# Patient Record
Sex: MALE | Race: BLACK OR AFRICAN AMERICAN | NOT HISPANIC OR LATINO | Employment: FULL TIME | URBAN - METROPOLITAN AREA
[De-identification: names, ages, dates, MRNs, and addresses within clinical notes are randomized per-mention and may not be internally consistent; named-entity substitution may affect disease eponyms.]

---

## 2017-04-19 ENCOUNTER — ALLSCRIPTS OFFICE VISIT (OUTPATIENT)
Dept: OTHER | Facility: OTHER | Age: 52
End: 2017-04-19

## 2018-01-14 VITALS
OXYGEN SATURATION: 98 % | DIASTOLIC BLOOD PRESSURE: 78 MMHG | TEMPERATURE: 98 F | HEART RATE: 97 BPM | WEIGHT: 228 LBS | SYSTOLIC BLOOD PRESSURE: 138 MMHG | HEIGHT: 65 IN | BODY MASS INDEX: 37.99 KG/M2 | RESPIRATION RATE: 18 BRPM

## 2019-05-14 ENCOUNTER — OFFICE VISIT (OUTPATIENT)
Dept: GASTROENTEROLOGY | Facility: CLINIC | Age: 54
End: 2019-05-14
Payer: COMMERCIAL

## 2019-05-14 VITALS
SYSTOLIC BLOOD PRESSURE: 150 MMHG | BODY MASS INDEX: 42.25 KG/M2 | HEIGHT: 65 IN | DIASTOLIC BLOOD PRESSURE: 105 MMHG | HEART RATE: 90 BPM | TEMPERATURE: 97.1 F | WEIGHT: 253.6 LBS

## 2019-05-14 DIAGNOSIS — Z12.11 COLON CANCER SCREENING: Primary | ICD-10-CM

## 2019-05-14 DIAGNOSIS — K21.9 GASTROESOPHAGEAL REFLUX DISEASE, ESOPHAGITIS PRESENCE NOT SPECIFIED: ICD-10-CM

## 2019-05-14 DIAGNOSIS — Z98.84 GASTRIC BYPASS STATUS FOR OBESITY: ICD-10-CM

## 2019-05-14 PROCEDURE — 99244 OFF/OP CNSLTJ NEW/EST MOD 40: CPT | Performed by: INTERNAL MEDICINE

## 2019-05-14 RX ORDER — AMLODIPINE BESYLATE 5 MG/1
1 TABLET ORAL DAILY
COMMUNITY
End: 2020-06-05

## 2019-05-14 RX ORDER — RANITIDINE 150 MG/1
150 TABLET ORAL DAILY
COMMUNITY
End: 2020-10-01 | Stop reason: ALTCHOICE

## 2019-05-14 RX ORDER — ALLOPURINOL 300 MG/1
1 TABLET ORAL AS NEEDED
COMMUNITY
End: 2020-12-01 | Stop reason: SDUPTHER

## 2019-05-14 RX ORDER — LANOLIN ALCOHOL/MO/W.PET/CERES
CREAM (GRAM) TOPICAL
COMMUNITY
End: 2020-12-01 | Stop reason: SDUPTHER

## 2019-05-14 RX ORDER — PANTOPRAZOLE SODIUM 40 MG/1
1 TABLET, DELAYED RELEASE ORAL 2 TIMES DAILY
COMMUNITY
Start: 2017-04-19 | End: 2020-10-01 | Stop reason: ALTCHOICE

## 2019-05-14 RX ORDER — SUCRALFATE ORAL 1 G/10ML
SUSPENSION ORAL 4 TIMES DAILY
COMMUNITY
End: 2020-12-01 | Stop reason: SDUPTHER

## 2019-05-24 ENCOUNTER — HOSPITAL ENCOUNTER (OUTPATIENT)
Dept: GASTROENTEROLOGY | Facility: HOSPITAL | Age: 54
Discharge: HOME | End: 2019-05-24
Attending: INTERNAL MEDICINE
Payer: COMMERCIAL

## 2019-05-24 VITALS
SYSTOLIC BLOOD PRESSURE: 143 MMHG | HEART RATE: 90 BPM | RESPIRATION RATE: 18 BRPM | OXYGEN SATURATION: 96 % | DIASTOLIC BLOOD PRESSURE: 90 MMHG

## 2019-05-24 DIAGNOSIS — K21.9 GASTROESOPHAGEAL REFLUX DISEASE, ESOPHAGITIS PRESENCE NOT SPECIFIED: ICD-10-CM

## 2019-05-24 PROCEDURE — 91020 GASTRIC MOTILITY STUDIES: CPT

## 2019-06-24 PROCEDURE — 91010 ESOPHAGUS MOTILITY STUDY: CPT | Performed by: INTERNAL MEDICINE

## 2019-07-01 ENCOUNTER — ANESTHESIA EVENT (OUTPATIENT)
Dept: GASTROENTEROLOGY | Facility: AMBULARY SURGERY CENTER | Age: 54
End: 2019-07-01

## 2019-07-14 NOTE — ANESTHESIA PREPROCEDURE EVALUATION
Review of Systems/Medical History          Cardiovascular  Hypertension ,    Pulmonary  Smoker cigar smoker  ,   Comment: Last night     GI/Hepatic    GERD , Bariatric surgery (2014),             Endo/Other    Obesity  morbid obesity   GYN       Hematology   Musculoskeletal    Comment: Gout      Neurology   Psychology           Physical Exam    Airway    Mallampati score: I  TM Distance: >3 FB  Neck ROM: full     Dental   No notable dental hx     Cardiovascular      Pulmonary      Other Findings        Anesthesia Plan  ASA Score- 3     Anesthesia Type- IV sedation with anesthesia with ASA Monitors  Additional Monitors:   Airway Plan:         Plan Factors-Patient not instructed to abstain from smoking on day of procedure  Patient did not smoke on day of surgery  Induction- intravenous  Postoperative Plan-     Informed Consent- Anesthetic plan and risks discussed with patient  I personally reviewed this patient with the CRNA  Discussed and agreed on the Anesthesia Plan with the CRNA  Galina Delgadillo

## 2019-07-15 ENCOUNTER — ANESTHESIA (OUTPATIENT)
Dept: GASTROENTEROLOGY | Facility: AMBULARY SURGERY CENTER | Age: 54
End: 2019-07-15

## 2019-07-15 ENCOUNTER — HOSPITAL ENCOUNTER (OUTPATIENT)
Dept: GASTROENTEROLOGY | Facility: AMBULARY SURGERY CENTER | Age: 54
Setting detail: OUTPATIENT SURGERY
Discharge: HOME | End: 2019-07-15
Attending: INTERNAL MEDICINE | Admitting: INTERNAL MEDICINE
Payer: COMMERCIAL

## 2019-07-15 VITALS
HEART RATE: 78 BPM | TEMPERATURE: 97 F | DIASTOLIC BLOOD PRESSURE: 93 MMHG | RESPIRATION RATE: 18 BRPM | WEIGHT: 250 LBS | HEIGHT: 65 IN | BODY MASS INDEX: 41.65 KG/M2 | OXYGEN SATURATION: 97 % | SYSTOLIC BLOOD PRESSURE: 146 MMHG

## 2019-07-15 DIAGNOSIS — Z12.11 COLON CANCER SCREENING: ICD-10-CM

## 2019-07-15 DIAGNOSIS — K21.9 GASTROESOPHAGEAL REFLUX DISEASE, ESOPHAGITIS PRESENCE NOT SPECIFIED: ICD-10-CM

## 2019-07-15 PROCEDURE — 43235 EGD DIAGNOSTIC BRUSH WASH: CPT | Performed by: INTERNAL MEDICINE

## 2019-07-15 RX ORDER — TRAMADOL HYDROCHLORIDE 50 MG/1
50 TABLET ORAL EVERY 6 HOURS PRN
COMMUNITY
End: 2021-07-18 | Stop reason: HOSPADM

## 2019-07-15 RX ORDER — SODIUM CHLORIDE, SODIUM LACTATE, POTASSIUM CHLORIDE, CALCIUM CHLORIDE 600; 310; 30; 20 MG/100ML; MG/100ML; MG/100ML; MG/100ML
125 INJECTION, SOLUTION INTRAVENOUS CONTINUOUS
Status: DISCONTINUED | OUTPATIENT
Start: 2019-07-15 | End: 2019-07-19 | Stop reason: HOSPADM

## 2019-07-15 RX ORDER — LIDOCAINE HYDROCHLORIDE 10 MG/ML
INJECTION, SOLUTION INFILTRATION; PERINEURAL AS NEEDED
Status: DISCONTINUED | OUTPATIENT
Start: 2019-07-15 | End: 2019-07-15 | Stop reason: SURG

## 2019-07-15 RX ORDER — PROPOFOL 10 MG/ML
INJECTION, EMULSION INTRAVENOUS AS NEEDED
Status: DISCONTINUED | OUTPATIENT
Start: 2019-07-15 | End: 2019-07-15 | Stop reason: SURG

## 2019-07-15 RX ADMIN — LIDOCAINE HYDROCHLORIDE ANHYDROUS 30 MG: 10 INJECTION, SOLUTION INFILTRATION at 11:59

## 2019-07-15 RX ADMIN — PROPOFOL 50 MG: 10 INJECTION, EMULSION INTRAVENOUS at 12:02

## 2019-07-15 RX ADMIN — SODIUM CHLORIDE, SODIUM LACTATE, POTASSIUM CHLORIDE, AND CALCIUM CHLORIDE: .6; .31; .03; .02 INJECTION, SOLUTION INTRAVENOUS at 11:33

## 2019-07-15 RX ADMIN — PROPOFOL 50 MG: 10 INJECTION, EMULSION INTRAVENOUS at 12:00

## 2019-07-15 RX ADMIN — PROPOFOL 50 MG: 10 INJECTION, EMULSION INTRAVENOUS at 11:59

## 2019-07-15 RX ADMIN — PROPOFOL 50 MG: 10 INJECTION, EMULSION INTRAVENOUS at 12:05

## 2019-07-15 NOTE — H&P
History and Physical -  [facility]  Sruthi Pereira 47 y o  male MRN: [id number]        HPI: Sruthi Pereira is a 47y o  year old male who presents for EGD for long standing GERD  REVIEW OF SYSTEMS: Per the HPI, and otherwise unremarkable  Historical Information   History reviewed  No pertinent past medical history  Past Surgical History:   Procedure Laterality Date    STOMACH SURGERY  2015    gastric sleeve     Social History   Social History     Substance and Sexual Activity   Alcohol Use Never    Frequency: Never     Social History     Substance and Sexual Activity   Drug Use Not on file     Social History     Tobacco Use   Smoking Status Light Tobacco Smoker   Smokeless Tobacco Never Used   Tobacco Comment    cigar ocasional     History reviewed  No pertinent family history  Meds/Allergies       (Not in a hospital admission)    No Known Allergies    Objective     There were no vitals taken for this visit  PHYSICAL EXAM    Gen: NAD  CV: RRR  CHEST: Clear  ABD: soft, NT/ND  EXT: no edema      ASSESSMENT/PLAN:  This is a 47y o  year old male here for EGD, and he is stable and optimized for his procedure

## 2019-07-15 NOTE — ANESTHESIA POSTPROCEDURE EVALUATION
Post-Op Assessment Note    CV Status:  Stable  Pain Score: 0    Pain management: adequate     Mental Status:  Alert and awake   Hydration Status:  Euvolemic   PONV Controlled:  Controlled   Airway Patency:  Patent   Post Op Vitals Reviewed: Yes      Staff: CRNA           BP   144/96   Temp     Pulse     Resp   18   SpO2   96 RA

## 2019-07-17 ENCOUNTER — TELEPHONE (OUTPATIENT)
Dept: GASTROENTEROLOGY | Facility: AMBULARY SURGERY CENTER | Age: 54
End: 2019-07-17

## 2019-07-17 NOTE — TELEPHONE ENCOUNTER
It's under procedures  Normal w/ large hiatal hernia  Agree needs to see bariatrics for possible repair    Sunshine Meyer

## 2019-12-03 ENCOUNTER — TELEPHONE (OUTPATIENT)
Dept: GASTROENTEROLOGY | Facility: AMBULARY SURGERY CENTER | Age: 54
End: 2019-12-03

## 2019-12-03 NOTE — TELEPHONE ENCOUNTER
Patients GI provider:  Dr Dhruv Clarke    Number to return call: [phone]    Reason for call: Pt spouse called requesting to schedule the hiatal repair procedure as discussed without going to bariatric  Please assist thank you    Scheduled procedure/appointment date if applicable: Apt/procedure   n/a

## 2019-12-05 NOTE — TELEPHONE ENCOUNTER
Left voicemail for patient or wife to call office  He has to go see a surgeon to get the hernia removed

## 2019-12-09 NOTE — TELEPHONE ENCOUNTER
Spoke to Howie and she is in the process of getting records over to Dr Vernon Fountain to schedule an appt

## 2019-12-10 DIAGNOSIS — Z98.84 BARIATRIC SURGERY STATUS: Primary | ICD-10-CM

## 2019-12-16 ENCOUNTER — HOSPITAL ENCOUNTER (INPATIENT)
Facility: HOSPITAL | Age: 54
LOS: 2 days | Discharge: HOME | DRG: 176 | End: 2019-12-18
Attending: EMERGENCY MEDICINE | Admitting: INTERNAL MEDICINE
Payer: COMMERCIAL

## 2019-12-16 ENCOUNTER — APPOINTMENT (EMERGENCY)
Dept: CT IMAGING | Facility: HOSPITAL | Age: 54
DRG: 176 | End: 2019-12-16
Payer: COMMERCIAL

## 2019-12-16 DIAGNOSIS — I26.99 PULMONARY EMBOLISM (HCC): ICD-10-CM

## 2019-12-16 DIAGNOSIS — F17.290 CIGAR SMOKER: ICD-10-CM

## 2019-12-16 DIAGNOSIS — I26.99 PE (PULMONARY THROMBOEMBOLISM) (HCC): Primary | ICD-10-CM

## 2019-12-16 LAB
ALBUMIN SERPL BCP-MCNC: 3.4 G/DL (ref 3.5–5)
ALP SERPL-CCNC: 87 U/L (ref 46–116)
ALT SERPL W P-5'-P-CCNC: 35 U/L (ref 12–78)
ANION GAP SERPL CALCULATED.3IONS-SCNC: 9 MMOL/L (ref 4–13)
APTT PPP: 31 SECONDS (ref 23–37)
AST SERPL W P-5'-P-CCNC: 19 U/L (ref 5–45)
BASOPHILS # BLD AUTO: 0.02 THOUSANDS/ΜL (ref 0–0.1)
BASOPHILS NFR BLD AUTO: 0 % (ref 0–1)
BILIRUB SERPL-MCNC: 0.24 MG/DL (ref 0.2–1)
BILIRUB UR QL STRIP: NEGATIVE
BUN SERPL-MCNC: 11 MG/DL (ref 5–25)
CALCIUM SERPL-MCNC: 8.9 MG/DL (ref 8.3–10.1)
CHLORIDE SERPL-SCNC: 104 MMOL/L (ref 100–108)
CLARITY UR: CLEAR
CO2 SERPL-SCNC: 26 MMOL/L (ref 21–32)
COLOR UR: YELLOW
CREAT SERPL-MCNC: 1.08 MG/DL (ref 0.6–1.3)
EOSINOPHIL # BLD AUTO: 0.12 THOUSAND/ΜL (ref 0–0.61)
EOSINOPHIL NFR BLD AUTO: 2 % (ref 0–6)
ERYTHROCYTE [DISTWIDTH] IN BLOOD BY AUTOMATED COUNT: 13.6 % (ref 11.6–15.1)
GFR SERPL CREATININE-BSD FRML MDRD: 90 ML/MIN/1.73SQ M
GLUCOSE SERPL-MCNC: 130 MG/DL (ref 65–140)
GLUCOSE UR STRIP-MCNC: NEGATIVE MG/DL
HCT VFR BLD AUTO: 41.7 % (ref 36.5–49.3)
HGB BLD-MCNC: 13.5 G/DL (ref 12–17)
HGB UR QL STRIP.AUTO: NEGATIVE
IMM GRANULOCYTES # BLD AUTO: 0.01 THOUSAND/UL (ref 0–0.2)
IMM GRANULOCYTES NFR BLD AUTO: 0 % (ref 0–2)
INR PPP: 0.96 (ref 0.84–1.19)
KETONES UR STRIP-MCNC: NEGATIVE MG/DL
LEUKOCYTE ESTERASE UR QL STRIP: NEGATIVE
LIPASE SERPL-CCNC: 115 U/L (ref 73–393)
LYMPHOCYTES # BLD AUTO: 0.88 THOUSANDS/ΜL (ref 0.6–4.47)
LYMPHOCYTES NFR BLD AUTO: 12 % (ref 14–44)
MCH RBC QN AUTO: 28.2 PG (ref 26.8–34.3)
MCHC RBC AUTO-ENTMCNC: 32.4 G/DL (ref 31.4–37.4)
MCV RBC AUTO: 87 FL (ref 82–98)
MONOCYTES # BLD AUTO: 0.57 THOUSAND/ΜL (ref 0.17–1.22)
MONOCYTES NFR BLD AUTO: 8 % (ref 4–12)
NEUTROPHILS # BLD AUTO: 5.7 THOUSANDS/ΜL (ref 1.85–7.62)
NEUTS SEG NFR BLD AUTO: 78 % (ref 43–75)
NITRITE UR QL STRIP: NEGATIVE
NRBC BLD AUTO-RTO: 0 /100 WBCS
PH UR STRIP.AUTO: 7 [PH]
PLATELET # BLD AUTO: 209 THOUSANDS/UL (ref 149–390)
PMV BLD AUTO: 10.4 FL (ref 8.9–12.7)
POTASSIUM SERPL-SCNC: 3.7 MMOL/L (ref 3.5–5.3)
PROT SERPL-MCNC: 7.3 G/DL (ref 6.4–8.2)
PROT UR STRIP-MCNC: NEGATIVE MG/DL
PROTHROMBIN TIME: 12.2 SECONDS (ref 11.6–14.5)
RBC # BLD AUTO: 4.79 MILLION/UL (ref 3.88–5.62)
SODIUM SERPL-SCNC: 139 MMOL/L (ref 136–145)
SP GR UR STRIP.AUTO: 1.02 (ref 1–1.03)
TROPONIN I SERPL-MCNC: 0.05 NG/ML
UROBILINOGEN UR QL STRIP.AUTO: 1 E.U./DL
WBC # BLD AUTO: 7.3 THOUSAND/UL (ref 4.31–10.16)

## 2019-12-16 PROCEDURE — 85730 THROMBOPLASTIN TIME PARTIAL: CPT | Performed by: EMERGENCY MEDICINE

## 2019-12-16 PROCEDURE — 93005 ELECTROCARDIOGRAM TRACING: CPT

## 2019-12-16 PROCEDURE — 96374 THER/PROPH/DIAG INJ IV PUSH: CPT

## 2019-12-16 PROCEDURE — 84484 ASSAY OF TROPONIN QUANT: CPT | Performed by: EMERGENCY MEDICINE

## 2019-12-16 PROCEDURE — 81003 URINALYSIS AUTO W/O SCOPE: CPT | Performed by: EMERGENCY MEDICINE

## 2019-12-16 PROCEDURE — 96361 HYDRATE IV INFUSION ADD-ON: CPT

## 2019-12-16 PROCEDURE — 71275 CT ANGIOGRAPHY CHEST: CPT

## 2019-12-16 PROCEDURE — 80053 COMPREHEN METABOLIC PANEL: CPT | Performed by: EMERGENCY MEDICINE

## 2019-12-16 PROCEDURE — 74177 CT ABD & PELVIS W/CONTRAST: CPT

## 2019-12-16 PROCEDURE — 99285 EMERGENCY DEPT VISIT HI MDM: CPT

## 2019-12-16 PROCEDURE — 99285 EMERGENCY DEPT VISIT HI MDM: CPT | Performed by: EMERGENCY MEDICINE

## 2019-12-16 PROCEDURE — 85025 COMPLETE CBC W/AUTO DIFF WBC: CPT | Performed by: EMERGENCY MEDICINE

## 2019-12-16 PROCEDURE — 36415 COLL VENOUS BLD VENIPUNCTURE: CPT | Performed by: EMERGENCY MEDICINE

## 2019-12-16 PROCEDURE — 83690 ASSAY OF LIPASE: CPT | Performed by: EMERGENCY MEDICINE

## 2019-12-16 PROCEDURE — 85610 PROTHROMBIN TIME: CPT | Performed by: EMERGENCY MEDICINE

## 2019-12-16 RX ORDER — HEPARIN SODIUM 1000 [USP'U]/ML
4600 INJECTION, SOLUTION INTRAVENOUS; SUBCUTANEOUS AS NEEDED
Status: DISCONTINUED | OUTPATIENT
Start: 2019-12-16 | End: 2019-12-18

## 2019-12-16 RX ORDER — HEPARIN SODIUM 1000 [USP'U]/ML
9200 INJECTION, SOLUTION INTRAVENOUS; SUBCUTANEOUS AS NEEDED
Status: DISCONTINUED | OUTPATIENT
Start: 2019-12-16 | End: 2019-12-18

## 2019-12-16 RX ORDER — KETOROLAC TROMETHAMINE 30 MG/ML
15 INJECTION, SOLUTION INTRAMUSCULAR; INTRAVENOUS ONCE
Status: COMPLETED | OUTPATIENT
Start: 2019-12-16 | End: 2019-12-16

## 2019-12-16 RX ORDER — HEPARIN SODIUM 10000 [USP'U]/100ML
3-30 INJECTION, SOLUTION INTRAVENOUS
Status: DISCONTINUED | OUTPATIENT
Start: 2019-12-16 | End: 2019-12-18

## 2019-12-16 RX ORDER — HEPARIN SODIUM 1000 [USP'U]/ML
9200 INJECTION, SOLUTION INTRAVENOUS; SUBCUTANEOUS ONCE
Status: COMPLETED | OUTPATIENT
Start: 2019-12-16 | End: 2019-12-16

## 2019-12-16 RX ADMIN — KETOROLAC TROMETHAMINE 15 MG: 30 INJECTION, SOLUTION INTRAMUSCULAR at 19:46

## 2019-12-16 RX ADMIN — IOHEXOL 100 ML: 350 INJECTION, SOLUTION INTRAVENOUS at 20:58

## 2019-12-16 RX ADMIN — HEPARIN SODIUM 18 UNITS/KG/HR: 10000 INJECTION, SOLUTION INTRAVENOUS at 22:31

## 2019-12-16 RX ADMIN — SODIUM CHLORIDE 1000 ML: 0.9 INJECTION, SOLUTION INTRAVENOUS at 19:42

## 2019-12-16 RX ADMIN — HEPARIN SODIUM 9200 UNITS: 1000 INJECTION INTRAVENOUS; SUBCUTANEOUS at 22:30

## 2019-12-16 NOTE — LETTER
[facility]  FLOOR MED SURG UNIT  [address] [place] [address]  Dept: [phone]    December 19, 2019     Patient: Kiara Moran   YOB: 1965   Date of Visit: 12/16/2019       To Whom it May Concern:    Allison President was under my professional care  He was seen in the hospital from 12/16/2019   to 12/19/19  He may return to work after 12/26/19 with the following limitations; No heavy lifting or exertional work  If you have any questions or concerns, please don't hesitate to call           Sincerely,          John Najera MD

## 2019-12-17 ENCOUNTER — APPOINTMENT (INPATIENT)
Dept: ULTRASOUND IMAGING | Facility: HOSPITAL | Age: 54
DRG: 176 | End: 2019-12-17
Payer: COMMERCIAL

## 2019-12-17 PROBLEM — Z90.3 HISTORY OF SLEEVE GASTRECTOMY: Status: ACTIVE | Noted: 2019-12-17

## 2019-12-17 PROBLEM — I10 HTN (HYPERTENSION): Status: ACTIVE | Noted: 2019-12-17

## 2019-12-17 PROBLEM — D86.9 SARCOIDOSIS: Status: ACTIVE | Noted: 2019-12-17

## 2019-12-17 PROBLEM — R91.1 INCIDENTAL LUNG NODULE, > 3MM AND < 8MM: Status: ACTIVE | Noted: 2019-12-17

## 2019-12-17 PROBLEM — N28.9 RENAL LESION: Status: ACTIVE | Noted: 2019-12-17

## 2019-12-17 PROBLEM — F17.290 CIGAR SMOKER: Status: ACTIVE | Noted: 2019-12-17

## 2019-12-17 PROBLEM — K21.9 GERD (GASTROESOPHAGEAL REFLUX DISEASE): Status: ACTIVE | Noted: 2019-12-17

## 2019-12-17 PROBLEM — I26.99 PULMONARY EMBOLISM (HCC): Status: ACTIVE | Noted: 2019-12-17

## 2019-12-17 LAB
ANION GAP SERPL CALCULATED.3IONS-SCNC: 10 MMOL/L (ref 4–13)
APTT PPP: 146 SECONDS (ref 23–37)
APTT PPP: 67 SECONDS (ref 23–37)
APTT PPP: 92 SECONDS (ref 23–37)
ATRIAL RATE: 107 BPM
BASOPHILS # BLD AUTO: 0.03 THOUSANDS/ΜL (ref 0–0.1)
BASOPHILS NFR BLD AUTO: 0 % (ref 0–1)
BUN SERPL-MCNC: 9 MG/DL (ref 5–25)
CALCIUM SERPL-MCNC: 8.4 MG/DL (ref 8.3–10.1)
CHLORIDE SERPL-SCNC: 103 MMOL/L (ref 100–108)
CO2 SERPL-SCNC: 26 MMOL/L (ref 21–32)
CREAT SERPL-MCNC: 0.83 MG/DL (ref 0.6–1.3)
EOSINOPHIL # BLD AUTO: 0.17 THOUSAND/ΜL (ref 0–0.61)
EOSINOPHIL NFR BLD AUTO: 3 % (ref 0–6)
ERYTHROCYTE [DISTWIDTH] IN BLOOD BY AUTOMATED COUNT: 13.9 % (ref 11.6–15.1)
GFR SERPL CREATININE-BSD FRML MDRD: 116 ML/MIN/1.73SQ M
GLUCOSE SERPL-MCNC: 90 MG/DL (ref 65–140)
HCT VFR BLD AUTO: 42.3 % (ref 36.5–49.3)
HGB BLD-MCNC: 13.3 G/DL (ref 12–17)
IMM GRANULOCYTES # BLD AUTO: 0.03 THOUSAND/UL (ref 0–0.2)
IMM GRANULOCYTES NFR BLD AUTO: 0 % (ref 0–2)
LYMPHOCYTES # BLD AUTO: 0.94 THOUSANDS/ΜL (ref 0.6–4.47)
LYMPHOCYTES NFR BLD AUTO: 14 % (ref 14–44)
MCH RBC QN AUTO: 27.8 PG (ref 26.8–34.3)
MCHC RBC AUTO-ENTMCNC: 31.4 G/DL (ref 31.4–37.4)
MCV RBC AUTO: 89 FL (ref 82–98)
MONOCYTES # BLD AUTO: 0.69 THOUSAND/ΜL (ref 0.17–1.22)
MONOCYTES NFR BLD AUTO: 10 % (ref 4–12)
NEUTROPHILS # BLD AUTO: 4.82 THOUSANDS/ΜL (ref 1.85–7.62)
NEUTS SEG NFR BLD AUTO: 73 % (ref 43–75)
NRBC BLD AUTO-RTO: 0 /100 WBCS
P AXIS: 58 DEGREES
PLATELET # BLD AUTO: 197 THOUSANDS/UL (ref 149–390)
PMV BLD AUTO: 11.4 FL (ref 8.9–12.7)
POTASSIUM SERPL-SCNC: 3.5 MMOL/L (ref 3.5–5.3)
PR INTERVAL: 134 MS
QRS AXIS: 5 DEGREES
QRSD INTERVAL: 84 MS
QT INTERVAL: 316 MS
QTC INTERVAL: 414 MS
RBC # BLD AUTO: 4.78 MILLION/UL (ref 3.88–5.62)
SODIUM SERPL-SCNC: 139 MMOL/L (ref 136–145)
T WAVE AXIS: 38 DEGREES
TROPONIN I SERPL-MCNC: 0.04 NG/ML
TROPONIN I SERPL-MCNC: 0.04 NG/ML
VENTRICULAR RATE: 103 BPM
WBC # BLD AUTO: 6.68 THOUSAND/UL (ref 4.31–10.16)

## 2019-12-17 PROCEDURE — 93010 ELECTROCARDIOGRAM REPORT: CPT | Performed by: INTERNAL MEDICINE

## 2019-12-17 PROCEDURE — 84484 ASSAY OF TROPONIN QUANT: CPT | Performed by: NURSE PRACTITIONER

## 2019-12-17 PROCEDURE — 85730 THROMBOPLASTIN TIME PARTIAL: CPT | Performed by: NURSE PRACTITIONER

## 2019-12-17 PROCEDURE — 85730 THROMBOPLASTIN TIME PARTIAL: CPT | Performed by: INTERNAL MEDICINE

## 2019-12-17 PROCEDURE — 85025 COMPLETE CBC W/AUTO DIFF WBC: CPT | Performed by: NURSE PRACTITIONER

## 2019-12-17 PROCEDURE — 99223 1ST HOSP IP/OBS HIGH 75: CPT | Performed by: INTERNAL MEDICINE

## 2019-12-17 PROCEDURE — 80048 BASIC METABOLIC PNL TOTAL CA: CPT | Performed by: NURSE PRACTITIONER

## 2019-12-17 PROCEDURE — 93970 EXTREMITY STUDY: CPT | Performed by: SURGERY

## 2019-12-17 PROCEDURE — 93970 EXTREMITY STUDY: CPT

## 2019-12-17 RX ORDER — TRAMADOL HYDROCHLORIDE 50 MG/1
50 TABLET ORAL EVERY 6 HOURS PRN
Status: DISCONTINUED | OUTPATIENT
Start: 2019-12-17 | End: 2019-12-17

## 2019-12-17 RX ORDER — SUCRALFATE ORAL 1 G/10ML
1000 SUSPENSION ORAL
Status: DISCONTINUED | OUTPATIENT
Start: 2019-12-17 | End: 2019-12-18 | Stop reason: HOSPADM

## 2019-12-17 RX ORDER — FAMOTIDINE 20 MG/1
20 TABLET, FILM COATED ORAL DAILY
Status: DISCONTINUED | OUTPATIENT
Start: 2019-12-17 | End: 2019-12-18 | Stop reason: HOSPADM

## 2019-12-17 RX ORDER — PANTOPRAZOLE SODIUM 40 MG/1
40 TABLET, DELAYED RELEASE ORAL
Status: DISCONTINUED | OUTPATIENT
Start: 2019-12-17 | End: 2019-12-18 | Stop reason: HOSPADM

## 2019-12-17 RX ORDER — ACETAMINOPHEN 325 MG/1
975 TABLET ORAL EVERY 8 HOURS SCHEDULED
Status: DISCONTINUED | OUTPATIENT
Start: 2019-12-17 | End: 2019-12-18 | Stop reason: HOSPADM

## 2019-12-17 RX ORDER — AMLODIPINE BESYLATE 5 MG/1
5 TABLET ORAL DAILY
Status: DISCONTINUED | OUTPATIENT
Start: 2019-12-17 | End: 2019-12-18 | Stop reason: HOSPADM

## 2019-12-17 RX ORDER — ACETAMINOPHEN 325 MG/1
650 TABLET ORAL EVERY 6 HOURS PRN
Status: DISCONTINUED | OUTPATIENT
Start: 2019-12-17 | End: 2019-12-17

## 2019-12-17 RX ORDER — OXYCODONE HYDROCHLORIDE 5 MG/1
5 TABLET ORAL EVERY 4 HOURS PRN
Status: DISCONTINUED | OUTPATIENT
Start: 2019-12-17 | End: 2019-12-18 | Stop reason: HOSPADM

## 2019-12-17 RX ORDER — OXYCODONE HYDROCHLORIDE 10 MG/1
10 TABLET ORAL EVERY 4 HOURS PRN
Status: DISCONTINUED | OUTPATIENT
Start: 2019-12-17 | End: 2019-12-18 | Stop reason: HOSPADM

## 2019-12-17 RX ADMIN — OXYCODONE HYDROCHLORIDE 10 MG: 10 TABLET ORAL at 16:35

## 2019-12-17 RX ADMIN — TRAMADOL HYDROCHLORIDE 50 MG: 50 TABLET ORAL at 14:15

## 2019-12-17 RX ADMIN — FAMOTIDINE 20 MG: 20 TABLET, FILM COATED ORAL at 08:21

## 2019-12-17 RX ADMIN — PANTOPRAZOLE SODIUM 40 MG: 40 TABLET, DELAYED RELEASE ORAL at 16:01

## 2019-12-17 RX ADMIN — PANTOPRAZOLE SODIUM 40 MG: 40 TABLET, DELAYED RELEASE ORAL at 06:33

## 2019-12-17 RX ADMIN — SUCRALFATE 1000 MG: 1 SUSPENSION ORAL at 01:03

## 2019-12-17 RX ADMIN — SUCRALFATE 1000 MG: 1 SUSPENSION ORAL at 08:21

## 2019-12-17 RX ADMIN — TRAMADOL HYDROCHLORIDE 50 MG: 50 TABLET ORAL at 04:28

## 2019-12-17 RX ADMIN — Medication 1 TABLET: at 08:21

## 2019-12-17 RX ADMIN — ACETAMINOPHEN 975 MG: 325 TABLET, FILM COATED ORAL at 21:54

## 2019-12-17 RX ADMIN — ACETAMINOPHEN 650 MG: 325 TABLET, FILM COATED ORAL at 08:21

## 2019-12-17 RX ADMIN — SUCRALFATE 1000 MG: 1 SUSPENSION ORAL at 21:53

## 2019-12-17 RX ADMIN — AMLODIPINE BESYLATE 5 MG: 5 TABLET ORAL at 08:21

## 2019-12-17 RX ADMIN — SUCRALFATE 1000 MG: 1 SUSPENSION ORAL at 16:03

## 2019-12-17 NOTE — ASSESSMENT & PLAN NOTE
· CT PE study displayed an incidental finding of a 4 mm right lower lobe nodule  Recommend repeat imaging in 12 months  Follow up outpatient  · Patient is a current cigar smoker

## 2019-12-17 NOTE — PLAN OF CARE
Problem: PAIN - ADULT  Goal: Verbalizes/displays adequate comfort level or baseline comfort level  Description  Interventions:  - Encourage patient to monitor pain and request assistance  - Assess pain using appropriate pain scale  - Administer analgesics based on type and severity of pain and evaluate response  - Implement non-pharmacological measures as appropriate and evaluate response  - Consider cultural and social influences on pain and pain management  - Notify physician/advanced practitioner if interventions unsuccessful or patient reports new pain  Outcome: Progressing     Problem: INFECTION - ADULT  Goal: Absence or prevention of progression during hospitalization  Description  INTERVENTIONS:  - Assess and monitor for signs and symptoms of infection  - Monitor lab/diagnostic results  - Monitor all insertion sites, i e  indwelling lines, tubes, and drains  - Monitor endotracheal if appropriate and nasal secretions for changes in amount and color  - [place] appropriate cooling/warming therapies per order  - Administer medications as ordered  - Instruct and encourage patient and family to use good hand hygiene technique  - Identify and instruct in appropriate isolation precautions for identified infection/condition  Outcome: Progressing  Goal: Absence of fever/infection during neutropenic period  Description  INTERVENTIONS:  - Monitor WBC    Outcome: Progressing     Problem: SAFETY ADULT  Goal: Patient will remain free of falls  Description  INTERVENTIONS:  - Assess patient frequently for physical needs  -  Identify cognitive and physical deficits and behaviors that affect risk of falls    -  [place] fall precautions as indicated by assessment   - Educate patient/family on patient safety including physical limitations  - Instruct patient to call for assistance with activity based on assessment  - Modify environment to reduce risk of injury  - Consider OT/PT consult to assist with strengthening/mobility  Outcome: Progressing  Goal: Maintain or return to baseline ADL function  Description  INTERVENTIONS:  -  Assess patient's ability to carry out ADLs; assess patient's baseline for ADL function and identify physical deficits which impact ability to perform ADLs (bathing, care of mouth/teeth, toileting, grooming, dressing, etc )  - Assess/evaluate cause of self-care deficits   - Assess range of motion  - Assess patient's mobility; develop plan if impaired  - Assess patient's need for assistive devices and provide as appropriate  - Encourage maximum independence but intervene and supervise when necessary  - Involve family in performance of ADLs  - Assess for home care needs following discharge   - Consider OT consult to assist with ADL evaluation and planning for discharge  - Provide patient education as appropriate  Outcome: Progressing  Goal: Maintain or return mobility status to optimal level  Description  INTERVENTIONS:  - Assess patient's baseline mobility status (ambulation, transfers, stairs, etc )    - Identify cognitive and physical deficits and behaviors that affect mobility  - Identify mobility aids required to assist with transfers and/or ambulation (gait belt, sit-to-stand, lift, walker, cane, etc )  - [place] fall precautions as indicated by assessment  - Record patient progress and toleration of activity level on Mobility SBAR; progress patient to next Phase/Stage  - Instruct patient to call for assistance with activity based on assessment  - Consider rehabilitation consult to assist with strengthening/weightbearing, etc   Outcome: Progressing     Problem: DISCHARGE PLANNING  Goal: Discharge to home or other facility with appropriate resources  Description  INTERVENTIONS:  - Identify barriers to discharge w/patient and caregiver  - Arrange for needed discharge resources and transportation as appropriate  - Identify discharge learning needs (meds, wound care, etc )  - Arrange for interpretive services to assist at discharge as needed  - Refer to Case Management Department for coordinating discharge planning if the patient needs post-hospital services based on physician/advanced practitioner order or complex needs related to functional status, cognitive ability, or social support system  Outcome: Progressing     Problem: Knowledge Deficit  Goal: Patient/family/caregiver demonstrates understanding of disease process, treatment plan, medications, and discharge instructions  Description  Complete learning assessment and assess knowledge base  Interventions:  - Provide teaching at level of understanding  - Provide teaching via preferred learning methods  Outcome: Progressing     Problem: Potential for Falls  Goal: Patient will remain free of falls  Description  INTERVENTIONS:  - Assess patient frequently for physical needs  -  Identify cognitive and physical deficits and behaviors that affect risk of falls    -  [place] fall precautions as indicated by assessment   - Educate patient/family on patient safety including physical limitations  - Instruct patient to call for assistance with activity based on assessment  - Modify environment to reduce risk of injury  - Consider OT/PT consult to assist with strengthening/mobility  Outcome: Progressing

## 2019-12-17 NOTE — ASSESSMENT & PLAN NOTE
· CT PE study displayed an incidental find of a 3 3 cm right renal cystic lesion, does not meet CT criteria for simple cyst on this exam, recommend elective ultrasound or MRI abdomen with contrast for further evaluation  · Follow up outpatient

## 2019-12-17 NOTE — ASSESSMENT & PLAN NOTE
· Patient presents with right flank/pleural pain that started on Sunday while watching football  · He reports that while at work on Monday, he experienced dyspnea with exertion and worsening right flank/pleural pain  · Patient works as a  for the [employer]  · Patient denies any recent travel or recent surgical procedures  · Patient reports that he smokes cigars occasionally  · Tachycardic on admission, heart rate in the 110s, however, this has resolved  · CT PE study:  Right lower lobe segmental and subsegmental pulmonary emboli are noted pulmonary arterial tree  3 3 cm right renal cystic lesion  4 mm nodule in right lower lobe possibly a fissural nodule  · Obtain Duplex of bilateral lower extremities, patient complained of intermittent right thigh pain x7 days  · Not requiring supplemental oxygen at this time  Currently on room air at 96%  · Continue IV heparin drip  Plan to transition to oral anticoagulant  · Pain management: Tramadol and Tylenol PRN  · Continue to monitor

## 2019-12-17 NOTE — H&P
[address] [facility]    H&P- Remediosnuriagume Guy 1965, 49 y o  male MRN: [id number]    Unit/Bed#: S -01 Encounter: [id number]    Primary Care Provider: Damian Hagan MD   Date and time admitted to hospital: 12/16/2019  7:14 PM      * Pulmonary embolism [facility])  Assessment & Plan  · Patient presents with right flank/pleural pain that started on Sunday while watching football  · He reports that while at work on Monday, he experienced dyspnea with exertion and worsening right flank/pleural pain  · Patient works as a  for the [employer]  · Patient denies any recent travel or recent surgical procedures  · Patient reports that he smokes cigars occasionally  · Tachycardic on admission, heart rate in the 110s, however, this has resolved  · CT PE study:  Right lower lobe segmental and subsegmental pulmonary emboli are noted pulmonary arterial tree  3 3 cm right renal cystic lesion  4 mm nodule in right lower lobe possibly a fissural nodule  · Obtain Duplex of bilateral lower extremities, patient complained of intermittent right thigh pain x7 days  · Not requiring supplemental oxygen at this time  Currently on room air at 96%  · Continue IV heparin drip  Plan to transition to oral anticoagulant  · Pain management: Tramadol and Tylenol PRN  · Continue to monitor  GERD (gastroesophageal reflux disease)  Assessment & Plan  · Continue Pepcid, Protonix, and Carafate  History of sleeve gastrectomy  Assessment & Plan  · Follows outpatient with Dr Tamiko Lynch of Gastroenterology  · Continue outpatient follow up  Sarcoidosis  Assessment & Plan  · Patient reports he was diagnosed in 2004  · He denies any active treatment at this time  Incidental lung nodule, > 3mm and < 8mm  Assessment & Plan  · CT PE study displayed an incidental finding of a 4 mm right lower lobe nodule  Recommend repeat imaging in 12 months  Follow up outpatient  · Patient is a current cigar smoker      Renal lesion  Assessment & Plan  · CT PE study displayed an incidental find of a 3 3 cm right renal cystic lesion, does not meet CT criteria for simple cyst on this exam, recommend elective ultrasound or MRI abdomen with contrast for further evaluation  · Follow up outpatient  HTN (hypertension)  Assessment & Plan  · Blood pressures of 140s/80-150s/90s  · Continue Norvasc  · Continue to monitor  Cigar smoker  Assessment & Plan  · Encouraged smoking cessation  VTE Prophylaxis: Heparin Drip  / sequential compression device   Code Status: Full  POLST: POLST form is not discussed and not completed at this time  Discussion with family: Wife at bedside    Anticipated Length of Stay:  Patient will be admitted on an Inpatient basis with an anticipated length of stay of  > 2 midnights  Justification for Hospital Stay: IV heparin drip bridge to PO anticoagulant, CM consult  Total Time for Visit, including Counseling / Coordination of Care: 1 hour  Greater than 50% of this total time spent on direct patient counseling and coordination of care  Chief Complaint:   Right flank pain and shortness of breath    History of Present Illness:    Samara Sloan is a 49 y o  male with a past medical history of HTN, GERD, sarcoidosis, gastric sleeve, who presents with right flank pain and shortness of breath that started 2 days ago  Patient reports that on Sunday he began to experience right flank pain while watching football  He reports that while at work on Monday he began to have shortness of breath and increased right flank pain  He reports that he works as a  for the [place] [employer]  While working, he was unable to close the truck door  He presented to the ER for further evaluation  Additionally, he reports right thigh pain intermittently that began about one week ago  He currently denies right thigh pain at this time  He denies chest pain, chest tightness, cough, palpitations, nausea, vomiting, fever or chills   He reports that he smoke cigars weekly  He denies alcohol use  He denies any recent travel or any recent surgical procedures  Review of Systems:    Review of Systems   Constitutional: Negative for activity change, appetite change, chills, diaphoresis, fatigue and fever  Respiratory: Negative for cough, chest tightness and shortness of breath  Cardiovascular: Negative for chest pain, palpitations and leg swelling  Gastrointestinal: Negative for abdominal pain, constipation, diarrhea, nausea and vomiting  Genitourinary: Negative for dysuria, frequency and hematuria  Musculoskeletal: Positive for back pain  Negative for arthralgias  Neurological: Negative for dizziness, syncope, weakness, light-headedness and headaches  All other systems reviewed and are negative  Past Medical and Surgical History:     Past Medical History:   Diagnosis Date    GERD (gastroesophageal reflux disease)     Hypertension        Past Surgical History:   Procedure Laterality Date    FOOT SURGERY      HAND SURGERY      STOMACH SURGERY  2015    gastric sleeve       Meds/Allergies:    Prior to Admission medications    Medication Sig Start Date End Date Taking?  Authorizing Provider   allopurinol (ZYLOPRIM) 300 mg tablet Take 1 tablet by mouth as needed    Yes Historical Provider, MD   amLODIPine (NORVASC) 5 mg tablet Take 1 tablet by mouth daily   Yes Historical Provider, MD   calcium citrate-vitamin D (CITRACAL+D) 315-200 MG-UNIT per tablet Take by mouth   Yes Historical Provider, MD   Multiple Vitamins-Minerals (MULTIVITAMIN ADULTS PO) Take 1 tablet by mouth daily   Yes Historical Provider, MD   pantoprazole (PROTONIX) 40 mg tablet Take 1 tablet by mouth 2 (two) times a day  4/19/17  Yes Historical Provider, MD   ranitidine (ZANTAC) 150 mg tablet Take 150 mg by mouth daily    Yes Historical Provider, MD   sucralfate (CARAFATE) 1 g/10 mL suspension Take by mouth 4 (four) times a day   Yes Historical Provider, MD   traMADol (ULTRAM) 50 mg tablet Take 50 mg by mouth every 6 (six) hours as needed for moderate pain    Historical Provider, MD     I have reviewed home medications with patient personally  Allergies: No Known Allergies    Social History:     Marital Status: /Civil Union   Occupation:   Patient Pre-hospital Living Situation: Private residence with wife  Patient Pre-hospital Level of Mobility: Independent  Patient Pre-hospital Diet Restrictions: None  Substance Use History:   Social History     Substance and Sexual Activity   Alcohol Use Never    Frequency: Never     Social History     Tobacco Use   Smoking Status Light Tobacco Smoker   Smokeless Tobacco Never Used   Tobacco Comment    cigar ocasional     Social History     Substance and Sexual Activity   Drug Use Never       Family History:    non-contributory    Physical Exam:     Vitals:   Blood Pressure: 158/96 (12/16/19 2304)  Pulse: 91 (12/16/19 2304)  Temperature: 97 8 °F (36 6 °C) (12/16/19 2304)  Temp Source: Oral (12/16/19 2304)  Respirations: 18 (12/16/19 2304)  Height: 5' 5" (165 1 cm) (12/16/19 1919)  Weight - Scale: 115 kg (253 lb 8 5 oz) (12/16/19 2128)  SpO2: 96 % (12/16/19 2304)    Physical Exam   Constitutional: He is oriented to person, place, and time  He appears well-developed and well-nourished  HENT:   Head: Normocephalic and atraumatic  Nose: Nose normal    Mouth/Throat: Oropharynx is clear and moist    Eyes: Pupils are equal, round, and reactive to light  Conjunctivae are normal  Right eye exhibits no discharge  Left eye exhibits no discharge  Neck: Normal range of motion  Neck supple  No tracheal deviation present  Cardiovascular: Normal rate, regular rhythm, normal heart sounds and intact distal pulses  Exam reveals no gallop and no friction rub  No murmur heard  Pulmonary/Chest: Effort normal and breath sounds normal  No respiratory distress  He has no wheezes  He has no rales  He exhibits no tenderness  Abdominal: Soft   Bowel sounds are normal  There is no tenderness  Musculoskeletal: Normal range of motion  He exhibits no edema  Neurological: He is alert and oriented to person, place, and time  Skin: Skin is warm and dry  Capillary refill takes less than 2 seconds  No rash noted  Psychiatric: He has a normal mood and affect  His behavior is normal  Judgment and thought content normal    Nursing note and vitals reviewed  Additional Data:     Lab Results: I have personally reviewed pertinent reports  Results from last 7 days   Lab Units 12/16/19  1941   WBC Thousand/uL 7 30   HEMOGLOBIN g/dL 13 5   HEMATOCRIT % 41 7   PLATELETS Thousands/uL 209   NEUTROS PCT % 78*   LYMPHS PCT % 12*   MONOS PCT % 8   EOS PCT % 2     Results from last 7 days   Lab Units 12/16/19  1941   SODIUM mmol/L 139   POTASSIUM mmol/L 3 7   CHLORIDE mmol/L 104   CO2 mmol/L 26   BUN mg/dL 11   CREATININE mg/dL 1 08   ANION GAP mmol/L 9   CALCIUM mg/dL 8 9   ALBUMIN g/dL 3 4*   TOTAL BILIRUBIN mg/dL 0 24   ALK PHOS U/L 87   ALT U/L 35   AST U/L 19   GLUCOSE RANDOM mg/dL 130     Results from last 7 days   Lab Units 12/16/19  1941   INR  0 96                   Imaging: I have personally reviewed pertinent reports  CT pe study w abdomen pelvis w contrast   Final Result by Miguel Hicks MD (12/16 5673)      3 3 cm right renal cystic lesion renal cystic lesion which does not meet CT criteria for simple cyst on this examination; recommend elective ultrasound or MRI abdomen with contrast, renal protocol, for further evaluation  4 mm nodule in the right lower lobe possibly a fissural nodule  Based on current Fleischner Society 2017 Guidelines on incidental pulmonary nodule, no routine follow-up is needed if the patient is considered low risk for lung cancer  If the patient is    considered high risk for lung cancer, 12 month follow-up non-contrast chest CT is recommended         I personally discussed this study with Mary Jo Motley on 12/16/2019 at 9:47 PM  Workstation performed: [id number]         VAS lower limb venous duplex study, complete bilateral    (Results Pending)       EKG, Pathology, and Other Studies Reviewed on Admission:   · EKG: Sinus tachycardia with occasional PVCs, heart rate 103    Allscripts / Epic Records Reviewed: Yes     ** Please Note: This note has been constructed using a voice recognition system   **

## 2019-12-17 NOTE — PLAN OF CARE
Problem: PAIN - ADULT  Goal: Verbalizes/displays adequate comfort level or baseline comfort level  Description  Interventions:  - Encourage patient to monitor pain and request assistance  - Assess pain using appropriate pain scale  - Administer analgesics based on type and severity of pain and evaluate response  - Implement non-pharmacological measures as appropriate and evaluate response  - Consider cultural and social influences on pain and pain management  - Notify physician/advanced practitioner if interventions unsuccessful or patient reports new pain  Outcome: Progressing     Problem: INFECTION - ADULT  Goal: Absence or prevention of progression during hospitalization  Description  INTERVENTIONS:  - Assess and monitor for signs and symptoms of infection  - Monitor lab/diagnostic results  - Monitor all insertion sites, i e  indwelling lines, tubes, and drains  - Monitor endotracheal if appropriate and nasal secretions for changes in amount and color  - [place] appropriate cooling/warming therapies per order  - Administer medications as ordered  - Instruct and encourage patient and family to use good hand hygiene technique  - Identify and instruct in appropriate isolation precautions for identified infection/condition  Outcome: Progressing  Goal: Absence of fever/infection during neutropenic period  Description  INTERVENTIONS:  - Monitor WBC    Outcome: Progressing     Problem: SAFETY ADULT  Goal: Patient will remain free of falls  Description  INTERVENTIONS:  - Assess patient frequently for physical needs  -  Identify cognitive and physical deficits and behaviors that affect risk of falls    -  [place] fall precautions as indicated by assessment   - Educate patient/family on patient safety including physical limitations  - Instruct patient to call for assistance with activity based on assessment  - Modify environment to reduce risk of injury  - Consider OT/PT consult to assist with strengthening/mobility  Outcome: Progressing  Goal: Maintain or return to baseline ADL function  Description  INTERVENTIONS:  -  Assess patient's ability to carry out ADLs; assess patient's baseline for ADL function and identify physical deficits which impact ability to perform ADLs (bathing, care of mouth/teeth, toileting, grooming, dressing, etc )  - Assess/evaluate cause of self-care deficits   - Assess range of motion  - Assess patient's mobility; develop plan if impaired  - Assess patient's need for assistive devices and provide as appropriate  - Encourage maximum independence but intervene and supervise when necessary  - Involve family in performance of ADLs  - Assess for home care needs following discharge   - Consider OT consult to assist with ADL evaluation and planning for discharge  - Provide patient education as appropriate  Outcome: Progressing  Goal: Maintain or return mobility status to optimal level  Description  INTERVENTIONS:  - Assess patient's baseline mobility status (ambulation, transfers, stairs, etc )    - Identify cognitive and physical deficits and behaviors that affect mobility  - Identify mobility aids required to assist with transfers and/or ambulation (gait belt, sit-to-stand, lift, walker, cane, etc )  - [place] fall precautions as indicated by assessment  - Record patient progress and toleration of activity level on Mobility SBAR; progress patient to next Phase/Stage  - Instruct patient to call for assistance with activity based on assessment  - Consider rehabilitation consult to assist with strengthening/weightbearing, etc   Outcome: Progressing     Problem: DISCHARGE PLANNING  Goal: Discharge to home or other facility with appropriate resources  Description  INTERVENTIONS:  - Identify barriers to discharge w/patient and caregiver  - Arrange for needed discharge resources and transportation as appropriate  - Identify discharge learning needs (meds, wound care, etc )  - Arrange for interpretive services to assist at discharge as needed  - Refer to Case Management Department for coordinating discharge planning if the patient needs post-hospital services based on physician/advanced practitioner order or complex needs related to functional status, cognitive ability, or social support system  Outcome: Progressing     Problem: Knowledge Deficit  Goal: Patient/family/caregiver demonstrates understanding of disease process, treatment plan, medications, and discharge instructions  Description  Complete learning assessment and assess knowledge base    Interventions:  - Provide teaching at level of understanding  - Provide teaching via preferred learning methods  Outcome: Progressing

## 2019-12-17 NOTE — UTILIZATION REVIEW
Initial Clinical Review    Admission: Date/Time/Statement: Inpatient Admission Orders (From admission, onward)     Ordered        12/16/19 2156  Inpatient Admission  Once                   Orders Placed This Encounter   Procedures    Inpatient Admission     Standing Status:   Standing     Number of Occurrences:   1     Order Specific Question:   Admitting Physician     Answer:   Elvis River [[id number]]     Order Specific Question:   Level of Care     Answer:   Med Surg [[id number]]     Order Specific Question:   Estimated length of stay     Answer:   More than 2 Midnights     Order Specific Question:   Certification     Answer:   I certify that inpatient services are medically necessary for this patient for a duration of greater than two midnights  See H&P and MD Progress Notes for additional information about the patient's course of treatment  ED Arrival Information     Expected Arrival Acuity Means of Arrival Escorted By Service Admission Type    - 12/16/2019 19:08 Urgent Walk-In Self Hospitalist Urgent    Arrival Complaint    SOB        Chief Complaint   Patient presents with    Shortness of Breath     Pt reports feeling SOB d/t R flank pain     Flank Pain     R flank pain starting last night, denies any difficulty urinating, started last night  States the pain is so bad that it hurts to take a deep breath or sit down      Assessment/Plan:   50 yo to ER from home,  Admitted [facility]pt [facility] at [facility] level of care for treatment of  PE  H/o sarcoidosis  SOB began 2 days ago  On 12/14 felt onset of rt flank pain while watching football  Today pain severe, came to ER  CT chest revealed PE   RA sat 96%    Will initiate  continuous heparin gtt, provide pain management and r/o dvt LE      ED Triage Vitals   Temperature Pulse Respirations Blood Pressure SpO2   12/16/19 1920 12/16/19 1919 12/16/19 1919 12/16/19 1920 12/16/19 1919   98 6 °F (37 °C) (!) 111 (!) 25 (!) 179/106 96 %      Temp Source Heart Rate Source Patient Position - Orthostatic VS BP Location FiO2 (%)   12/16/19 1920 12/16/19 1919 12/16/19 1919 12/16/19 1919 --   Oral Monitor Sitting Right arm       Pain Score       12/16/19 1919       Worst Possible Pain        Wt Readings from Last 1 Encounters:   12/16/19 115 kg (253 lb 8 5 oz)     Additional Vital Signs:     12/16/19 2045    96        95 %   12/16/19 2044    98  18  140/88    96 %   12/16/19 2030    100        95 %   12/16/19 2015    100        96 %   12/16/19 2000    98        97 %   12/16/19 1945    100        97 %         12/17/19 0700  98 6 °F (37 °C)  95  18  142/91  111  98 %         Pertinent Labs/Diagnostic Test Results:   12/16  CT PE study:  Right lower lobe segmental and subsegmental pulmonary emboli are noted pulmonary arterial tree  3 3 cm right renal cystic lesion  4 mm nodule in right lower lobe possibly a fissural nodule      12/16    Duplex of bilateral lower extremities, NEG for thrombosis of phlebitis     12/16  EKG -  Sinus tach    Results from last 7 days   Lab Units 12/17/19  0502 12/16/19  1941   WBC Thousand/uL 6 68 7 30   HEMOGLOBIN g/dL 13 3 13 5   HEMATOCRIT % 42 3 41 7   PLATELETS Thousands/uL 197 209   NEUTROS ABS Thousands/µL 4 82 5 70         Results from last 7 days   Lab Units 12/17/19  0502 12/16/19 1941   SODIUM mmol/L 139 139   POTASSIUM mmol/L 3 5 3 7   CHLORIDE mmol/L 103 104   CO2 mmol/L 26 26   ANION GAP mmol/L 10 9   BUN mg/dL 9 11   CREATININE mg/dL 0 83 1 08   EGFR ml/min/1 73sq m 116 90   CALCIUM mg/dL 8 4 8 9     Results from last 7 days   Lab Units 12/16/19  1941   AST U/L 19   ALT U/L 35   ALK PHOS U/L 87   TOTAL PROTEIN g/dL 7 3   ALBUMIN g/dL 3 4*   TOTAL BILIRUBIN mg/dL 0 24         Results from last 7 days   Lab Units 12/17/19  0502 12/16/19  1941   GLUCOSE RANDOM mg/dL 90 130     Results from last 7 days   Lab Units 12/17/19  0502 12/17/19  0047 12/16/19 1941   TROPONIN I ng/mL 0 04 0 04 0 05*         Results from last 7 days   Lab Units 12/17/19  0502 12/16/19 1941   PROTIME seconds  --  12 2   INR   --  0 96   PTT seconds 146* 31     Results from last 7 days   Lab Units 12/16/19 1941   LIPASE u/L 115     Results from last 7 days   Lab Units 12/16/19 1945   CLARITY UA  Clear   COLOR UA  Yellow   SPEC GRAV UA  1 020   PH UA  7 0   GLUCOSE UA mg/dl Negative   KETONES UA mg/dl Negative   BLOOD UA  Negative   PROTEIN UA mg/dl Negative   NITRITE UA  Negative   BILIRUBIN UA  Negative   UROBILINOGEN UA E U /dl 1 0   LEUKOCYTES UA  Negative     ED Treatment:   Medication Administration from 12/16/2019 1908 to 12/16/2019 2257       Date/Time Order Dose Route Action     12/16/2019 1946 ketorolac (TORADOL) injection 15 mg 15 mg Intravenous Given     12/16/2019 1942 sodium chloride 0 9 % bolus 1,000 mL 1,000 mL Intravenous New Bag     12/16/2019 2230 heparin (porcine) injection 9,200 Units 9,200 Units Intravenous Given     12/16/2019 2231 heparin (porcine) 25,000 units in 250 mL infusion (premix) 18 Units/kg/hr Intravenous New Bag        Past Medical History:   Diagnosis Date    GERD (gastroesophageal reflux disease)     Hypertension      Admitting Diagnosis: Shortness of breath [R06 02]  PE (pulmonary thromboembolism) (HCC) [I26 99]  Flank pain [R10 9]  Age/Sex: 49 y o  male  Admission Orders:  Scd's;  LE duplex study ;   Telemetry;  Continuous heparin gtt;    Scheduled Medications:    Medications:  amLODIPine 5 mg Oral Daily   famotidine 20 mg Oral Daily   multivitamin-minerals 1 tablet Oral Daily   nicotine 1 patch Transdermal Daily   pantoprazole 40 mg Oral BID AC   sucralfate 1,000 mg Oral 4x Daily (AC & HS)     Continuous IV Infusions:    heparin (porcine) 3-30 Units/kg/hr (Order-Specific) Intravenous Titrated     PRN Meds:    acetaminophen 650 mg Oral Q6H PRN   heparin (porcine) 4,600 Units Intravenous PRN   heparin (porcine) 9,200 Units Intravenous PRN   traMADol 50 mg Oral Q6H PRN       Network Utilization Review Department  [email or web address]  ATTENTION: Please call with any questions or concerns to [phone] and carefully listen to the prompts so that you are directed to the right person  All voicemails are confidential   Michelle Doll all requests for admission clinical reviews, approved or denied determinations and any other requests to dedicated fax number below belonging to the campus where the patient is receiving treatment   List of dedicated fax numbers for the Facilities:  [address] DENIALS (Administrative/Medical Necessity) [phone]   [address] (Maternity/NICU/Pediatrics) [phone]   Benito Courser [phone]   [facility] [phone]   Guilherme Grit [phone]   [address] [facility]  [phone]   [address] [place] [address] [address] [place] [address] [phone]   [facility]  [phone]   [address] [facility], [facility]   [address] [facility] [place] [address] [phone]

## 2019-12-18 ENCOUNTER — APPOINTMENT (INPATIENT)
Dept: NON INVASIVE DIAGNOSTICS | Facility: HOSPITAL | Age: 54
DRG: 176 | End: 2019-12-18
Payer: COMMERCIAL

## 2019-12-18 VITALS
WEIGHT: 253.53 LBS | DIASTOLIC BLOOD PRESSURE: 92 MMHG | SYSTOLIC BLOOD PRESSURE: 155 MMHG | HEART RATE: 86 BPM | TEMPERATURE: 98.3 F | RESPIRATION RATE: 18 BRPM | HEIGHT: 65 IN | BODY MASS INDEX: 42.24 KG/M2 | OXYGEN SATURATION: 95 %

## 2019-12-18 LAB
APTT PPP: 62 SECONDS (ref 23–37)
ERYTHROCYTE [DISTWIDTH] IN BLOOD BY AUTOMATED COUNT: 13.6 % (ref 11.6–15.1)
HCT VFR BLD AUTO: 40.7 % (ref 36.5–49.3)
HGB BLD-MCNC: 13 G/DL (ref 12–17)
MCH RBC QN AUTO: 28 PG (ref 26.8–34.3)
MCHC RBC AUTO-ENTMCNC: 31.9 G/DL (ref 31.4–37.4)
MCV RBC AUTO: 88 FL (ref 82–98)
PLATELET # BLD AUTO: 212 THOUSANDS/UL (ref 149–390)
PMV BLD AUTO: 11.2 FL (ref 8.9–12.7)
RBC # BLD AUTO: 4.64 MILLION/UL (ref 3.88–5.62)
WBC # BLD AUTO: 7.56 THOUSAND/UL (ref 4.31–10.16)

## 2019-12-18 PROCEDURE — 85027 COMPLETE CBC AUTOMATED: CPT | Performed by: INTERNAL MEDICINE

## 2019-12-18 PROCEDURE — 93306 TTE W/DOPPLER COMPLETE: CPT | Performed by: INTERNAL MEDICINE

## 2019-12-18 PROCEDURE — 99239 HOSP IP/OBS DSCHRG MGMT >30: CPT | Performed by: INTERNAL MEDICINE

## 2019-12-18 PROCEDURE — 85730 THROMBOPLASTIN TIME PARTIAL: CPT | Performed by: INTERNAL MEDICINE

## 2019-12-18 PROCEDURE — 93306 TTE W/DOPPLER COMPLETE: CPT

## 2019-12-18 RX ORDER — OXYCODONE HYDROCHLORIDE 5 MG/1
5 TABLET ORAL EVERY 4 HOURS PRN
Qty: 20 TABLET | Refills: 0 | Status: SHIPPED | OUTPATIENT
Start: 2019-12-18 | End: 2019-12-23

## 2019-12-18 RX ADMIN — PANTOPRAZOLE SODIUM 40 MG: 40 TABLET, DELAYED RELEASE ORAL at 16:22

## 2019-12-18 RX ADMIN — AMLODIPINE BESYLATE 5 MG: 5 TABLET ORAL at 08:25

## 2019-12-18 RX ADMIN — SUCRALFATE 1000 MG: 1 SUSPENSION ORAL at 11:16

## 2019-12-18 RX ADMIN — ACETAMINOPHEN 975 MG: 325 TABLET, FILM COATED ORAL at 06:58

## 2019-12-18 RX ADMIN — PANTOPRAZOLE SODIUM 40 MG: 40 TABLET, DELAYED RELEASE ORAL at 07:00

## 2019-12-18 RX ADMIN — SUCRALFATE 1000 MG: 1 SUSPENSION ORAL at 16:22

## 2019-12-18 RX ADMIN — OXYCODONE HYDROCHLORIDE 5 MG: 5 TABLET ORAL at 08:28

## 2019-12-18 RX ADMIN — RIVAROXABAN 15 MG: 15 TABLET, FILM COATED ORAL at 16:22

## 2019-12-18 RX ADMIN — HEPARIN SODIUM 13 UNITS/KG/HR: 10000 INJECTION, SOLUTION INTRAVENOUS at 04:08

## 2019-12-18 RX ADMIN — FAMOTIDINE 20 MG: 20 TABLET, FILM COATED ORAL at 08:25

## 2019-12-18 RX ADMIN — PERFLUTREN 0.8 ML/MIN: 6.52 INJECTION, SUSPENSION INTRAVENOUS at 11:05

## 2019-12-18 RX ADMIN — Medication 1 TABLET: at 08:25

## 2019-12-18 RX ADMIN — ACETAMINOPHEN 975 MG: 325 TABLET, FILM COATED ORAL at 15:25

## 2019-12-18 RX ADMIN — SUCRALFATE 1000 MG: 1 SUSPENSION ORAL at 07:00

## 2019-12-18 NOTE — INCIDENTAL FINDINGS
The following findings require follow up:  Radiographic finding   Findin mm nodule in the right lower lobe possibly a fissural nodule     Follow up required:  CT chest   Follow up should be done within 12 month(s)    Please notify the following clinician to assist with the follow up:   Dr Ce Tomlinson

## 2019-12-18 NOTE — SOCIAL WORK
Pt is in need of Xarelto to go home with  Cm received call from [facility] stating medication was escribed and the co pay cost is $55 per month  Cm met with pt to discuss cost and [facility] because [facility] will be [facility]  Cm contacted pt's PCP that was on record and they stated they have not seen him since 2014 and he would have to be seen in the office before they would prescribe him medications  Cm met with pt to explain this and offered to arrange an appointment however he stated he no longer sees that doctor and he was able to provide another name and number  Pt's PCP is now Lizzette Russell who is out of [place], [place] (P: [phone];  F: [phone])  Pt spoke with doctor on the phone and she is requesting medical release to be signed, script for Xarelto, CT scan and DCI faxed to her upon DC  CM completed this and updated pt's PCP in chart  No further needs from cm at this time

## 2019-12-18 NOTE — DISCHARGE SUMMARY
Discharge Summary - [facility] Internal Medicine    Patient Information: Bakari Hooper 49 y o  male MRN: [id number]  Unit/Bed#: S -01 Encounter: [id number]    Discharging Physician / Practitioner: Corbin Stout MD  PCP: TESFAYE Garcia  Admission Date: 12/16/2019  Discharge Date: 12/18/19    Reason for Admission:  Acute pulmonary embolism    Discharge Diagnoses:     Principal Problem:    Pulmonary embolism ([address] )  Active Problems:    GERD (gastroesophageal reflux disease)    History of sleeve gastrectomy    Sarcoidosis    Incidental lung nodule, > 3mm and < 8mm    Renal lesion    Cigar smoker    HTN (hypertension)      Consultations During Hospital Stay:  · None    Procedures Performed:   · None    Significant findings:  · CTA chest -Right lower lobe segmental and subsegmental pulmonary emboli are noted  3 3 cm right renal cystic lesion renal cystic lesion which does not meet CT criteria for simple cyst on this examination; recommend elective ultrasound or MRI abdomen with contrast, renal protocol, for further evaluation  4 mm nodule in the right lower lobe possibly a fissural nodule  Based on current Fleischner Society 2017 Guidelines on incidental pulmonary nodule, no routine follow-up is needed if the patient is considered low risk for lung cancer  If the patient is considered high risk for lung cancer, 12 month follow-up non-contrast chest CT is recommended    Hospital Course:   Bakari Hooper is a 49 y o  male patient who originally presented to the hospital on 12/16/2019 due to right flank and right lower chest pain  CTA of the chest showed Right lower lobe segmental and subsegmental pulmonary emboli are noted  He was started on heparin infusion and pain control  He remained hemodynamically stable through his hospital course  Echocardiogram was done which showed ejection fraction 86-53%, grade 1 diastolic dysfunction, no hemodynamically significant valvular disease    He was transitioned to Xarelto on discharge and is instructed to follow up with his primary care physician for continued management  Patient should be on oral anticoagulation for at least 3-6 months for presumed provoked PE  Condition at Discharge: stable     Discharge Day Visit / Exam:     Subjective:  No new complaints or acute overnight events  Still with right-sided chest and flank pain although he states this is improving  Vitals: Blood Pressure: 141/90 (12/18/19 0700)  Pulse: 82 (12/18/19 0700)  Temperature: 98 6 °F (37 °C) (12/18/19 0700)  Temp Source: Oral (12/18/19 0700)  Respirations: 18 (12/18/19 0700)  Height: 5' 5" (165 1 cm) (12/16/19 1919)  Weight - Scale: 115 kg (253 lb 8 5 oz) (12/16/19 2128)  SpO2: 95 % (12/18/19 0700)    General Appearance:    Alert, cooperative, no distress, appropriately responsive    Head:    Normocephalic, without obvious abnormality, atraumatic, mucous membranes moist    Eyes:    Conjunctiva/corneas clear, EOM's intact   Neck:   Supple   Lungs:     Clear to auscultation bilaterally, respirations unlabored, no crackles or wheeze     Heart:    Regular rate and rhythm, S1 and S2    Abdomen:     Soft, morbidly obese, non-tender   Extremities:   Extremities normal, atraumatic, no cyanosis or edema   Neurologic:  nonfocal      Discharge instructions/Information to patient and family:   See after visit summary for information provided to patient and family  Provisions for Follow-Up Care:  See after visit summary for information related to follow-up care and any pertinent home health orders  Disposition: Home    Updated patient's spouse on phone  All questions answered and concerns addressed    Discharge Statement:  I spent >30 minutes discharging the patient  This time was spent on the day of discharge  I had direct contact with the patient on the day of discharge   Greater than 50% of the total time was spent examining patient, answering all patient questions, arranging and discussing plan of care with patient as well as directly providing post-discharge instructions  Additional time then spent on discharge activities  Discharge Medications:  See after visit summary for reconciled discharge medications provided to patient and family  ** Please Note: Dragon 360 Dictation voice to text software may have been used in the creation of this document   **

## 2019-12-18 NOTE — PLAN OF CARE
Problem: PAIN - ADULT  Goal: Verbalizes/displays adequate comfort level or baseline comfort level  Description  Interventions:  - Encourage patient to monitor pain and request assistance  - Assess pain using appropriate pain scale  - Administer analgesics based on type and severity of pain and evaluate response  - Implement non-pharmacological measures as appropriate and evaluate response  - Consider cultural and social influences on pain and pain management  - Notify physician/advanced practitioner if interventions unsuccessful or patient reports new pain  Outcome: Progressing     Problem: INFECTION - ADULT  Goal: Absence or prevention of progression during hospitalization  Description  INTERVENTIONS:  - Assess and monitor for signs and symptoms of infection  - Monitor lab/diagnostic results  - Monitor all insertion sites, i e  indwelling lines, tubes, and drains  - Monitor endotracheal if appropriate and nasal secretions for changes in amount and color  - [place] appropriate cooling/warming therapies per order  - Administer medications as ordered  - Instruct and encourage patient and family to use good hand hygiene technique  - Identify and instruct in appropriate isolation precautions for identified infection/condition  Outcome: Progressing  Goal: Absence of fever/infection during neutropenic period  Description  INTERVENTIONS:  - Monitor WBC    Outcome: Progressing     Problem: SAFETY ADULT  Goal: Patient will remain free of falls  Description  INTERVENTIONS:  - Assess patient frequently for physical needs  -  Identify cognitive and physical deficits and behaviors that affect risk of falls    -  [place] fall precautions as indicated by assessment   - Educate patient/family on patient safety including physical limitations  - Instruct patient to call for assistance with activity based on assessment  - Modify environment to reduce risk of injury  - Consider OT/PT consult to assist with strengthening/mobility  Outcome: Progressing  Goal: Maintain or return to baseline ADL function  Description  INTERVENTIONS:  -  Assess patient's ability to carry out ADLs; assess patient's baseline for ADL function and identify physical deficits which impact ability to perform ADLs (bathing, care of mouth/teeth, toileting, grooming, dressing, etc )  - Assess/evaluate cause of self-care deficits   - Assess range of motion  - Assess patient's mobility; develop plan if impaired  - Assess patient's need for assistive devices and provide as appropriate  - Encourage maximum independence but intervene and supervise when necessary  - Involve family in performance of ADLs  - Assess for home care needs following discharge   - Consider OT consult to assist with ADL evaluation and planning for discharge  - Provide patient education as appropriate  Outcome: Progressing  Goal: Maintain or return mobility status to optimal level  Description  INTERVENTIONS:  - Assess patient's baseline mobility status (ambulation, transfers, stairs, etc )    - Identify cognitive and physical deficits and behaviors that affect mobility  - Identify mobility aids required to assist with transfers and/or ambulation (gait belt, sit-to-stand, lift, walker, cane, etc )  - [place] fall precautions as indicated by assessment  - Record patient progress and toleration of activity level on Mobility SBAR; progress patient to next Phase/Stage  - Instruct patient to call for assistance with activity based on assessment  - Consider rehabilitation consult to assist with strengthening/weightbearing, etc   Outcome: Progressing     Problem: DISCHARGE PLANNING  Goal: Discharge to home or other facility with appropriate resources  Description  INTERVENTIONS:  - Identify barriers to discharge w/patient and caregiver  - Arrange for needed discharge resources and transportation as appropriate  - Identify discharge learning needs (meds, wound care, etc )  - Arrange for interpretive services to assist at discharge as needed  - Refer to Case Management Department for coordinating discharge planning if the patient needs post-hospital services based on physician/advanced practitioner order or complex needs related to functional status, cognitive ability, or social support system  Outcome: Progressing     Problem: Knowledge Deficit  Goal: Patient/family/caregiver demonstrates understanding of disease process, treatment plan, medications, and discharge instructions  Description  Complete learning assessment and assess knowledge base  Interventions:  - Provide teaching at level of understanding  - Provide teaching via preferred learning methods  Outcome: Progressing     Problem: Potential for Falls  Goal: Patient will remain free of falls  Description  INTERVENTIONS:  - Assess patient frequently for physical needs  -  Identify cognitive and physical deficits and behaviors that affect risk of falls    -  [place] fall precautions as indicated by assessment   - Educate patient/family on patient safety including physical limitations  - Instruct patient to call for assistance with activity based on assessment  - Modify environment to reduce risk of injury  - Consider OT/PT consult to assist with strengthening/mobility  Outcome: Progressing

## 2019-12-18 NOTE — DISCHARGE INSTRUCTIONS
Pulmonary Embolism   WHAT YOU NEED TO KNOW:   A pulmonary embolism (PE) is the sudden blockage of a blood vessel in the lungs by an embolus  An embolus is a small piece of blood clot, fat, air, or tumor cells  The embolus cuts off the blood supply to your lungs  A pulmonary embolism can become life-threatening  DISCHARGE INSTRUCTIONS:   Call 911 for any of the following:   · You feel lightheaded, short of breath, and have chest pain  · You cough up blood  · You have a seizure  · You have slurred speech, increased sleepiness, or problems seeing, talking, or thinking  · You have weakness or cannot move your arm or leg on one side of your body  Seek care immediately if:   · You feel faint  · You have a severe headache  · Your heart is beating faster than normal   Contact your healthcare provider if:   · The skin on any part of your legs or hips turns purple  · Your gums or nose bleed  · You see blood in your urine or bowel movements  · Your bowel movements are black or darker than normal     · You have questions or concerns about your condition or care  Medicines:   · Blood thinners  help treat the PE and prevent new clots from forming  Examples of blood thinners include heparin, rivaroxaban, apixiban, and warfarin  The following are general safety guidelines to follow while you are taking a blood thinner:     ¨ Watch for bleeding and bruising  Watch for bleeding from your gums or nose  Watch for blood in your urine and bowel movements  Use a soft washcloth on your skin, and a soft toothbrush to brush your teeth  This can keep your skin and gums from bleeding  If you shave, use an electric shaver  Do not play contact sports  ¨ Tell your dentist and other healthcare providers that you take a blood thinner  Wear a bracelet or necklace that says you take this medicine  ¨ Do not start or stop any medicines unless your healthcare provider tells you to   Many medicines cannot be used with blood thinners  ¨ Tell your healthcare provider right away if you forget to take the blood thinner , or if you take too much  ¨ Warfarin  is a blood thinner that you may need to take  The following are additional things you should be aware of if you take warfarin:    § Foods and medicines can affect the amount of warfarin in your blood  Do not make major changes to your diet  Warfarin works best when you eat about the same amount of vitamin K every day  Vitamin K is found in green leafy vegetables and certain other foods  Ask for more information about what to eat or not to eat  § You will need to see your healthcare provider for follow-up visits  You will need regular blood tests to decide how much warfarin you need  · Take your medicine as directed  Contact your healthcare provider if you think your medicine is not helping or if you have side effects  Tell him or her if you are allergic to any medicine  Keep a list of the medicines, vitamins, and herbs you take  Include the amounts, and when and why you take them  Bring the list or the pill bottles to follow-up visits  Carry your medicine list with you in case of an emergency  Prevent another PE:   · Wear pressure stockings  The stockings are tight and put pressure on your legs  This improves blood flow and helps prevent clots  Wear the stockings during the day  Do not wear them when you sleep  · Exercise regularly  Ask about the best exercise plan for you  When you travel by car or work at a desk, take breaks to stand up and move around as much as possible  Rotate your feet in circles often if you sit for a long period of time  · Maintain a healthy weight  Ask your healthcare provider how much you should weigh  Ask him to help you create a weight loss plan if you are overweight  · Do not smoke  Nicotine and other chemicals in cigarettes and cigars can damage blood vessels and increase your risk for another PE   Ask your healthcare provider for information if you currently smoke and need help to quit  E-cigarettes or smokeless tobacco still contain nicotine  Talk to your healthcare provider before you use these products  Follow up with your healthcare provider as directed:  Write down your questions so you remember to ask them during your visits  © 2017 [employer] Information is for End User's use only and may not be sold, redistributed or otherwise used for commercial purposes  All illustrations and images included in CareNotes® are the copyrighted property of [employer] , [employer]  or Yordy Vogel  The above information is an  only  It is not intended as medical advice for individual conditions or treatments  Talk to your doctor, nurse or pharmacist before following any medical regimen to see if it is safe and effective for you

## 2019-12-24 ENCOUNTER — HOSPITAL ENCOUNTER (OUTPATIENT)
Dept: RADIOLOGY | Facility: HOSPITAL | Age: 54
Discharge: HOME | End: 2019-12-24
Attending: SURGERY
Payer: COMMERCIAL

## 2019-12-24 DIAGNOSIS — Z98.84 BARIATRIC SURGERY STATUS: ICD-10-CM

## 2019-12-24 PROCEDURE — 74240 X-RAY XM UPR GI TRC 1CNTRST: CPT

## 2020-01-15 ENCOUNTER — OFFICE VISIT (OUTPATIENT)
Dept: BARIATRICS | Facility: CLINIC | Age: 55
End: 2020-01-15
Payer: COMMERCIAL

## 2020-01-15 VITALS
HEART RATE: 93 BPM | TEMPERATURE: 98.5 F | WEIGHT: 257.5 LBS | DIASTOLIC BLOOD PRESSURE: 90 MMHG | RESPIRATION RATE: 18 BRPM | SYSTOLIC BLOOD PRESSURE: 140 MMHG | BODY MASS INDEX: 42.9 KG/M2 | HEIGHT: 65 IN

## 2020-01-15 DIAGNOSIS — K44.9 HIATAL HERNIA: ICD-10-CM

## 2020-01-15 DIAGNOSIS — I26.99 PULMONARY EMBOLISM, UNSPECIFIED CHRONICITY, UNSPECIFIED PULMONARY EMBOLISM TYPE, UNSPECIFIED WHETHER ACUTE COR PULMONALE PRESENT (HCC): ICD-10-CM

## 2020-01-15 DIAGNOSIS — Z90.3 HISTORY OF SLEEVE GASTRECTOMY: ICD-10-CM

## 2020-01-15 DIAGNOSIS — K21.9 GASTROESOPHAGEAL REFLUX DISEASE, ESOPHAGITIS PRESENCE NOT SPECIFIED: Primary | ICD-10-CM

## 2020-01-15 PROCEDURE — 99203 OFFICE O/P NEW LOW 30 MIN: CPT | Performed by: SURGERY

## 2020-01-15 NOTE — PROGRESS NOTES
OFFICE VISIT - BARIATRIC SURGERY  Maldonado Hernandez 49 y o  male MRN: [id number]  Unit/Bed#:  Encounter: [id number]      HPI:  Samuel Perez is a 49 y o  male status post sleeve gastrectomy in 2014 at an outside hospital   He is in the office today referred by GI to discuss a large hiatal hernia with sleeve migration into the chest     Subjective     The patient is status post laparoscopic sleeve gastrectomy in December of 2014 at [facility] by Dr Gopal Grover  At the time the surgery the patient was 250 lb and he was able to drop as low as 215  Today he weighs 257 5 lb with a BMI of 43 5  The patient reports that approximately year and a half ago he started to have nausea with intermittent regurgitation and severe reflux  He has frequent spells of bringing of food in choking as well as halitosis and tooth decay due to reflux  He consult to GI which performed a thorough workup and start him on pantoprazole 40 mg b i d , ranitidine 150 mg q day and Carafate 1 g p o  Q 6 hours  This regimen has mildly control his symptoms, but he still experiences reflux and has to sleep propped up  Of note, the patient recently was diagnosed with a PE and started on Xarelto therapy  He reports he has approximately 2 months left therapy before the take him off anticoagulation  He developed the PE secondary to a DVT after a long distance drive  Review of Systems   Constitutional: Negative  HENT: Negative  Eyes: Negative  Respiratory: Negative  Cardiovascular: Negative  Gastrointestinal: Positive for nausea and vomiting  Endocrine: Negative  Genitourinary: Negative  Musculoskeletal: Negative  Skin: Negative  Allergic/Immunologic: Negative  Neurological: Negative  Hematological: Negative  Psychiatric/Behavioral: Negative  All other systems reviewed and are negative        Historical Information   Past Medical History:   Diagnosis Date    GERD (gastroesophageal reflux disease)     Heartburn     Hypertension     Pulmonary embolism (HCC)      Past Surgical History:   Procedure Laterality Date    BARIATRIC SURGERY      FOOT SURGERY      HAND SURGERY      STOMACH SURGERY  2015    gastric sleeve     Social History   Social History     Substance and Sexual Activity   Alcohol Use Never    Frequency: Never     Social History     Substance and Sexual Activity   Drug Use Never     Social History     Tobacco Use   Smoking Status Former Smoker   Smokeless Tobacco Never Used   Tobacco Comment    cigar ocasional       Objective       Current Vitals:        Invasive Devices     None                 Physical Exam   Constitutional: He is oriented to person, place, and time  He appears well-developed and well-nourished  HENT:   Head: Normocephalic and atraumatic  Nose: Nose normal    Mouth/Throat: Oropharynx is clear and moist    Eyes: Conjunctivae and EOM are normal    Neck: Normal range of motion  No tracheal deviation present  Cardiovascular: Normal rate and regular rhythm  Pulmonary/Chest: Effort normal and breath sounds normal  No respiratory distress  Abdominal: Soft  Bowel sounds are normal  He exhibits no distension  There is no rebound and no guarding  No hernia  The abdomen is obese  All laparoscopic incisions have completely healed  There are no palpable underlying incisional hernias  Musculoskeletal: Normal range of motion  He exhibits no edema  Neurological: He is alert and oriented to person, place, and time  Skin: Skin is warm and dry  Psychiatric: He has a normal mood and affect  His behavior is normal  Judgment and thought content normal    Vitals reviewed  Pathology, and Other Studies: I have personally reviewed pertinent reports     and I have personally reviewed pertinent films in PACS      Assessment/PLAN:    Deven Beauchamp is a 49 y o  male  status post sleeve gastrectomy in 2014 at an outside hospital   He is in the office today referred by GI severe refractory heartburn secondary to a large hiatal hernia with sleep migration into the chest     The patient's reflux workup was reviewed with him, and the results are as follows:    UGI (12/24/19)    IMPRESSION:     1  Multiple episodes of large volume spontaneous gastroesophageal reflux noted throughout the exam  2  Small to moderate-sized type III hiatal hernia  3  No evidence of ulceration, fistula or obstruction    EGD (7/15/19)    FINDINGS:  · Large sliding hiatal hernia (type I hiatal hernia) - diaphragmatic impression 7 cm from the incisors  · The stomach appeared normal  On direct and retroflexed views  · The duodenum appeared normal  The first and second parts were visualized  No biopsies were obtained  Manometry (5/14/19)    Indication- GERD     Esophageal  manometry     Esophageal motility- 2/10 swallows demonstrated weak or absent contractile while 8/10 swallows had normal contractility with mean DCI of 963 mmHg  s cm     LES- median IRP is 6 mmHg     Impedance- abnormal impedance likely due to large hiatal hernia     Rapid swallow index was less than 0 8     [place] classification- normal esophageal motility with large hiatal hernia      ------------------------------------------------------------------------------    At this point the patient is workup is complete  The patient is in need of repair of this large hiatal hernia and has several options for this  A repair of a paraesophageal hernia with or without [place] [facility] device insertion was discussed, as well as the option of converting to a bypass  Given that the patient's manometry results are normal he has every option open to him  Given that his BMI is still above 40, he appears to be more inclined to a conversion to Keke-en-Y gastric bypass but he will make him a final decision in the future  The patient will be enrolled in our revisional pathway for now    He is to continue his Xarelto therapy and any surgical intervention will be done after he is off blood thinners  Will follow up in 3 months      Alexa Bland MD  Bariatric Surgery Fellow  1/15/2020  2:46 PM

## 2020-02-18 ENCOUNTER — CLINICAL SUPPORT (OUTPATIENT)
Dept: BARIATRICS | Facility: CLINIC | Age: 55
End: 2020-02-18

## 2020-02-18 VITALS — BODY MASS INDEX: 43.33 KG/M2 | WEIGHT: 256.4 LBS

## 2020-02-18 DIAGNOSIS — E66.01 MORBID (SEVERE) OBESITY DUE TO EXCESS CALORIES (HCC): ICD-10-CM

## 2020-02-18 DIAGNOSIS — K21.9 GERD (GASTROESOPHAGEAL REFLUX DISEASE): Primary | ICD-10-CM

## 2020-02-18 DIAGNOSIS — K21.9 GASTROESOPHAGEAL REFLUX DISEASE, ESOPHAGITIS PRESENCE NOT SPECIFIED: Primary | ICD-10-CM

## 2020-02-18 DIAGNOSIS — I10 ESSENTIAL HYPERTENSION: ICD-10-CM

## 2020-02-18 DIAGNOSIS — Z90.3 HISTORY OF SLEEVE GASTRECTOMY: ICD-10-CM

## 2020-02-18 DIAGNOSIS — K91.2 POSTSURGICAL MALABSORPTION: ICD-10-CM

## 2020-02-18 PROCEDURE — RECHECK: Performed by: DIETITIAN, REGISTERED

## 2020-02-18 NOTE — PROGRESS NOTES
Bariatric Nutrition Assessment Note    Type of surgery    Laparoscopic sleeve gastrectomy in December of 2014 at [facility] by Dr Sameer Jimenez  5 years  post-op  Surgeon: Dr Negra López  49 y o   male     Wt with BMI of 25: 148 2lbs  Pre-Op Excess Wt: 108 2lbs from current weight  Wt 116 kg (256 lb 6 4 oz)   BMI 43 33 kg/m²     [place]  [facility] Equation:  2312 kcal/day for weight maintenance at sedentary activity level  1812 kcal/day for 1 lb/wk wt loss  Weight History   Onset of Obesity: Adult: early 19's  Family history of obesity: No  Wt Loss Attempts: Commercial Programs ([employer], [employer], etc )  Counseling with  MD  Exercise  FAD Diets (Cabbage soup, Grapefruit, Cleanse, etc )  Fasting  High Protein/Low CHO diets (Atkins, Union, etc )  Meal Replacements (Jason Reels Fast, etc )  Nutrition Counseling with RD  Self Created Diets (Portion Control, Healthy Food Choices, etc )  Patient has tried the above for 6 months or more with insufficient weight loss or weight regain, which is why patient has requested to be evaluated for weight loss surgery today  Maximum Wt Lost:  Highest weight was 350lbs per pt report  At the time of surgery the patient was 250 lb and he reached a low weight of 215lbs after surgery      Review of History and Medications   Past Medical History:   Diagnosis Date    GERD (gastroesophageal reflux disease)     Heartburn     Hypertension     Pulmonary embolism (HCC)      Past Surgical History:   Procedure Laterality Date    BARIATRIC SURGERY      FOOT SURGERY      HAND SURGERY      STOMACH SURGERY  2015    gastric sleeve     Social History     Socioeconomic History    Marital status: /Civil Union     Spouse name: Not on file    Number of children: Not on file    Years of education: Not on file    Highest education level: Not on file   Occupational History    Not on file   Social Needs  Financial resource strain: Not on file   Wilfred-Hina insecurity:     Worry: Not on file     Inability: Not on file    Transportation needs:     Medical: Not on file     Non-medical: Not on file   Tobacco Use    Smoking status: Former Smoker    Smokeless tobacco: Never Used    Tobacco comment: cigar ocasional   Substance and Sexual Activity    Alcohol use: Never     Frequency: Never    Drug use: Never    Sexual activity: Not on file   Lifestyle    Physical activity:     Days per week: Not on file     Minutes per session: Not on file    Stress: Not on file   Relationships    Social connections:     Talks on phone: Not on file     Gets together: Not on file     Attends Spiritism service: Not on file     Active member of club or organization: Not on file     Attends meetings of clubs or organizations: Not on file     Relationship status: Not on file    Intimate partner violence:     Fear of current or ex partner: Not on file     Emotionally abused: Not on file     Physically abused: Not on file     Forced sexual activity: Not on file   Other Topics Concern    Not on file   Social History Narrative    Not on file       Current Outpatient Medications:     allopurinol (ZYLOPRIM) 300 mg tablet, Take 1 tablet by mouth as needed , Disp: , Rfl:     amLODIPine (NORVASC) 5 mg tablet, Take 1 tablet by mouth daily, Disp: , Rfl:     calcium citrate-vitamin D (CITRACAL+D) 315-200 MG-UNIT per tablet, Take by mouth, Disp: , Rfl:     Multiple Vitamins-Minerals (MULTIVITAMIN ADULTS PO), Take 1 tablet by mouth daily, Disp: , Rfl:     nicotine (NICODERM CQ) 7 mg/24hr TD 24 hr patch, Place 1 patch on the skin daily (Patient not taking: Reported on 1/15/2020), Disp: 28 patch, Rfl: 0    pantoprazole (PROTONIX) 40 mg tablet, Take 1 tablet by mouth 2 (two) times a day , Disp: , Rfl:     ranitidine (ZANTAC) 150 mg tablet, Take 150 mg by mouth daily , Disp: , Rfl:     rivaroxaban (XARELTO STARTER PACK) 15 & 20 MG starter pack, Take 1 Package by mouth see administration instructions, Disp: 1 Package, Rfl: 0    sucralfate (CARAFATE) 1 g/10 mL suspension, Take by mouth 4 (four) times a day, Disp: , Rfl:     traMADol (ULTRAM) 50 mg tablet, Take 50 mg by mouth every 6 (six) hours as needed for moderate pain, Disp: , Rfl:   Food Intake and Lifestyle Assessment   Food Intake Assessment completed via usual diet recall  Breakfast: coffee 32-48oz with creamer, bagel with cream cheese  Lunch: soup: cream of chicken OR broccoli cheddar OR potato soup  Dinner: beef short ribs, macaroni and cheese  Waits about 3 hrs to lay down after dinner due to reflux/regurgitation  Beverage intake: coffee, water, sugar free beverages and occasional regular soda  Protein supplement: Premier Protein  Estimated protein intake per day: 60-80g  Estimated fluid intake per day: two 16oz bottles water, 2-3 16oz coffees  Meals eaten away from home: 4 lunches per week  Typical meal pattern: 3 meals per day and 0 snacks per day  Eating Behaviors: Consumption of high calorie/ high fat foods, Consumption of high calorie beverages and appropriate portion sizes, does not currently follow 30/60 rule, occasionally eats fast food or drinks regular soda, occasionally skips breakfast, does not eat for several hours before bed due to GERD/regurgitation  Food allergies or intolerances: No Known Allergies  Cultural or Evangelical considerations: none    Pt is currently taking a Centrum multivitamin once daily  Pt has not taken calcium for a while  Physical Assessment  Physical Activity  Types of exercise: Walking  Pt is trying to get up more during work due to previous blood clots  Has recumbent bike at home  Current physical limitations: PE in December 2019- hospitalized for 4 days  Previously broke both ankles  OA in B/L Knees  Has free gym at work he plans on using  Psychosocial Assessment   Support systems: spouse  Socioeconomic factors: none noted      Nutrition Diagnosis  Diagnosis: Overweight / Obesity (NC-3 3), Excessive energy intake (NI-1 5), Undesirable food choices (NB-1 7) and Altered GI function (NC-1 4)  Related to: Limited adherence to nutrition-related recommendations, Physical inactivity, Excessive energy intake and Altered GI function  As Evidenced by: BMI >25, Expected anthropometric outcomes are not achieved, Excessive energy intake, Unintentional weight gain and reports of gerd/reflux     Nutrition Prescription: Recommend the following diet  Regular    Interventions and Teaching   Discussed pre-op and post-op nutrition guidelines  Patient educated and handouts provided  Surgical changes to stomach / GI  Capacity of post-surgery stomach  Diet progression  Adequate hydration  Sugar and fat restriction to decrease "dumping syndrome"  Fat restriction to decrease steatorrhea  Expected weight loss  Weight loss plateaus/ possibility of weight regain  Exercise  Suggestions for pre-op diet  Nutrition considerations after surgery  Protein supplements  Meal planning and preparation  Appropriate carbohydrate, protein, and fat intake, and food/fluid choices to maximize safe weight loss, nutrient intake, and tolerance   Dietary and lifestyle changes  Possible problems with poor eating habits  Intuitive eating  Techniques for self monitoring and keeping daily food journal  Potential for food intolerance after surgery, and ways to deal with them including: lactose intolerance, nausea, reflux, vomiting, diarrhea, food intolerance, appetite changes, gas  Vitamin / Mineral supplementation:  Reviewed Bariatric-formula Multivitamin with minerals and Calcium and instructed pt to begin taking  Also ordered annual vitamin/mineral labs for pt      Patient provided with gym coupon for [employer] Assessment: Yes    Education provided to: patient    Barriers to learning: No barriers identified  Readiness to change: action    Prior research on procedure: discussed with provider and previous wt  loss surgery    Comprehension: needs reinforcement and verbalizes understanding     Expected Compliance: good  Recommendations  Pt is an appropriate candidate for surgery  Yes    Evaluation / Monitoring  Dietitian to Monitor: Eating pattern as discussed Tolerance of nutrition prescription Body weight Lab values Physical activity    Goals  Eliminate sugar sweetened beverages, Food journal, Exercise 30 minutes 5 times per week, Complete lession plans 1-6, Eat 3 meals per day and begin following 30/60 rule and taking vitamins as discussed      Time Spent:   1 Hour

## 2020-02-18 NOTE — PROGRESS NOTES
Bariatric Behavioral Health Evaluation    Presenting Problem patient here to improve health, to treat symptoms and possible revision  Is the patient seeking Bariatric Surgery Eval? Yes  If yes how long have you researched this surgery option  Realizes Post- Op Requirements? Yes     Pre-morbid level of function and history of present illness: patient has medical issues  Psychiatric/Psychological Treatment Diagnosis: patient denies Axis I diagnosis and treatment  Outpatient Counselor No     Psychiatrist No     Have you had Inpatient Treatment? No    Family Constellation (include relationship with each and Psych/Med HX)    Mother  obesity and tobacco use, Father  tobacco use and Siblings  tobacco use    Additional comments/stressors related to family/relationships/peer support     Physical/Psychological Assessment:     Appearance: appropriate  Sociability: friendly  Affect: appropriate  Mood: calm  Thought Process: coherent  Speech: normal  Content: no impairment  Orientation: person  Yes , place  Yes , time  Yes , normal attention span  Yes , normal memory  Yes   and normal judgement  Yes   Insight: emotional  fair    Risk Assessment:         Recommendations: Recommended for surgery  yes    Observation:     Interviews this interview only  Based on the previous information, the client presents the following risk of harm to self or others: low     Note Patient denies Axis I diagnosis and treatment  Patient educated regarding the impact of nicotine and alcohol on the post surgery bariatric patient  Patient has a positive family history of obesity and tobacco use  Patient meets criteria for surgery at this program and is referred to the surgeon

## 2020-04-07 ENCOUNTER — TELEMEDICINE (OUTPATIENT)
Dept: SLEEP CENTER | Facility: CLINIC | Age: 55
End: 2020-04-07
Payer: COMMERCIAL

## 2020-04-07 DIAGNOSIS — K21.9 GERD (GASTROESOPHAGEAL REFLUX DISEASE): ICD-10-CM

## 2020-04-07 PROCEDURE — 99241 PR OFFICE CONSULTATION NEW/ESTAB PATIENT 15 MIN: CPT | Performed by: INTERNAL MEDICINE

## 2020-05-12 ENCOUNTER — TELEPHONE (OUTPATIENT)
Dept: BARIATRICS | Facility: CLINIC | Age: 55
End: 2020-05-12

## 2020-05-15 ENCOUNTER — OFFICE VISIT (OUTPATIENT)
Dept: BARIATRICS | Facility: CLINIC | Age: 55
End: 2020-05-15

## 2020-05-15 VITALS — BODY MASS INDEX: 42.25 KG/M2 | WEIGHT: 250 LBS

## 2020-05-15 DIAGNOSIS — Z90.3 HISTORY OF SLEEVE GASTRECTOMY: Primary | ICD-10-CM

## 2020-05-15 PROCEDURE — RECHECK: Performed by: DIETITIAN, REGISTERED

## 2020-05-22 ENCOUNTER — TELEPHONE (OUTPATIENT)
Dept: CARDIOLOGY CLINIC | Facility: CLINIC | Age: 55
End: 2020-05-22

## 2020-05-29 ENCOUNTER — OFFICE VISIT (OUTPATIENT)
Dept: CARDIOLOGY CLINIC | Facility: CLINIC | Age: 55
End: 2020-05-29
Payer: COMMERCIAL

## 2020-05-29 VITALS
HEIGHT: 65 IN | SYSTOLIC BLOOD PRESSURE: 122 MMHG | DIASTOLIC BLOOD PRESSURE: 80 MMHG | HEART RATE: 96 BPM | OXYGEN SATURATION: 97 % | BODY MASS INDEX: 43.55 KG/M2 | WEIGHT: 261.4 LBS

## 2020-05-29 DIAGNOSIS — K21.9 GERD (GASTROESOPHAGEAL REFLUX DISEASE): ICD-10-CM

## 2020-05-29 DIAGNOSIS — I10 ESSENTIAL HYPERTENSION: ICD-10-CM

## 2020-05-29 DIAGNOSIS — Z01.810 PREOPERATIVE CARDIOVASCULAR EXAMINATION: Primary | ICD-10-CM

## 2020-05-29 DIAGNOSIS — R94.30 LEFT VENTRICULAR EJECTION FRACTION OF 40-49%: ICD-10-CM

## 2020-05-29 PROCEDURE — 99244 OFF/OP CNSLTJ NEW/EST MOD 40: CPT | Performed by: INTERNAL MEDICINE

## 2020-05-29 PROCEDURE — 93000 ELECTROCARDIOGRAM COMPLETE: CPT | Performed by: INTERNAL MEDICINE

## 2020-05-29 RX ORDER — RIVAROXABAN 20 MG/1
TABLET, FILM COATED ORAL
COMMUNITY
Start: 2020-05-11 | End: 2020-09-24 | Stop reason: HOSPADM

## 2020-06-05 ENCOUNTER — TELEPHONE (OUTPATIENT)
Dept: CARDIOLOGY CLINIC | Facility: CLINIC | Age: 55
End: 2020-06-05

## 2020-06-05 ENCOUNTER — HOSPITAL ENCOUNTER (OUTPATIENT)
Dept: NON INVASIVE DIAGNOSTICS | Facility: CLINIC | Age: 55
Discharge: HOME | End: 2020-06-05
Payer: COMMERCIAL

## 2020-06-05 DIAGNOSIS — D86.9 SARCOIDOSIS: ICD-10-CM

## 2020-06-05 DIAGNOSIS — I10 ESSENTIAL HYPERTENSION: ICD-10-CM

## 2020-06-05 DIAGNOSIS — I10 ESSENTIAL HYPERTENSION: Primary | ICD-10-CM

## 2020-06-05 DIAGNOSIS — R94.30 LEFT VENTRICULAR EJECTION FRACTION OF 40-49%: ICD-10-CM

## 2020-06-05 DIAGNOSIS — I50.22 CHRONIC SYSTOLIC CONGESTIVE HEART FAILURE (HCC): ICD-10-CM

## 2020-06-05 DIAGNOSIS — Z01.810 PREOPERATIVE CARDIOVASCULAR EXAMINATION: ICD-10-CM

## 2020-06-05 PROCEDURE — 93321 DOPPLER ECHO F-UP/LMTD STD: CPT | Performed by: INTERNAL MEDICINE

## 2020-06-05 PROCEDURE — 93325 DOPPLER ECHO COLOR FLOW MAPG: CPT | Performed by: INTERNAL MEDICINE

## 2020-06-05 PROCEDURE — 93308 TTE F-UP OR LMTD: CPT

## 2020-06-05 PROCEDURE — 93308 TTE F-UP OR LMTD: CPT | Performed by: INTERNAL MEDICINE

## 2020-06-05 RX ORDER — CARVEDILOL 6.25 MG/1
6.25 TABLET ORAL 2 TIMES DAILY WITH MEALS
Qty: 90 TABLET | Refills: 3 | Status: SHIPPED | OUTPATIENT
Start: 2020-06-05 | End: 2020-07-17 | Stop reason: SDUPTHER

## 2020-06-05 RX ORDER — LOSARTAN POTASSIUM 25 MG/1
25 TABLET ORAL DAILY
Qty: 90 TABLET | Refills: 3 | Status: SHIPPED | OUTPATIENT
Start: 2020-06-05 | End: 2020-07-17 | Stop reason: SDUPTHER

## 2020-06-08 ENCOUNTER — TELEPHONE (OUTPATIENT)
Dept: CARDIOLOGY CLINIC | Facility: CLINIC | Age: 55
End: 2020-06-08

## 2020-06-17 ENCOUNTER — OFFICE VISIT (OUTPATIENT)
Dept: BARIATRICS | Facility: CLINIC | Age: 55
End: 2020-06-17

## 2020-06-17 DIAGNOSIS — Z90.3 HISTORY OF SLEEVE GASTRECTOMY: Primary | ICD-10-CM

## 2020-06-17 PROCEDURE — RECHECK

## 2020-06-22 ENCOUNTER — DOCUMENTATION (OUTPATIENT)
Dept: BARIATRICS | Facility: CLINIC | Age: 55
End: 2020-06-22

## 2020-06-30 ENCOUNTER — TRANSCRIBE ORDERS (OUTPATIENT)
Dept: LAB | Facility: CLINIC | Age: 55
End: 2020-06-30

## 2020-06-30 ENCOUNTER — LAB (OUTPATIENT)
Dept: LAB | Facility: CLINIC | Age: 55
End: 2020-06-30
Payer: COMMERCIAL

## 2020-06-30 DIAGNOSIS — K91.2 POSTSURGICAL MALABSORPTION: ICD-10-CM

## 2020-06-30 DIAGNOSIS — Z90.3 HISTORY OF SLEEVE GASTRECTOMY: ICD-10-CM

## 2020-06-30 DIAGNOSIS — K21.9 GASTROESOPHAGEAL REFLUX DISEASE, ESOPHAGITIS PRESENCE NOT SPECIFIED: ICD-10-CM

## 2020-06-30 DIAGNOSIS — E66.01 MORBID (SEVERE) OBESITY DUE TO EXCESS CALORIES (HCC): ICD-10-CM

## 2020-06-30 DIAGNOSIS — I10 ESSENTIAL HYPERTENSION: ICD-10-CM

## 2020-06-30 LAB
25(OH)D3 SERPL-MCNC: 22 NG/ML (ref 30–100)
ALBUMIN SERPL BCP-MCNC: 3.1 G/DL (ref 3.5–5)
ALP SERPL-CCNC: 70 U/L (ref 46–116)
ALT SERPL W P-5'-P-CCNC: 21 U/L (ref 12–78)
ANION GAP SERPL CALCULATED.3IONS-SCNC: 7 MMOL/L (ref 4–13)
AST SERPL W P-5'-P-CCNC: 12 U/L (ref 5–45)
BILIRUB SERPL-MCNC: 0.47 MG/DL (ref 0.2–1)
BUN SERPL-MCNC: 12 MG/DL (ref 5–25)
CALCIUM SERPL-MCNC: 8 MG/DL (ref 8.3–10.1)
CHLORIDE SERPL-SCNC: 101 MMOL/L (ref 100–108)
CHOLEST SERPL-MCNC: 163 MG/DL (ref 50–200)
CO2 SERPL-SCNC: 25 MMOL/L (ref 21–32)
CREAT SERPL-MCNC: 1 MG/DL (ref 0.6–1.3)
ERYTHROCYTE [DISTWIDTH] IN BLOOD BY AUTOMATED COUNT: 14.4 % (ref 11.6–15.1)
FERRITIN SERPL-MCNC: 36 NG/ML (ref 8–388)
FOLATE SERPL-MCNC: 8.5 NG/ML (ref 3.1–17.5)
GFR SERPL CREATININE-BSD FRML MDRD: 99 ML/MIN/1.73SQ M
GLUCOSE P FAST SERPL-MCNC: 105 MG/DL (ref 65–99)
HCT VFR BLD AUTO: 39.6 % (ref 36.5–49.3)
HDLC SERPL-MCNC: 60 MG/DL
HGB BLD-MCNC: 12.4 G/DL (ref 12–17)
IRON SATN MFR SERPL: 15 %
IRON SERPL-MCNC: 45 UG/DL (ref 65–175)
LDLC SERPL CALC-MCNC: 88 MG/DL (ref 0–100)
MCH RBC QN AUTO: 27.2 PG (ref 26.8–34.3)
MCHC RBC AUTO-ENTMCNC: 31.3 G/DL (ref 31.4–37.4)
MCV RBC AUTO: 87 FL (ref 82–98)
NONHDLC SERPL-MCNC: 103 MG/DL
PLATELET # BLD AUTO: 164 THOUSANDS/UL (ref 149–390)
PMV BLD AUTO: 11.9 FL (ref 8.9–12.7)
POTASSIUM SERPL-SCNC: 3.6 MMOL/L (ref 3.5–5.3)
PROT SERPL-MCNC: 7 G/DL (ref 6.4–8.2)
PTH-INTACT SERPL-MCNC: 96.5 PG/ML (ref 18.4–80.1)
RBC # BLD AUTO: 4.56 MILLION/UL (ref 3.88–5.62)
SODIUM SERPL-SCNC: 133 MMOL/L (ref 136–145)
TIBC SERPL-MCNC: 308 UG/DL (ref 250–450)
TRIGL SERPL-MCNC: 76 MG/DL
TSH SERPL DL<=0.05 MIU/L-ACNC: 1.02 UIU/ML (ref 0.36–3.74)
VIT B12 SERPL-MCNC: 266 PG/ML (ref 100–900)
WBC # BLD AUTO: 5.13 THOUSAND/UL (ref 4.31–10.16)

## 2020-06-30 PROCEDURE — 36415 COLL VENOUS BLD VENIPUNCTURE: CPT

## 2020-06-30 PROCEDURE — 80061 LIPID PANEL: CPT

## 2020-06-30 PROCEDURE — 82746 ASSAY OF FOLIC ACID SERUM: CPT

## 2020-06-30 PROCEDURE — 84443 ASSAY THYROID STIM HORMONE: CPT

## 2020-06-30 PROCEDURE — 82525 ASSAY OF COPPER: CPT

## 2020-06-30 PROCEDURE — 80053 COMPREHEN METABOLIC PANEL: CPT

## 2020-06-30 PROCEDURE — 82306 VITAMIN D 25 HYDROXY: CPT

## 2020-06-30 PROCEDURE — 83970 ASSAY OF PARATHORMONE: CPT

## 2020-06-30 PROCEDURE — 83540 ASSAY OF IRON: CPT

## 2020-06-30 PROCEDURE — 82607 VITAMIN B-12: CPT

## 2020-06-30 PROCEDURE — 83550 IRON BINDING TEST: CPT

## 2020-06-30 PROCEDURE — 84425 ASSAY OF VITAMIN B-1: CPT

## 2020-06-30 PROCEDURE — 85027 COMPLETE CBC AUTOMATED: CPT

## 2020-06-30 PROCEDURE — 84630 ASSAY OF ZINC: CPT

## 2020-06-30 PROCEDURE — 82728 ASSAY OF FERRITIN: CPT

## 2020-06-30 PROCEDURE — 84590 ASSAY OF VITAMIN A: CPT

## 2020-07-02 LAB
COPPER SERPL-MCNC: 97 UG/DL (ref 72–166)
ZINC SERPL-MCNC: 69 UG/DL (ref 56–134)

## 2020-07-06 LAB — VIT A SERPL-MCNC: 57 UG/DL (ref 20.1–62)

## 2020-07-07 ENCOUNTER — HOSPITAL ENCOUNTER (OUTPATIENT)
Dept: RADIOLOGY | Facility: HOSPITAL | Age: 55
Discharge: HOME | End: 2020-07-07
Attending: INTERNAL MEDICINE
Payer: COMMERCIAL

## 2020-07-07 VITALS
OXYGEN SATURATION: 100 % | RESPIRATION RATE: 16 BRPM | DIASTOLIC BLOOD PRESSURE: 79 MMHG | TEMPERATURE: 97.3 F | HEART RATE: 83 BPM | SYSTOLIC BLOOD PRESSURE: 126 MMHG

## 2020-07-07 DIAGNOSIS — D86.9 SARCOIDOSIS: ICD-10-CM

## 2020-07-07 DIAGNOSIS — I50.22 CHRONIC SYSTOLIC CONGESTIVE HEART FAILURE (HCC): ICD-10-CM

## 2020-07-07 LAB
ATRIAL RATE: 78 BPM
ATRIAL RATE: 83 BPM
P AXIS: 52 DEGREES
P AXIS: 61 DEGREES
PR INTERVAL: 150 MS
PR INTERVAL: 154 MS
QRS AXIS: 109 DEGREES
QRS AXIS: 75 DEGREES
QRSD INTERVAL: 86 MS
QRSD INTERVAL: 88 MS
QT INTERVAL: 380 MS
QT INTERVAL: 396 MS
QTC INTERVAL: 446 MS
QTC INTERVAL: 451 MS
T WAVE AXIS: -6 DEGREES
T WAVE AXIS: 17 DEGREES
VENTRICULAR RATE: 78 BPM
VENTRICULAR RATE: 83 BPM

## 2020-07-07 PROCEDURE — 93005 ELECTROCARDIOGRAM TRACING: CPT

## 2020-07-07 PROCEDURE — 93010 ELECTROCARDIOGRAM REPORT: CPT | Performed by: INTERNAL MEDICINE

## 2020-07-07 PROCEDURE — 75563 CARD MRI W/STRESS IMG & DYE: CPT

## 2020-07-07 PROCEDURE — A9585 GADOBUTROL INJECTION: HCPCS | Performed by: INTERNAL MEDICINE

## 2020-07-07 RX ADMIN — REGADENOSON 0.4 MG: 0.08 INJECTION, SOLUTION INTRAVENOUS at 10:01

## 2020-07-07 RX ADMIN — GADOBUTROL 28 ML: 604.72 INJECTION INTRAVENOUS at 10:07

## 2020-07-07 NOTE — NURSING NOTE
Post MRI pt alert and orientated x3  Post vitals and ECG performed  Pt given coffee and a snack by MRI technologist   Pt has no complaints and awaiting discharge from MRI patiently

## 2020-07-07 NOTE — NURSING NOTE
Pt alert and orientated x3  Pt identified x2  Vital signs and ECG performed prior to MRI  IV's inserted in bilateral antecubitals  Pt has no complaints and awaiting MRI procedure patiently

## 2020-07-08 DIAGNOSIS — E53.8 LOW VITAMIN B12 LEVEL: ICD-10-CM

## 2020-07-08 DIAGNOSIS — E83.51 LOW CALCIUM LEVELS: ICD-10-CM

## 2020-07-08 DIAGNOSIS — E21.1 HYPERPARATHYROIDISM , SECONDARY, NON-RENAL (HCC): ICD-10-CM

## 2020-07-08 DIAGNOSIS — Z90.3 HISTORY OF SLEEVE GASTRECTOMY: Primary | ICD-10-CM

## 2020-07-08 DIAGNOSIS — R79.89 LOW VITAMIN D LEVEL: ICD-10-CM

## 2020-07-08 LAB — VIT B1 BLD-SCNC: 108.4 NMOL/L (ref 66.5–200)

## 2020-07-15 ENCOUNTER — TELEPHONE (OUTPATIENT)
Dept: CARDIOLOGY CLINIC | Facility: CLINIC | Age: 55
End: 2020-07-15

## 2020-07-15 NOTE — TELEPHONE ENCOUNTER
I had sent a message for this patient to be scheduled to see me Friday at 3pm for a pull over  Can you make sure this is arranged?

## 2020-07-15 NOTE — TELEPHONE ENCOUNTER
Michelle from Bariatric Weight Management called to inquire on the status of CC for patient's upcoming bariatric revision with Dr Arron Powell  Please advise

## 2020-07-17 ENCOUNTER — OFFICE VISIT (OUTPATIENT)
Dept: CARDIOLOGY CLINIC | Facility: CLINIC | Age: 55
End: 2020-07-17
Payer: COMMERCIAL

## 2020-07-17 VITALS
BODY MASS INDEX: 42.95 KG/M2 | OXYGEN SATURATION: 97 % | HEIGHT: 65 IN | DIASTOLIC BLOOD PRESSURE: 80 MMHG | SYSTOLIC BLOOD PRESSURE: 126 MMHG | HEART RATE: 74 BPM | WEIGHT: 257.8 LBS

## 2020-07-17 DIAGNOSIS — I10 ESSENTIAL HYPERTENSION: ICD-10-CM

## 2020-07-17 DIAGNOSIS — I50.22 CHRONIC SYSTOLIC CONGESTIVE HEART FAILURE (HCC): ICD-10-CM

## 2020-07-17 PROCEDURE — 99214 OFFICE O/P EST MOD 30 MIN: CPT | Performed by: INTERNAL MEDICINE

## 2020-07-17 PROCEDURE — 3079F DIAST BP 80-89 MM HG: CPT | Performed by: INTERNAL MEDICINE

## 2020-07-17 PROCEDURE — 3074F SYST BP LT 130 MM HG: CPT | Performed by: INTERNAL MEDICINE

## 2020-07-17 PROCEDURE — 3008F BODY MASS INDEX DOCD: CPT | Performed by: INTERNAL MEDICINE

## 2020-07-17 RX ORDER — LOSARTAN POTASSIUM 50 MG/1
50 TABLET ORAL DAILY
Qty: 90 TABLET | Refills: 3 | Status: SHIPPED | OUTPATIENT
Start: 2020-07-17 | End: 2020-12-01 | Stop reason: SDUPTHER

## 2020-07-17 RX ORDER — CARVEDILOL 12.5 MG/1
12.5 TABLET ORAL 2 TIMES DAILY WITH MEALS
Qty: 180 TABLET | Refills: 3 | Status: SHIPPED | OUTPATIENT
Start: 2020-07-17 | End: 2020-12-01 | Stop reason: SDUPTHER

## 2020-07-17 NOTE — PROGRESS NOTES
Cardiology Consultation     Autumn Layton  [id number]  1965  [address] [facility]  [address] [id number]      1  Essential hypertension  carvedilol (COREG) 12 5 mg tablet    losartan (COZAAR) 50 mg tablet   2  Chronic systolic congestive heart failure (HCC)  carvedilol (COREG) 12 5 mg tablet    losartan (COZAAR) 50 mg tablet    Echo limited with contrast if indicated       Discussion/Summary:    Dilated nonischemic cardiomyopathy with biventricular dysfunction, EF is 31% by MRI  Suspect viral/idiopathic CMP, no other identifiable cause  There is a h/o sarcoidosis, but no suggestion of cardiac involvement  With regards to the heart failure, he is currently well compensated  I discussed the diagnosis at length with the patient and his wife, who is a  and seems to have a pretty good understanding of heart failure due to her work as well  We reviewed sodium, fluid restriction, optimal medical therapy for CHF  Device indication for AICD, and briefly discussed the topic of advanced therapies for CHF should these ever become necessary in the future  I will titrate up his beta blocker today, increase to 12 5mg twice a day  He can check BP at home  If > 100 SBP in 1 week and no symptoms, can increase losartan to 50mg  I have him new rx for both which he gets through the [place] system where he works  He is not requiring any diuretics at this time  I will repeat a limited echo in 3 months after he is on increased doses of therapy and if EF < 35%, he understands referral for AICD would be warranted  With regards to his preoperative evaluation, we reviewed the risks and benefits  I think he is low-moderate risk currently, given the CHF  However he is very well compensated  I will titrate up his therapy regardless, but I think he is safe to undergo surgery   We need to watch his volume status carefully, and he should continue his treatment through surgery  While BP can be lower after surgery, I would be cautious and ideally not stop his beta blocker and ARB entirely unless there is symptomatic hypotension  May need to decrease the doses temporarily  He is symptomatic with reflux from the hernia, and weight loss will be helpful in increasing aerobic activity and will be beneficial for his heart failure as well, so he can proceed with surgery as planned -- the date is yet TBD  Previous History:  52-year-old gentleman who has a history of gastric sleeve  I met him in May of 2020 in preoperative evaluation for bariatric surgery revision and hiatal hernia repair  He had a Pe in 12/2019, and at that time an echo showed a mildly decreased LV function  He was asymptomatic, but given his prior decrease in LV function, I repeated a limited echo which somewhat surprisingly showed an ef of 35%  There is a history of pulmonary sarcoidosis without any significant flares in the past other than when he was first diagnosed  He is hypertensive  I did a stress CMR to look for ischemic CMP, other infiltrative disease, and also to reevaluate EF  This showed biventricular dysfunction, EF 31%  No infiltration, and no evidence of ischemia  He returns today to review the results and discuss the timing of his surgery  He remains asymptomatic from a heart failure standpoint, NYHA Class I  He has been on Coreg 6 25mg twice a day and losartan 25mg without any side effects  He does miss the evening dose of Coreg occasionally  Has not required any diuretics  Is on Xarelto for DVT  No h/o afib      Patient Active Problem List   Diagnosis    Pulmonary embolism (HCC)    GERD (gastroesophageal reflux disease)    History of sleeve gastrectomy    Sarcoidosis    Incidental lung nodule, > 3mm and < 8mm    Renal lesion    Cigar smoker    Essential hypertension    Hyperparathyroidism , secondary, non-renal ([facility] )    Low vitamin D level    Low vitamin B12 level    Low calcium levels     Past Medical History:   Diagnosis Date    GERD (gastroesophageal reflux disease)     Heartburn     Hypertension     Pulmonary embolism (HCC)      Social History     Tobacco Use    Smoking status: Former Smoker    Smokeless tobacco: Never Used    Tobacco comment: cigar ocasional   Substance Use Topics    Alcohol use: Never     Frequency: Never    Drug use: Never      Family History   Problem Relation Age of Onset    Hypertension Father      Past Surgical History:   Procedure Laterality Date    BARIATRIC SURGERY      FOOT SURGERY      HAND SURGERY      STOMACH SURGERY  2015    gastric sleeve       Current Outpatient Medications:     allopurinol (ZYLOPRIM) 300 mg tablet, Take 1 tablet by mouth as needed , Disp: , Rfl:     calcium citrate-vitamin D (CITRACAL+D) 315-200 MG-UNIT per tablet, Take by mouth, Disp: , Rfl:     carvedilol (COREG) 12 5 mg tablet, Take 1 tablet (12 5 mg total) by mouth 2 (two) times a day with meals, Disp: 180 tablet, Rfl: 3    losartan (COZAAR) 50 mg tablet, Take 1 tablet (50 mg total) by mouth daily, Disp: 90 tablet, Rfl: 3    Multiple Vitamins-Minerals (MULTIVITAMIN ADULTS PO), Take 1 tablet by mouth daily, Disp: , Rfl:     pantoprazole (PROTONIX) 40 mg tablet, Take 1 tablet by mouth 2 (two) times a day , Disp: , Rfl:     ranitidine (ZANTAC) 150 mg tablet, Take 150 mg by mouth daily , Disp: , Rfl:     sucralfate (CARAFATE) 1 g/10 mL suspension, Take by mouth 4 (four) times a day, Disp: , Rfl:     traMADol (ULTRAM) 50 mg tablet, Take 50 mg by mouth every 6 (six) hours as needed for moderate pain, Disp: , Rfl:     XARELTO 20 MG tablet, , Disp: , Rfl:   No Known Allergies    Vitals:    07/17/20 1503   BP: 126/80   BP Location: Left arm   Patient Position: Sitting   Cuff Size: Adult   Pulse: 74   SpO2: 97%   Weight: 117 kg (257 lb 12 8 oz)   Height: 5' 4 5" (1 638 m)     Vitals:    07/17/20 1503   Weight: 117 kg (257 lb 12 8 oz)      Height: 5' 4 5" (163 8 cm)   Body mass index is 43 57 kg/m²  Physical Exam:  GEN: Janet Stands appears well, alert and oriented x 3, pleasant and cooperative   HEENT: pupils equal, round, and reactive to light; extraocular muscles intact  NECK: supple, no carotid bruits   HEART: regular rhythm, normal S1 and S2, no murmurs, clicks, gallops or rubs   LUNGS: clear to auscultation bilaterally; no wheezes, rales, or rhonchi   ABDOMEN: Obese, normal bowel sounds, soft, no tenderness, no distention  EXTREMITIES: peripheral pulses normal; no clubbing, cyanosis, or edema  NEURO: no focal findings   SKIN: normal without suspicious lesions on exposed skin    ROS:  Positive for GERD  Except as noted in HPI, is otherwise reviewed in detail and a 12 point review of systems is negative  Labs:  Lab Results   Component Value Date     12/26/2015    K 3 6 06/30/2020     06/30/2020    CREATININE 1 00 06/30/2020    BUN 12 06/30/2020    CO2 25 06/30/2020    ALT 21 06/30/2020    AST 12 06/30/2020    INR 0 96 12/16/2019    GLUF 105 (H) 06/30/2020    HGBA1C 5 6 06/06/2015    WBC 5 13 06/30/2020    HGB 12 4 06/30/2020    HCT 39 6 06/30/2020     06/30/2020     Testing:  MRI 7/7/20: IMPRESSION:  Impression:  1  Mildly enlarged left ventricle size moderately reduced left ventricle systolic function  Ejection fraction measures 31%  This may be slightly underestimated secondary to some arrhythmia artifact  Nonspecific delayed myocardial enhancement at   inferior RV insertion site  No evidence of perfusion defect in resting or stress state  Findings are consistent with idiopathic cardiomyopathy  2  Normal right ventricle end-diastolic volume with moderately reduced right ventricle systolic function  Ejection fraction measures 20%  3  No valvular abnormality  4   Mild left atrial enlargement      Echo 6/5/2020:  LEFT VENTRICLE:  Size was at the upper limits of normal   Systolic function was moderately reduced  Ejection fraction was estimated to be 35 %  There was moderate global hypokinesis with severe hypokinesis of the entire septum and the basal to mid inferior wall  Wall thickness was normal      RIGHT VENTRICLE:  The size was normal   Systolic function was normal      MITRAL VALVE:  There was mild regurgitation      TRICUSPID VALVE:  There was trace regurgitation      COMPARISONS:  Comparison was made with the previous study of 18-Dec-2019  Ejection fraction has decreased  Echo 12/2019:  LEFT VENTRICLE:  Systolic function was at the lower limits of normal  Ejection fraction was estimated to be 45 % - 50%  Although no diagnostic regional wall motion abnormality was identified, this possibility cannot be completely excluded on the basis of this study  Wall thickness was increased  Hypertrophy was noted  Doppler parameters were consistent with abnormal left ventricular relaxation (grade 1 diastolic dysfunction)      AORTIC VALVE:  There was trace regurgitation

## 2020-08-12 ENCOUNTER — TELEPHONE (OUTPATIENT)
Dept: SLEEP CENTER | Facility: CLINIC | Age: 55
End: 2020-08-12

## 2020-08-12 NOTE — TELEPHONE ENCOUNTER
Michelle from weight management is wondering what the treatment recommendations are for this patient  He needs to be scheduled for a bariatric revision surgery and your office notes have no information as to what the treatment options are for him      [address]

## 2020-09-03 ENCOUNTER — OFFICE VISIT (OUTPATIENT)
Dept: BARIATRICS | Facility: CLINIC | Age: 55
End: 2020-09-03

## 2020-09-03 ENCOUNTER — OFFICE VISIT (OUTPATIENT)
Dept: BARIATRICS | Facility: CLINIC | Age: 55
End: 2020-09-03
Payer: COMMERCIAL

## 2020-09-03 VITALS
HEIGHT: 65 IN | DIASTOLIC BLOOD PRESSURE: 80 MMHG | SYSTOLIC BLOOD PRESSURE: 134 MMHG | HEART RATE: 87 BPM | TEMPERATURE: 98.6 F | BODY MASS INDEX: 43.32 KG/M2 | WEIGHT: 260 LBS

## 2020-09-03 DIAGNOSIS — E66.01 MORBID (SEVERE) OBESITY DUE TO EXCESS CALORIES (HCC): Primary | ICD-10-CM

## 2020-09-03 PROCEDURE — 99214 OFFICE O/P EST MOD 30 MIN: CPT | Performed by: SURGERY

## 2020-09-03 PROCEDURE — 1036F TOBACCO NON-USER: CPT | Performed by: SURGERY

## 2020-09-03 PROCEDURE — RECHECK: Performed by: DIETITIAN, REGISTERED

## 2020-09-03 RX ORDER — ACETAMINOPHEN 325 MG/1
975 TABLET ORAL ONCE
Status: CANCELLED | OUTPATIENT
Start: 2020-09-22 | End: 2020-09-03

## 2020-09-03 RX ORDER — GABAPENTIN 300 MG/1
600 CAPSULE ORAL ONCE
Status: CANCELLED | OUTPATIENT
Start: 2020-09-22 | End: 2020-09-03

## 2020-09-03 RX ORDER — CEFAZOLIN SODIUM 2 G/50ML
2000 SOLUTION INTRAVENOUS ONCE
Status: CANCELLED | OUTPATIENT
Start: 2020-09-22 | End: 2020-09-03

## 2020-09-03 RX ORDER — CAPSAICIN 0.025 %
CREAM (GRAM) TOPICAL AS NEEDED
COMMUNITY
Start: 2020-09-02 | End: 2021-07-18 | Stop reason: HOSPADM

## 2020-09-03 RX ORDER — SCOLOPAMINE TRANSDERMAL SYSTEM 1 MG/1
1 PATCH, EXTENDED RELEASE TRANSDERMAL ONCE
Status: CANCELLED | OUTPATIENT
Start: 2020-09-22 | End: 2020-09-03

## 2020-09-03 RX ORDER — CYCLOBENZAPRINE HCL 10 MG
TABLET ORAL
COMMUNITY
Start: 2020-09-02 | End: 2020-12-01 | Stop reason: ALTCHOICE

## 2020-09-03 RX ORDER — NAPROXEN 500 MG/1
TABLET ORAL
COMMUNITY
Start: 2020-09-02 | End: 2020-12-01 | Stop reason: ALTCHOICE

## 2020-09-03 RX ORDER — HEPARIN SODIUM 5000 [USP'U]/ML
5000 INJECTION, SOLUTION INTRAVENOUS; SUBCUTANEOUS
Status: CANCELLED | OUTPATIENT
Start: 2020-09-23 | End: 2020-09-24

## 2020-09-03 RX ORDER — CELECOXIB 200 MG/1
200 CAPSULE ORAL ONCE
Status: CANCELLED | OUTPATIENT
Start: 2020-09-22 | End: 2020-09-03

## 2020-09-03 NOTE — H&P (VIEW-ONLY)
BARIATRIC HISTORY AND PHYSICAL - BARIATRIC SURGERY  Emperatriz Hernandez 48 y o  male MRN: [id number]  Unit/Bed#:  Encounter: [id number]      HPI:  Autumn Layton is a 48 y o  male who presents to review her preoperative workup and see if she is a good candidate to undergo a bariatric procedure  Review of Systems   Constitutional: Negative  HENT: Negative  Eyes: Negative  Respiratory: Negative  Cardiovascular: Negative  Gastrointestinal: Negative  Endocrine: Negative  Genitourinary: Negative  Musculoskeletal: Negative  Skin: Negative  Neurological: Negative  Hematological: Negative  Psychiatric/Behavioral: Negative  Historical Information   Past Medical History:   Diagnosis Date    GERD (gastroesophageal reflux disease)     Heartburn     Hypertension     Pulmonary embolism (HCC)      Past Surgical History:   Procedure Laterality Date    BARIATRIC SURGERY      FOOT SURGERY      HAND SURGERY      STOMACH SURGERY  2015    gastric sleeve     Social History   Social History     Substance and Sexual Activity   Alcohol Use Never    Frequency: Never     Social History     Substance and Sexual Activity   Drug Use Never     Social History     Tobacco Use   Smoking Status Former Smoker   Smokeless Tobacco Never Used   Tobacco Comment    cigar ocasional     Family History: non-contributory    Meds/Allergies   all medications and allergies reviewed  No Known Allergies    Objective     Current Vitals:   Blood Pressure: 134/80 (09/03/20 1326)  Pulse: 87 (09/03/20 1326)  Temperature: 98 6 °F (37 °C) (09/03/20 1326)  Height: 5' 4 5" (163 8 cm) (09/03/20 1326)  Weight - Scale: 118 kg (260 lb) (09/03/20 1326)  Body mass index is 43 94 kg/m²  [unfilled]    Invasive Devices     None                 Physical Exam  Constitutional:       Appearance: He is well-developed  HENT:      Head: Normocephalic and atraumatic     Eyes:      Conjunctiva/sclera: Conjunctivae normal    Neck: Musculoskeletal: Normal range of motion and neck supple  Cardiovascular:      Rate and Rhythm: Normal rate and regular rhythm  Heart sounds: Normal heart sounds  Pulmonary:      Effort: Pulmonary effort is normal       Breath sounds: Normal breath sounds  Abdominal:      General: Bowel sounds are normal       Palpations: Abdomen is soft  Musculoskeletal: Normal range of motion  Skin:     General: Skin is warm and dry  Neurological:      Mental Status: He is alert and oriented to person, place, and time  Deep Tendon Reflexes: Reflexes are normal and symmetric  Lab Results: I have personally reviewed pertinent lab results  Imaging: I have personally reviewed pertinent reports  EKG, Pathology, and Other Studies: I have personally reviewed pertinent reports  Code Status: [unfilled]  Advance Directive and Living Will:      Power of :    POLST:      Assessment/Plan:      The patient presented to review the preoperative workup and see if bariatric surgery is appropriate and indicated following the extensive preoperative workup and the enrollment in our weight loss program    Preoperative workup was complete  Results were reviewed with the patient including the blood work results and the endoscopy findings and the biopsy results  We also reviewed the cardiology evaluation  I believe that the patient will be a good candidate for laparoscopic Robotic assisted SLEEVE CONVERSION TO HALEY-EN-Y GASTRIC BYPASS/ Parmova 23 REPAIR    Patient will need to start the 2 week liquid diet prior to surgery  Risks and benefits explained one more time to the patient  Alternatives to surgery and alternative forms of surgery were also explained  Post-surgical commitment and after care programs were explained  We also discussed that the [employer] robot may be available to us to use on the day of the patient procedure  and that the procedure may be performed robotically    I discussed in details the advantages and disadvantages of this approach including the potential decrease in postoperative pain because of the remote center technology  I also mentioned the lack of strong evidence for  the use of robot in bariatric patients and the potential disadvantage to patients because of the prolonged operative time  Consent was signed  Questions were answered and concerns were addressed  Patient wishes to proceed  As per  [facility] guidelines, I had a discussion with the patient regarding his CODE STATUS in the perioperative period and the patient is level 1 or FULL CODE STATUS

## 2020-09-03 NOTE — PROGRESS NOTES
BARIATRIC HISTORY AND PHYSICAL - BARIATRIC SURGERY  Ninoska Hernandez 48 y o  male MRN: [id number]  Unit/Bed#:  Encounter: [id number]      HPI:  Lata Barajas is a 48 y o  male who presents to review her preoperative workup and see if she is a good candidate to undergo a bariatric procedure  Review of Systems   Constitutional: Negative  HENT: Negative  Eyes: Negative  Respiratory: Negative  Cardiovascular: Negative  Gastrointestinal: Negative  Endocrine: Negative  Genitourinary: Negative  Musculoskeletal: Negative  Skin: Negative  Neurological: Negative  Hematological: Negative  Psychiatric/Behavioral: Negative  Historical Information   Past Medical History:   Diagnosis Date    GERD (gastroesophageal reflux disease)     Heartburn     Hypertension     Pulmonary embolism (HCC)      Past Surgical History:   Procedure Laterality Date    BARIATRIC SURGERY      FOOT SURGERY      HAND SURGERY      STOMACH SURGERY  2015    gastric sleeve     Social History   Social History     Substance and Sexual Activity   Alcohol Use Never    Frequency: Never     Social History     Substance and Sexual Activity   Drug Use Never     Social History     Tobacco Use   Smoking Status Former Smoker   Smokeless Tobacco Never Used   Tobacco Comment    cigar ocasional     Family History: non-contributory    Meds/Allergies   all medications and allergies reviewed  No Known Allergies    Objective     Current Vitals:   Blood Pressure: 134/80 (09/03/20 1326)  Pulse: 87 (09/03/20 1326)  Temperature: 98 6 °F (37 °C) (09/03/20 1326)  Height: 5' 4 5" (163 8 cm) (09/03/20 1326)  Weight - Scale: 118 kg (260 lb) (09/03/20 1326)  Body mass index is 43 94 kg/m²  [unfilled]    Invasive Devices     None                 Physical Exam  Constitutional:       Appearance: He is well-developed  HENT:      Head: Normocephalic and atraumatic     Eyes:      Conjunctiva/sclera: Conjunctivae normal    Neck: Musculoskeletal: Normal range of motion and neck supple  Cardiovascular:      Rate and Rhythm: Normal rate and regular rhythm  Heart sounds: Normal heart sounds  Pulmonary:      Effort: Pulmonary effort is normal       Breath sounds: Normal breath sounds  Abdominal:      General: Bowel sounds are normal       Palpations: Abdomen is soft  Musculoskeletal: Normal range of motion  Skin:     General: Skin is warm and dry  Neurological:      Mental Status: He is alert and oriented to person, place, and time  Deep Tendon Reflexes: Reflexes are normal and symmetric  Lab Results: I have personally reviewed pertinent lab results  Imaging: I have personally reviewed pertinent reports  EKG, Pathology, and Other Studies: I have personally reviewed pertinent reports  Code Status: [unfilled]  Advance Directive and Living Will:      Power of :    POLST:      Assessment/Plan:      The patient presented to review the preoperative workup and see if bariatric surgery is appropriate and indicated following the extensive preoperative workup and the enrollment in our weight loss program    Preoperative workup was complete  Results were reviewed with the patient including the blood work results and the endoscopy findings and the biopsy results  We also reviewed the cardiology evaluation  I believe that the patient will be a good candidate for laparoscopic Robotic assisted SLEEVE CONVERSION TO HALEY-EN-Y GASTRIC BYPASS/ Parmova 23 REPAIR    Patient will need to start the 2 week liquid diet prior to surgery  Risks and benefits explained one more time to the patient  Alternatives to surgery and alternative forms of surgery were also explained  Post-surgical commitment and after care programs were explained  We also discussed that the [employer] robot may be available to us to use on the day of the patient procedure  and that the procedure may be performed robotically    I discussed in details the advantages and disadvantages of this approach including the potential decrease in postoperative pain because of the remote center technology  I also mentioned the lack of strong evidence for  the use of robot in bariatric patients and the potential disadvantage to patients because of the prolonged operative time  Consent was signed  Questions were answered and concerns were addressed  Patient wishes to proceed  As per  [facility] guidelines, I had a discussion with the patient regarding his CODE STATUS in the perioperative period and the patient is level 1 or FULL CODE STATUS

## 2020-09-03 NOTE — PROGRESS NOTES
Weight Management Nutrition Class Virtual     Diagnosis: Morbid Obesity    Bariatric Surgeon: Dr Mirnada Cuevas    Surgery: pre op lap revision     Class: Pre Op Education   Topics discussed today include:     fluid goals post op, protein goals post op, constipation, chew food well, exercise, avoidance of alcohol, PPI use, diet progression, hypoglycemia, dumping syndrome, protein supplems, vitamin/mineral supplements, calcium supplements, additional vitamin B12, iron supplements, fat soluble vitamins  and pre op liver shrinking diet and ERAS protocol     Patient was able to verbalize basic diet (protein, fluid, vitamin and mineral) recommendations and possible nutrition-related complications   Yes

## 2020-09-04 DIAGNOSIS — E66.9 OBESITY, UNSPECIFIED: Primary | ICD-10-CM

## 2020-09-04 RX ORDER — OXYCODONE HYDROCHLORIDE 5 MG/1
5 TABLET ORAL EVERY 4 HOURS PRN
Qty: 10 TABLET | Refills: 0 | Status: SHIPPED | OUTPATIENT
Start: 2020-09-22 | End: 2020-10-01 | Stop reason: ALTCHOICE

## 2020-09-04 RX ORDER — OMEPRAZOLE 20 MG/1
20 CAPSULE, DELAYED RELEASE ORAL DAILY
Qty: 30 CAPSULE | Refills: 3 | Status: SHIPPED | OUTPATIENT
Start: 2020-09-04 | End: 2020-12-22 | Stop reason: SDUPTHER

## 2020-09-04 RX ORDER — NALOXONE HYDROCHLORIDE 4 MG/.1ML
SPRAY NASAL
Qty: 1 EACH | Refills: 0 | Status: SHIPPED | OUTPATIENT
Start: 2020-09-04 | End: 2020-12-02 | Stop reason: ALTCHOICE

## 2020-09-12 DIAGNOSIS — Z20.822 ENCOUNTER FOR LABORATORY TESTING FOR SEVERE ACUTE RESPIRATORY SYNDROME CORONAVIRUS 2 (SARS-COV-2): ICD-10-CM

## 2020-09-12 PROCEDURE — U0003 INFECTIOUS AGENT DETECTION BY NUCLEIC ACID (DNA OR RNA); SEVERE ACUTE RESPIRATORY SYNDROME CORONAVIRUS 2 (SARS-COV-2) (CORONAVIRUS DISEASE [COVID-19]), AMPLIFIED PROBE TECHNIQUE, MAKING USE OF HIGH THROUGHPUT TECHNOLOGIES AS DESCRIBED BY CMS-2020-01-R: HCPCS | Performed by: SURGERY

## 2020-09-13 LAB — SARS-COV-2 RNA SPEC QL NAA+PROBE: NOT DETECTED

## 2020-09-15 ENCOUNTER — TELEPHONE (OUTPATIENT)
Dept: BARIATRICS | Facility: CLINIC | Age: 55
End: 2020-09-15

## 2020-09-21 ENCOUNTER — ANESTHESIA EVENT (OUTPATIENT)
Dept: PERIOP | Facility: HOSPITAL | Age: 55
DRG: 326 | End: 2020-09-21
Payer: COMMERCIAL

## 2020-09-21 NOTE — PRE-PROCEDURE INSTRUCTIONS
Pre-Surgery Instructions:   Medication Instructions    allopurinol (ZYLOPRIM) 300 mg tablet Instructed patient per Anesthesia Guidelines   calcium citrate-vitamin D (CITRACAL+D) 315-200 MG-UNIT per tablet Instructed patient per Anesthesia Guidelines   capsaicin (ZOSTRIX) 0 025 % cream Instructed patient per Anesthesia Guidelines   carvedilol (COREG) 12 5 mg tablet Instructed patient per Anesthesia Guidelines   Multiple Vitamins-Minerals (MULTIVITAMIN ADULTS PO) Instructed patient per Anesthesia Guidelines   naloxone (NARCAN) 4 mg/0 1 mL nasal spray Instructed patient per Anesthesia Guidelines   naproxen (NAPROSYN) 500 mg tablet Instructed patient per Anesthesia Guidelines   omeprazole (PriLOSEC) 20 mg delayed release capsule Instructed patient per Anesthesia Guidelines   pantoprazole (PROTONIX) 40 mg tablet Instructed patient per Anesthesia Guidelines   sucralfate (CARAFATE) 1 g/10 mL suspension Instructed patient per Anesthesia Guidelines  Pre-op and bathing instructions reviewed with patient's spouse  Pt has hibiclens  Pt will take carvedilol and omeprazole am of surgery

## 2020-09-22 ENCOUNTER — ANESTHESIA (OUTPATIENT)
Dept: PERIOP | Facility: HOSPITAL | Age: 55
DRG: 326 | End: 2020-09-22
Payer: COMMERCIAL

## 2020-09-22 ENCOUNTER — HOSPITAL ENCOUNTER (INPATIENT)
Facility: HOSPITAL | Age: 55
LOS: 2 days | Discharge: HOME | DRG: 326 | End: 2020-09-24
Attending: SURGERY | Admitting: SURGERY
Payer: COMMERCIAL

## 2020-09-22 VITALS — HEART RATE: 82 BPM

## 2020-09-22 DIAGNOSIS — R11.10 VOMITING: ICD-10-CM

## 2020-09-22 DIAGNOSIS — K44.9 PARAESOPHAGEAL HERNIA: ICD-10-CM

## 2020-09-22 DIAGNOSIS — K21.9 GASTROESOPHAGEAL REFLUX DISEASE: ICD-10-CM

## 2020-09-22 DIAGNOSIS — R12 HEARTBURN: ICD-10-CM

## 2020-09-22 DIAGNOSIS — D86.9 SARCOIDOSIS: Primary | ICD-10-CM

## 2020-09-22 DIAGNOSIS — Z86.711 HISTORY OF PULMONARY EMBOLISM: ICD-10-CM

## 2020-09-22 DIAGNOSIS — R19.6 HALITOSIS: ICD-10-CM

## 2020-09-22 DIAGNOSIS — Z98.84 STATUS POST LAPAROSCOPIC SLEEVE GASTRECTOMY: ICD-10-CM

## 2020-09-22 DIAGNOSIS — R11.0 NAUSEA: ICD-10-CM

## 2020-09-22 DIAGNOSIS — K21.9 GASTROESOPHAGEAL REFLUX DISEASE, ESOPHAGITIS PRESENCE NOT SPECIFIED: ICD-10-CM

## 2020-09-22 DIAGNOSIS — E66.01 MORBID OBESITY (HCC): ICD-10-CM

## 2020-09-22 PROCEDURE — 43659 UNLISTED LAPS PX STOMACH: CPT | Performed by: SURGERY

## 2020-09-22 PROCEDURE — 0BUT4JZ SUPPLEMENT DIAPHRAGM WITH SYNTHETIC SUBSTITUTE, PERCUTANEOUS ENDOSCOPIC APPROACH: ICD-10-PCS | Performed by: SURGERY

## 2020-09-22 PROCEDURE — 0DJ08ZZ INSPECTION OF UPPER INTESTINAL TRACT, VIA NATURAL OR ARTIFICIAL OPENING ENDOSCOPIC: ICD-10-PCS | Performed by: SURGERY

## 2020-09-22 PROCEDURE — 88302 TISSUE EXAM BY PATHOLOGIST: CPT | Performed by: PATHOLOGY

## 2020-09-22 PROCEDURE — C9290 INJ, BUPIVACAINE LIPOSOME: HCPCS | Performed by: SURGERY

## 2020-09-22 PROCEDURE — 8E0W4CZ ROBOTIC ASSISTED PROCEDURE OF TRUNK REGION, PERCUTANEOUS ENDOSCOPIC APPROACH: ICD-10-PCS | Performed by: SURGERY

## 2020-09-22 PROCEDURE — 0DS64ZZ REPOSITION STOMACH, PERCUTANEOUS ENDOSCOPIC APPROACH: ICD-10-PCS | Performed by: SURGERY

## 2020-09-22 PROCEDURE — 0DNW4ZZ RELEASE PERITONEUM, PERCUTANEOUS ENDOSCOPIC APPROACH: ICD-10-PCS | Performed by: SURGERY

## 2020-09-22 PROCEDURE — 0D164ZA BYPASS STOMACH TO JEJUNUM, PERCUTANEOUS ENDOSCOPIC APPROACH: ICD-10-PCS | Performed by: SURGERY

## 2020-09-22 PROCEDURE — 43282 LAP PARAESOPH HER RPR W/MESH: CPT | Performed by: SURGERY

## 2020-09-22 PROCEDURE — C1781 MESH (IMPLANTABLE): HCPCS | Performed by: SURGERY

## 2020-09-22 DEVICE — SEAMGUARD STPL REINF INTUITIVE SUREFORM 60 GREEN: Type: IMPLANTABLE DEVICE | Status: FUNCTIONAL

## 2020-09-22 DEVICE — BIO-A TISSUE REINFORCEMENT 7CMX10CM
Type: IMPLANTABLE DEVICE | Site: ABDOMEN | Status: FUNCTIONAL
Brand: GORE BIO-A TISSUE REINFORCEMENT

## 2020-09-22 RX ORDER — HEPARIN SODIUM 5000 [USP'U]/ML
5000 INJECTION, SOLUTION INTRAVENOUS; SUBCUTANEOUS EVERY 8 HOURS SCHEDULED
Status: DISCONTINUED | OUTPATIENT
Start: 2020-09-23 | End: 2020-09-24 | Stop reason: HOSPADM

## 2020-09-22 RX ORDER — MIDAZOLAM HYDROCHLORIDE 2 MG/2ML
INJECTION, SOLUTION INTRAMUSCULAR; INTRAVENOUS AS NEEDED
Status: DISCONTINUED | OUTPATIENT
Start: 2020-09-22 | End: 2020-09-22

## 2020-09-22 RX ORDER — ONDANSETRON 2 MG/ML
4 INJECTION INTRAMUSCULAR; INTRAVENOUS EVERY 6 HOURS PRN
Status: DISCONTINUED | OUTPATIENT
Start: 2020-09-22 | End: 2020-09-24 | Stop reason: HOSPADM

## 2020-09-22 RX ORDER — SODIUM CHLORIDE 9 MG/ML
INJECTION INTRAVENOUS AS NEEDED
Status: DISCONTINUED | OUTPATIENT
Start: 2020-09-22 | End: 2020-09-22 | Stop reason: HOSPADM

## 2020-09-22 RX ORDER — CELECOXIB 200 MG/1
200 CAPSULE ORAL ONCE
Status: COMPLETED | OUTPATIENT
Start: 2020-09-22 | End: 2020-09-22

## 2020-09-22 RX ORDER — CEFAZOLIN SODIUM 2 G/50ML
SOLUTION INTRAVENOUS AS NEEDED
Status: DISCONTINUED | OUTPATIENT
Start: 2020-09-22 | End: 2020-09-22

## 2020-09-22 RX ORDER — METOCLOPRAMIDE HYDROCHLORIDE 5 MG/ML
INJECTION INTRAMUSCULAR; INTRAVENOUS AS NEEDED
Status: DISCONTINUED | OUTPATIENT
Start: 2020-09-22 | End: 2020-09-22

## 2020-09-22 RX ORDER — ONDANSETRON 2 MG/ML
4 INJECTION INTRAMUSCULAR; INTRAVENOUS ONCE AS NEEDED
Status: DISCONTINUED | OUTPATIENT
Start: 2020-09-22 | End: 2020-09-22 | Stop reason: HOSPADM

## 2020-09-22 RX ORDER — FENTANYL CITRATE 50 UG/ML
INJECTION, SOLUTION INTRAMUSCULAR; INTRAVENOUS AS NEEDED
Status: DISCONTINUED | OUTPATIENT
Start: 2020-09-22 | End: 2020-09-22

## 2020-09-22 RX ORDER — GABAPENTIN 300 MG/1
600 CAPSULE ORAL ONCE
Status: COMPLETED | OUTPATIENT
Start: 2020-09-22 | End: 2020-09-22

## 2020-09-22 RX ORDER — HEPARIN SODIUM 5000 [USP'U]/ML
5000 INJECTION, SOLUTION INTRAVENOUS; SUBCUTANEOUS
Status: COMPLETED | OUTPATIENT
Start: 2020-09-22 | End: 2020-09-22

## 2020-09-22 RX ORDER — MAGNESIUM HYDROXIDE 1200 MG/15ML
LIQUID ORAL AS NEEDED
Status: DISCONTINUED | OUTPATIENT
Start: 2020-09-22 | End: 2020-09-22 | Stop reason: HOSPADM

## 2020-09-22 RX ORDER — SODIUM CHLORIDE 9 MG/ML
125 INJECTION, SOLUTION INTRAVENOUS CONTINUOUS
Status: DISCONTINUED | OUTPATIENT
Start: 2020-09-22 | End: 2020-09-23

## 2020-09-22 RX ORDER — ACETAMINOPHEN 325 MG/1
975 TABLET ORAL ONCE
Status: COMPLETED | OUTPATIENT
Start: 2020-09-22 | End: 2020-09-22

## 2020-09-22 RX ORDER — ROCURONIUM BROMIDE 10 MG/ML
INJECTION, SOLUTION INTRAVENOUS AS NEEDED
Status: DISCONTINUED | OUTPATIENT
Start: 2020-09-22 | End: 2020-09-22

## 2020-09-22 RX ORDER — PROPOFOL 10 MG/ML
INJECTION, EMULSION INTRAVENOUS AS NEEDED
Status: DISCONTINUED | OUTPATIENT
Start: 2020-09-22 | End: 2020-09-22

## 2020-09-22 RX ORDER — ONDANSETRON 2 MG/ML
INJECTION INTRAMUSCULAR; INTRAVENOUS AS NEEDED
Status: DISCONTINUED | OUTPATIENT
Start: 2020-09-22 | End: 2020-09-22

## 2020-09-22 RX ORDER — CEFAZOLIN SODIUM 2 G/50ML
2000 SOLUTION INTRAVENOUS EVERY 8 HOURS
Status: COMPLETED | OUTPATIENT
Start: 2020-09-22 | End: 2020-09-23

## 2020-09-22 RX ORDER — HYDROMORPHONE HCL/PF 1 MG/ML
0.5 SYRINGE (ML) INJECTION
Status: DISCONTINUED | OUTPATIENT
Start: 2020-09-22 | End: 2020-09-22 | Stop reason: HOSPADM

## 2020-09-22 RX ORDER — BUPIVACAINE HYDROCHLORIDE AND EPINEPHRINE 5; 5 MG/ML; UG/ML
INJECTION, SOLUTION PERINEURAL AS NEEDED
Status: DISCONTINUED | OUTPATIENT
Start: 2020-09-22 | End: 2020-09-22 | Stop reason: HOSPADM

## 2020-09-22 RX ORDER — SCOLOPAMINE TRANSDERMAL SYSTEM 1 MG/1
1 PATCH, EXTENDED RELEASE TRANSDERMAL ONCE
Status: DISCONTINUED | OUTPATIENT
Start: 2020-09-22 | End: 2020-09-24 | Stop reason: HOSPADM

## 2020-09-22 RX ORDER — LIDOCAINE HYDROCHLORIDE 20 MG/ML
INJECTION, SOLUTION INFILTRATION; PERINEURAL AS NEEDED
Status: DISCONTINUED | OUTPATIENT
Start: 2020-09-22 | End: 2020-09-22

## 2020-09-22 RX ORDER — CEFAZOLIN SODIUM 2 G/50ML
2000 SOLUTION INTRAVENOUS ONCE
Status: DISCONTINUED | OUTPATIENT
Start: 2020-09-22 | End: 2020-09-22 | Stop reason: HOSPADM

## 2020-09-22 RX ORDER — NEOSTIGMINE METHYLSULFATE 1 MG/ML
INJECTION INTRAVENOUS AS NEEDED
Status: DISCONTINUED | OUTPATIENT
Start: 2020-09-22 | End: 2020-09-22

## 2020-09-22 RX ORDER — SODIUM CHLORIDE, SODIUM LACTATE, POTASSIUM CHLORIDE, CALCIUM CHLORIDE 600; 310; 30; 20 MG/100ML; MG/100ML; MG/100ML; MG/100ML
75 INJECTION, SOLUTION INTRAVENOUS CONTINUOUS
Status: DISCONTINUED | OUTPATIENT
Start: 2020-09-22 | End: 2020-09-24 | Stop reason: HOSPADM

## 2020-09-22 RX ORDER — GLYCOPYRROLATE 0.2 MG/ML
INJECTION INTRAMUSCULAR; INTRAVENOUS AS NEEDED
Status: DISCONTINUED | OUTPATIENT
Start: 2020-09-22 | End: 2020-09-22

## 2020-09-22 RX ORDER — DEXAMETHASONE SODIUM PHOSPHATE 4 MG/ML
INJECTION, SOLUTION INTRA-ARTICULAR; INTRALESIONAL; INTRAMUSCULAR; INTRAVENOUS; SOFT TISSUE AS NEEDED
Status: DISCONTINUED | OUTPATIENT
Start: 2020-09-22 | End: 2020-09-22

## 2020-09-22 RX ORDER — METOPROLOL TARTRATE 5 MG/5ML
INJECTION INTRAVENOUS AS NEEDED
Status: DISCONTINUED | OUTPATIENT
Start: 2020-09-22 | End: 2020-09-22

## 2020-09-22 RX ORDER — OXYCODONE HCL 5 MG/5 ML
10 SOLUTION, ORAL ORAL EVERY 4 HOURS PRN
Status: DISCONTINUED | OUTPATIENT
Start: 2020-09-22 | End: 2020-09-24 | Stop reason: HOSPADM

## 2020-09-22 RX ORDER — ACETAMINOPHEN 160 MG/5ML
975 SUSPENSION, ORAL (FINAL DOSE FORM) ORAL EVERY 8 HOURS PRN
Status: DISCONTINUED | OUTPATIENT
Start: 2020-09-22 | End: 2020-09-24 | Stop reason: HOSPADM

## 2020-09-22 RX ADMIN — PROPOFOL 120 MG: 10 INJECTION, EMULSION INTRAVENOUS at 12:57

## 2020-09-22 RX ADMIN — GABAPENTIN 600 MG: 300 CAPSULE ORAL at 11:52

## 2020-09-22 RX ADMIN — SODIUM CHLORIDE, SODIUM LACTATE, POTASSIUM CHLORIDE, AND CALCIUM CHLORIDE 75 ML/HR: .6; .31; .03; .02 INJECTION, SOLUTION INTRAVENOUS at 18:22

## 2020-09-22 RX ADMIN — HYDROMORPHONE HYDROCHLORIDE 0.5 MG: 1 INJECTION, SOLUTION INTRAMUSCULAR; INTRAVENOUS; SUBCUTANEOUS at 17:19

## 2020-09-22 RX ADMIN — METRONIDAZOLE 500 MG: 500 INJECTION, SOLUTION INTRAVENOUS at 23:07

## 2020-09-22 RX ADMIN — ONDANSETRON 4 MG: 2 INJECTION INTRAMUSCULAR; INTRAVENOUS at 16:25

## 2020-09-22 RX ADMIN — HEPARIN SODIUM 5000 UNITS: 5000 INJECTION INTRAVENOUS; SUBCUTANEOUS at 11:51

## 2020-09-22 RX ADMIN — ROCURONIUM BROMIDE 10 MG: 10 INJECTION, SOLUTION INTRAVENOUS at 13:37

## 2020-09-22 RX ADMIN — METRONIDAZOLE 500 MG: 500 INJECTION, SOLUTION INTRAVENOUS at 12:49

## 2020-09-22 RX ADMIN — ROCURONIUM BROMIDE 10 MG: 10 INJECTION, SOLUTION INTRAVENOUS at 15:00

## 2020-09-22 RX ADMIN — FENTANYL CITRATE 50 MCG: 50 INJECTION, SOLUTION INTRAMUSCULAR; INTRAVENOUS at 12:57

## 2020-09-22 RX ADMIN — METOPROLOL TARTRATE 0.5 MG: 5 INJECTION INTRAVENOUS at 15:13

## 2020-09-22 RX ADMIN — FENTANYL CITRATE 50 MCG: 50 INJECTION, SOLUTION INTRAMUSCULAR; INTRAVENOUS at 14:36

## 2020-09-22 RX ADMIN — NEOSTIGMINE METHYLSULFATE 4 MG: 1 INJECTION, SOLUTION INTRAVENOUS at 16:41

## 2020-09-22 RX ADMIN — FENTANYL CITRATE 50 MCG: 50 INJECTION, SOLUTION INTRAMUSCULAR; INTRAVENOUS at 15:55

## 2020-09-22 RX ADMIN — CEFAZOLIN SODIUM 2000 MG: 2 SOLUTION INTRAVENOUS at 12:46

## 2020-09-22 RX ADMIN — FENTANYL CITRATE 50 MCG: 50 INJECTION, SOLUTION INTRAMUSCULAR; INTRAVENOUS at 13:20

## 2020-09-22 RX ADMIN — CELECOXIB 200 MG: 200 CAPSULE ORAL at 11:52

## 2020-09-22 RX ADMIN — ROCURONIUM BROMIDE 10 MG: 10 INJECTION, SOLUTION INTRAVENOUS at 14:06

## 2020-09-22 RX ADMIN — SODIUM CHLORIDE 125 ML/HR: 0.9 INJECTION, SOLUTION INTRAVENOUS at 11:43

## 2020-09-22 RX ADMIN — SCOPALAMINE 1 PATCH: 1 PATCH, EXTENDED RELEASE TRANSDERMAL at 11:45

## 2020-09-22 RX ADMIN — ACETAMINOPHEN 975 MG: 325 TABLET ORAL at 11:51

## 2020-09-22 RX ADMIN — METOPROLOL TARTRATE 0.5 MG: 5 INJECTION INTRAVENOUS at 15:10

## 2020-09-22 RX ADMIN — MORPHINE SULFATE 2 MG: 2 INJECTION, SOLUTION INTRAMUSCULAR; INTRAVENOUS at 18:59

## 2020-09-22 RX ADMIN — ROCURONIUM BROMIDE 5 MG: 10 INJECTION, SOLUTION INTRAVENOUS at 16:01

## 2020-09-22 RX ADMIN — DEXAMETHASONE SODIUM PHOSPHATE 4 MG: 4 INJECTION, SOLUTION INTRAMUSCULAR; INTRAVENOUS at 13:13

## 2020-09-22 RX ADMIN — LIDOCAINE HYDROCHLORIDE 5 ML: 20 INJECTION, SOLUTION INFILTRATION; PERINEURAL at 12:57

## 2020-09-22 RX ADMIN — MORPHINE SULFATE 2 MG: 2 INJECTION, SOLUTION INTRAMUSCULAR; INTRAVENOUS at 22:22

## 2020-09-22 RX ADMIN — FAMOTIDINE 20 MG: 10 INJECTION, SOLUTION INTRAVENOUS at 18:59

## 2020-09-22 RX ADMIN — GLYCOPYRROLATE 0.4 MG: 0.2 INJECTION, SOLUTION INTRAMUSCULAR; INTRAVENOUS at 16:41

## 2020-09-22 RX ADMIN — ROCURONIUM BROMIDE 40 MG: 10 INJECTION, SOLUTION INTRAVENOUS at 12:57

## 2020-09-22 RX ADMIN — FENTANYL CITRATE 50 MCG: 50 INJECTION, SOLUTION INTRAMUSCULAR; INTRAVENOUS at 12:47

## 2020-09-22 RX ADMIN — GLYCOPYRROLATE 0.2 MG: 0.2 INJECTION, SOLUTION INTRAMUSCULAR; INTRAVENOUS at 12:47

## 2020-09-22 RX ADMIN — FENTANYL CITRATE 50 MCG: 50 INJECTION, SOLUTION INTRAMUSCULAR; INTRAVENOUS at 14:06

## 2020-09-22 RX ADMIN — MIDAZOLAM 2 MG: 1 INJECTION INTRAMUSCULAR; INTRAVENOUS at 12:47

## 2020-09-22 RX ADMIN — CEFAZOLIN SODIUM 2000 MG: 2 SOLUTION INTRAVENOUS at 21:58

## 2020-09-22 RX ADMIN — FENTANYL CITRATE 50 MCG: 50 INJECTION, SOLUTION INTRAMUSCULAR; INTRAVENOUS at 15:39

## 2020-09-22 RX ADMIN — METOPROLOL TARTRATE 1 MG: 5 INJECTION INTRAVENOUS at 15:37

## 2020-09-22 RX ADMIN — ROCURONIUM BROMIDE 5 MG: 10 INJECTION, SOLUTION INTRAVENOUS at 15:33

## 2020-09-22 RX ADMIN — HYDROMORPHONE HYDROCHLORIDE 0.5 MG: 1 INJECTION, SOLUTION INTRAMUSCULAR; INTRAVENOUS; SUBCUTANEOUS at 17:35

## 2020-09-22 RX ADMIN — METOCLOPRAMIDE 5 MG: 5 INJECTION, SOLUTION INTRAMUSCULAR; INTRAVENOUS at 12:47

## 2020-09-22 RX ADMIN — SODIUM CHLORIDE: 0.9 INJECTION, SOLUTION INTRAVENOUS at 16:05

## 2020-09-22 RX ADMIN — FENTANYL CITRATE 50 MCG: 50 INJECTION, SOLUTION INTRAMUSCULAR; INTRAVENOUS at 14:27

## 2020-09-22 NOTE — ANESTHESIA PREPROCEDURE EVALUATION
Procedure:  LAPAROSCOPIC REVISION/ CONVERSION TO HALEY-EN-Y GASTRIC BYPASS W ROBOTICS AND INTRAOPERATIVE EGD (N/A Abdomen)    Relevant Problems   ANESTHESIA (within normal limits)      CARDIO   (+) Essential hypertension   (+) Pulmonary embolism (HCC)      ENDO   (+) Hyperparathyroidism , secondary, non-renal (HCC)      GI/HEPATIC   (+) GERD (gastroesophageal reflux disease)      PULMONARY   (+) Cigar smoker      Other   (+) Sarcoidosis        Physical Exam    Airway    Mallampati score: II  TM Distance: >3 FB       Dental   No notable dental hx     Cardiovascular  Rhythm: regular, Rate: normal, Cardiovascular exam normal    Pulmonary  Pulmonary exam normal Breath sounds clear to auscultation,     Other Findings        Anesthesia Plan  ASA Score- 3     Anesthesia Type- general with ASA Monitors  Additional Monitors:   Airway Plan: ETT  Plan Factors-Exercise tolerance (METS): >4 METS  Chart reviewed  EKG reviewed  Existing labs reviewed  Patient summary reviewed  Patient is not a current smoker  Patient instructed to abstain from smoking on day of procedure  Patient did not smoke on day of surgery  Obstructive sleep apnea risk education given perioperatively  Induction- intravenous  Postoperative Plan- Plan for postoperative opioid use  Informed Consent- Anesthetic plan and risks discussed with patient  I personally reviewed this patient with the CRNA  Discussed and agreed on the Anesthesia Plan with the CRNA  [place]

## 2020-09-22 NOTE — ANESTHESIA POSTPROCEDURE EVALUATION
Post-Op Assessment Note    CV Status:  Stable  Pain Score: 2    Pain management: adequate     Mental Status:  Alert and awake   Hydration Status:  Euvolemic   PONV Controlled:  Controlled   Airway Patency:  Patent      Post Op Vitals Reviewed: Yes      Staff: Anesthesiologist         No complications documented      BP      Temp      Pulse     Resp      SpO2

## 2020-09-22 NOTE — INTERVAL H&P NOTE
H&P reviewed  After examining the patient I find no changes in the patients condition since the H&P had been written      Vitals:    09/22/20 1129   BP: 138/77   Pulse: 88   Resp: 16   Temp: 98 2 °F (36 8 °C)   SpO2: 98%

## 2020-09-22 NOTE — OP NOTE
OPERATIVE REPORT  PATIENT NAME: Aspen Mcelroy    :  1965  MRN: [id number]  Pt Location: [facility] OR ROOM 06    SURGERY DATE: 2020    Surgeon(s) and Role:     * Nba Briscoe MD - Primary     * Moon Newton MD - Fellow    Preop Diagnosis:  Gastroesophageal reflux disease [K21 9]  Paraesophageal hernia [K44 9]  Heartburn [R12]  Vomiting [R11 10]  Nausea [R11 0]  History of pulmonary embolism [Z86 711]  Halitosis [R19 6]  Morbid obesity ([address] ) [E66 01]  Status post laparoscopic sleeve gastrectomy [Z98 84]    Post-Op Diagnosis Codes:     * Gastroesophageal reflux disease [K21 9]     * Paraesophageal hernia [K44 9]     * Heartburn [R12]     * Vomiting [R11 10]     * Nausea [R11 0]     * History of pulmonary embolism [Z86 711]     * Halitosis [R19 6]     * Morbid obesity ([address] ) [E66 01]     * Status post laparoscopic sleeve gastrectomy [Z]    Procedure(s) (LRB):  LAPAROSCOPIC REVISION/ CONVERSION TO HALEY-EN-Y GASTRIC BYPASS W ROBOTICS, PARAESOPHAGEAL HERNIA REPAIR, AND INTRAOPERATIVE EGD (N/A)    Specimen(s):  ID Type Source Tests Collected by Time Destination   1 : Hernia Sac Tissue Soft Tissue, Other TISSUE EXAM Nba Briscoe MD 2020 [id number]        Estimated Blood Loss:   200 ml    Drains:  Closed/Suction Drain Right Abdomen Bulb 19 Fr   (Active)   Number of days: 0       Anesthesia Type:   General    Operative Indications:  Gastroesophageal reflux disease [K21 9]  Paraesophageal hernia [K44 9]  Heartburn [R12]  Vomiting [R11 10]  Nausea [R11 0]  History of pulmonary embolism [Z86 711]  Halitosis [R19 6]  Morbid obesity ([address] ) [E66 01]  Status post laparoscopic sleeve gastrectomy [Z]      Operative Findings:  Large paraesophageal hernia  Sleeve migration into the chest cavity  Gastric volvulus    Complications:   None    Procedure and Technique:  Robotic extensive lysis of adhesions  Robotic repair of paraesophageal hernia with bio a mesh  Robotic reduction of gastric volvulus  Robotic Haley-en-Y gastric bypass intraop endoscopy   I was present for the entire procedure    Patient Disposition:  hemodynamically stable     Indication for the procedure:  Patient is a 52-year-old male with history of laparoscopic sleeve gastrectomy with developed a large paraesophageal hernia with sleeve migration into the chest cavity and with what appears to be corkscrewing or volvulus on imaging studies, patient was consented for a sleeve conversion to Keke-en-Y gastric bypass in addition to repair of his paraesophageal hernia, the increases risk for perioperative complication rates were explained to the patient in details given the revisional nature of the procedure  Procedure: The patient was brought to the operating room and placed in a supine position  The patient received a dose of IV antibiotics and a dose of subcutaneous Heparin prior to the procedure  The patient was induced under general endotracheal anesthesia  The abdominal wall was prepped and draped under sterile conditions in the usual fashion  The procedure was started by obtaining access to the abdominal cavity using a Veress needle to the left side of the midline around 6 inches from the xiphoid the abdominal cavity was insufflated with CO2 to a pressure of 15 mmHg  After that, the abdomen was entered with an 8 mm trocar using an Optiview trocar under direct visualization  At that point, an 8 and 12 mm robotic trocars were placed on the right side of the abdominal wall, under direct visualization, and another 8 mm robotic trocar and 12 mm assistant trocar were placed on the left side of the abdominal wall, also under direct visualization  A TAP block was performed using 20 ml of Exparel mixed with saline and 0 5 % Marcaine for a total of 100 ml  The patient was then placed in a reverse Trendelenburg position   A Bandar retractor was placed through a small stab incision below the xiphoid and was used to retract the left lobe of the liver in a medial fashion, the robot was then docked and locked in place  An OG tube was placed to decompress the stomach and then removed immediately  The procedure was started by lifting the omentum in a cephalad fashion  The omentum was then split in half using the vessel sealer  The ligament of Treitz was identified and then the small bowel was transected with a 60 mm stapler using a white load  The mesentery of the small bowel was then transected using the vessel sealer,   After that, the distal small bowel was run for 150 cm in a way to obtain a 150 cm long Keke limb  At that point, two enterotomies were made in the BP limb and the Keke limb with hot scissors l, and the jejunojejunostomy was fashioned using a 60 mm stapler with a white load  After firing the stapler, the staple line was inspected and there was no evidence of bleeding and the staple line was well formed  The common enterotomy of the jejunojejunostomy was closed in two layers using 2-0 Vicryl running suture for the first layer and  2-0 V LOC in a running fashion for the second layer  The mesenteric defect of the small bowel was approximated using nonabsorbable suture in a running fashion  At that point, we turned our attention to the upper portion of the abdomen  There was evidence of dense intra-abdominal adhesions from the previous sleeve gastrectomy in the sleeve migration those adhesions were taken down with a combination of sharp dissection the use vessel sealer the part procedure was very tedious and took a significant amount of time  The angle of His was then dissected using blunt dissection and the vessel sealer  The phrenoesophageal ligament was also dissected anteriorly and  a paraesophageal hernia was identified  The decision was then made to repair the hernia posteriorly  Right annette and left annette were dissected away from the hernia sac and skeletonized all the way down to the confluence   Esophagus was kept out of harm's way during the dissection  Both vagi were also identified and preserved  Dissection was carried all the way up to the level of the pulmonary vein  to obtain more length of the esophagus  Previous gastric sleeve pouch appeared to have herniated into chest cavity we dissected the pouch await the hernia sac that part procedure was also very tedious and took a significant amount potentially able to separate the hernia sac from part of the gastric pouch that had herniated into the chest cavity and that part was reduced into the abdominal cavity  The dissection was completed circumferentially around the esophagus  We had at least 5 cm of the lower esophagus in an intra-abdominal location by the end of the dissection  Hernia sac was completely dissected and excised  Right annette and left annette were then approximated using 0 Ethibond sutures placed in an interrupted fashion  After approximating the crura the anesthesiologist placed a 58 Burmese bougie to size the hiatus  A bio a mesh was then trimmed to accommodate the esophagus and placed around the esophagus and secured with a simple 0 Ethibond suture  Both leaflets of the mesh were secured to the diaphragm using fibrin glue  The gastric pouch that had herniated the chest cavity did not appear to be dilated however to avoid any staple line breakdown especially after the extent of the dissection we decided to imbricate the previous gastric sleeve staple line with a running 2- 0 V lock suture for safety  The pars flaccida was then opened and a gastric pouch was created with serial firings of the Sureform 60 mm intuitive  stapler with a black cartridge reinforced with Seamguard  After the formation of the gastric pouch, the staple lines of the pouch and the gastric remnant were inspected and appeared to be well formed and also appeared to be hemostatic  The gastric remnant staple line was also imbricated with a running 2 0 V lock suture    A new gastrojejunostomy anastomosis was then fashioned in an antecolic antegastric fashion in 2 layers  Outer layer was a running layer of 2-0 V lock suture and the inner  layer was a running 2-0 Vicryl suture  The Brown Abed defect was closed with a simple nonabsorbable 2 0 Ethibond suture in a figure-of-eight  Fashion  Upper endoscopy was then performed and showed no evidence of bubbles or bleeding at the suture line of the anastomosis or the staple line of the gastric pouch  The gastrojejunostomy was covered with an omental flap that was secured in place with an absorbable suture in a simple fashion  The [facility] liver retractor was removed  The 12 mm port was closed  with 1-0 Vicryl in a simple fashion  All the skin edges of the trocar sites were approximated with 4-0 Monocryl in a subcuticular inverted fashion  The patient was extubated and transferred to the PACU in stable condition      SIGNATURE: Genet Sandoval MD  DATE: September 22, 2020  TIME: 4:43 PM

## 2020-09-23 ENCOUNTER — APPOINTMENT (INPATIENT)
Dept: CT IMAGING | Facility: HOSPITAL | Age: 55
DRG: 326 | End: 2020-09-23
Payer: COMMERCIAL

## 2020-09-23 ENCOUNTER — APPOINTMENT (INPATIENT)
Dept: RADIOLOGY | Facility: HOSPITAL | Age: 55
DRG: 326 | End: 2020-09-23
Payer: COMMERCIAL

## 2020-09-23 LAB
ANION GAP SERPL CALCULATED.3IONS-SCNC: 8 MMOL/L (ref 4–13)
BUN SERPL-MCNC: 15 MG/DL (ref 5–25)
CALCIUM SERPL-MCNC: 8.2 MG/DL (ref 8.3–10.1)
CHLORIDE SERPL-SCNC: 103 MMOL/L (ref 100–108)
CO2 SERPL-SCNC: 25 MMOL/L (ref 21–32)
CREAT SERPL-MCNC: 0.95 MG/DL (ref 0.6–1.3)
ERYTHROCYTE [DISTWIDTH] IN BLOOD BY AUTOMATED COUNT: 14.4 % (ref 11.6–15.1)
GFR SERPL CREATININE-BSD FRML MDRD: 104 ML/MIN/1.73SQ M
GLUCOSE SERPL-MCNC: 123 MG/DL (ref 65–140)
HCT VFR BLD AUTO: 41.1 % (ref 36.5–49.3)
HGB BLD-MCNC: 12.6 G/DL (ref 12–17)
MCH RBC QN AUTO: 27 PG (ref 26.8–34.3)
MCHC RBC AUTO-ENTMCNC: 30.7 G/DL (ref 31.4–37.4)
MCV RBC AUTO: 88 FL (ref 82–98)
PLATELET # BLD AUTO: 173 THOUSANDS/UL (ref 149–390)
PMV BLD AUTO: 11.2 FL (ref 8.9–12.7)
POTASSIUM SERPL-SCNC: 4.4 MMOL/L (ref 3.5–5.3)
RBC # BLD AUTO: 4.67 MILLION/UL (ref 3.88–5.62)
SODIUM SERPL-SCNC: 136 MMOL/L (ref 136–145)
TROPONIN I SERPL-MCNC: 0.02 NG/ML
TROPONIN I SERPL-MCNC: 0.02 NG/ML
WBC # BLD AUTO: 10.95 THOUSAND/UL (ref 4.31–10.16)

## 2020-09-23 PROCEDURE — 93005 ELECTROCARDIOGRAM TRACING: CPT

## 2020-09-23 PROCEDURE — 84484 ASSAY OF TROPONIN QUANT: CPT | Performed by: SURGERY

## 2020-09-23 PROCEDURE — 80048 BASIC METABOLIC PNL TOTAL CA: CPT | Performed by: SURGERY

## 2020-09-23 PROCEDURE — 71275 CT ANGIOGRAPHY CHEST: CPT

## 2020-09-23 PROCEDURE — G1004 CDSM NDSC: HCPCS

## 2020-09-23 PROCEDURE — 74240 X-RAY XM UPR GI TRC 1CNTRST: CPT

## 2020-09-23 PROCEDURE — 99254 IP/OBS CNSLTJ NEW/EST MOD 60: CPT | Performed by: PHYSICIAN ASSISTANT

## 2020-09-23 PROCEDURE — 85027 COMPLETE CBC AUTOMATED: CPT | Performed by: SURGERY

## 2020-09-23 PROCEDURE — 99024 POSTOP FOLLOW-UP VISIT: CPT | Performed by: SURGERY

## 2020-09-23 RX ORDER — SIMETHICONE 20 MG/.3ML
40 EMULSION ORAL EVERY 6 HOURS PRN
Status: DISCONTINUED | OUTPATIENT
Start: 2020-09-23 | End: 2020-09-23

## 2020-09-23 RX ORDER — SIMETHICONE 80 MG
80 TABLET,CHEWABLE ORAL EVERY 6 HOURS PRN
Status: DISCONTINUED | OUTPATIENT
Start: 2020-09-23 | End: 2020-09-24 | Stop reason: HOSPADM

## 2020-09-23 RX ORDER — HYDRALAZINE HYDROCHLORIDE 20 MG/ML
5 INJECTION INTRAMUSCULAR; INTRAVENOUS ONCE
Status: COMPLETED | OUTPATIENT
Start: 2020-09-23 | End: 2020-09-23

## 2020-09-23 RX ORDER — CARVEDILOL 6.25 MG/1
12.5 TABLET ORAL 2 TIMES DAILY WITH MEALS
Status: DISCONTINUED | OUTPATIENT
Start: 2020-09-23 | End: 2020-09-24 | Stop reason: HOSPADM

## 2020-09-23 RX ADMIN — CARVEDILOL 12.5 MG: 6.25 TABLET, FILM COATED ORAL at 17:30

## 2020-09-23 RX ADMIN — MORPHINE SULFATE 2 MG: 2 INJECTION, SOLUTION INTRAMUSCULAR; INTRAVENOUS at 07:55

## 2020-09-23 RX ADMIN — HYDRALAZINE HYDROCHLORIDE 5 MG: 20 INJECTION INTRAMUSCULAR; INTRAVENOUS at 17:30

## 2020-09-23 RX ADMIN — SIMETHICONE 80 MG: 80 TABLET, CHEWABLE ORAL at 13:14

## 2020-09-23 RX ADMIN — IOHEXOL 85 ML: 350 INJECTION, SOLUTION INTRAVENOUS at 19:09

## 2020-09-23 RX ADMIN — OXYCODONE HYDROCHLORIDE 10 MG: 5 SOLUTION ORAL at 11:08

## 2020-09-23 RX ADMIN — HEPARIN SODIUM 5000 UNITS: 5000 INJECTION INTRAVENOUS; SUBCUTANEOUS at 22:05

## 2020-09-23 RX ADMIN — MORPHINE SULFATE 2 MG: 2 INJECTION, SOLUTION INTRAMUSCULAR; INTRAVENOUS at 02:43

## 2020-09-23 RX ADMIN — OXYCODONE HYDROCHLORIDE 10 MG: 5 SOLUTION ORAL at 19:38

## 2020-09-23 RX ADMIN — METRONIDAZOLE 500 MG: 500 INJECTION, SOLUTION INTRAVENOUS at 05:30

## 2020-09-23 RX ADMIN — HEPARIN SODIUM 5000 UNITS: 5000 INJECTION INTRAVENOUS; SUBCUTANEOUS at 05:09

## 2020-09-23 RX ADMIN — SODIUM CHLORIDE, SODIUM LACTATE, POTASSIUM CHLORIDE, AND CALCIUM CHLORIDE 75 ML/HR: .6; .31; .03; .02 INJECTION, SOLUTION INTRAVENOUS at 09:51

## 2020-09-23 RX ADMIN — HEPARIN SODIUM 5000 UNITS: 5000 INJECTION INTRAVENOUS; SUBCUTANEOUS at 14:29

## 2020-09-23 RX ADMIN — IOHEXOL 50 ML: 350 INJECTION, SOLUTION INTRAVENOUS at 09:40

## 2020-09-23 RX ADMIN — FAMOTIDINE 20 MG: 10 INJECTION, SOLUTION INTRAVENOUS at 17:30

## 2020-09-23 RX ADMIN — OXYCODONE HYDROCHLORIDE 10 MG: 5 SOLUTION ORAL at 15:47

## 2020-09-23 RX ADMIN — FAMOTIDINE 20 MG: 10 INJECTION, SOLUTION INTRAVENOUS at 08:31

## 2020-09-23 RX ADMIN — CEFAZOLIN SODIUM 2000 MG: 2 SOLUTION INTRAVENOUS at 05:09

## 2020-09-23 NOTE — PLAN OF CARE
Problem: PAIN - ADULT  Goal: Verbalizes/displays adequate comfort level or baseline comfort level  Description: Interventions:  - Encourage patient to monitor pain and request assistance  - Assess pain using appropriate pain scale  - Administer analgesics based on type and severity of pain and evaluate response  - Implement non-pharmacological measures as appropriate and evaluate response  - Consider cultural and social influences on pain and pain management  - Notify physician/advanced practitioner if interventions unsuccessful or patient reports new pain  Outcome: Progressing     Problem: INFECTION - ADULT  Goal: Absence or prevention of progression during hospitalization  Description: INTERVENTIONS:  - Assess and monitor for signs and symptoms of infection  - Monitor lab/diagnostic results  - Monitor all insertion sites, i e  indwelling lines, tubes, and drains  - Monitor endotracheal if appropriate and nasal secretions for changes in amount and color  - [place] appropriate cooling/warming therapies per order  - Administer medications as ordered  - Instruct and encourage patient and family to use good hand hygiene technique  - Identify and instruct in appropriate isolation precautions for identified infection/condition  Outcome: Progressing  Goal: Absence of fever/infection during neutropenic period  Description: INTERVENTIONS:  - Monitor WBC    Outcome: Progressing     Problem: SAFETY ADULT  Goal: Patient will remain free of falls  Description: INTERVENTIONS:  - Assess patient frequently for physical needs  -  Identify cognitive and physical deficits and behaviors that affect risk of falls    -  [place] fall precautions as indicated by assessment   - Educate patient/family on patient safety including physical limitations  - Instruct patient to call for assistance with activity based on assessment  - Modify environment to reduce risk of injury  - Consider OT/PT consult to assist with strengthening/mobility  Outcome: Progressing  Goal: Maintain or return to baseline ADL function  Description: INTERVENTIONS:  -  Assess patient's ability to carry out ADLs; assess patient's baseline for ADL function and identify physical deficits which impact ability to perform ADLs (bathing, care of mouth/teeth, toileting, grooming, dressing, etc )  - Assess/evaluate cause of self-care deficits   - Assess range of motion  - Assess patient's mobility; develop plan if impaired  - Assess patient's need for assistive devices and provide as appropriate  - Encourage maximum independence but intervene and supervise when necessary  - Involve family in performance of ADLs  - Assess for home care needs following discharge   - Consider OT consult to assist with ADL evaluation and planning for discharge  - Provide patient education as appropriate  Outcome: Progressing  Goal: Maintain or return mobility status to optimal level  Description: INTERVENTIONS:  - Assess patient's baseline mobility status (ambulation, transfers, stairs, etc )    - Identify cognitive and physical deficits and behaviors that affect mobility  - Identify mobility aids required to assist with transfers and/or ambulation (gait belt, sit-to-stand, lift, walker, cane, etc )  - [place] fall precautions as indicated by assessment  - Record patient progress and toleration of activity level on Mobility SBAR; progress patient to next Phase/Stage  - Instruct patient to call for assistance with activity based on assessment  - Consider rehabilitation consult to assist with strengthening/weightbearing, etc   Outcome: Progressing     Problem: DISCHARGE PLANNING  Goal: Discharge to home or other facility with appropriate resources  Description: INTERVENTIONS:  - Identify barriers to discharge w/patient and caregiver  - Arrange for needed discharge resources and transportation as appropriate  - Identify discharge learning needs (meds, wound care, etc )  - Arrange for interpretive services to assist at discharge as needed  - Refer to Case Management Department for coordinating discharge planning if the patient needs post-hospital services based on physician/advanced practitioner order or complex needs related to functional status, cognitive ability, or social support system  Outcome: Progressing     Problem: Knowledge Deficit  Goal: Patient/family/caregiver demonstrates understanding of disease process, treatment plan, medications, and discharge instructions  Description: Complete learning assessment and assess knowledge base    Interventions:  - Provide teaching at level of understanding  - Provide teaching via preferred learning methods  Outcome: Progressing     Problem: RESPIRATORY - ADULT  Goal: Achieves optimal ventilation and oxygenation  Description: INTERVENTIONS:  - Assess for changes in respiratory status  - Assess for changes in mentation and behavior  - Position to facilitate oxygenation and minimize respiratory effort  - Oxygen administered by appropriate delivery if ordered  - Initiate smoking cessation education as indicated  - Encourage broncho-pulmonary hygiene including cough, deep breathe, Incentive Spirometry  - Assess the need for suctioning and aspirate as needed  - Assess and instruct to report SOB or any respiratory difficulty  - Respiratory Therapy support as indicated  Outcome: Progressing     Problem: GASTROINTESTINAL - ADULT  Goal: Minimal or absence of nausea and/or vomiting  Description: INTERVENTIONS:  - Administer IV fluids if ordered to ensure adequate hydration  - Maintain NPO status until nausea and vomiting are resolved  - Nasogastric tube if ordered  - Administer ordered antiemetic medications as needed  - Provide nonpharmacologic comfort measures as appropriate  - Advance diet as tolerated, if ordered  - Consider nutrition services referral to assist patient with adequate nutrition and appropriate food choices  Outcome: Progressing  Goal: Maintains or returns to baseline bowel function  Description: INTERVENTIONS:  - Assess bowel function  - Encourage oral fluids to ensure adequate hydration  - Administer IV fluids if ordered to ensure adequate hydration  - Administer ordered medications as needed  - Encourage mobilization and activity  - Consider nutritional services referral to assist patient with adequate nutrition and appropriate food choices  Outcome: Progressing  Goal: Maintains adequate nutritional intake  Description: INTERVENTIONS:  - Monitor percentage of each meal consumed  - Identify factors contributing to decreased intake, treat as appropriate  - Assist with meals as needed  - Monitor I&O, weight, and lab values if indicated  - Obtain nutrition services referral as needed  Outcome: Progressing     Problem: METABOLIC, FLUID AND ELECTROLYTES - ADULT  Goal: Electrolytes maintained within normal limits  Description: INTERVENTIONS:  - Monitor labs and assess patient for signs and symptoms of electrolyte imbalances  - Administer electrolyte replacement as ordered  - Monitor response to electrolyte replacements, including repeat lab results as appropriate  - Instruct patient on fluid and nutrition as appropriate  Outcome: Progressing  Goal: Fluid balance maintained  Description: INTERVENTIONS:  - Monitor labs   - Monitor I/O and WT  - Instruct patient on fluid and nutrition as appropriate  - Assess for signs & symptoms of volume excess or deficit  Outcome: Progressing     Problem: SKIN/TISSUE INTEGRITY - ADULT  Goal: Skin integrity remains intact  Description: INTERVENTIONS  - Identify patients at risk for skin breakdown  - Assess and monitor skin integrity  - Assess and monitor nutrition and hydration status  - Monitor labs (i e  albumin)  - Assess for incontinence   - Turn and reposition patient  - Assist with mobility/ambulation  - Relieve pressure over bony prominences  - Avoid friction and shearing  - Provide appropriate hygiene as needed including keeping skin clean and dry  - Evaluate need for skin moisturizer/barrier cream  - Collaborate with interdisciplinary team (i e  Nutrition, Rehabilitation, etc )   - Patient/family teaching  Outcome: Progressing  Goal: Incision(s), wounds(s) or drain site(s) healing without S/S of infection  Description: INTERVENTIONS  - Assess and document risk factors for skin impairment   - Assess and document dressing, incision, wound bed, drain sites and surrounding tissue  - Consider nutrition services referral as needed  - Oral mucous membranes remain intact  - Provide patient/ family education  Outcome: Progressing  Goal: Oral mucous membranes remain intact  Description: INTERVENTIONS  - Assess oral mucosa and hygiene practices  - Implement preventative oral hygiene regimen  - Implement oral medicated treatments as ordered  - Initiate Nutrition services referral as needed  Outcome: Progressing     Problem: MUSCULOSKELETAL - ADULT  Goal: Maintain or return mobility to safest level of function  Description: INTERVENTIONS:  - Assess patient's ability to carry out ADLs; assess patient's baseline for ADL function and identify physical deficits which impact ability to perform ADLs (bathing, care of mouth/teeth, toileting, grooming, dressing, etc )  - Assess/evaluate cause of self-care deficits   - Assess range of motion  - Assess patient's mobility  - Assess patient's need for assistive devices and provide as appropriate  - Encourage maximum independence but intervene and supervise when necessary  - Involve family in performance of ADLs  - Assess for home care needs following discharge   - Consider OT consult to assist with ADL evaluation and planning for discharge  - Provide patient education as appropriate  Outcome: Progressing  Goal: Maintain proper alignment of affected body part  Description: INTERVENTIONS:  - Support, maintain and protect limb and body alignment  - Provide patient/ family with appropriate education  Outcome: Progressing

## 2020-09-23 NOTE — CONSULTS
Inpatient Medical Consultation - [address] [place] [address] Internal Medicine    Patient Information: [facility] 48 y o  male MRN: [id number]  Unit/Bed#: E5 -01 Encounter: [id number]  PCP: Tora Hodgkin, APN-C  Date of Admission:  9/22/2020  Date of Consultation: 09/23/20  Requesting Physician: Madi Aburto MD    Reason For Consultation:   Postoperative medical management / chest pain    Assessment/Plan:    · Morbid obesity/Keke-en-Y gastric bypass:  Initial gastric bypass performed in dec 2014  Presents today for revision by Dr Lewis Neither  Continue management per primary team, bariatrics  · Shortness of breath/pluretic chest pain: S/p gastric bypass revision  Pt developed sharp aching left sided chest pain  He also offers that pain is worse with exertion aka just walking to the bathroom  He also states pain occurs with deep inspiration  Oxygen sats okay on room air but pt is tachycardia  Obtained EKG without signs of acute ischemia or S1Q3T3 pattern  However pt is tachycardia  Given his hx of PE, tachycardia, recent surgery and description of pain, I think it is reasonable to rule out acute PE at this time  Order for CT chest PE study  Obtain troponin  Continue to monitor on telemetry  · History of PE: 12/2019  PE was presumed provoked from long car ride  Was previously anticoagulated on Xarelto but reports being taken off in June 2020  He is unsure of who took him off  · History of pulmonary sarcoidosis:  Without any recent flares  No evidence of infiltrative disease on recent echocardiogram     · Essential hypertension:  Home regimen carvedilol 12 5 mg b i d , losartan 50 mg daily  · GERD:  Continue Protonix, Zantac, Carafate    · "Frozen shoulder": reports recent painful shoulder  Was prescribed flexeril and naproxen but he has completed a course of these and is not currently taking         VTE Prophylaxis: Heparin  / sequential compression device     Counseling / Coordination of Care Time: 45 minutes  Greater than 50% of total time spent on patient counseling and coordination of care  History of Present Illness:    Bob Rome is a 48 y o  male who is originally admitted to the bariatric service on 9/22/2020 due to need for revision of Keke-en-Y gastric bypass  Initial gastric bypass was performed in dec 2014  We are consulted for postoperative medical management  Patient has a past medical history of PE dec 2019, dilated nonischemic cardiomyopathy with EF of 31% by MRI suspected viral/idiopathic, essential hypertension, pulmonary sarcoidosis without cardiac involvement, gout, gerd  S/p surgery, pt is complaining of left sided chest pain  He describes it as sharp and aching, worse with movement  Reports sensation of shortness of breath and has difficulty/pain upon taking a deep breath in  He is tachycardic and reports feeling his heart beating fast  Hx of PE 12/2019 but was taken off xarelto in June  I am unable to find official documentation of this  Lives at home with wife  Denies smoking or alcohol consumption  Review of Systems:    Review of Systems   Constitutional: Negative for chills, diaphoresis, fatigue and fever  HENT: Negative for congestion  Respiratory: Positive for cough and shortness of breath  Negative for wheezing  Cardiovascular: Positive for chest pain  Negative for palpitations and leg swelling  Gastrointestinal: Positive for abdominal pain  Negative for nausea and vomiting  Genitourinary: Negative for dysuria  Skin: Negative for wound  Neurological: Negative for dizziness and headaches  Psychiatric/Behavioral: Negative for agitation         Past Medical and Surgical History:     Past Medical History:   Diagnosis Date    GERD (gastroesophageal reflux disease)     Gout     Heartburn     Hyperparathyroidism ([facility] [address] )     Hypertension     Lung nodule     Pulmonary embolism (HCC)     Sarcoidosis     Wears glasses        Past Surgical History:   Procedure Laterality Date    BARIATRIC SURGERY      COLONOSCOPY      FOOT SURGERY Right     R ankle    GASTRIC BYPASS LAPAROSCOPIC N/A 2020    Procedure: LAPAROSCOPIC REVISION/ CONVERSION TO HALEY-EN-Y GASTRIC BYPASS W ROBOTICS, PARAESOPHAGEAL HERNIA REPAIR, AND INTRAOPERATIVE EGD;  Surgeon: Ayla Echevarria MD;  Location: [facility];  Service: Bariatrics    HAND SURGERY      STOMACH SURGERY      gastric sleeve       Meds/Allergies:    all medications and allergies reviewed    Allergies: No Known Allergies    Social History:     Marital Status: /Civil Union    Substance Use History:   Social History     Substance and Sexual Activity   Alcohol Use Never    Frequency: Never     Social History     Tobacco Use   Smoking Status Former Smoker    Types: Cigars    Last attempt to quit: 2020    Years since quittin 6   Smokeless Tobacco Never Used   Tobacco Comment    only smoked occ cigar, not for over 6 months     Social History     Substance and Sexual Activity   Drug Use Never       Family History:    non-contributory    Physical Exam:     Vitals:   Blood Pressure: 160/88 (20 1500)  Pulse: 82 (20 1500)  Temperature: 98 2 °F (36 8 °C) (20 1500)  Temp Source: Tympanic (20 1500)  Respirations: 18 (20 1500)  Height: 5' 5" (165 1 cm) (20 1129)  Weight - Scale: 115 kg (252 lb 10 4 oz) (20 1132)  SpO2: 92 % (20 1500)    Physical Exam  Constitutional:       Appearance: He is obese  HENT:      Head: Atraumatic  Eyes:      General: No scleral icterus  Cardiovascular:      Rate and Rhythm: Regular rhythm  Tachycardia present  Pulmonary:      Effort: Pulmonary effort is normal  No respiratory distress  Breath sounds: Normal breath sounds  No stridor  No wheezing or rhonchi  Comments: Chest pain unable to be reproduced   Abdominal:      General: Bowel sounds are normal  There is no distension  Palpations: Abdomen is soft  There is no mass  Tenderness: There is abdominal tenderness  Comments: Abdominal incisions clean dry and intact  Drain in place   Musculoskeletal:         General: No swelling  Right lower leg: No edema  Left lower leg: No edema  Skin:     General: Skin is warm and dry  Neurological:      Mental Status: He is oriented to person, place, and time  Mental status is at baseline  Psychiatric:         Mood and Affect: Mood normal          Behavior: Behavior normal          Additional Data:     Lab Results: I have personally reviewed pertinent reports  Results from last 7 days   Lab Units 09/23/20  0527   WBC Thousand/uL 10 95*   HEMOGLOBIN g/dL 12 6   HEMATOCRIT % 41 1   PLATELETS Thousands/uL 173     Results from last 7 days   Lab Units 09/23/20  0527   POTASSIUM mmol/L 4 4   CHLORIDE mmol/L 103   CO2 mmol/L 25   BUN mg/dL 15   CREATININE mg/dL 0 95   CALCIUM mg/dL 8 2*           Imaging: I have personally reviewed pertinent reports  Fl Upper Gi Ugi    Result Date: 9/23/2020  Narrative: LIMITED UPPER GI SERIES INDICATION:  Status post gastric bypass after previous sleeve gastrectomy  COMPARISON:  None IMAGES:  14 FLUOROSCOPY TIME:  60 seconds FINDINGS: A limited single contrast upper GI study was performed with approximately 50 mL Omnipaque 350 contrast  There is a normal postoperative appearance after sleeve gastrectomy and revision Keke-en-Y bypass  There is no evidence of leak  Dilated esophagus with free passage of contrast material across the gastroesophageal junction into the stomach  The stomach appears narrowed after previous partial gastrectomy  There is passage of contrast material distally into the small bowel  Impression: No evidence of leak  Workstation performed: [id number]       ** Please Note: This note has been constructed using a voice recognition system   **

## 2020-09-23 NOTE — PLAN OF CARE
Problem: PAIN - ADULT  Goal: Verbalizes/displays adequate comfort level or baseline comfort level  Description: Interventions:  - Encourage patient to monitor pain and request assistance  - Assess pain using appropriate pain scale  - Administer analgesics based on type and severity of pain and evaluate response  - Implement non-pharmacological measures as appropriate and evaluate response  - Consider cultural and social influences on pain and pain management  - Notify physician/advanced practitioner if interventions unsuccessful or patient reports new pain  Outcome: Progressing     Problem: INFECTION - ADULT  Goal: Absence or prevention of progression during hospitalization  Description: INTERVENTIONS:  - Assess and monitor for signs and symptoms of infection  - Monitor lab/diagnostic results  - Monitor all insertion sites, i e  indwelling lines, tubes, and drains  - Monitor endotracheal if appropriate and nasal secretions for changes in amount and color  - [place] appropriate cooling/warming therapies per order  - Administer medications as ordered  - Instruct and encourage patient and family to use good hand hygiene technique  - Identify and instruct in appropriate isolation precautions for identified infection/condition  Outcome: Progressing  Goal: Absence of fever/infection during neutropenic period  Description: INTERVENTIONS:  - Monitor WBC    Outcome: Progressing     Problem: SAFETY ADULT  Goal: Patient will remain free of falls  Description: INTERVENTIONS:  - Assess patient frequently for physical needs  -  Identify cognitive and physical deficits and behaviors that affect risk of falls    -  [place] fall precautions as indicated by assessment   - Educate patient/family on patient safety including physical limitations  - Instruct patient to call for assistance with activity based on assessment  - Modify environment to reduce risk of injury  - Consider OT/PT consult to assist with strengthening/mobility  Outcome: Progressing  Goal: Maintain or return to baseline ADL function  Description: INTERVENTIONS:  -  Assess patient's ability to carry out ADLs; assess patient's baseline for ADL function and identify physical deficits which impact ability to perform ADLs (bathing, care of mouth/teeth, toileting, grooming, dressing, etc )  - Assess/evaluate cause of self-care deficits   - Assess range of motion  - Assess patient's mobility; develop plan if impaired  - Assess patient's need for assistive devices and provide as appropriate  - Encourage maximum independence but intervene and supervise when necessary  - Involve family in performance of ADLs  - Assess for home care needs following discharge   - Consider OT consult to assist with ADL evaluation and planning for discharge  - Provide patient education as appropriate  Outcome: Progressing  Goal: Maintain or return mobility status to optimal level  Description: INTERVENTIONS:  - Assess patient's baseline mobility status (ambulation, transfers, stairs, etc )    - Identify cognitive and physical deficits and behaviors that affect mobility  - Identify mobility aids required to assist with transfers and/or ambulation (gait belt, sit-to-stand, lift, walker, cane, etc )  - [place] fall precautions as indicated by assessment  - Record patient progress and toleration of activity level on Mobility SBAR; progress patient to next Phase/Stage  - Instruct patient to call for assistance with activity based on assessment  - Consider rehabilitation consult to assist with strengthening/weightbearing, etc   Outcome: Progressing     Problem: DISCHARGE PLANNING  Goal: Discharge to home or other facility with appropriate resources  Description: INTERVENTIONS:  - Identify barriers to discharge w/patient and caregiver  - Arrange for needed discharge resources and transportation as appropriate  - Identify discharge learning needs (meds, wound care, etc )  - Arrange for interpretive services to assist at discharge as needed  - Refer to Case Management Department for coordinating discharge planning if the patient needs post-hospital services based on physician/advanced practitioner order or complex needs related to functional status, cognitive ability, or social support system  Outcome: Progressing     Problem: Knowledge Deficit  Goal: Patient/family/caregiver demonstrates understanding of disease process, treatment plan, medications, and discharge instructions  Description: Complete learning assessment and assess knowledge base    Interventions:  - Provide teaching at level of understanding  - Provide teaching via preferred learning methods  Outcome: Progressing     Problem: RESPIRATORY - ADULT  Goal: Achieves optimal ventilation and oxygenation  Description: INTERVENTIONS:  - Assess for changes in respiratory status  - Assess for changes in mentation and behavior  - Position to facilitate oxygenation and minimize respiratory effort  - Oxygen administered by appropriate delivery if ordered  - Initiate smoking cessation education as indicated  - Encourage broncho-pulmonary hygiene including cough, deep breathe, Incentive Spirometry  - Assess the need for suctioning and aspirate as needed  - Assess and instruct to report SOB or any respiratory difficulty  - Respiratory Therapy support as indicated  Outcome: Progressing     Problem: GASTROINTESTINAL - ADULT  Goal: Minimal or absence of nausea and/or vomiting  Description: INTERVENTIONS:  - Administer IV fluids if ordered to ensure adequate hydration  - Maintain NPO status until nausea and vomiting are resolved  - Nasogastric tube if ordered  - Administer ordered antiemetic medications as needed  - Provide nonpharmacologic comfort measures as appropriate  - Advance diet as tolerated, if ordered  - Consider nutrition services referral to assist patient with adequate nutrition and appropriate food choices  Outcome: Progressing  Goal: Maintains or returns to baseline bowel function  Description: INTERVENTIONS:  - Assess bowel function  - Encourage oral fluids to ensure adequate hydration  - Administer IV fluids if ordered to ensure adequate hydration  - Administer ordered medications as needed  - Encourage mobilization and activity  - Consider nutritional services referral to assist patient with adequate nutrition and appropriate food choices  Outcome: Progressing  Goal: Maintains adequate nutritional intake  Description: INTERVENTIONS:  - Monitor percentage of each meal consumed  - Identify factors contributing to decreased intake, treat as appropriate  - Assist with meals as needed  - Monitor I&O, weight, and lab values if indicated  - Obtain nutrition services referral as needed  Outcome: Progressing     Problem: METABOLIC, FLUID AND ELECTROLYTES - ADULT  Goal: Electrolytes maintained within normal limits  Description: INTERVENTIONS:  - Monitor labs and assess patient for signs and symptoms of electrolyte imbalances  - Administer electrolyte replacement as ordered  - Monitor response to electrolyte replacements, including repeat lab results as appropriate  - Instruct patient on fluid and nutrition as appropriate  Outcome: Progressing  Goal: Fluid balance maintained  Description: INTERVENTIONS:  - Monitor labs   - Monitor I/O and WT  - Instruct patient on fluid and nutrition as appropriate  - Assess for signs & symptoms of volume excess or deficit  Outcome: Progressing     Problem: SKIN/TISSUE INTEGRITY - ADULT  Goal: Skin integrity remains intact  Description: INTERVENTIONS  - Identify patients at risk for skin breakdown  - Assess and monitor skin integrity  - Assess and monitor nutrition and hydration status  - Monitor labs (i e  albumin)  - Assess for incontinence   - Turn and reposition patient  - Assist with mobility/ambulation  - Relieve pressure over bony prominences  - Avoid friction and shearing  - Provide appropriate hygiene as needed including keeping skin clean and dry  - Evaluate need for skin moisturizer/barrier cream  - Collaborate with interdisciplinary team (i e  Nutrition, Rehabilitation, etc )   - Patient/family teaching  Outcome: Progressing  Goal: Incision(s), wounds(s) or drain site(s) healing without S/S of infection  Description: INTERVENTIONS  - Assess and document risk factors for skin impairment   - Assess and document dressing, incision, wound bed, drain sites and surrounding tissue  - Consider nutrition services referral as needed  - Oral mucous membranes remain intact  - Provide patient/ family education  Outcome: Progressing  Goal: Oral mucous membranes remain intact  Description: INTERVENTIONS  - Assess oral mucosa and hygiene practices  - Implement preventative oral hygiene regimen  - Implement oral medicated treatments as ordered  - Initiate Nutrition services referral as needed  Outcome: Progressing     Problem: MUSCULOSKELETAL - ADULT  Goal: Maintain or return mobility to safest level of function  Description: INTERVENTIONS:  - Assess patient's ability to carry out ADLs; assess patient's baseline for ADL function and identify physical deficits which impact ability to perform ADLs (bathing, care of mouth/teeth, toileting, grooming, dressing, etc )  - Assess/evaluate cause of self-care deficits   - Assess range of motion  - Assess patient's mobility  - Assess patient's need for assistive devices and provide as appropriate  - Encourage maximum independence but intervene and supervise when necessary  - Involve family in performance of ADLs  - Assess for home care needs following discharge   - Consider OT consult to assist with ADL evaluation and planning for discharge  - Provide patient education as appropriate  Outcome: Progressing  Goal: Maintain proper alignment of affected body part  Description: INTERVENTIONS:  - Support, maintain and protect limb and body alignment  - Provide patient/ family with appropriate education  Outcome: Progressing     Problem: Prexisting or High Potential for Compromised Skin Integrity  Goal: Skin integrity is maintained or improved  Description: INTERVENTIONS:  - Identify patients at risk for skin breakdown  - Assess and monitor skin integrity  - Assess and monitor nutrition and hydration status  - Monitor labs   - Assess for incontinence   - Turn and reposition patient  - Assist with mobility/ambulation  - Relieve pressure over bony prominences  - Avoid friction and shearing  - Provide appropriate hygiene as needed including keeping skin clean and dry  - Evaluate need for skin moisturizer/barrier cream  - Collaborate with interdisciplinary team   - Patient/family teaching  - Consider wound care consult   Outcome: Progressing     Problem: Potential for Falls  Goal: Patient will remain free of falls  Description: INTERVENTIONS:  - Assess patient frequently for physical needs  -  Identify cognitive and physical deficits and behaviors that affect risk of falls    -  [place] fall precautions as indicated by assessment   - Educate patient/family on patient safety including physical limitations  - Instruct patient to call for assistance with activity based on assessment  - Modify environment to reduce risk of injury  - Consider OT/PT consult to assist with strengthening/mobility  Outcome: Progressing

## 2020-09-23 NOTE — PROGRESS NOTES
Progress Note - Bariatric Surgery   Farhana Hernandez 48 y o  male MRN: [id number]  Unit/Bed#: E5 -01 Encounter: [id number]      Subjective/Objective     Subjective: Patient with morbid obesity s/p robo revision to RYGB with PEH repair   Patient complaining of some abdominal pain  No nausea or vomiting  No acute events overnight  Objective:    /69 (BP Location: Right arm)   Pulse 71   Temp 98 5 °F (36 9 °C) (Tympanic)   Resp 20   Ht 5' 5" (1 651 m)   Wt 115 kg (252 lb 10 4 oz)   SpO2 96%   BMI 42 04 kg/m²       Intake/Output Summary (Last 24 hours) at 9/23/2020 0824  Last data filed at 9/23/2020 0509  Gross per 24 hour   Intake 1500 ml   Output 690 ml   Net 810 ml       Invasive Devices     Peripheral Intravenous Line            Peripheral IV 09/22/20 Left Forearm less than 1 day          Drain            Closed/Suction Drain Right Abdomen Bulb 19 Fr  less than 1 day                ROS: 10-point system completed  All negative except see HPI  Physical Exam    General Appearance:    Alert, cooperative, no distress, appears stated age   Head:    Normocephalic, without obvious abnormality, atraumatic   Lungs:     Respirations unlabored   Heart:    Regular rate and rhythm   Abdomen:     Soft, appropriate tenderness,  no masses, no organomegaly,   non distended   Extremities:   Extremities normal, atraumatic, no cyanosis or edema   Neurologic:  Incision:  Psych:   Normal strength and sensation    Clean, dry, and intact    Normal mood and affect       Lab, Imaging and other studies:I have personally reviewed pertinent lab results  VTE Mechanical Prophylaxis: sequential compression device    Assessment/Plan  1)  Morbid Obesity s/p robo revision to RYGB with PEH repair  with stable post op course  Patient afebrile and hemodynamically stable  - UGI this am  If normal, will start on liquids      Plan of care was discussed with patient and patient's nurse  Care plan discussed with Dr Gianfranco Armenta [address] MD  Bariatric Surgery Fellow

## 2020-09-23 NOTE — UTILIZATION REVIEW
Initial Clinical Review    Elective Inpatient surgical procedure  Age/Sex: 48 y o  male  Surgery Date: 9/22  Procedure: S/P LAPAROSCOPIC REVISION/ CONVERSION TO HALEY-EN-Y GASTRIC BYPASS W ROBOTICS, PARAESOPHAGEAL HERNIA REPAIR, AND INTRAOPERATIVE EGD (N/A)  Anesthesia: General    9/23 POD#1 Progress Note:   C/o abdominal pain  UGI this am  If normal, will start on liquids  Admission Orders: Date/Time/Statement:   Admission Orders (From admission, onward)     Ordered        09/22/20 1713  Inpatient Admission  Once                   Orders Placed This Encounter   Procedures    Inpatient Admission     Standing Status:   Standing     Number of Occurrences:   1     Order Specific Question:   Admitting Physician     Answer:   CB Ferrera [[id number]]     Order Specific Question:   Level of Care     Answer:   Med Surg [[id number]]     Order Specific Question:   Estimated length of stay     Answer:   Inpatient Only Surgery     Vital Signs: /69 (BP Location: Right arm)   Pulse 71   Temp 98 5 °F (36 9 °C) (Tympanic)   Resp 20   Ht 5' 5" (1 651 m)   Wt 115 kg (252 lb 10 4 oz)   SpO2 96%   BMI 42 04 kg/m²      Diet: NPO  Mobility: Up as tolerated  DVT Prophylaxis: SCD/ Foot pumps    Medications/Pain Control:   Scheduled Medications:  famotidine, 20 mg, Intravenous, BID  heparin (porcine), 5,000 Units, Subcutaneous, Q8H [address]  scopolamine, 1 patch, Transdermal, Once      Continuous IV Infusions:  lactated ringers, 75 mL/hr, Intravenous, Continuous      PRN Meds:  acetaminophen, 975 mg, Oral, Q8H PRN  morphine injection, 2 mg, Intravenous, Q2H PRN 9/22 x2, 9/23 x2  ondansetron, 4 mg, Intravenous, Q6H PRN  oxyCODONE, 10 mg, Oral, Q4H PRN      Network Utilization Review Department  [email or web address]  ATTENTION: Please call with any questions or concerns to [phone] and carefully listen to the prompts so that you are directed to the right person   All voicemails are confidential   Urbano Holt all requests for admission clinical reviews, approved or denied determinations and any other requests to dedicated fax number below belonging to the campus where the patient is receiving treatment   List of dedicated fax numbers for the Facilities:  [address] [place] [address] DENIALS (Administrative/Medical Necessity) [phone]   [address]  (Maternity/NICU/Pediatrics) [phone]   Dede Lorenzana [phone]   Ernestina hTapa [phone]   Pito Guzman [phone]   Lord Casarez [phone]   [address] [place] [address] [phone]   [facility]  [phone]   [address] [facility]  [address] [facility] [address] [facility] [phone]

## 2020-09-24 VITALS
HEART RATE: 106 BPM | DIASTOLIC BLOOD PRESSURE: 74 MMHG | TEMPERATURE: 99.2 F | RESPIRATION RATE: 18 BRPM | OXYGEN SATURATION: 94 % | BODY MASS INDEX: 42.09 KG/M2 | SYSTOLIC BLOOD PRESSURE: 139 MMHG | HEIGHT: 65 IN | WEIGHT: 252.65 LBS

## 2020-09-24 PROBLEM — R07.9 CHEST PAIN: Status: ACTIVE | Noted: 2020-09-24

## 2020-09-24 PROBLEM — I42.8 NICM (NONISCHEMIC CARDIOMYOPATHY) (HCC): Status: ACTIVE | Noted: 2020-09-24

## 2020-09-24 LAB
ANION GAP SERPL CALCULATED.3IONS-SCNC: 9 MMOL/L (ref 4–13)
BUN SERPL-MCNC: 14 MG/DL (ref 5–25)
CALCIUM SERPL-MCNC: 8.1 MG/DL (ref 8.3–10.1)
CHLORIDE SERPL-SCNC: 101 MMOL/L (ref 100–108)
CO2 SERPL-SCNC: 24 MMOL/L (ref 21–32)
CREAT SERPL-MCNC: 0.94 MG/DL (ref 0.6–1.3)
GFR SERPL CREATININE-BSD FRML MDRD: 106 ML/MIN/1.73SQ M
GLUCOSE SERPL-MCNC: 138 MG/DL (ref 65–140)
POTASSIUM SERPL-SCNC: 3.8 MMOL/L (ref 3.5–5.3)
SODIUM SERPL-SCNC: 134 MMOL/L (ref 136–145)

## 2020-09-24 PROCEDURE — 99232 SBSQ HOSP IP/OBS MODERATE 35: CPT | Performed by: INTERNAL MEDICINE

## 2020-09-24 PROCEDURE — NC001 PR NO CHARGE: Performed by: SURGERY

## 2020-09-24 PROCEDURE — 99024 POSTOP FOLLOW-UP VISIT: CPT | Performed by: SURGERY

## 2020-09-24 PROCEDURE — 80048 BASIC METABOLIC PNL TOTAL CA: CPT | Performed by: SURGERY

## 2020-09-24 RX ORDER — LIDOCAINE HYDROCHLORIDE 20 MG/ML
10 INJECTION, SOLUTION EPIDURAL; INFILTRATION; INTRACAUDAL; PERINEURAL ONCE
Status: DISCONTINUED | OUTPATIENT
Start: 2020-09-24 | End: 2020-09-24 | Stop reason: HOSPADM

## 2020-09-24 RX ORDER — ACETAMINOPHEN 160 MG/5ML
975 SUSPENSION, ORAL (FINAL DOSE FORM) ORAL EVERY 8 HOURS PRN
Qty: 118 ML | Refills: 0 | Status: SHIPPED | OUTPATIENT
Start: 2020-09-24 | End: 2020-12-01 | Stop reason: SDUPTHER

## 2020-09-24 RX ADMIN — SODIUM CHLORIDE, SODIUM LACTATE, POTASSIUM CHLORIDE, AND CALCIUM CHLORIDE 75 ML/HR: .6; .31; .03; .02 INJECTION, SOLUTION INTRAVENOUS at 01:15

## 2020-09-24 RX ADMIN — OXYCODONE HYDROCHLORIDE 10 MG: 5 SOLUTION ORAL at 08:40

## 2020-09-24 RX ADMIN — OXYCODONE HYDROCHLORIDE 10 MG: 5 SOLUTION ORAL at 17:10

## 2020-09-24 RX ADMIN — FAMOTIDINE 20 MG: 10 INJECTION, SOLUTION INTRAVENOUS at 08:36

## 2020-09-24 RX ADMIN — HEPARIN SODIUM 5000 UNITS: 5000 INJECTION INTRAVENOUS; SUBCUTANEOUS at 14:53

## 2020-09-24 RX ADMIN — CARVEDILOL 12.5 MG: 6.25 TABLET, FILM COATED ORAL at 08:36

## 2020-09-24 RX ADMIN — CARVEDILOL 12.5 MG: 6.25 TABLET, FILM COATED ORAL at 16:39

## 2020-09-24 RX ADMIN — HEPARIN SODIUM 5000 UNITS: 5000 INJECTION INTRAVENOUS; SUBCUTANEOUS at 05:36

## 2020-09-24 NOTE — ASSESSMENT & PLAN NOTE
Most recent echo EF 35%  Follows outpatient with cardiology  Is euvolemic on exam  Continue beta-blocker, losartan

## 2020-09-24 NOTE — PROGRESS NOTES
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  Tolerating liquids  Objective:    /79 (BP Location: Left arm)   Pulse (!) 109   Temp 99 °F (37 2 °C) (Tympanic)   Resp 18   Ht 5' 5" (1 651 m)   Wt 115 kg (252 lb 10 4 oz)   SpO2 92%   BMI 42 04 kg/m²       Intake/Output Summary (Last 24 hours) at 9/24/2020 0818  Last data filed at 9/24/2020 0601  Gross per 24 hour   Intake 210 ml   Output 1120 ml   Net -910 ml       Invasive Devices     Peripheral Intravenous Line            Peripheral IV 09/22/20 Left Forearm 1 day          Drain            Closed/Suction Drain Right Abdomen Bulb 19 Fr  1 day                ROS: 10-point system completed  All negative except see HPI  Physical Exam    General Appearance:    Alert, cooperative, no distress, appears stated age   Head:    Normocephalic, without obvious abnormality, atraumatic   Lungs:     Respirations unlabored   Heart:    Regular rate and rhythm   Abdomen:     Soft, appropriate tenderness,  no masses, no organomegaly,   non distended  BUBBA with serosang output  Extremities:   Extremities normal, atraumatic, no cyanosis or edema   Neurologic:  Incision:  Psych:   Normal strength and sensation    Clean, dry, and intact    Normal mood and affect       Lab, Imaging and other studies:I have personally reviewed pertinent lab results  VTE Mechanical Prophylaxis: sequential compression device    Assessment/Plan  Morbid Obesity s/p robo revision to RYGB with PEH repair  with stable post op course   Patient afebrile and hemodynamically stable        Patient was complaining of chest pain and SOB on ambulation yesterday  CT PE protocol with abdomen pelvis showed normal postop changes with no evidence of PE  Troponin negative  Feeling better today  Still having some chest pain when taking deep breath 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

## 2020-09-24 NOTE — DISCHARGE INSTR - AVS FIRST PAGE
your pain medications from [address] in [address]   Take Tylenol as instructed  Stay hydrated and follow your discharge diet progression   Mild nausea is ok as long as you can drink fluids  Take your omeprazole daily  Crush or cut your pills and open capsules, mix with liquid to drink    Follow up with Dr Davon Manuel and your PCP within the next week

## 2020-09-24 NOTE — DISCHARGE SUMMARY
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]     * Heartburn [R12]     * Vomiting [R11 10]     * Nausea [R11 0]     * History of pulmonary embolism [Z86 711]     * Halitosis [R19 6]     * Morbid obesity ([facility] [address] ) [E66 01]     * Status post laparoscopic sleeve gastrectomy [Z98 84]    Procedures Performed:  Procedure(s):  LAPAROSCOPIC REVISION/ CONVERSION TO HALEY-EN-Y GASTRIC BYPASS W ROBOTICS, PARAESOPHAGEAL HERNIA REPAIR, AND INTRAOPERATIVE EGD    Surgeon: Ayla Echevarria MD    See H & P for full details of admission and Operative Note for full details of operations performed  Patient tolerated surgery well without complications  In the morning postoperative Day 1, the patient had mild nausea and abdominal pain  We did an upper GI study that looked normal for postoperative changes  We started him on a clear liquid diet which he tolerated  In the afternoon he was complaining of some chest pain and shortness of breath while ambulating  An EKG and troponins were normal   A CT PE protocol was done and was negative  His pain is most 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  On postop day 2 he was feeling better  Tolerated a clear liquid diet without vomiting  Able to ambulate and voiding independently  Patient was deemed ready for discharge home  Patient was seen and examined prior to discharge        Provisions for Follow-Up Care:  See After Visit Summary/Discharge Instructions for information related to follow-up care and home orders  Disposition: Home, in stable condition  Planned Readmission: No    Discharge Medications:  See After Visit Summary/Discharge Instructions for reconciled discharge medications provided to patient and family  Post Operative instructions: Reviewed with patient and/or family      Signature:   Nicole Robert MD  Date: 9/24/2020 Time: 3:40 PM

## 2020-09-24 NOTE — DISCHARGE INSTRUCTIONS
Bariatric/Weight Loss Surgery  Hospital Discharge Instructions  1  ACTIVITY:  a  Progress as feels comfortable - a good rule is:  if you are doing something and it begins to hurt, stop doing the activity  Walk every hour while at home  b  Chasidy Hansen may walk stairs if you do so slowly  c  You may shower 48 hours after surgery  d  Use your incentive spirometer 10 times per hour while awake for 1 week  e  Do NOT drive for 48 hours after surgery  No driving 24 hours after taking certain prescription pain medications   Examples of such medication are Percocet, Darvocet, Oxycodone, Tylenol #3, and Tylenol with Codeine  2  DIET  a  Stay on a liquid diet for 7 days after your surgery date, sipping slowly  Refer to your manual for examples of choices  Remember to keep your liquids sugar free or low calorie  You may have protein drinks  Make sure to drink 48 to 64 ounces per day of fluids  b  Chasidy Hansen may advance to a pureed diet one week after surgery as instructed by your diet progression pamphlet  Once you get approval from your surgeon at your first post operative visit you may advance to the soft diet  3  MEDICATIONS:  a  The abdominal nerve block will wear off during the first 1-2 days that you are home, and you may become sore  Continue to take your Tylenol and your pain medication as instructed  b  Start vitamins and minerals when you get home  c  Anti-acid Medication as per prescription  d  Other medications as indicated on the Physician Patient Discharge Instructions form given to you at the time of discharge  e  Make sure that you are splitting your pill or tablet medications in halves or fourths or even crushing them before you take them  Capsules should be opened and mixed with water or jello  You need to do this for at least 4 weeks after surgery  Eventually you will be able to take your medications the regular way as they were prescribed     f  Chasidy Hansen will need to consult with your Family Doctor in regards to all your prescribed medication, particularly those for blood pressure and diabetes  As you lose weight, medical conditions may change, requiring an alteration or elimination of the drug dose  g  DO NOT TAKE BIRTH CONTROL(BC) MEDICATIONS, INSERT BC VAGINAL RINGS, OR PLACE IUD OR ANY OTHER BC METHODS UNTIL 31 DAYS FROM DAY OF DISCHARGE FROM HOSPITAL  THIS PLACES YOU AT HIGH RISK FOR A POTENTIALLY LIFE THREATENING BLOOD CLOT  Remember to always use barrier methods for birth control and speak to your GYN about using two forms of birth control to start 31 days after surgery  It is very important to avoid pregnancy until at least 18-24 months after surgery  4  INCISION CARE  a  You may shower and get incisions wet 2 days after surgery  No soaking tub baths or swimming for 30 days after surgery  Keep abdominal area and incisions clean  Use soap and water to create a good lather and rinse off  Do not scrub incisions  b  If you have a drain, empty the drain as the nurses instructed  5  FOLLOW-UP APPOINTMENT should be made for one week after discharge  Call surgeons office at [phone] to schedule an appointment      6  CALL YOUR DOCTOR FOR:  pain not controlled by pain medications, a temperature greater than 101 5° F, any increase or change in drainage or redness from any incision, any vomiting or inability to keep liquids down, shortness of breath, shoulder pain, or bleeding

## 2020-09-24 NOTE — ASSESSMENT & PLAN NOTE
History of pulmonary sarcoidosis:  Without any recent flares    No evidence of infiltrative disease on recent echocardiogram

## 2020-09-24 NOTE — ASSESSMENT & PLAN NOTE
Atypical cardiac pain  Chest pain is pleuritic following gastric bypass revision new line described as sharp aching left-sided chest pain worse with deep inspiration new line EKG without signs of acute ischemia    Troponins negative x2  CT PE without pulmonary embolism  Continue conservative measures with oral pain medication-would avoid NSAIDs  Continue medications for GERD  Recommend ongoing follow-up with cardiology given history of nonischemic cardiomyopathy  May benefit from cardiac rehab

## 2020-09-24 NOTE — ASSESSMENT & PLAN NOTE
Status post LAPAROSCOPIC REVISION/ CONVERSION TO HALYE-EN-Y GASTRIC BYPASS W ROBOTICS, PARAESOPHAGEAL HERNIA REPAIR, AND INTRAOPERATIVE EGD (N/A)  Ongoing follow-up with bariatric surgery

## 2020-09-24 NOTE — PLAN OF CARE
Problem: PAIN - ADULT  Goal: Verbalizes/displays adequate comfort level or baseline comfort level  Description: Interventions:  - Encourage patient to monitor pain and request assistance  - Assess pain using appropriate pain scale  - Administer analgesics based on type and severity of pain and evaluate response  - Implement non-pharmacological measures as appropriate and evaluate response  - Consider cultural and social influences on pain and pain management  - Notify physician/advanced practitioner if interventions unsuccessful or patient reports new pain  Outcome: Progressing     Problem: INFECTION - ADULT  Goal: Absence or prevention of progression during hospitalization  Description: INTERVENTIONS:  - Assess and monitor for signs and symptoms of infection  - Monitor lab/diagnostic results  - Monitor all insertion sites, i e  indwelling lines, tubes, and drains  - Monitor endotracheal if appropriate and nasal secretions for changes in amount and color  - [place] appropriate cooling/warming therapies per order  - Administer medications as ordered  - Instruct and encourage patient and family to use good hand hygiene technique  - Identify and instruct in appropriate isolation precautions for identified infection/condition  Outcome: Progressing  Goal: Absence of fever/infection during neutropenic period  Description: INTERVENTIONS:  - Monitor WBC    Outcome: Progressing     Problem: SAFETY ADULT  Goal: Patient will remain free of falls  Description: INTERVENTIONS:  - Assess patient frequently for physical needs  -  Identify cognitive and physical deficits and behaviors that affect risk of falls    -  [place] fall precautions as indicated by assessment   - Educate patient/family on patient safety including physical limitations  - Instruct patient to call for assistance with activity based on assessment  - Modify environment to reduce risk of injury  - Consider OT/PT consult to assist with strengthening/mobility  Outcome: Progressing  Goal: Maintain or return to baseline ADL function  Description: INTERVENTIONS:  -  Assess patient's ability to carry out ADLs; assess patient's baseline for ADL function and identify physical deficits which impact ability to perform ADLs (bathing, care of mouth/teeth, toileting, grooming, dressing, etc )  - Assess/evaluate cause of self-care deficits   - Assess range of motion  - Assess patient's mobility; develop plan if impaired  - Assess patient's need for assistive devices and provide as appropriate  - Encourage maximum independence but intervene and supervise when necessary  - Involve family in performance of ADLs  - Assess for home care needs following discharge   - Consider OT consult to assist with ADL evaluation and planning for discharge  - Provide patient education as appropriate  Outcome: Progressing  Goal: Maintain or return mobility status to optimal level  Description: INTERVENTIONS:  - Assess patient's baseline mobility status (ambulation, transfers, stairs, etc )    - Identify cognitive and physical deficits and behaviors that affect mobility  - Identify mobility aids required to assist with transfers and/or ambulation (gait belt, sit-to-stand, lift, walker, cane, etc )  - [place] fall precautions as indicated by assessment  - Record patient progress and toleration of activity level on Mobility SBAR; progress patient to next Phase/Stage  - Instruct patient to call for assistance with activity based on assessment  - Consider rehabilitation consult to assist with strengthening/weightbearing, etc   Outcome: Progressing     Problem: DISCHARGE PLANNING  Goal: Discharge to home or other facility with appropriate resources  Description: INTERVENTIONS:  - Identify barriers to discharge w/patient and caregiver  - Arrange for needed discharge resources and transportation as appropriate  - Identify discharge learning needs (meds, wound care, etc )  - Arrange for interpretive services to assist at discharge as needed  - Refer to Case Management Department for coordinating discharge planning if the patient needs post-hospital services based on physician/advanced practitioner order or complex needs related to functional status, cognitive ability, or social support system  Outcome: Progressing     Problem: Knowledge Deficit  Goal: Patient/family/caregiver demonstrates understanding of disease process, treatment plan, medications, and discharge instructions  Description: Complete learning assessment and assess knowledge base    Interventions:  - Provide teaching at level of understanding  - Provide teaching via preferred learning methods  Outcome: Progressing     Problem: RESPIRATORY - ADULT  Goal: Achieves optimal ventilation and oxygenation  Description: INTERVENTIONS:  - Assess for changes in respiratory status  - Assess for changes in mentation and behavior  - Position to facilitate oxygenation and minimize respiratory effort  - Oxygen administered by appropriate delivery if ordered  - Initiate smoking cessation education as indicated  - Encourage broncho-pulmonary hygiene including cough, deep breathe, Incentive Spirometry  - Assess the need for suctioning and aspirate as needed  - Assess and instruct to report SOB or any respiratory difficulty  - Respiratory Therapy support as indicated  Outcome: Progressing     Problem: GASTROINTESTINAL - ADULT  Goal: Minimal or absence of nausea and/or vomiting  Description: INTERVENTIONS:  - Administer IV fluids if ordered to ensure adequate hydration  - Maintain NPO status until nausea and vomiting are resolved  - Nasogastric tube if ordered  - Administer ordered antiemetic medications as needed  - Provide nonpharmacologic comfort measures as appropriate  - Advance diet as tolerated, if ordered  - Consider nutrition services referral to assist patient with adequate nutrition and appropriate food choices  Outcome: Progressing  Goal: Maintains or returns to baseline bowel function  Description: INTERVENTIONS:  - Assess bowel function  - Encourage oral fluids to ensure adequate hydration  - Administer IV fluids if ordered to ensure adequate hydration  - Administer ordered medications as needed  - Encourage mobilization and activity  - Consider nutritional services referral to assist patient with adequate nutrition and appropriate food choices  Outcome: Progressing  Goal: Maintains adequate nutritional intake  Description: INTERVENTIONS:  - Monitor percentage of each meal consumed  - Identify factors contributing to decreased intake, treat as appropriate  - Assist with meals as needed  - Monitor I&O, weight, and lab values if indicated  - Obtain nutrition services referral as needed  Outcome: Progressing     Problem: METABOLIC, FLUID AND ELECTROLYTES - ADULT  Goal: Electrolytes maintained within normal limits  Description: INTERVENTIONS:  - Monitor labs and assess patient for signs and symptoms of electrolyte imbalances  - Administer electrolyte replacement as ordered  - Monitor response to electrolyte replacements, including repeat lab results as appropriate  - Instruct patient on fluid and nutrition as appropriate  Outcome: Progressing  Goal: Fluid balance maintained  Description: INTERVENTIONS:  - Monitor labs   - Monitor I/O and WT  - Instruct patient on fluid and nutrition as appropriate  - Assess for signs & symptoms of volume excess or deficit  Outcome: Progressing     Problem: SKIN/TISSUE INTEGRITY - ADULT  Goal: Skin integrity remains intact  Description: INTERVENTIONS  - Identify patients at risk for skin breakdown  - Assess and monitor skin integrity  - Assess and monitor nutrition and hydration status  - Monitor labs (i e  albumin)  - Assess for incontinence   - Turn and reposition patient  - Assist with mobility/ambulation  - Relieve pressure over bony prominences  - Avoid friction and shearing  - Provide appropriate hygiene as needed including keeping skin clean and dry  - Evaluate need for skin moisturizer/barrier cream  - Collaborate with interdisciplinary team (i e  Nutrition, Rehabilitation, etc )   - Patient/family teaching  Outcome: Progressing  Goal: Incision(s), wounds(s) or drain site(s) healing without S/S of infection  Description: INTERVENTIONS  - Assess and document risk factors for skin impairment   - Assess and document dressing, incision, wound bed, drain sites and surrounding tissue  - Consider nutrition services referral as needed  - Oral mucous membranes remain intact  - Provide patient/ family education  Outcome: Progressing  Goal: Oral mucous membranes remain intact  Description: INTERVENTIONS  - Assess oral mucosa and hygiene practices  - Implement preventative oral hygiene regimen  - Implement oral medicated treatments as ordered  - Initiate Nutrition services referral as needed  Outcome: Progressing     Problem: MUSCULOSKELETAL - ADULT  Goal: Maintain or return mobility to safest level of function  Description: INTERVENTIONS:  - Assess patient's ability to carry out ADLs; assess patient's baseline for ADL function and identify physical deficits which impact ability to perform ADLs (bathing, care of mouth/teeth, toileting, grooming, dressing, etc )  - Assess/evaluate cause of self-care deficits   - Assess range of motion  - Assess patient's mobility  - Assess patient's need for assistive devices and provide as appropriate  - Encourage maximum independence but intervene and supervise when necessary  - Involve family in performance of ADLs  - Assess for home care needs following discharge   - Consider OT consult to assist with ADL evaluation and planning for discharge  - Provide patient education as appropriate  Outcome: Progressing  Goal: Maintain proper alignment of affected body part  Description: INTERVENTIONS:  - Support, maintain and protect limb and body alignment  - Provide patient/ family with appropriate education  Outcome: Progressing     Problem: Prexisting or High Potential for Compromised Skin Integrity  Goal: Skin integrity is maintained or improved  Description: INTERVENTIONS:  - Identify patients at risk for skin breakdown  - Assess and monitor skin integrity  - Assess and monitor nutrition and hydration status  - Monitor labs   - Assess for incontinence   - Turn and reposition patient  - Assist with mobility/ambulation  - Relieve pressure over bony prominences  - Avoid friction and shearing  - Provide appropriate hygiene as needed including keeping skin clean and dry  - Evaluate need for skin moisturizer/barrier cream  - Collaborate with interdisciplinary team   - Patient/family teaching  - Consider wound care consult   Outcome: Progressing     Problem: Potential for Falls  Goal: Patient will remain free of falls  Description: INTERVENTIONS:  - Assess patient frequently for physical needs  -  Identify cognitive and physical deficits and behaviors that affect risk of falls    -  [place] fall precautions as indicated by assessment   - Educate patient/family on patient safety including physical limitations  - Instruct patient to call for assistance with activity based on assessment  - Modify environment to reduce risk of injury  - Consider OT/PT consult to assist with strengthening/mobility  Outcome: Progressing

## 2020-09-24 NOTE — PROGRESS NOTES
Progress Note - Pippa Hernandez 1965, 48 y o  male MRN: [id number]    Unit/Bed#: E5 -01 Encounter: [id number]    Primary Care Provider: TESFAYE Núñez   Date and time admitted to hospital: 9/22/2020 11:04 AM        Chest pain  Assessment & Plan  Atypical cardiac pain  Chest pain is pleuritic following gastric bypass revision new line described as sharp aching left-sided chest pain worse with deep inspiration new line EKG without signs of acute ischemia  Troponins negative x2  CT PE without pulmonary embolism  Continue conservative measures with oral pain medication-would avoid NSAIDs  Continue medications for GERD  Recommend ongoing follow-up with cardiology given history of nonischemic cardiomyopathy  May benefit from cardiac rehab    [facility]  Assessment & Plan  Most recent echo EF 35%  Follows outpatient with cardiology  Is euvolemic on exam  Continue beta-blocker, losartan    Essential hypertension  Assessment & Plan  Resume carvedilol, losartan at previous doses on discharge    Sarcoidosis  Assessment & Plan  History of pulmonary sarcoidosis:  Without any recent flares    No evidence of infiltrative disease on recent echocardiogram     History of sleeve gastrectomy  Assessment & Plan  Status post LAPAROSCOPIC REVISION/ CONVERSION TO HALEY-EN-Y GASTRIC BYPASS W ROBOTICS, PARAESOPHAGEAL HERNIA REPAIR, AND INTRAOPERATIVE EGD (N/A)  Ongoing follow-up with bariatric surgery    GERD (gastroesophageal reflux disease)  Assessment & Plan  Continue PPI, ranitidine, sucralfate  Would avoid naproxen    Pulmonary embolism (HCC)  Assessment & Plan  History of provoked PE  Completed course of anticoagulation in June of 2020      VTE Prophylaxis: Heparin  / sequential compression device     Recommendations for Discharge:  · Resume home carvedilol 12 5 b i d , losartan 50 mg daily  · Outpatient follow up with cardiology for NICM  · Outpatient follow up with PCP in next 1 to 2 weeks    Counseling / Coordination of Care Time: 15 minutes  Greater than 50% of total time spent on patient counseling and coordination of care  Collaboration of Care: Were Recommendations Directly Discussed with Primary Treatment Team? - Yes     Subjective:  Patient seen examined at bedside  Overall feels improved but does feel fatigued  Physical Exam:     Vitals:   Blood Pressure: 139/74 (09/24/20 1600)  Pulse: (!) 106 (09/24/20 1600)  Temperature: 99 2 °F (37 3 °C) (09/24/20 1600)  Temp Source: Temporal (09/24/20 1600)  Respirations: 18 (09/24/20 1600)  Height: 5' 5" (165 1 cm) (09/22/20 1129)  Weight - Scale: 115 kg (252 lb 10 4 oz) (09/22/20 1132)  SpO2: 94 % (09/24/20 1600)    Physical Exam  Constitutional:       General: He is not in acute distress  Appearance: He is well-developed  He is not diaphoretic  HENT:      Head: Normocephalic and atraumatic  Mouth/Throat:      Pharynx: No oropharyngeal exudate  Eyes:      General: No scleral icterus  Conjunctiva/sclera: Conjunctivae normal       Pupils: Pupils are equal, round, and reactive to light  Cardiovascular:      Rate and Rhythm: Tachycardia present  Heart sounds: Normal heart sounds  No murmur  Pulmonary:      Effort: Pulmonary effort is normal  No respiratory distress  Breath sounds: Normal breath sounds  No wheezing or rales  Abdominal:      General: Bowel sounds are normal  There is no distension  Palpations: Abdomen is soft  Tenderness: There is no abdominal tenderness  There is no guarding  Musculoskeletal:         General: No tenderness or deformity  Skin:     General: Skin is warm and dry  Capillary Refill: Capillary refill takes less than 2 seconds  Neurological:      Mental Status: He is alert and oriented to person, place, and time  Psychiatric:         Behavior: Behavior normal          Thought Content:  Thought content normal          Judgment: Judgment normal  Additional Data:     Lab Results: I have reviewed pertinent results     Results from last 7 days   Lab Units 09/23/20  0527   WBC Thousand/uL 10 95*   HEMOGLOBIN g/dL 12 6   HEMATOCRIT % 41 1   PLATELETS Thousands/uL 173     Results from last 7 days   Lab Units 09/24/20  0949   SODIUM mmol/L 134*   POTASSIUM mmol/L 3 8   CHLORIDE mmol/L 101   CO2 mmol/L 24   BUN mg/dL 14   CREATININE mg/dL 0 94   ANION GAP mmol/L 9   CALCIUM mg/dL 8 1*   GLUCOSE RANDOM mg/dL 138         Results from last 7 days   Lab Units 09/23/20  2041 09/23/20  1816   TROPONIN I ng/mL 0 02 0 02     Lab Results   Component Value Date/Time    HGBA1C 5 6 06/06/2015 09:26 AM               Imaging: I have reviewed pertinent images     CTA chest pe study   Final Result by Miri Francois DO (09/23 1941)      1  Limited CT pulmonary angiogram with no convincing central emboli to the segmental level  2   Small left hydropneumothorax  3  Probable bilateral lower lobe and lingular subsegmental atelectasis  4  Postoperative changes related to gastric bypass  Thoracic esophagus is slightly distended and debris-filled  I personally discussed this study with Ligia Zimmerman on 9/23/2020 at 7:41 PM                Workstation performed: [id number]         FL upper GI UGI   Final Result by Erna Chi MD (09/23 1030)      No evidence of leak  Workstation performed: [id number]             ** Please Note: This note has been constructed using a voice recognition system   **

## 2020-09-25 ENCOUNTER — TELEPHONE (OUTPATIENT)
Dept: BARIATRICS | Facility: CLINIC | Age: 55
End: 2020-09-25

## 2020-09-25 LAB
ATRIAL RATE: 104 BPM
P AXIS: 60 DEGREES
PR INTERVAL: 146 MS
QRS AXIS: 35 DEGREES
QRSD INTERVAL: 84 MS
QT INTERVAL: 338 MS
QTC INTERVAL: 444 MS
T WAVE AXIS: 44 DEGREES
VENTRICULAR RATE: 104 BPM

## 2020-09-25 PROCEDURE — 93010 ELECTROCARDIOGRAM REPORT: CPT | Performed by: INTERNAL MEDICINE

## 2020-09-28 ENCOUNTER — TELEPHONE (OUTPATIENT)
Dept: MEDSURG UNIT | Facility: HOSPITAL | Age: 55
End: 2020-09-28

## 2020-09-28 NOTE — TELEPHONE ENCOUNTER
Post op follow up phone call completed  Pt is sipping liquids, using IS as instructed, reinforced importance of using IS to help prevent pneumonia  Ambulating about home without difficulty  Pain persists with change in position and continues to have some shoulder pain  Suggested using tylenol around the clock  Reaffirmed examples of liquid diet over the next week  Pt stated understanding about discharge instructions and medication adjustments  Follow up appt with surgeon scheduled for the end of this week  Instructed to call with any additional questions or concerns

## 2020-10-01 ENCOUNTER — OFFICE VISIT (OUTPATIENT)
Dept: BARIATRICS | Facility: CLINIC | Age: 55
End: 2020-10-01

## 2020-10-01 VITALS
HEIGHT: 65 IN | SYSTOLIC BLOOD PRESSURE: 130 MMHG | WEIGHT: 249.5 LBS | DIASTOLIC BLOOD PRESSURE: 70 MMHG | HEART RATE: 98 BPM | BODY MASS INDEX: 41.57 KG/M2 | TEMPERATURE: 98 F

## 2020-10-01 DIAGNOSIS — Z98.84 BARIATRIC SURGERY STATUS: ICD-10-CM

## 2020-10-01 DIAGNOSIS — E66.01 MORBID (SEVERE) OBESITY DUE TO EXCESS CALORIES (HCC): ICD-10-CM

## 2020-10-01 DIAGNOSIS — Z98.84 BARIATRIC SURGERY STATUS: Primary | ICD-10-CM

## 2020-10-01 DIAGNOSIS — Z90.3 HISTORY OF SLEEVE GASTRECTOMY: Primary | ICD-10-CM

## 2020-10-01 PROCEDURE — RECHECK: Performed by: DIETITIAN, REGISTERED

## 2020-10-01 PROCEDURE — 99024 POSTOP FOLLOW-UP VISIT: CPT | Performed by: SURGERY

## 2020-11-03 ENCOUNTER — TELEPHONE (OUTPATIENT)
Dept: BARIATRICS | Facility: CLINIC | Age: 55
End: 2020-11-03

## 2020-11-03 ENCOUNTER — CLINICAL SUPPORT (OUTPATIENT)
Dept: BARIATRICS | Facility: CLINIC | Age: 55
End: 2020-11-03

## 2020-11-03 DIAGNOSIS — Z98.84 BARIATRIC SURGERY STATUS: Primary | ICD-10-CM

## 2020-11-03 PROCEDURE — RECHECK: Performed by: DIETITIAN, REGISTERED

## 2020-12-01 ENCOUNTER — OFFICE VISIT (OUTPATIENT)
Dept: FAMILY MEDICINE CLINIC | Facility: CLINIC | Age: 55
End: 2020-12-01
Payer: COMMERCIAL

## 2020-12-01 VITALS
OXYGEN SATURATION: 97 % | TEMPERATURE: 96.7 F | HEIGHT: 65 IN | DIASTOLIC BLOOD PRESSURE: 92 MMHG | WEIGHT: 236 LBS | BODY MASS INDEX: 39.32 KG/M2 | SYSTOLIC BLOOD PRESSURE: 138 MMHG | HEART RATE: 90 BPM

## 2020-12-01 DIAGNOSIS — R73.03 PREDIABETES: Primary | ICD-10-CM

## 2020-12-01 DIAGNOSIS — R91.1 INCIDENTAL LUNG NODULE, > 3MM AND < 8MM: ICD-10-CM

## 2020-12-01 DIAGNOSIS — M1A.9XX0 CHRONIC GOUT WITHOUT TOPHUS, UNSPECIFIED CAUSE, UNSPECIFIED SITE: ICD-10-CM

## 2020-12-01 DIAGNOSIS — M81.0 AGE-RELATED OSTEOPOROSIS WITHOUT CURRENT PATHOLOGICAL FRACTURE: ICD-10-CM

## 2020-12-01 DIAGNOSIS — K21.9 GASTROESOPHAGEAL REFLUX DISEASE WITHOUT ESOPHAGITIS: ICD-10-CM

## 2020-12-01 DIAGNOSIS — E78.2 MIXED HYPERLIPIDEMIA: ICD-10-CM

## 2020-12-01 DIAGNOSIS — N28.9 RENAL LESION: ICD-10-CM

## 2020-12-01 DIAGNOSIS — I50.22 CHRONIC SYSTOLIC CONGESTIVE HEART FAILURE (HCC): ICD-10-CM

## 2020-12-01 DIAGNOSIS — Z98.84 S/P BARIATRIC SURGERY: ICD-10-CM

## 2020-12-01 DIAGNOSIS — Z86.711 HISTORY OF PULMONARY EMBOLISM: ICD-10-CM

## 2020-12-01 DIAGNOSIS — I10 ESSENTIAL HYPERTENSION: ICD-10-CM

## 2020-12-01 DIAGNOSIS — Z12.5 SCREENING FOR PROSTATE CANCER: ICD-10-CM

## 2020-12-01 DIAGNOSIS — K21.9 GASTROESOPHAGEAL REFLUX DISEASE: ICD-10-CM

## 2020-12-01 DIAGNOSIS — D86.9 SARCOIDOSIS: ICD-10-CM

## 2020-12-01 PROBLEM — M10.9 GOUT: Status: ACTIVE | Noted: 2020-12-01

## 2020-12-01 PROCEDURE — 3008F BODY MASS INDEX DOCD: CPT | Performed by: INTERNAL MEDICINE

## 2020-12-01 PROCEDURE — 99204 OFFICE O/P NEW MOD 45 MIN: CPT | Performed by: NURSE PRACTITIONER

## 2020-12-01 PROCEDURE — 4010F ACE/ARB THERAPY RXD/TAKEN: CPT | Performed by: INTERNAL MEDICINE

## 2020-12-01 RX ORDER — CARVEDILOL 12.5 MG/1
12.5 TABLET ORAL 2 TIMES DAILY WITH MEALS
Qty: 180 TABLET | Refills: 3 | Status: SHIPPED | OUTPATIENT
Start: 2020-12-01 | End: 2021-10-04

## 2020-12-01 RX ORDER — LOSARTAN POTASSIUM 50 MG/1
50 TABLET ORAL DAILY
Qty: 90 TABLET | Refills: 0 | Status: SHIPPED | OUTPATIENT
Start: 2020-12-01 | End: 2022-02-15 | Stop reason: SDUPTHER

## 2020-12-01 RX ORDER — LANOLIN ALCOHOL/MO/W.PET/CERES
1 CREAM (GRAM) TOPICAL 2 TIMES DAILY
Qty: 180 TABLET | Refills: 1 | Status: SHIPPED | OUTPATIENT
Start: 2020-12-01 | End: 2021-07-18 | Stop reason: HOSPADM

## 2020-12-01 RX ORDER — SUCRALFATE ORAL 1 G/10ML
1 SUSPENSION ORAL 4 TIMES DAILY
Qty: 1200 ML | Refills: 1 | Status: SHIPPED | OUTPATIENT
Start: 2020-12-01 | End: 2021-01-08

## 2020-12-01 RX ORDER — ACETAMINOPHEN 160 MG/5ML
480 SUSPENSION, ORAL (FINAL DOSE FORM) ORAL EVERY 6 HOURS PRN
Qty: 240 ML | Refills: 1 | Status: SHIPPED | OUTPATIENT
Start: 2020-12-01 | End: 2020-12-10 | Stop reason: SDUPTHER

## 2020-12-01 RX ORDER — ALLOPURINOL 300 MG/1
300 TABLET ORAL DAILY
Qty: 30 TABLET | Refills: 1 | Status: SHIPPED | OUTPATIENT
Start: 2020-12-01 | End: 2020-12-09 | Stop reason: SDUPTHER

## 2020-12-02 PROBLEM — I50.22 CHRONIC SYSTOLIC CONGESTIVE HEART FAILURE (HCC): Status: ACTIVE | Noted: 2020-12-02

## 2020-12-02 PROBLEM — N52.9 ED (ERECTILE DYSFUNCTION) OF ORGANIC ORIGIN: Status: ACTIVE | Noted: 2020-12-02

## 2020-12-02 PROBLEM — E78.2 MIXED HYPERLIPIDEMIA: Status: ACTIVE | Noted: 2020-12-02

## 2020-12-02 PROBLEM — R73.03 PREDIABETES: Status: ACTIVE | Noted: 2020-12-02

## 2020-12-02 PROBLEM — Z86.711 HISTORY OF PULMONARY EMBOLISM: Status: ACTIVE | Noted: 2020-12-02

## 2020-12-04 ENCOUNTER — HOSPITAL ENCOUNTER (OUTPATIENT)
Dept: NON INVASIVE DIAGNOSTICS | Facility: CLINIC | Age: 55
Discharge: HOME | End: 2020-12-04
Payer: COMMERCIAL

## 2020-12-04 DIAGNOSIS — I50.22 CHRONIC SYSTOLIC CONGESTIVE HEART FAILURE (HCC): ICD-10-CM

## 2020-12-04 PROCEDURE — 93321 DOPPLER ECHO F-UP/LMTD STD: CPT | Performed by: INTERNAL MEDICINE

## 2020-12-04 PROCEDURE — 93308 TTE F-UP OR LMTD: CPT | Performed by: INTERNAL MEDICINE

## 2020-12-04 PROCEDURE — 93308 TTE F-UP OR LMTD: CPT

## 2020-12-04 PROCEDURE — 93325 DOPPLER ECHO COLOR FLOW MAPG: CPT | Performed by: INTERNAL MEDICINE

## 2020-12-08 ENCOUNTER — TELEMEDICINE (OUTPATIENT)
Dept: PULMONOLOGY | Facility: CLINIC | Age: 55
End: 2020-12-08
Payer: COMMERCIAL

## 2020-12-08 DIAGNOSIS — Z86.711 HISTORY OF PULMONARY EMBOLISM: Primary | ICD-10-CM

## 2020-12-08 DIAGNOSIS — R91.1 INCIDENTAL LUNG NODULE, > 3MM AND < 8MM: ICD-10-CM

## 2020-12-08 DIAGNOSIS — D86.9 SARCOIDOSIS: ICD-10-CM

## 2020-12-08 PROCEDURE — 99213 OFFICE O/P EST LOW 20 MIN: CPT | Performed by: INTERNAL MEDICINE

## 2020-12-08 PROCEDURE — 1036F TOBACCO NON-USER: CPT | Performed by: INTERNAL MEDICINE

## 2020-12-09 DIAGNOSIS — M1A.9XX0 CHRONIC GOUT WITHOUT TOPHUS, UNSPECIFIED CAUSE, UNSPECIFIED SITE: ICD-10-CM

## 2020-12-10 DIAGNOSIS — K21.9 GASTROESOPHAGEAL REFLUX DISEASE: ICD-10-CM

## 2020-12-10 RX ORDER — ACETAMINOPHEN 160 MG/5ML
480 SUSPENSION, ORAL (FINAL DOSE FORM) ORAL EVERY 6 HOURS PRN
Qty: 240 ML | Refills: 1 | Status: SHIPPED | OUTPATIENT
Start: 2020-12-10 | End: 2021-01-09

## 2020-12-10 RX ORDER — ALLOPURINOL 300 MG/1
300 TABLET ORAL DAILY
Qty: 30 TABLET | Refills: 1 | Status: SHIPPED | OUTPATIENT
Start: 2020-12-10 | End: 2021-02-01

## 2020-12-21 ENCOUNTER — HOSPITAL ENCOUNTER (EMERGENCY)
Facility: HOSPITAL | Age: 55
Discharge: HOME | End: 2020-12-22
Attending: EMERGENCY MEDICINE
Payer: COMMERCIAL

## 2020-12-21 DIAGNOSIS — R10.33 PERIUMBILICAL ABDOMINAL PAIN: Primary | ICD-10-CM

## 2020-12-21 PROCEDURE — 99284 EMERGENCY DEPT VISIT MOD MDM: CPT

## 2020-12-21 PROCEDURE — 99285 EMERGENCY DEPT VISIT HI MDM: CPT | Performed by: PHYSICIAN ASSISTANT

## 2020-12-22 ENCOUNTER — TELEPHONE (OUTPATIENT)
Dept: FAMILY MEDICINE CLINIC | Facility: CLINIC | Age: 55
End: 2020-12-22

## 2020-12-22 ENCOUNTER — TELEMEDICINE (OUTPATIENT)
Dept: FAMILY MEDICINE CLINIC | Facility: CLINIC | Age: 55
End: 2020-12-22
Payer: COMMERCIAL

## 2020-12-22 ENCOUNTER — TELEPHONE (OUTPATIENT)
Dept: BARIATRICS | Facility: CLINIC | Age: 55
End: 2020-12-22

## 2020-12-22 ENCOUNTER — APPOINTMENT (EMERGENCY)
Dept: CT IMAGING | Facility: HOSPITAL | Age: 55
End: 2020-12-22
Payer: COMMERCIAL

## 2020-12-22 VITALS
DIASTOLIC BLOOD PRESSURE: 98 MMHG | SYSTOLIC BLOOD PRESSURE: 179 MMHG | HEART RATE: 66 BPM | TEMPERATURE: 99 F | RESPIRATION RATE: 18 BRPM | OXYGEN SATURATION: 99 %

## 2020-12-22 VITALS — HEIGHT: 65 IN | WEIGHT: 236 LBS | BODY MASS INDEX: 39.32 KG/M2

## 2020-12-22 DIAGNOSIS — R10.13 EPIGASTRIC PAIN: ICD-10-CM

## 2020-12-22 DIAGNOSIS — G89.29 CHRONIC PAIN OF BOTH ANKLES: Primary | ICD-10-CM

## 2020-12-22 DIAGNOSIS — M25.571 CHRONIC PAIN OF BOTH ANKLES: Primary | ICD-10-CM

## 2020-12-22 DIAGNOSIS — R10.33 PERIUMBILICAL ABDOMINAL PAIN: ICD-10-CM

## 2020-12-22 DIAGNOSIS — M25.50 CHRONIC JOINT PAIN: ICD-10-CM

## 2020-12-22 DIAGNOSIS — M25.572 CHRONIC PAIN OF BOTH ANKLES: Primary | ICD-10-CM

## 2020-12-22 DIAGNOSIS — E66.9 OBESITY, UNSPECIFIED: ICD-10-CM

## 2020-12-22 DIAGNOSIS — N28.1 RENAL CYST, ACQUIRED, RIGHT: ICD-10-CM

## 2020-12-22 DIAGNOSIS — R82.4 KETONURIA: ICD-10-CM

## 2020-12-22 DIAGNOSIS — G89.29 CHRONIC JOINT PAIN: ICD-10-CM

## 2020-12-22 LAB
ALBUMIN SERPL BCP-MCNC: 3.5 G/DL (ref 3.5–5)
ALP SERPL-CCNC: 73 U/L (ref 46–116)
ALT SERPL W P-5'-P-CCNC: 41 U/L (ref 12–78)
ANION GAP SERPL CALCULATED.3IONS-SCNC: 9 MMOL/L (ref 4–13)
AST SERPL W P-5'-P-CCNC: 21 U/L (ref 5–45)
BASOPHILS # BLD AUTO: 0.02 THOUSANDS/ΜL (ref 0–0.1)
BASOPHILS NFR BLD AUTO: 0 % (ref 0–1)
BILIRUB SERPL-MCNC: 0.37 MG/DL (ref 0.2–1)
BILIRUB UR QL STRIP: NEGATIVE
BUN SERPL-MCNC: 13 MG/DL (ref 5–25)
CALCIUM SERPL-MCNC: 8.6 MG/DL (ref 8.3–10.1)
CHLORIDE SERPL-SCNC: 103 MMOL/L (ref 100–108)
CLARITY UR: CLEAR
CO2 SERPL-SCNC: 28 MMOL/L (ref 21–32)
COLOR UR: YELLOW
CREAT SERPL-MCNC: 0.76 MG/DL (ref 0.6–1.3)
EOSINOPHIL # BLD AUTO: 0.27 THOUSAND/ΜL (ref 0–0.61)
EOSINOPHIL NFR BLD AUTO: 5 % (ref 0–6)
ERYTHROCYTE [DISTWIDTH] IN BLOOD BY AUTOMATED COUNT: 15.5 % (ref 11.6–15.1)
GFR SERPL CREATININE-BSD FRML MDRD: 120 ML/MIN/1.73SQ M
GLUCOSE SERPL-MCNC: 101 MG/DL (ref 65–140)
GLUCOSE UR STRIP-MCNC: NEGATIVE MG/DL
HCT VFR BLD AUTO: 41.1 % (ref 36.5–49.3)
HGB BLD-MCNC: 12.8 G/DL (ref 12–17)
HGB UR QL STRIP.AUTO: NEGATIVE
IMM GRANULOCYTES # BLD AUTO: 0.01 THOUSAND/UL (ref 0–0.2)
IMM GRANULOCYTES NFR BLD AUTO: 0 % (ref 0–2)
KETONES UR STRIP-MCNC: ABNORMAL MG/DL
LEUKOCYTE ESTERASE UR QL STRIP: NEGATIVE
LIPASE SERPL-CCNC: 74 U/L (ref 73–393)
LYMPHOCYTES # BLD AUTO: 0.9 THOUSANDS/ΜL (ref 0.6–4.47)
LYMPHOCYTES NFR BLD AUTO: 17 % (ref 14–44)
MCH RBC QN AUTO: 27.2 PG (ref 26.8–34.3)
MCHC RBC AUTO-ENTMCNC: 31.1 G/DL (ref 31.4–37.4)
MCV RBC AUTO: 87 FL (ref 82–98)
MONOCYTES # BLD AUTO: 0.47 THOUSAND/ΜL (ref 0.17–1.22)
MONOCYTES NFR BLD AUTO: 9 % (ref 4–12)
NEUTROPHILS # BLD AUTO: 3.62 THOUSANDS/ΜL (ref 1.85–7.62)
NEUTS SEG NFR BLD AUTO: 69 % (ref 43–75)
NITRITE UR QL STRIP: NEGATIVE
NRBC BLD AUTO-RTO: 0 /100 WBCS
PH UR STRIP.AUTO: 6 [PH] (ref 4.5–8)
PLATELET # BLD AUTO: 168 THOUSANDS/UL (ref 149–390)
PMV BLD AUTO: 11.2 FL (ref 8.9–12.7)
POTASSIUM SERPL-SCNC: 3.9 MMOL/L (ref 3.5–5.3)
PROT SERPL-MCNC: 7.2 G/DL (ref 6.4–8.2)
PROT UR STRIP-MCNC: NEGATIVE MG/DL
RBC # BLD AUTO: 4.7 MILLION/UL (ref 3.88–5.62)
SODIUM SERPL-SCNC: 140 MMOL/L (ref 136–145)
SP GR UR STRIP.AUTO: >=1.03 (ref 1–1.03)
UROBILINOGEN UR QL STRIP.AUTO: 0.2 E.U./DL
WBC # BLD AUTO: 5.29 THOUSAND/UL (ref 4.31–10.16)

## 2020-12-22 PROCEDURE — 1036F TOBACCO NON-USER: CPT | Performed by: NURSE PRACTITIONER

## 2020-12-22 PROCEDURE — 74177 CT ABD & PELVIS W/CONTRAST: CPT

## 2020-12-22 PROCEDURE — 81003 URINALYSIS AUTO W/O SCOPE: CPT

## 2020-12-22 PROCEDURE — 85025 COMPLETE CBC W/AUTO DIFF WBC: CPT | Performed by: PHYSICIAN ASSISTANT

## 2020-12-22 PROCEDURE — 99213 OFFICE O/P EST LOW 20 MIN: CPT | Performed by: NURSE PRACTITIONER

## 2020-12-22 PROCEDURE — 36415 COLL VENOUS BLD VENIPUNCTURE: CPT | Performed by: PHYSICIAN ASSISTANT

## 2020-12-22 PROCEDURE — 83690 ASSAY OF LIPASE: CPT | Performed by: PHYSICIAN ASSISTANT

## 2020-12-22 PROCEDURE — 96374 THER/PROPH/DIAG INJ IV PUSH: CPT

## 2020-12-22 PROCEDURE — 80053 COMPREHEN METABOLIC PANEL: CPT | Performed by: PHYSICIAN ASSISTANT

## 2020-12-22 PROCEDURE — 96361 HYDRATE IV INFUSION ADD-ON: CPT

## 2020-12-22 PROCEDURE — 3008F BODY MASS INDEX DOCD: CPT | Performed by: NURSE PRACTITIONER

## 2020-12-22 PROCEDURE — 96376 TX/PRO/DX INJ SAME DRUG ADON: CPT

## 2020-12-22 PROCEDURE — G1004 CDSM NDSC: HCPCS

## 2020-12-22 RX ORDER — OMEPRAZOLE 20 MG/1
20 CAPSULE, DELAYED RELEASE ORAL 2 TIMES DAILY
Qty: 60 CAPSULE | Refills: 1 | Status: SHIPPED | OUTPATIENT
Start: 2020-12-22 | End: 2021-07-18 | Stop reason: HOSPADM

## 2020-12-22 RX ORDER — MULTIVITAMIN WITH FOLIC ACID 400 MCG
1 TABLET ORAL DAILY
COMMUNITY
Start: 2020-12-01 | End: 2021-04-28

## 2020-12-22 RX ORDER — MORPHINE SULFATE 4 MG/ML
4 INJECTION, SOLUTION INTRAMUSCULAR; INTRAVENOUS ONCE
Status: COMPLETED | OUTPATIENT
Start: 2020-12-22 | End: 2020-12-22

## 2020-12-22 RX ORDER — OXYCODONE HYDROCHLORIDE 5 MG/1
5 TABLET ORAL EVERY 6 HOURS PRN
Qty: 6 TABLET | Refills: 0 | Status: SHIPPED | OUTPATIENT
Start: 2020-12-22 | End: 2020-12-23 | Stop reason: SDUPTHER

## 2020-12-22 RX ADMIN — SODIUM CHLORIDE 1000 ML: 0.9 INJECTION, SOLUTION INTRAVENOUS at 00:21

## 2020-12-22 RX ADMIN — MORPHINE SULFATE 4 MG: 4 INJECTION INTRAVENOUS at 04:22

## 2020-12-22 RX ADMIN — MORPHINE SULFATE 4 MG: 4 INJECTION INTRAVENOUS at 01:16

## 2020-12-22 RX ADMIN — IOHEXOL 50 ML: 240 INJECTION, SOLUTION INTRATHECAL; INTRAVASCULAR; INTRAVENOUS; ORAL at 01:22

## 2020-12-22 RX ADMIN — IOHEXOL 100 ML: 350 INJECTION, SOLUTION INTRAVENOUS at 03:17

## 2020-12-23 ENCOUNTER — PATIENT MESSAGE (OUTPATIENT)
Dept: FAMILY MEDICINE CLINIC | Facility: CLINIC | Age: 55
End: 2020-12-23

## 2020-12-23 DIAGNOSIS — R10.33 PERIUMBILICAL ABDOMINAL PAIN: ICD-10-CM

## 2020-12-23 RX ORDER — OXYCODONE HYDROCHLORIDE 5 MG/1
5 TABLET ORAL EVERY 6 HOURS PRN
Qty: 20 TABLET | Refills: 0 | Status: SHIPPED | OUTPATIENT
Start: 2020-12-23 | End: 2020-12-28

## 2020-12-31 ENCOUNTER — HOSPITAL ENCOUNTER (OUTPATIENT)
Dept: PULMONOLOGY | Facility: HOSPITAL | Age: 55
Discharge: HOME | End: 2020-12-31
Attending: INTERNAL MEDICINE
Payer: COMMERCIAL

## 2020-12-31 DIAGNOSIS — D86.9 SARCOIDOSIS: ICD-10-CM

## 2020-12-31 PROCEDURE — 94010 BREATHING CAPACITY TEST: CPT

## 2020-12-31 PROCEDURE — 94729 DIFFUSING CAPACITY: CPT

## 2020-12-31 PROCEDURE — 94726 PLETHYSMOGRAPHY LUNG VOLUMES: CPT | Performed by: INTERNAL MEDICINE

## 2020-12-31 PROCEDURE — 94010 BREATHING CAPACITY TEST: CPT | Performed by: INTERNAL MEDICINE

## 2020-12-31 PROCEDURE — 94729 DIFFUSING CAPACITY: CPT | Performed by: INTERNAL MEDICINE

## 2020-12-31 PROCEDURE — 94760 N-INVAS EAR/PLS OXIMETRY 1: CPT

## 2020-12-31 PROCEDURE — 94726 PLETHYSMOGRAPHY LUNG VOLUMES: CPT

## 2021-01-06 ENCOUNTER — TELEPHONE (OUTPATIENT)
Dept: FAMILY MEDICINE CLINIC | Facility: CLINIC | Age: 56
End: 2021-01-06

## 2021-01-06 NOTE — TELEPHONE ENCOUNTER
Advised patient to follow up with bariatric surgeon, Dr Niall Maciel as soon as possible  Patient has an office visit with his group this Friday 1/8

## 2021-01-07 PROBLEM — E66.9 OBESITY, CLASS II, BMI 35-39.9: Status: ACTIVE | Noted: 2021-01-07

## 2021-01-07 PROBLEM — E66.812 OBESITY, CLASS II, BMI 35-39.9: Status: ACTIVE | Noted: 2021-01-07

## 2021-01-07 PROBLEM — Z48.815 ENCOUNTER FOR SURGICAL AFTERCARE FOLLOWING SURGERY OF DIGESTIVE SYSTEM: Status: ACTIVE | Noted: 2021-01-07

## 2021-01-08 ENCOUNTER — OFFICE VISIT (OUTPATIENT)
Dept: BARIATRICS | Facility: CLINIC | Age: 56
End: 2021-01-08
Payer: COMMERCIAL

## 2021-01-08 VITALS
RESPIRATION RATE: 20 BRPM | HEIGHT: 65 IN | TEMPERATURE: 97.4 F | SYSTOLIC BLOOD PRESSURE: 128 MMHG | DIASTOLIC BLOOD PRESSURE: 82 MMHG | HEART RATE: 78 BPM | BODY MASS INDEX: 36.9 KG/M2 | WEIGHT: 221.5 LBS

## 2021-01-08 DIAGNOSIS — E11.65 TYPE 2 DIABETES MELLITUS WITH HYPERGLYCEMIA (HCC): ICD-10-CM

## 2021-01-08 DIAGNOSIS — E66.9 OBESITY, CLASS II, BMI 35-39.9: ICD-10-CM

## 2021-01-08 DIAGNOSIS — Z48.815 ENCOUNTER FOR SURGICAL AFTERCARE FOLLOWING SURGERY OF DIGESTIVE SYSTEM: ICD-10-CM

## 2021-01-08 DIAGNOSIS — E53.8 LOW VITAMIN B12 LEVEL: ICD-10-CM

## 2021-01-08 DIAGNOSIS — E21.1 HYPERPARATHYROIDISM , SECONDARY, NON-RENAL (HCC): ICD-10-CM

## 2021-01-08 DIAGNOSIS — Z98.84 BARIATRIC SURGERY STATUS: ICD-10-CM

## 2021-01-08 DIAGNOSIS — K21.9 GASTROESOPHAGEAL REFLUX DISEASE, UNSPECIFIED WHETHER ESOPHAGITIS PRESENT: ICD-10-CM

## 2021-01-08 DIAGNOSIS — M79.10 MUSCLE PAIN: Primary | ICD-10-CM

## 2021-01-08 DIAGNOSIS — R79.89 LOW VITAMIN D LEVEL: ICD-10-CM

## 2021-01-08 DIAGNOSIS — E78.00 HYPERCHOLESTEROLEMIA: ICD-10-CM

## 2021-01-08 DIAGNOSIS — I10 ESSENTIAL HYPERTENSION: ICD-10-CM

## 2021-01-08 PROBLEM — K91.2 POSTSURGICAL MALABSORPTION: Status: ACTIVE | Noted: 2021-01-08

## 2021-01-08 PROBLEM — N28.1 RENAL CYST: Status: ACTIVE | Noted: 2021-01-08

## 2021-01-08 PROCEDURE — 99214 OFFICE O/P EST MOD 30 MIN: CPT | Performed by: PHYSICIAN ASSISTANT

## 2021-01-08 NOTE — ASSESSMENT & PLAN NOTE
By prior labs-advised him to add an EXTRA 2000 IU vitamin D3 daily-this is being rechecked by PCP-so otherwise defer to them

## 2021-01-08 NOTE — ASSESSMENT & PLAN NOTE
Malabsorption- patient is at risk for malabsorption of vitamins/minerals secondary to malabsorption from her procedure and restriction of intakes  Reviewed current supplements and advised on same    He is taking 2 out of 4 bariatric fusion and one 500 mg calcium citrate with d-advised supplements are inadequate to meet needs    Advised on trial to one [employer] mvi with 45 mg of iron and 2-500 mg calcium citrate with d  His PCP is ordering most of his vitamin levels/will order the remainder now

## 2021-01-08 NOTE — PATIENT INSTRUCTIONS
It was great to meet you today  Use abdominal binder-especially with work or lifting/  If you develop abdominal pain with fever,chills, nausea or vomiting-then call us/go to ER for further evaluation  For 4 days only take 400 mg ibuprofen every 8 hours around the clock-take with food or milk  Avoid heavy lifting for the next few days-continue to walk  -use heat to the area for 10-15 minutes on and 10--15 minutes off-while awake-do not sleep with a heating pad to avoid burning skin  Take omeprazole before breakfast and dinner daily for one week and then can decrease back to once daily    Follow-up with PCP regarding kidney cyst and follow-up ultrasound recommended by ER    Follow-up in 3 months  We kindly ask that you arrive 15 minutes before  your scheduled appointment time with your provider to allow for our staff to room you and check your vital signs and update your chart  We thank you for your patience at your visit  Your appointment time is reserved only for you  If you are unable to make your scheduled visit, we would request that you call to cancel and reschedule it at your earliest convenience  Follow diet as discussed  Get lab work done prior to next office visit  It is recommended to check with your insurance BEFORE getting labs done to make sure they are covered by your policy  Make sure to HOLD any multivitamins that may contain biotin and any biotin supplements FOR 5 DAYS before any labs since it can affect the results  IMPORTANT if you have a [facility]" account, you will receive a letter of your vitamin/mineral results through the computer  Please watch for an update to your chart-since recommendations for supplement adjustments will be sent to you this way  Follow vitamin  and mineral recommendations as reviewed with you  Bariatric vitamins are highly recommended   Vitamins are important for a life-time to avoid low levels which can lead to other medical problems  Exercise as tolerated  Call our office if you have any problems with abdominal pain especially associated with fever, chills, nausea, vomiting or any other concerns  All  Post-bariatric surgery patients should be aware that very small quantities of any alcohol  can cause impairment and it is very possible not to feel the effect  The effect can be in the system for several hours  It is also a stomach irritant  It is advised to AVOID alcohol, Nonsteroidal antiinflammatory drugs (NSAIDS) and nicotine of all forms   Any of these can cause stomach irritation/pain  Recommendation : Most, if not all post-gastric surgery patients would benefit from having a regular counselor for at least 2 years post-op to help with need for multiple life-style changes  If you want to do this and need help finding a regular counselor you can also make a follow-up with our  to help you find one      May return to work Wednesday 1/13/2021

## 2021-01-08 NOTE — ASSESSMENT & PLAN NOTE
Appear controlled on medicaiton    Advised that with continued weight loss blood pressure can come down  Advised on symptoms of hypotension and if this occurs to follow-up with PCP for further management since blood pressure medications may need to be adjusted at that time

## 2021-01-08 NOTE — ASSESSMENT & PLAN NOTE
Want level > 400 after gastric surgery-being checked by PCP-he will switch his mvi to [employer] which contains 1000 mcg vitamin [id number] defer to PCP for now

## 2021-01-08 NOTE — ASSESSMENT & PLAN NOTE
Improved from morbid obesity with bmi of 42 2 at his first post-op visit with his surgeon to current bmi of 27 4-obesity ii

## 2021-01-08 NOTE — ASSESSMENT & PLAN NOTE
He has had intermittent periumbilical to lower abdominal pain worse with movement/lifting particularly  Pain has been on average every other day-for the past 2-3 weeks  Not associated with meals  No associated nausea/vomiting  He notes he had some issues with constipation early post-op but now moving bowels well-had one today  He had CT of abdomen/pelvis at [facility] the end of December  which showed no acute findings related to his gastric surgery but did have an abnormal kidney cyst and recommended non-emergent ultrasound for same  He has follow-up scheduled with PCP next month and encouraged to review this with her -Daughter at the visit and she appears supportive and will remind him  Reviewed ER report and radiologist's report of the CT of abdomen/pelvise    On exam he has large abdominal pannus  I was unable to appreciate any hernias-and none seen on CT of abdomen/pelvis     His pain appears most consistent with musculoskeletal pain    Advised on increasing ppi to twice daily for one week  Take 400 mg ibuprofen every 8 hours for 4 days  I will order abdominal binder as some of his pain is likely related to body habitus/carrying extra weight in lower abdomen with his weight loss    He was advised on when to seek more immediate medical attention    I reviewed his case with DR Melida Aden and he agrees with the plan

## 2021-01-08 NOTE — ASSESSMENT & PLAN NOTE
-s/p Keke-En-Y Gastric Bypass with Dr Vernon Fountain on 9/22/2020  Overall doing Fair  Initial:  Current:  EWL:  Trav:  Current BMI is Body mass index is 37 43 kg/m²      Tolerating a regular diet-yes  Eating at least 60 grams of protein per day-yes  Following 30/60 minute rule with liquids-yes  Drinking at least 64 ounces of fluid per day-yes  Drinking carbonated beverages-no  Sufficient exercise-walking/physical job  Using NSAIDs regularly-no  Using nicotine-no  Using alcohol-no    Colonsocopy/colon cancer screening is up-to-date as reported by patient

## 2021-01-08 NOTE — PROGRESS NOTES
Assessment/Plan:    Obesity, Class II, BMI 35-39 9  Improved from morbid obesity with bmi of 42 2 at his first post-op visit with his surgeon to current bmi of 27 4-obesity ii    Encounter for surgical aftercare following surgery of digestive system  -s/p Keke-En-Y Gastric Bypass with Dr Iglesia Courtney on 9/22/2020  Overall doing Fair  Initial:  Current:  EWL:  Trav:  Current BMI is Body mass index is 37 43 kg/m²  Tolerating a regular diet-yes  Eating at least 60 grams of protein per day-yes  Following 30/60 minute rule with liquids-yes  Drinking at least 64 ounces of fluid per day-yes  Drinking carbonated beverages-no  Sufficient exercise-walking/physical job  Using NSAIDs regularly-no  Using nicotine-no  Using alcohol-no    Colonsocopy/colon cancer screening is up-to-date as reported by patient        Muscle pain  He has had intermittent periumbilical to lower abdominal pain worse with movement/lifting particularly  Pain has been on average every other day-for the past 2-3 weeks  Not associated with meals  No associated nausea/vomiting  He notes he had some issues with constipation early post-op but now moving bowels well-had one today  He had CT of abdomen/pelvis at [facility] the end of December  which showed no acute findings related to his gastric surgery but did have an abnormal kidney cyst and recommended non-emergent ultrasound for same  He has follow-up scheduled with PCP next month and encouraged to review this with her -Daughter at the visit and she appears supportive and will remind him  Reviewed ER report and radiologist's report of the CT of abdomen/pelvise    On exam he has large abdominal pannus  I was unable to appreciate any hernias-and none seen on CT of abdomen/pelvis     His pain appears most consistent with musculoskeletal pain    Advised on increasing ppi to twice daily for one week  Take 400 mg ibuprofen every 8 hours for 4 days  I will order abdominal binder as some of his pain is likely related to body habitus/carrying extra weight in lower abdomen with his weight loss    He was advised on when to seek more immediate medical attention    I reviewed his case with DR Shira Us and he agrees with the plan    Low vitamin D level  By prior labs-advised him to add an EXTRA 2000 IU vitamin D3 daily-this is being rechecked by PCP-so otherwise defer to them    Low vitamin B12 level  Want level > 400 after gastric surgery-being checked by PCP-he will switch his mvi to procare which contains 1000 mcg vitamin [id number] defer to PCP for now    Type 2 diabetes mellitus with hyperglycemia (HCC)    Lab Results   Component Value Date    HGBA1C 5 6 06/06/2015     Off DM medication/may be diet controlled /defer to PCP for rechecks    Gastroesophageal reflux disease  Controlled on ppi  Continue on twice day dosing for one week then can decrease back to once daily as tolerated    Hyperparathyroidism , secondary, non-renal ([address] )  Being checked by PCP-defer to them    Hypercholesterolemia  Followed by pcp    Postsurgical malabsorption  Malabsorption- patient is at risk for malabsorption of vitamins/minerals secondary to malabsorption from her procedure and restriction of intakes  Reviewed current supplements and advised on same    He is taking 2 out of 4 bariatric fusion and one 500 mg calcium citrate with d-advised supplements are inadequate to meet needs  Advised on trial to one [employer] mvi with 45 mg of iron and 2-500 mg calcium citrate with d  His PCP is ordering most of his vitamin levels/will order the remainder now    Essential hypertension  Appear controlled on medicaiton    Advised that with continued weight loss blood pressure can come down  Advised on symptoms of hypotension and if this occurs to follow-up with PCP for further management since blood pressure medications may need to be adjusted at that time           Diagnoses and all orders for this visit:    Muscle pain  -     Abdominal binder  -     Vitamin B1, whole blood; Future  -     Zinc; Future  -     Vitamin A; Future    Obesity, Class II, BMI 35-39 9  -     Vitamin B1, whole blood; Future  -     Zinc; Future  -     Vitamin A; Future    Encounter for surgical aftercare following surgery of digestive system  -     Vitamin B1, whole blood; Future  -     Zinc; Future  -     Vitamin A; Future    Type 2 diabetes mellitus with hyperglycemia (HCC)  -     Vitamin B1, whole blood; Future  -     Zinc; Future  -     Vitamin A; Future    Gastroesophageal reflux disease, unspecified whether esophagitis present  -     Vitamin B1, whole blood; Future  -     Zinc; Future  -     Vitamin A; Future    Hyperparathyroidism , secondary, non-renal (HCC)  -     Vitamin B1, whole blood; Future  -     Zinc; Future  -     Vitamin A; Future    Essential hypertension  -     Vitamin B1, whole blood; Future  -     Zinc; Future  -     Vitamin A; Future    Low vitamin D level  -     Vitamin B1, whole blood; Future  -     Zinc; Future  -     Vitamin A; Future    Low vitamin B12 level  -     Vitamin B1, whole blood; Future  -     Zinc; Future  -     Vitamin A; Future    Hypercholesterolemia  -     Vitamin B1, whole blood; Future  -     Zinc; Future  -     Vitamin A; Future    Bariatric surgery status  -     Vitamin B1, whole blood; Future  -     Zinc; Future  -     Vitamin A; Future          Subjective:      Patient ID: Nicole Poe is a 48 y o  male  He is status post revision -conversion from gastric sleeve surgery to laparoscopic yosi-en-y gastric bypass surgery 9/22/2020  He has had intermittent abdominal pain around periumbilical area with some radiation to lower abdomen on average every other day for the last 2-3 weeks  -worse with movement/lifting  No associated nausea/vomiting/fever or chills  He notes he was having problems with constipation early post-op but recently moving bowels adequately  No urinary complaints   He had gone to ER at the end of December for evaluation of the pain  CT of abdomen without any acute findings related to his gastric surgery-but did have an abnormal kidney cyst and radiologist advised on non-urgent Ultrasound of his kidneys  Per patient he was unaware of this finding  The following portions of the patient's history were reviewed and updated as appropriate: allergies, current medications, past family history, past medical history, past social history, past surgical history and problem list     Review of Systems   Constitutional: Negative for chills and fever  Unexpected weight change: planned weight loss  Respiratory: Negative for cough, shortness of breath and wheezing  Cardiovascular: Negative for chest pain and palpitations  Gastrointestinal: Positive for abdominal pain  Negative for constipation, diarrhea, nausea and vomiting  See hpi/discussion   Psychiatric/Behavioral: Negative for suicidal ideas (no complait of anxiety or depression)  Objective:      /82 (BP Location: Right arm, Patient Position: Sitting, Cuff Size: Standard)   Pulse 78   Temp (!) 97 4 °F (36 3 °C) (Tympanic)   Resp 20   Ht 5' 4 5" (1 638 m)   Wt 100 kg (221 lb 8 oz)   BMI 37 43 kg/m²          Physical Exam  Constitutional:       General: He is not in acute distress  Appearance: He is not ill-appearing, toxic-appearing or diaphoretic  Comments:  obese   Pulmonary:      Effort: Pulmonary effort is normal    Abdominal:      Comments: Protuberant abdomen; incision sites appear well-healed; large lower abdominal pannus, no incisional hernias appreciated; non-tender to palpation; hypoactive bowel sounds   Neurological:      Mental Status: He is alert and oriented to person, place, and time     Psychiatric:         Mood and Affect: Mood normal

## 2021-01-08 NOTE — ASSESSMENT & PLAN NOTE
Lab Results   Component Value Date    HGBA1C 5 6 06/06/2015     Off DM medication/may be diet controlled /defer to PCP for rechecks

## 2021-01-08 NOTE — ASSESSMENT & PLAN NOTE
Controlled on ppi  Continue on twice day dosing for one week then can decrease back to once daily as tolerated

## 2021-01-11 ENCOUNTER — OFFICE VISIT (OUTPATIENT)
Dept: PULMONOLOGY | Facility: CLINIC | Age: 56
End: 2021-01-11
Payer: COMMERCIAL

## 2021-01-11 VITALS
TEMPERATURE: 98.7 F | RESPIRATION RATE: 18 BRPM | HEIGHT: 65 IN | DIASTOLIC BLOOD PRESSURE: 80 MMHG | OXYGEN SATURATION: 98 % | WEIGHT: 222 LBS | HEART RATE: 76 BPM | BODY MASS INDEX: 36.99 KG/M2 | SYSTOLIC BLOOD PRESSURE: 128 MMHG

## 2021-01-11 DIAGNOSIS — D86.9 SARCOIDOSIS: Primary | ICD-10-CM

## 2021-01-11 PROCEDURE — 3079F DIAST BP 80-89 MM HG: CPT | Performed by: INTERNAL MEDICINE

## 2021-01-11 PROCEDURE — 3074F SYST BP LT 130 MM HG: CPT | Performed by: INTERNAL MEDICINE

## 2021-01-11 PROCEDURE — 99213 OFFICE O/P EST LOW 20 MIN: CPT | Performed by: INTERNAL MEDICINE

## 2021-01-11 PROCEDURE — 1036F TOBACCO NON-USER: CPT | Performed by: INTERNAL MEDICINE

## 2021-01-11 PROCEDURE — 3008F BODY MASS INDEX DOCD: CPT | Performed by: INTERNAL MEDICINE

## 2021-01-11 NOTE — PROGRESS NOTES
Pulmonary Follow Up Note   Farhat Hernandez 48 y o  male MRN: [id number]  1/11/2021      Assessment/Plan:    Sarcoidosis  -Diagnosed in 2004, unclear as to how this is definitively diagnosed  He says that he was diagnosed based on chest imaging, may have had a biopsy but is not certain of this  Has not been on any kind of specific sarcoidosis therapy  -He had PFTS which do not show any evidence of obstruction or restriction  -Recent chest CT from September of this year did not reveal any evidence of any hilar lymphadenopathy but does show bibasilar atelectasis as well as left hydropneumothorax, imaging was done post surgery  -Virginia Covarrubias says he will gather his records from [facility] and bring them in for review  -He is clinically stable, he does not have any systemic symptoms from his sarcoidosis and denies any respiratory complaints  -Will order CXR to be completed prior to his next visit to follow up on previous hydropneumothorax after his surgery   -After review of records and CXR, if clinically stable, can likely follow up every 6 months to one year      Return in about 6 weeks (around 2/22/2021)  History of Present Illness   HPI:  William Lofton is following up today for his history of sarcoidosis, history of pulmonary nodules and history of pulmonary embolism  He was seen as a virtual visit on 12/8       Since his last televisit with me, he did have an ED visit for abdominal pain  He has not had any respiratory symptoms  We discussed that I would like to review his past medical records from the [facility], namely his workup for sarcoidosis and the review of the potential biopsy that he had confirming the diagnosis  He says that he has seen an opthamolgoist in the past and has also followed up with ENT several years ago  He denies being on steroids or any other steroid sparing agent  Review of Systems   Constitutional: Negative for appetite change and fever     HENT: Negative for ear pain, postnasal drip, rhinorrhea, sneezing, sore throat and trouble swallowing  Cardiovascular: Negative for chest pain  Musculoskeletal: Negative for myalgias  Skin: Negative for color change and rash  Neurological: Negative for headaches         Historical Information   Past Medical History:   Diagnosis Date    Bariatric surgery status     GERD (gastroesophageal reflux disease)     Gout     Heartburn     Hyperparathyroidism ([address] )     Hypertension     Lung nodule     Postsurgical malabsorption     Pulmonary embolism (HCC)     Sarcoidosis     Wears glasses      Past Surgical History:   Procedure Laterality Date    BARIATRIC SURGERY      COLONOSCOPY      FOOT SURGERY Right     R ankle    GASTRIC BYPASS LAPAROSCOPIC N/A 9/22/2020    Procedure: LAPAROSCOPIC REVISION/ CONVERSION TO HALEY-EN-Y GASTRIC BYPASS W ROBOTICS, PARAESOPHAGEAL HERNIA REPAIR, AND INTRAOPERATIVE EGD;  Surgeon: Dwayne Cohen MD;  Location: [facility];  Service: Bariatrics    HAND SURGERY      STOMACH SURGERY  2015    gastric sleeve     Family History   Problem Relation Age of Onset    Hypertension Father          Meds/Allergies     Current Outpatient Medications:     calcium citrate-vitamin D (CITRACAL+D) 315-200 MG-UNIT per tablet, Take 1 tablet by mouth 2 (two) times a day, Disp: 180 tablet, Rfl: 1    capsaicin (ZOSTRIX) 0 025 % cream, APPLY SMALL AMOUNT TOPICALLY EVERY 8 HOURS AS NEEDED FOR PAIN ([address] HANDS AFTER USE), Disp: , Rfl:     carvedilol (COREG) 12 5 mg tablet, Take 1 tablet (12 5 mg total) by mouth 2 (two) times a day with meals, Disp: 180 tablet, Rfl: 3    losartan (COZAAR) 50 mg tablet, Take 1 tablet (50 mg total) by mouth daily Has been on it without issues, Disp: 90 tablet, Rfl: 0    Multiple Vitamin (Daily-Mark Multivitamin) TABS, Take 1 tablet by mouth daily, Disp: , Rfl:     Multiple Vitamins-Minerals (Multivitamin Adults) TABS, Take 1 tablet by mouth daily, Disp: 90 tablet, Rfl: 1    omeprazole (PriLOSEC) 20 mg delayed release capsule, Take 1 capsule (20 mg total) by mouth 2 (two) times a day, Disp: 60 capsule, Rfl: 1    traMADol (ULTRAM) 50 mg tablet, Take 50 mg by mouth every 6 (six) hours as needed for moderate pain, Disp: , Rfl:     allopurinol (ZYLOPRIM) 300 mg tablet, Take 1 tablet (300 mg total) by mouth daily (Patient taking differently: Take 300 mg by mouth daily As needed ), Disp: 30 tablet, Rfl: 1  Allergies   Allergen Reactions    Lisinopril Cough    Nsaids      Other reaction(s): Duodenal ulcer disease    Flunisolide      Other reaction(s): Burning sensation    Testosterone Itching, Rash and Other (See Comments)       Vitals: Blood pressure 128/80, pulse 76, temperature 98 7 °F (37 1 °C), temperature source Tympanic, resp  rate 18, height 5' 5" (1 651 m), weight 101 kg (222 lb), SpO2 98 %  Body mass index is 36 94 kg/m²  Oxygen Therapy  SpO2: 98 %      Physical Exam  Physical Exam  Constitutional:       Appearance: Normal appearance  HENT:      Head: Normocephalic and atraumatic  Nose: Nose normal       Mouth/Throat:      Mouth: Mucous membranes are dry  Neck:      Musculoskeletal: Normal range of motion and neck supple  Cardiovascular:      Rate and Rhythm: Normal rate and regular rhythm  Pulses: Normal pulses  Heart sounds: Normal heart sounds  No murmur  Pulmonary:      Effort: Pulmonary effort is normal  No respiratory distress  Breath sounds: Normal breath sounds  No wheezing  Abdominal:      General: Abdomen is flat  There is no distension  Palpations: Abdomen is soft  Tenderness: There is no abdominal tenderness  Musculoskeletal:         General: No swelling or tenderness  Right lower leg: No edema  Left lower leg: No edema  Skin:     General: Skin is warm and dry  Findings: No rash  Neurological:      General: No focal deficit present  Mental Status: He is alert and oriented to person, place, and time     Psychiatric:         Mood and Affect: Mood normal          Behavior: Behavior normal          Labs: I have personally reviewed pertinent lab results  Lab Results   Component Value Date    WBC 5 29 12/22/2020    HGB 12 8 12/22/2020    HCT 41 1 12/22/2020    MCV 87 12/22/2020     12/22/2020     Lab Results   Component Value Date    GLUCOSE 96 12/26/2015    CALCIUM 8 6 12/22/2020     12/26/2015    K 3 9 12/22/2020    CO2 28 12/22/2020     12/22/2020    BUN 13 12/22/2020    CREATININE 0 76 12/22/2020     No results found for: IGE  Lab Results   Component Value Date    ALT 41 12/22/2020    AST 21 12/22/2020    ALKPHOS 73 12/22/2020       PFT results: The most recent pulmonary function tests were reviewed  No PFTs in system     Imaging:  I personally reviewed the images on the [facility] system pertinent to today's visit     09/23/20:  Limited CT pulmonary angiogram with no convincing central emboli to the segmental level  Small left hydropneumothorax  Probable bilateral lower lobe and lingular subsegmental atelectasis  Postoperative changes related to gastric bypass  Thoracic esophagus is slightly distended and debris-filled      Other studies:  2D Echo 10/2/47: Systolic function was mildly reduced by visual assessment  Ejection fraction was estimated to be 45 %  There was mild diffuse hypokinesis  Doppler parameters were consistent with abnormal left ventricular relaxation (grade 1 diastolic dysfunction)  [facility]

## 2021-01-31 ENCOUNTER — TRANSCRIBE ORDERS (OUTPATIENT)
Dept: LAB | Facility: CLINIC | Age: 56
End: 2021-01-31

## 2021-01-31 ENCOUNTER — APPOINTMENT (OUTPATIENT)
Dept: LAB | Facility: CLINIC | Age: 56
End: 2021-01-31
Payer: COMMERCIAL

## 2021-01-31 DIAGNOSIS — R73.03 PREDIABETES: ICD-10-CM

## 2021-01-31 DIAGNOSIS — Z98.84 BARIATRIC SURGERY STATUS: Primary | ICD-10-CM

## 2021-01-31 DIAGNOSIS — E66.9 OBESITY, CLASS II, BMI 35-39.9: ICD-10-CM

## 2021-01-31 DIAGNOSIS — E11.65 TYPE 2 DIABETES MELLITUS WITH HYPERGLYCEMIA (HCC): ICD-10-CM

## 2021-01-31 DIAGNOSIS — K21.9 GASTROESOPHAGEAL REFLUX DISEASE, UNSPECIFIED WHETHER ESOPHAGITIS PRESENT: ICD-10-CM

## 2021-01-31 DIAGNOSIS — E78.00 HYPERCHOLESTEROLEMIA: ICD-10-CM

## 2021-01-31 DIAGNOSIS — R79.89 LOW VITAMIN D LEVEL: ICD-10-CM

## 2021-01-31 DIAGNOSIS — E53.8 LOW VITAMIN B12 LEVEL: ICD-10-CM

## 2021-01-31 DIAGNOSIS — E78.2 MIXED HYPERLIPIDEMIA: ICD-10-CM

## 2021-01-31 DIAGNOSIS — Z48.815 ENCOUNTER FOR SURGICAL AFTERCARE FOLLOWING SURGERY OF DIGESTIVE SYSTEM: ICD-10-CM

## 2021-01-31 DIAGNOSIS — M1A.9XX0 CHRONIC GOUT WITHOUT TOPHUS, UNSPECIFIED CAUSE, UNSPECIFIED SITE: ICD-10-CM

## 2021-01-31 DIAGNOSIS — Z12.5 SCREENING FOR PROSTATE CANCER: ICD-10-CM

## 2021-01-31 DIAGNOSIS — E21.1 HYPERPARATHYROIDISM , SECONDARY, NON-RENAL (HCC): ICD-10-CM

## 2021-01-31 DIAGNOSIS — N28.9 RENAL LESION: ICD-10-CM

## 2021-01-31 DIAGNOSIS — I10 ESSENTIAL HYPERTENSION: ICD-10-CM

## 2021-01-31 DIAGNOSIS — M79.10 MUSCLE PAIN: ICD-10-CM

## 2021-01-31 LAB
25(OH)D3 SERPL-MCNC: 34.8 NG/ML (ref 30–100)
BASOPHILS # BLD AUTO: 0.02 THOUSANDS/ΜL (ref 0–0.1)
BASOPHILS NFR BLD AUTO: 1 % (ref 0–1)
BILIRUB UR QL STRIP: NEGATIVE
CALCIUM SERPL-MCNC: 8.6 MG/DL (ref 8.3–10.1)
CHOLEST SERPL-MCNC: 158 MG/DL (ref 50–200)
CLARITY UR: CLEAR
COLOR UR: YELLOW
CREAT UR-MCNC: 105 MG/DL
EOSINOPHIL # BLD AUTO: 0.21 THOUSAND/ΜL (ref 0–0.61)
EOSINOPHIL NFR BLD AUTO: 6 % (ref 0–6)
ERYTHROCYTE [DISTWIDTH] IN BLOOD BY AUTOMATED COUNT: 14.6 % (ref 11.6–15.1)
EST. AVERAGE GLUCOSE BLD GHB EST-MCNC: 117 MG/DL
FERRITIN SERPL-MCNC: 10 NG/ML (ref 8–388)
FOLATE SERPL-MCNC: >20 NG/ML (ref 3.1–17.5)
GLUCOSE UR STRIP-MCNC: NEGATIVE MG/DL
HBA1C MFR BLD: 5.7 %
HCT VFR BLD AUTO: 40.3 % (ref 36.5–49.3)
HDLC SERPL-MCNC: 80 MG/DL
HGB BLD-MCNC: 12.5 G/DL (ref 12–17)
HGB UR QL STRIP.AUTO: NEGATIVE
IMM GRANULOCYTES # BLD AUTO: 0.01 THOUSAND/UL (ref 0–0.2)
IMM GRANULOCYTES NFR BLD AUTO: 0 % (ref 0–2)
IRON SATN MFR SERPL: 16 %
IRON SERPL-MCNC: 58 UG/DL (ref 65–175)
KETONES UR STRIP-MCNC: NEGATIVE MG/DL
LDLC SERPL CALC-MCNC: 70 MG/DL (ref 0–100)
LEUKOCYTE ESTERASE UR QL STRIP: NEGATIVE
LYMPHOCYTES # BLD AUTO: 0.79 THOUSANDS/ΜL (ref 0.6–4.47)
LYMPHOCYTES NFR BLD AUTO: 22 % (ref 14–44)
MCH RBC QN AUTO: 27 PG (ref 26.8–34.3)
MCHC RBC AUTO-ENTMCNC: 31 G/DL (ref 31.4–37.4)
MCV RBC AUTO: 87 FL (ref 82–98)
MICROALBUMIN UR-MCNC: <5 MG/L (ref 0–20)
MICROALBUMIN/CREAT 24H UR: <5 MG/G CREATININE (ref 0–30)
MONOCYTES # BLD AUTO: 0.33 THOUSAND/ΜL (ref 0.17–1.22)
MONOCYTES NFR BLD AUTO: 9 % (ref 4–12)
NEUTROPHILS # BLD AUTO: 2.24 THOUSANDS/ΜL (ref 1.85–7.62)
NEUTS SEG NFR BLD AUTO: 62 % (ref 43–75)
NITRITE UR QL STRIP: NEGATIVE
NONHDLC SERPL-MCNC: 78 MG/DL
NRBC BLD AUTO-RTO: 0 /100 WBCS
PH UR STRIP.AUTO: 6 [PH]
PLATELET # BLD AUTO: 173 THOUSANDS/UL (ref 149–390)
PMV BLD AUTO: 12.1 FL (ref 8.9–12.7)
PROT UR STRIP-MCNC: NEGATIVE MG/DL
PSA SERPL-MCNC: 0.4 NG/ML (ref 0–4)
PTH-INTACT SERPL-MCNC: 31.3 PG/ML (ref 18.4–80.1)
RBC # BLD AUTO: 4.63 MILLION/UL (ref 3.88–5.62)
SP GR UR STRIP.AUTO: 1.02 (ref 1–1.03)
TIBC SERPL-MCNC: 357 UG/DL (ref 250–450)
TRIGL SERPL-MCNC: 42 MG/DL
TSH SERPL DL<=0.05 MIU/L-ACNC: 0.8 UIU/ML (ref 0.36–3.74)
URATE SERPL-MCNC: 5.3 MG/DL (ref 4.2–8)
UROBILINOGEN UR QL STRIP.AUTO: 0.2 E.U./DL
VIT B12 SERPL-MCNC: 472 PG/ML (ref 100–900)
WBC # BLD AUTO: 3.6 THOUSAND/UL (ref 4.31–10.16)

## 2021-01-31 PROCEDURE — 82746 ASSAY OF FOLIC ACID SERUM: CPT

## 2021-01-31 PROCEDURE — 84550 ASSAY OF BLOOD/URIC ACID: CPT

## 2021-01-31 PROCEDURE — 83540 ASSAY OF IRON: CPT

## 2021-01-31 PROCEDURE — 82310 ASSAY OF CALCIUM: CPT

## 2021-01-31 PROCEDURE — 84630 ASSAY OF ZINC: CPT

## 2021-01-31 PROCEDURE — 82570 ASSAY OF URINE CREATININE: CPT

## 2021-01-31 PROCEDURE — 84425 ASSAY OF VITAMIN B-1: CPT

## 2021-01-31 PROCEDURE — 85025 COMPLETE CBC W/AUTO DIFF WBC: CPT

## 2021-01-31 PROCEDURE — 82043 UR ALBUMIN QUANTITATIVE: CPT

## 2021-01-31 PROCEDURE — 83970 ASSAY OF PARATHORMONE: CPT

## 2021-01-31 PROCEDURE — 81003 URINALYSIS AUTO W/O SCOPE: CPT | Performed by: NURSE PRACTITIONER

## 2021-01-31 PROCEDURE — 82306 VITAMIN D 25 HYDROXY: CPT

## 2021-01-31 PROCEDURE — 84443 ASSAY THYROID STIM HORMONE: CPT

## 2021-01-31 PROCEDURE — 82728 ASSAY OF FERRITIN: CPT

## 2021-01-31 PROCEDURE — 3044F HG A1C LEVEL LT 7.0%: CPT | Performed by: INTERNAL MEDICINE

## 2021-01-31 PROCEDURE — 82607 VITAMIN B-12: CPT

## 2021-01-31 PROCEDURE — 36415 COLL VENOUS BLD VENIPUNCTURE: CPT

## 2021-01-31 PROCEDURE — 84590 ASSAY OF VITAMIN A: CPT

## 2021-01-31 PROCEDURE — G0103 PSA SCREENING: HCPCS

## 2021-01-31 PROCEDURE — 83550 IRON BINDING TEST: CPT

## 2021-01-31 PROCEDURE — 83036 HEMOGLOBIN GLYCOSYLATED A1C: CPT

## 2021-01-31 PROCEDURE — 80061 LIPID PANEL: CPT

## 2021-02-01 ENCOUNTER — TELEMEDICINE (OUTPATIENT)
Dept: CARDIOLOGY CLINIC | Facility: CLINIC | Age: 56
End: 2021-02-01
Payer: COMMERCIAL

## 2021-02-01 DIAGNOSIS — I42.8 NICM (NONISCHEMIC CARDIOMYOPATHY) (HCC): ICD-10-CM

## 2021-02-01 DIAGNOSIS — I50.22 CHRONIC SYSTOLIC CONGESTIVE HEART FAILURE (HCC): Primary | ICD-10-CM

## 2021-02-01 DIAGNOSIS — D86.9 SARCOIDOSIS: ICD-10-CM

## 2021-02-01 DIAGNOSIS — I10 ESSENTIAL HYPERTENSION: ICD-10-CM

## 2021-02-01 PROCEDURE — 99214 OFFICE O/P EST MOD 30 MIN: CPT | Performed by: INTERNAL MEDICINE

## 2021-02-01 NOTE — PROGRESS NOTES
Virtual Brief Visit    Assessment/Plan:    Problem List Items Addressed This Visit        Cardiovascular and Mediastinum    Essential hypertension    NICM (nonischemic cardiomyopathy) ([facility] )    Chronic systolic congestive heart failure ([facility] ) - Primary       Other    Sarcoidosis        Nonischemic cardiomyopathy  Reports stable volume status  Continue coreg, losartan  Not on diuretic  Has pulmonary sarcoid, stable, seen pulmonary  Trying to get them the records from [facility]  No infiltrative disease on the cMRI  Most recent echo with improvement to EF 45%    Follow up in office 6 months, and then if stable, can do yearly  Reason for visit is   Chief Complaint   Patient presents with    Virtual Brief Visit        Encounter provider Maisha Jordan MD    Provider located at [place] [address] [place] [address]    Recent Visits  No visits were found meeting these conditions  Showing recent visits within past 7 days and meeting all other requirements     Today's Visits  Date Type Provider Dept   02/01/21 Telemedicine Maisha Jordan MD [facility]   Showing today's visits and meeting all other requirements     Future Appointments  No visits were found meeting these conditions  Showing future appointments within next 150 days and meeting all other requirements        After connecting through [employer] and patient was informed that this is a secure, HIPAA-compliant platform  He agrees to proceed  , the patient was identified by name and date of birth  Ally Ross was informed that this is a telemedicine visit and that the visit is being conducted through [facility] and patient was informed that this is a secure, HIPAA-compliant platform  He agrees to proceed     My office door was closed  No one else was in the room  He acknowledged consent and understanding of privacy and security of the platform   The patient has agreed to participate and understands he can discontinue the visit at any time  Patient is aware this is a billable service  Michelle Merida is a 48 y o  male  HPI     He has a history of a nonischemic caridomyopathy  EF was 35% by echo, 31% by MRI, RV function reduced as well  Medical management, ischemia evaluation by stress MRI (negative)  Was found in evaluation for bariatric surgery, he has now undergone this  Tolerated surgery without any cardiac complications  Did have some abdominal pain, but this is improved  No complaints from cardiac standpoint  Stable on coreg, losartan  Volume status stable, hasn't needed diuretic  Losing weight post GB        Past Medical History:   Diagnosis Date    Bariatric surgery status     GERD (gastroesophageal reflux disease)     Gout     Heartburn     Hyperparathyroidism ([address] )     Hypertension     Lung nodule     Postsurgical malabsorption     Pulmonary embolism (HCC)     Sarcoidosis     Wears glasses        Past Surgical History:   Procedure Laterality Date    BARIATRIC SURGERY      COLONOSCOPY      FOOT SURGERY Right     R ankle    GASTRIC BYPASS LAPAROSCOPIC N/A 9/22/2020    Procedure: LAPAROSCOPIC REVISION/ CONVERSION TO HALEY-EN-Y GASTRIC BYPASS W ROBOTICS, PARAESOPHAGEAL HERNIA REPAIR, AND INTRAOPERATIVE EGD;  Surgeon: Issa Astudillo MD;  Location: [facility];  Service: Bariatrics    HAND SURGERY      STOMACH SURGERY  2015    gastric sleeve       Current Outpatient Medications   Medication Sig Dispense Refill    calcium citrate-vitamin D (CITRACAL+D) 315-200 MG-UNIT per tablet Take 1 tablet by mouth 2 (two) times a day 180 tablet 1    carvedilol (COREG) 12 5 mg tablet Take 1 tablet (12 5 mg total) by mouth 2 (two) times a day with meals 180 tablet 3    losartan (COZAAR) 50 mg tablet Take 1 tablet (50 mg total) by mouth daily Has been on it without issues 90 tablet 0    omeprazole (PriLOSEC) 20 mg delayed release capsule Take 1 capsule (20 mg total) by mouth 2 (two) times a day 60 capsule 1    capsaicin (ZOSTRIX) 0 025 % cream APPLY SMALL AMOUNT TOPICALLY EVERY 8 HOURS AS NEEDED FOR PAIN [facility] HANDS AFTER USE)      Multiple Vitamin (Daily-Mark Multivitamin) TABS Take 1 tablet by mouth daily      Multiple Vitamins-Minerals (Multivitamin Adults) TABS Take 1 tablet by mouth daily 90 tablet 1    traMADol (ULTRAM) 50 mg tablet Take 50 mg by mouth every 6 (six) hours as needed for moderate pain       No current facility-administered medications for this visit  Allergies   Allergen Reactions    Lisinopril Cough    Nsaids      Other reaction(s): Duodenal ulcer disease    Flunisolide      Other reaction(s): Burning sensation    Testosterone Itching, Rash and Other (See Comments)       Review of Systems  12 point ROS was negative  There were no vitals filed for this visit  I spent 15 minutes directly with the patient during this visit    VIRTUAL VISIT [address] acknowledges that he has consented to an online visit or consultation  He understands that the online visit is based solely on information provided by him, and that, in the absence of a face-to-face physical evaluation by the physician, the diagnosis he receives is both limited and provisional in terms of accuracy and completeness  This is not intended to replace a full medical face-to-face evaluation by the physician  Cristi  understands and accepts these terms

## 2021-02-04 LAB — ZINC SERPL-MCNC: 70 UG/DL (ref 44–115)

## 2021-02-06 LAB — VIT B1 BLD-SCNC: 120.5 NMOL/L (ref 66.5–200)

## 2021-02-07 LAB — VIT A SERPL-MCNC: 45.6 UG/DL (ref 20.1–62)

## 2021-02-25 ENCOUNTER — OFFICE VISIT (OUTPATIENT)
Dept: OBGYN CLINIC | Facility: CLINIC | Age: 56
End: 2021-02-25
Payer: COMMERCIAL

## 2021-02-25 ENCOUNTER — APPOINTMENT (OUTPATIENT)
Dept: RADIOLOGY | Facility: AMBULARY SURGERY CENTER | Age: 56
End: 2021-02-25
Attending: ORTHOPAEDIC SURGERY
Payer: COMMERCIAL

## 2021-02-25 VITALS
HEART RATE: 89 BPM | BODY MASS INDEX: 37.55 KG/M2 | WEIGHT: 225.4 LBS | SYSTOLIC BLOOD PRESSURE: 125 MMHG | DIASTOLIC BLOOD PRESSURE: 78 MMHG | HEIGHT: 65 IN

## 2021-02-25 DIAGNOSIS — M25.572 PAIN, JOINT, ANKLE AND FOOT, LEFT: ICD-10-CM

## 2021-02-25 DIAGNOSIS — M21.41 PES PLANUS OF BOTH FEET: ICD-10-CM

## 2021-02-25 DIAGNOSIS — M25.572 SINUS TARSI SYNDROME OF BOTH ANKLES: ICD-10-CM

## 2021-02-25 DIAGNOSIS — M25.572 PAIN, JOINT, ANKLE AND FOOT, LEFT: Primary | ICD-10-CM

## 2021-02-25 DIAGNOSIS — M21.42 PES PLANUS OF BOTH FEET: ICD-10-CM

## 2021-02-25 DIAGNOSIS — M25.571 SINUS TARSI SYNDROME OF BOTH ANKLES: ICD-10-CM

## 2021-02-25 PROCEDURE — 3008F BODY MASS INDEX DOCD: CPT | Performed by: ORTHOPAEDIC SURGERY

## 2021-02-25 PROCEDURE — 73600 X-RAY EXAM OF ANKLE: CPT

## 2021-02-25 PROCEDURE — 1036F TOBACCO NON-USER: CPT | Performed by: ORTHOPAEDIC SURGERY

## 2021-02-25 PROCEDURE — 99204 OFFICE O/P NEW MOD 45 MIN: CPT | Performed by: ORTHOPAEDIC SURGERY

## 2021-02-25 PROCEDURE — 3074F SYST BP LT 130 MM HG: CPT | Performed by: ORTHOPAEDIC SURGERY

## 2021-02-25 PROCEDURE — 3078F DIAST BP <80 MM HG: CPT | Performed by: ORTHOPAEDIC SURGERY

## 2021-02-25 PROCEDURE — 20605 DRAIN/INJ JOINT/BURSA W/O US: CPT | Performed by: ORTHOPAEDIC SURGERY

## 2021-02-25 RX ORDER — BETAMETHASONE SODIUM PHOSPHATE AND BETAMETHASONE ACETATE 3; 3 MG/ML; MG/ML
6 INJECTION, SUSPENSION INTRA-ARTICULAR; INTRALESIONAL; INTRAMUSCULAR; SOFT TISSUE
Status: COMPLETED | OUTPATIENT
Start: 2021-02-25 | End: 2021-02-25

## 2021-02-25 RX ORDER — LIDOCAINE HYDROCHLORIDE 10 MG/ML
1 INJECTION, SOLUTION INFILTRATION; PERINEURAL
Status: COMPLETED | OUTPATIENT
Start: 2021-02-25 | End: 2021-02-25

## 2021-02-25 RX ADMIN — BETAMETHASONE SODIUM PHOSPHATE AND BETAMETHASONE ACETATE 6 MG: 3; 3 INJECTION, SUSPENSION INTRA-ARTICULAR; INTRALESIONAL; INTRAMUSCULAR; SOFT TISSUE at 11:17

## 2021-02-25 RX ADMIN — LIDOCAINE HYDROCHLORIDE 1 ML: 10 INJECTION, SOLUTION INFILTRATION; PERINEURAL at 11:17

## 2021-02-25 NOTE — PROGRESS NOTES
Assessment:       1  Pain, joint, ankle and foot, left    2  Sinus tarsi syndrome of both ankles    3  Pes planus of both feet          Plan:          I explained my current clinical findings and reviewed radiological findings with ramona today  I suspect that he has bilateral ankle sinus tarsi syndrome likely secondary from hyperpronation due to pes planus  He may also have some mild chronic right ankle peroneal tendinitis  He was agreeable to and received a left sinus tarsi diagnostic/therapeutic cortisone injection on today's office visit  Will see him back in about 4 weeks time for assessment in this regard  If he has improvement of his left side we may consider doing a right ankle sinus tarsi injection as well     In the interim, I have advised him to request all previous medical records with regards to his musculoskeletal treatment from the past             Subjective:     Patient ID: Aren Tse is a 48 y o  male  Chief Complaint:  Bilateral ankle pain    HPI     Socrates Bryson is a 52-year-old gentleman who  Has been referred by his primary care provider ANSHUL Canales  For evaluation of ankle pain  patient reports having sustained a right ankle fracture in 1985 for which she underwent open reduction and internal fixation  Thereafter, he had another fracture of his left ankle about a year or 2 later which was treated non operatively  Ever since these injuries he has continued to experience pain of both his ankles intermittently for which she has earlier sought care at [facility]   The details of such care are not available for assessment on today's office visit  Currently, his left ankle pain is slightly worse than his right  Complains this to be of moderate intensity present both on the medial and lateral aspect, nonradiating, sharp in nature and made worse with prolonged ambulation especially when ambulating on uneven ground    He occasionally feels a sense of clicking and catching of the left ankle with ambulation  With regards to his right ankle pain, this is mostly localized to the anterolateral ankle with some radiation to the posterolateral ankle  Again it is made worse with prolonged ambulation or weight-bearing  The patient denies any significant new onset tingling or numbness of his lower extremities  Denies any recent injuries         Patient Active Problem List   Diagnosis    Pulmonary embolism (HCC)    Gastroesophageal reflux disease    History of sleeve gastrectomy    Sarcoidosis    Incidental lung nodule, > 3mm and < 8mm    Renal lesion    Cigar smoker    Essential hypertension    Hyperparathyroidism , secondary, non-renal ([facility] )    Low vitamin D level    Low vitamin B12 level    Low calcium levels    NICM (nonischemic cardiomyopathy) (HCC)    Chest pain    Bariatric surgery status    Type 2 diabetes mellitus with hyperglycemia (HCC)    Osteoporosis    Morbid obesity ([facility] )    Hypercholesterolemia    Heart burn    Gout    Generalized osteoarthritis    Arthritis    ED (erectile dysfunction) of organic origin    Prediabetes    Mixed hyperlipidemia    Chronic systolic congestive heart failure (HCC)    History of pulmonary embolism    Obesity, Class II, BMI 35-39 9    Encounter for surgical aftercare following surgery of digestive system    Muscle pain    Postsurgical malabsorption    Renal cyst       Social History     Occupational History    Not on file   Tobacco Use    Smoking status: Former Smoker     Packs/day: 1 00     Years: 7 00     Pack years: 7 00     Types: Cigars, Cigarettes     Quit date: 2006     Years since quitting: 15 1    Smokeless tobacco: Never Used    Tobacco comment: only smoked occ cigar, not for over 6 months   Substance and Sexual Activity    Alcohol use: Not Currently     Frequency: Never    Drug use: Not Currently     Types: Marijuana, Cocaine     Comment: pt quit 20 years ago     Sexual activity: Yes      Review of Systems   Constitutional: Negative  HENT: Negative  Eyes: Negative  Respiratory: Negative  Cardiovascular: Negative  Gastrointestinal: Negative  Endocrine: Negative  Genitourinary: Negative  Skin: Negative  Allergic/Immunologic: Negative  Neurological: Negative  Psychiatric/Behavioral: Negative  Objective:     Ortho ExamPhysical Exam  Vitals signs and nursing note reviewed  Constitutional:       Appearance: He is well-developed  HENT:      Head: Normocephalic and atraumatic  Eyes:      Conjunctiva/sclera: Conjunctivae normal    Cardiovascular:      Rate and Rhythm: Normal rate and regular rhythm  Pulmonary:      Effort: Pulmonary effort is normal  No respiratory distress  Skin:     General: Skin is warm  Findings: No erythema  Neurological:      Mental Status: He is alert and oriented to person, place, and time  Psychiatric:         Behavior: Behavior normal          Thought Content: Thought content normal          Judgment: Judgment normal            Right ankle exam:    Pes planus  Noted  Well-healed surgical scar on the lateral and medial aspect noted  There is tenderness to palpation over the sinus tarsi  Also has some tenderness over the peroneal tendons  Able to actively move his right ankle in all directions but has some discomfort with eversion and dorsiflexion  Grade 5/5 strength of his right ankle in all directions except 4/5 with eversion and inversion  Negative calcaneal squeeze  Negative syndesmotic squeeze  No tenderness over the Achilles tendon  Or retrocalcaneal bursa  Negative anterior drawer test   Negative talar tilt test   No discomfort on midfoot or forefoot palpation  No discomfort on passive movement of the midfoot and negative metatarsal squeeze  Clinically grossly intact distal neurovascular status  Left ankle exam:   pes planus noted  There is tenderness to palpation over the sinus tarsi    Patient is able to move his left ankle in all directions with some discomfort in eversion  Grade 5/5 strength of the left ankle in all directions  Negative anterior drawer test   Negative talar tilt test   Negative calcaneal squeeze  Negative syndesmotic squeeze  No discomfort with passive midfoot motion or metatarsal squeeze  Clinically  Grossly intact distal neurovascular status  I have personally reviewed pertinent films in PACS and my interpretation is Plain radiograph of the left ankle done today does not reveal any acute osseous injury  No significant arthritic change of the left ankle is noted  Marginal anterior tibial possible osteophyte as well as mild talonavicular degenerative change       Medium joint arthrocentesis  Universal Protocol:  Consent: Verbal consent obtained  Risks and benefits: risks, benefits and alternatives were discussed  Consent given by: patient  Time out: Immediately prior to procedure a "time out" was called to verify the correct patient, procedure, equipment, support staff and site/side marked as required  Timeout called at: 2/25/2021 11:10 AM   Patient understanding: patient states understanding of the procedure being performed  Patient consent: the patient's understanding of the procedure matches consent given  Site marked: the operative site was marked  Radiology Images displayed and confirmed  If images not available, report reviewed: imaging studies available  Required items: required blood products, implants, devices, and special equipment available  Patient identity confirmed: verbally with patient    Supporting Documentation  Indications: pain and diagnostic evaluation   Procedure Details  Location: ankle - Ankle joint: Left ankle sinus tarsi    Preparation: Patient was prepped and draped in the usual sterile fashion  Needle size: 25 G  Ultrasound guidance: no  Approach: anterolateral  Medications administered: 1 mL lidocaine 1 %; 6 mg betamethasone acetate-betamethasone sodium phosphate 6 (3-3) mg/mL    Patient tolerance: patient tolerated the procedure well with no immediate complications  Dressing:  Sterile dressing applied     Patient reports having had previous intra-articular cortisone injections in the past without having any side effects

## 2021-03-15 ENCOUNTER — HOSPITAL ENCOUNTER (OUTPATIENT)
Dept: ULTRASOUND IMAGING | Facility: HOSPITAL | Age: 56
Discharge: HOME | End: 2021-03-15
Payer: COMMERCIAL

## 2021-03-15 DIAGNOSIS — N28.1 RENAL CYST, ACQUIRED, RIGHT: ICD-10-CM

## 2021-03-15 PROCEDURE — 76770 US EXAM ABDO BACK WALL COMP: CPT

## 2021-03-19 ENCOUNTER — TELEPHONE (OUTPATIENT)
Dept: BARIATRICS | Facility: CLINIC | Age: 56
End: 2021-03-19

## 2021-03-19 NOTE — TELEPHONE ENCOUNTER
Received forms from [employer]   I did fill out form and forward it to the following fax number on form [phone] I did receive confirmation  Forms sent to central faxing to add into pt's chart

## 2021-03-20 ENCOUNTER — TELEPHONE (OUTPATIENT)
Dept: FAMILY MEDICINE CLINIC | Facility: CLINIC | Age: 56
End: 2021-03-20

## 2021-03-20 DIAGNOSIS — N28.9 KIDNEY LESION, NATIVE, RIGHT: Primary | ICD-10-CM

## 2021-03-20 DIAGNOSIS — N28.1 RENAL CYSTS, ACQUIRED, BILATERAL: ICD-10-CM

## 2021-03-21 NOTE — TELEPHONE ENCOUNTER
Can we call and see if he has been seeing a kidney specialist ??? He has abnormal US of the kidneys and also abnormal findings on previous CT of the kidney by a CT scan done by surgery ? ?      If not call me I would briefly discuss with him  ( It looks like he has a mass on the kidney)  and we will set him up with an appt he needs to see urology / nephrology sooner then later please

## 2021-03-22 NOTE — TELEPHONE ENCOUNTER
Called and spoke with patient  Scheduled appt for 4/14/21 at 845am with Dr Terrell Bruce  Address and directions for office provided

## 2021-03-22 NOTE — TELEPHONE ENCOUNTER
Thank you Viviana Abdi I will set him up in the office in the next few weeks for this renal mass and will take over his imaging, appreciate you reaching out!

## 2021-03-22 NOTE — TELEPHONE ENCOUNTER
Patient has an abnormal US of kidney and also some abnormalities on a CT scan from few months ago   I did refer him to you to be seen   But can you please review and see if he needs to be seen sooner please   I have not ordered the MRI as of yet as recommended by Radiology would like him to see you guys first and go from there    Thank you

## 2021-03-22 NOTE — TELEPHONE ENCOUNTER
Spoke with patient's wife - call transferred to [facility] [place]

## 2021-04-13 ENCOUNTER — TELEPHONE (OUTPATIENT)
Dept: UROLOGY | Facility: CLINIC | Age: 56
End: 2021-04-13

## 2021-04-13 NOTE — TELEPHONE ENCOUNTER
Patient previously scheduled for renal mass discussion for tomorro 4/14/21 (see PCP telephone encounter from 3/20/21)  Patient cancelled appt for tomorrow due to work schedule conflict and was rescheduled for mid June, this was incidentally discovered  Called and spoke with patient at this time  Reviewed we would like to see him sooner rather than later  PCP referred him due to abnormality seen on imaging  Offered to reschedule for tomorrow afternoon  Patient declined as he works in [address] [place] [address]  Appt r/s for 4/20/21 with Dr Girard  Reiterated it is important to keep this appt  Appt in June was then cancelled  Patient verbalized understanding and had no further questions

## 2021-04-20 ENCOUNTER — OFFICE VISIT (OUTPATIENT)
Dept: UROLOGY | Facility: CLINIC | Age: 56
End: 2021-04-20
Payer: COMMERCIAL

## 2021-04-20 ENCOUNTER — OFFICE VISIT (OUTPATIENT)
Dept: BARIATRICS | Facility: CLINIC | Age: 56
End: 2021-04-20
Payer: COMMERCIAL

## 2021-04-20 VITALS
WEIGHT: 224 LBS | SYSTOLIC BLOOD PRESSURE: 132 MMHG | BODY MASS INDEX: 37.32 KG/M2 | HEIGHT: 65 IN | DIASTOLIC BLOOD PRESSURE: 88 MMHG

## 2021-04-20 VITALS
SYSTOLIC BLOOD PRESSURE: 148 MMHG | HEART RATE: 80 BPM | TEMPERATURE: 97.4 F | DIASTOLIC BLOOD PRESSURE: 90 MMHG | RESPIRATION RATE: 20 BRPM | WEIGHT: 222 LBS | HEIGHT: 65 IN | BODY MASS INDEX: 36.99 KG/M2

## 2021-04-20 DIAGNOSIS — I50.22 CHRONIC SYSTOLIC CONGESTIVE HEART FAILURE (HCC): ICD-10-CM

## 2021-04-20 DIAGNOSIS — K91.2 POSTSURGICAL MALABSORPTION: ICD-10-CM

## 2021-04-20 DIAGNOSIS — Z98.84 BARIATRIC SURGERY STATUS: ICD-10-CM

## 2021-04-20 DIAGNOSIS — Z48.815 ENCOUNTER FOR SURGICAL AFTERCARE FOLLOWING SURGERY OF DIGESTIVE SYSTEM: Primary | ICD-10-CM

## 2021-04-20 DIAGNOSIS — N28.89 RIGHT RENAL MASS: Primary | ICD-10-CM

## 2021-04-20 PROCEDURE — 3075F SYST BP GE 130 - 139MM HG: CPT | Performed by: UROLOGY

## 2021-04-20 PROCEDURE — 3079F DIAST BP 80-89 MM HG: CPT | Performed by: UROLOGY

## 2021-04-20 PROCEDURE — 99205 OFFICE O/P NEW HI 60 MIN: CPT | Performed by: UROLOGY

## 2021-04-20 PROCEDURE — 99214 OFFICE O/P EST MOD 30 MIN: CPT | Performed by: PHYSICIAN ASSISTANT

## 2021-04-20 PROCEDURE — 1036F TOBACCO NON-USER: CPT | Performed by: UROLOGY

## 2021-04-20 PROCEDURE — 3008F BODY MASS INDEX DOCD: CPT | Performed by: PHYSICIAN ASSISTANT

## 2021-04-20 NOTE — PROGRESS NOTES
Referring Physician: ANSHUL Forrester  A copy of this note was sent to the referring physician  Diagnoses and all orders for this visit:    Right renal mass  -     Case request operating room: [facility]; Standing  -     [facility] Covid Screening; Future    Other orders  -     Diet NPO; Sips with meds; Standing  -     Place sequential compression device; Standing  -     ceFAZolin (ANCEF) 2,000 mg in dextrose 5 % 100 mL IVPB            Assessment and plan:       1  3 3 cm solid right renal mass consistent with renal cell carcinoma  - cT1a    2  Bilateral benign renal cysts    3  obesity  - status post prior gastric sleeve as well as subsequent Keke-en-Y gastric bypass September 2020     4  Pulmonary sarcoidosis     5  History of pulmonary embolism  -  Previously completed anticoagulation        We discussed with the patient the details regarding their renal mass, including the risks of recurrence, progression and metastasis  We went over the various alternatives for managing renal masses including active surveillance, cryoablation and radiofrequency ablation, partial nephrectomy and radical nephrectomy  Where appropriate, we also discussed the differences between open, laparoscopic and robotic renal surgery  At this point, the patient elects to undergo an open partial nephrectomy, possible radical nephrectomy  The potential risks, benefits, possible complications and alternatives were discussed    Possible complications include but are not limited to bleeding/transfusion, infection, reaction to anesthesia, heart attack,stroke or other adverse medical outcome, positioning injury,neuropraxia, PE/DVT, air embolus, the possibility the lesion is benign, failure to eradicate all the tumor if applicable or tumor recurrence necessitating further treatment, injury to surrounding structures including but not limited to solid organs (spleen/splenectomy, pancreas, liver, adrenal, etc ), bowel, nerve, vessels, etc , port site hernia, bowel obstruction, discomfort, wound infection, poor cosmesis, worsened renal function, chronic renal insufficiency, need for temporary or permanent dialysis, postoperative fluid collections or other unforeseen complication  The patient understands there is always a chance of having to convert the procedure to an open nephrectomy  informed consent was obtained for robotic assisted laparoscopic RIGHT partial nephrectomy, possible radical nephrectomy  Patient was counseled that due to his multiple risk factors including pulmonary sarcoidosis, diabetes, cardiomyopathy, and extensive history of prior abdominal surgeries that he is an increased risk of perioperative and postoperative complications  These complications would include potential need to convert to an open procedure, conversion to a laparoscopic radical nephrectomy, possible need for prolonged hospital course, prolonged intubation, ICU stay, recurrent venous thromboembolic event or cardiac event  Surgical will be scheduled in the near future  I will kindly request preoperative  Optimization and clearance from his cardiologist as well as pulmonologist             Elysia Angulo MD      Chief Complaint       Renal mass evaluation      History of Present Illness     Titi Peña is a 48 y o  Male referred in consultation for renal mass  This is a very pleasant 23-year-old male with a number of medical comorbidities including pulmonary sarcoidosis and ischemic cardiomyopathy and obesity  Patient is Tod Gowers underwent abdominal imaging, CT abdomen pelvis with IV as well as oral contrast in December 2020  This was performed for abdominal pain  CT revealed a number of benign-appearing renal cysts bilaterally  However the presence of a 3 3 cm lesion on the right side was noted  This was felt to be not consistent with a simple renal cyst   The lesion is primarily exophytic    There is no evidence of renal vein involvement regional lymphadenopathy or metastatic disease  Patient subsequently underwent a confirmatory ultrasound which did document that this was a solid tumor radiographically consistent with renal cell carcinoma  Patient is asymptomatic from a genitourinary standpoint  He underwent  Keke-en-Y gastric bypass in September of 2020  This was following a prior failed gastric sleeve  He has lost 50 lb since the time of that surgery  He does have a history of pulmonary sarcoidosis and pulmonary embolism  He has completed a course of anticoagulation is on no further treatment for either of these issues  He also has a history of cardiomyopathy  In addition to our face-to-face visit I spoke with his wife via face time during the encounter  Both he and his belkys wife are employed within the [employer]    Detailed Urologic History     - please refer to HPI    Review of Systems     Review of Systems   Constitutional: Negative for activity change and fatigue  HENT: Negative for congestion  Eyes: Negative for visual disturbance  Respiratory: Negative for shortness of breath and wheezing  Cardiovascular: Negative for chest pain and leg swelling  Gastrointestinal: Negative for abdominal pain  Endocrine: Negative for polyuria  Genitourinary: Negative for dysuria, flank pain, hematuria and urgency  Musculoskeletal: Negative for back pain  Allergic/Immunologic: Negative for immunocompromised state  Neurological: Negative for dizziness and numbness  Psychiatric/Behavioral: Negative for dysphoric mood  All other systems reviewed and are negative              Allergies     Allergies   Allergen Reactions    Lisinopril Cough    Nsaids      Other reaction(s): Duodenal ulcer disease    Flunisolide      Other reaction(s): Burning sensation    Testosterone Itching, Rash and Other (See Comments)       Physical Exam     Physical Exam  Constitutional:       General: He is not in acute distress  Appearance: He is well-developed  HENT:      Head: Normocephalic and atraumatic  Neck:      Musculoskeletal: Normal range of motion  Cardiovascular:      Comments: obese  Pulmonary:      Effort: Pulmonary effort is normal       Breath sounds: Normal breath sounds  Abdominal:      Palpations: Abdomen is soft  Genitourinary:     Comments: Laparoscopic surgical incisions are noted  Musculoskeletal: Normal range of motion  Skin:     General: Skin is warm  Neurological:      Mental Status: He is alert and oriented to person, place, and time     Psychiatric:         Behavior: Behavior normal              Vital Signs  Vitals:    04/20/21 1516   BP: 132/88   BP Location: Left arm   Patient Position: Sitting   Cuff Size: Adult   Weight: 102 kg (224 lb)   Height: 5' 4 5" (1 638 m)         Current Medications       Current Outpatient Medications:     capsaicin (ZOSTRIX) 0 025 % cream, APPLY SMALL AMOUNT TOPICALLY EVERY 8 HOURS AS NEEDED FOR PAIN [facility] HAND[facility] AFTER USE), Disp: , Rfl:     carvedilol (COREG) 12 5 mg tablet, Take 1 tablet (12 5 mg total) by mouth 2 (two) times a day with meals, Disp: 180 tablet, Rfl: 3    losartan (COZAAR) 50 mg tablet, Take 1 tablet (50 mg total) by mouth daily Has been on it without issues, Disp: 90 tablet, Rfl: 0    Multiple Vitamin (Daily-Mark Multivitamin) TABS, Take 1 tablet by mouth daily, Disp: , Rfl:     omeprazole (PriLOSEC) 20 mg delayed release capsule, Take 1 capsule (20 mg total) by mouth 2 (two) times a day, Disp: 60 capsule, Rfl: 1    traMADol (ULTRAM) 50 mg tablet, Take 50 mg by mouth every 6 (six) hours as needed for moderate pain, Disp: , Rfl:     calcium citrate-vitamin D (CITRACAL+D) 315-200 MG-UNIT per tablet, Take 1 tablet by mouth 2 (two) times a day, Disp: 180 tablet, Rfl: 1      Active Problems     Patient Active Problem List   Diagnosis    Pulmonary embolism ([facility] [address] )    Gastroesophageal reflux disease    History of sleeve gastrectomy    Sarcoidosis    Incidental lung nodule, > 3mm and < 8mm    Right renal mass    Cigar smoker    Essential hypertension    Hyperparathyroidism , secondary, non-renal (HCC)    Low vitamin D level    Low vitamin B12 level    Low calcium levels    NICM (nonischemic cardiomyopathy) (HCC)    Chest pain    Bariatric surgery status    Type 2 diabetes mellitus with hyperglycemia (HCC)    Osteoporosis    Morbid obesity ([address] )    Hypercholesterolemia    Heart burn    Gout    Generalized osteoarthritis    Arthritis    ED (erectile dysfunction) of organic origin    Prediabetes    Mixed hyperlipidemia    Chronic systolic congestive heart failure (HCC)    History of pulmonary embolism    Obesity, Class II, BMI 35-39 9    Encounter for surgical aftercare following surgery of digestive system    Muscle pain    Postsurgical malabsorption    Renal cyst         Past Medical History     Past Medical History:   Diagnosis Date    Bariatric surgery status     GERD (gastroesophageal reflux disease)     Gout     Heartburn     Hyperparathyroidism (HCC)     Hypertension     Lung nodule     Postsurgical malabsorption     Pulmonary embolism (HCC)     Sarcoidosis     Wears glasses          Surgical History     Past Surgical History:   Procedure Laterality Date    BARIATRIC SURGERY      COLONOSCOPY      FOOT SURGERY Right     R ankle    GASTRIC BYPASS LAPAROSCOPIC N/A 9/22/2020    Procedure: LAPAROSCOPIC REVISION/ CONVERSION TO HALEY-EN-Y GASTRIC BYPASS W ROBOTICS, PARAESOPHAGEAL HERNIA REPAIR, AND INTRAOPERATIVE EGD;  Surgeon: Farzana Senior MD;  Location: [facility];  Service: Bariatrics    HAND SURGERY      STOMACH SURGERY  2015    gastric sleeve         Family History     Family History   Problem Relation Age of Onset    Hypertension Father          Social History     Social History     Social History     Tobacco Use   Smoking Status Former Smoker    Packs/day: 1 00    Years: 7 00    Pack years: 7 00    Types: Cigars, Cigarettes    Quit date: 2006    Years since quitting: 15 3   Smokeless Tobacco Never Used   Tobacco Comment    only smoked occ cigar, not for over 6 months         Pertinent Lab Values     Lab Results   Component Value Date    CREATININE 0 76 12/22/2020       Lab Results   Component Value Date    PSA 0 4 01/31/2021       @RESULTRCNT(1H])@      Pertinent Imaging        FINDINGS:     ABDOMEN     LOWER CHEST:  No clinically significant abnormality identified in the visualized lower chest      LIVER/BILIARY TREE:  Unremarkable      GALLBLADDER:  No calcified gallstones  No pericholecystic inflammatory change      SPLEEN:  Unremarkable      PANCREAS:  Unremarkable      ADRENAL GLANDS:  Unremarkable      KIDNEYS/URETERS:  There is a unchanged 3 3 cm right renal cystic lesion which does not meet CT criteria for simple cysts on this examination  Bilateral simple cysts are visualized      STOMACH AND BOWEL:  Status post gastric surgery  No evidence of bowel obstruction  Colonic diverticulosis without evidence of acute diverticulitis     APPENDIX:  No findings to suggest appendicitis      ABDOMINOPELVIC CAVITY:  No ascites  No pneumoperitoneum  No lymphadenopathy      VESSELS:  Unremarkable for patient's age      PELVIS     REPRODUCTIVE ORGANS:  Unremarkable for patient's age      URINARY BLADDER:  Unremarkable      ABDOMINAL WALL/INGUINAL REGIONS:  Unremarkable      OSSEOUS STRUCTURES:  No acute fracture or destructive osseous lesion      IMPRESSION:     No evidence of bowel obstruction      Unchanged right-sided renal cystic lesion which does not meet CT criteria for  Further evaluation on a nonemergent basis with a renal ultrasound is recommended      The study was marked in EPIC for immediate notification  FINDINGS:     KIDNEYS:  Symmetric and normal size  Right kidney:  11 3 x 7 x 6 7 cm    Left kidney:  11 8 x 6 2 x 5 5 cm      Right kidney  Normal echogenicity and contour  Solid appearing mid renal lesion measuring 3 4 x 2 5 x 2 7 cm  This appears to correlate with the lesion seen on prior CT  Additional simple appearing cysts  No hydronephrosis  No shadowing calculi  No perinephric fluid collections      Left kidney  Normal echogenicity and contour  No suspicious masses detected  Simple appearing lower pole cyst measures 2 7 x 2 8 x 2 4 cm  No hydronephrosis  5 mm echogenic focus is indeterminate, possibly nonobstructing stone though no stone visualized on prior CT  No perinephric fluid collections      URETERS:  Nonvisualized      BLADDER:   Normally distended  No focal thickening or mass lesions  Bilateral ureteral jets are not detected         IMPRESSION:  1  Solid appearing mid renal lesion measuring 3 4 x 2 5 x 2 7 cm  This is concerning for neoplasm until proven otherwise  Further characterization with contrast MR suggested  2   Bilateral renal cysts  Portions of the record may have been created with voice recognition software   Occasional wrong word or "sound a like" substitutions may have occurred due to the inherent limitations of voice recognition software   Read the chart carefully and recognize, using context, where substitutions have occurred

## 2021-04-20 NOTE — PROGRESS NOTES
Assessment/Plan:     Patient ID: Russell Hammond is a 48 y o  male  Bariatric Surgery Status    -s/p Keke-En-Y Gastric Bypass with Dr Arron Powell on 9/22/20  Presents to the office today for 3rd postop  · Continued/Maintain healthy weight loss with good nutrition intakes  · Adequate hydration with at least 64oz  fluid intake  · Follow diet as discussed  · Follow vitamin and mineral recommendations as reviewed with you  · Exercise as tolerated  · Follow-up for annual  We kindly ask that your arrive 15 minutes before your scheduled appointment time with your provider to allow our staff to room you, get your vital signs and update your chart  · Call our office if you have any problems with abdominal pain especially associated with fever, chills, nausea, vomiting or any other concerns  · All  Post-bariatric surgery patients should be aware that very small quantities of any alcohol can cause impairment and it is very possible not to feel the effect  The effect can be in the system for several hours  It is also a stomach irritant  · It is advised to AVOID alcohol, Nonsteroidal antiinflammatory drugs (NSAIDS) and nicotine of all forms   Any of these can cause stomach irritation/pain  · Discussed the effects of alcohol on a bariatric patient and the increased impairment risk  · Keep up the good work!      Postsurgical Malabsorption   -At risk for malabsorption of vitamins/minerals secondary to malabsorption and restriction of intake from bariatric surgery  -Currently taking adequate postop bariatric surgery vitamin supplementation  -Last set of bariatric labs completed 1/2021 and within acceptable limits   -Next set of bariatric labs ordered for approximately 6 months  -Patient received education about the importance of adhering to a lifelong supplementation regimen to avoid vitamin/mineral deficiencies      Diagnoses and all orders for this visit:    Encounter for surgical aftercare following surgery of digestive system    Bariatric surgery status    Postsurgical malabsorption    Chronic systolic congestive heart failure (HCC)         Subjective:      Patient ID: Saurabh Olson is a 48 y o  male  -s/p Keke-En-Y Gastric Bypass with Dr France Betancur on 9/22/20  Presents to the office today for 3rd postop  Overall doing well  Abdominal pain improved since last visit  Current:222  EWL: (Weight loss is ahead of schedule at this post surgical period )  Trav: current   Current BMI is Body mass index is 37 52 kg/m²  · Tolerating a regular diet-yes  · Eating at least 60 grams of protein per day-yes  · Following 30/60 minute rule with liquids-yes  · Drinking at least 64 ounces of fluid per day-yes  · Drinking carbonated beverages-no  · Sufficient exercise-no  · Using NSAIDs regularly-no  · Using nicotine-no  · Using alcohol-no  · Supplements: bariatric mvi + calcium + vit D (occasional)          The following portions of the patient's history were reviewed and updated as appropriate: allergies, current medications, past family history, past medical history, past social history, past surgical history and problem list     Review of Systems   Constitutional: Negative  Respiratory: Negative  Cardiovascular: Negative  Gastrointestinal: Negative  Skin: Positive for rash (due to abdominal skin )  Neurological: Negative  Psychiatric/Behavioral: Negative  Objective:    /90 (BP Location: Left arm, Patient Position: Sitting, Cuff Size: Standard)   Pulse 80   Temp (!) 97 4 °F (36 3 °C) (Tympanic)   Resp 20   Ht 5' 4 5" (1 638 m)   Wt 101 kg (222 lb)   BMI 37 52 kg/m²      Physical Exam  Vitals signs and nursing note reviewed  Constitutional:       Appearance: Normal appearance  He is obese  HENT:      Head: Normocephalic and atraumatic  Mouth/Throat:      Mouth: Mucous membranes are moist    Eyes:      Extraocular Movements: Extraocular movements intact  Pupils: Pupils are equal, round, and reactive to light  Neck:      Musculoskeletal: Normal range of motion  Cardiovascular:      Rate and Rhythm: Normal rate and regular rhythm  Pulmonary:      Effort: Pulmonary effort is normal       Breath sounds: Normal breath sounds  Abdominal:      General: Bowel sounds are normal    Musculoskeletal: Normal range of motion  Skin:     General: Skin is warm and dry  Neurological:      General: No focal deficit present  Mental Status: He is alert and oriented to person, place, and time     Psychiatric:         Mood and Affect: Mood normal          Behavior: Behavior normal

## 2021-04-20 NOTE — LETTER
April 21, 2021     Gonzalez Nathan, [facility] [address] [facility] [place] [address]    Patient: Kathryn Reyes   YOB: 1965   Date of Visit: 4/20/2021       Dear Dr Bennett Curling: Thank you for referring Denisha Snell to me for evaluation  Below are my notes for this consultation  If you have questions, please do not hesitate to call me  I look forward to following your patient along with you  Sincerely,        Unique Smith MD        CC: MD Joseph Pickett MD Zana Shames, MD Aisha Dupes, MD  4/21/2021  8:17 AM  Sign when Signing Visit  Referring Physician: ANSHUL Sanabria  A copy of this note was sent to the referring physician  Diagnoses and all orders for this visit:    Right renal mass  -     Case request operating room: [facility]; Standing  -     PAT Covid Screening; Future    Other orders  -     Diet NPO; Sips with meds; Standing  -     Place sequential compression device; Standing  -     ceFAZolin (ANCEF) 2,000 mg in dextrose 5 % 100 mL IVPB            Assessment and plan:       1  3 3 cm solid right renal mass consistent with renal cell carcinoma  - cT1a    2  Bilateral benign renal cysts    3  obesity  - status post prior gastric sleeve as well as subsequent Keke-en-Y gastric bypass September 2020     4  Pulmonary sarcoidosis     5  History of pulmonary embolism  -  Previously completed anticoagulation        We discussed with the patient the details regarding their renal mass, including the risks of recurrence, progression and metastasis  We went over the various alternatives for managing renal masses including active surveillance, cryoablation and radiofrequency ablation, partial nephrectomy and radical nephrectomy  Where appropriate, we also discussed the differences between open, laparoscopic and robotic renal surgery       At this point, the patient elects to undergo an open partial nephrectomy, possible radical nephrectomy  The potential risks, benefits, possible complications and alternatives were discussed  Possible complications include but are not limited to bleeding/transfusion, infection, reaction to anesthesia, heart attack,stroke or other adverse medical outcome, positioning injury,neuropraxia, PE/DVT, air embolus, the possibility the lesion is benign, failure to eradicate all the tumor if applicable or tumor recurrence necessitating further treatment, injury to surrounding structures including but not limited to solid organs (spleen/splenectomy, pancreas, liver, adrenal, etc ), bowel, nerve, vessels, etc , port site hernia, bowel obstruction, discomfort, wound infection, poor cosmesis, worsened renal function, chronic renal insufficiency, need for temporary or permanent dialysis, postoperative fluid collections or other unforeseen complication  The patient understands there is always a chance of having to convert the procedure to an open nephrectomy  informed consent was obtained for robotic assisted laparoscopic RIGHT partial nephrectomy, possible radical nephrectomy  Patient was counseled that due to his multiple risk factors including pulmonary sarcoidosis, diabetes, cardiomyopathy, and extensive history of prior abdominal surgeries that he is an increased risk of perioperative and postoperative complications  These complications would include potential need to convert to an open procedure, conversion to a laparoscopic radical nephrectomy, possible need for prolonged hospital course, prolonged intubation, ICU stay, recurrent venous thromboembolic event or cardiac event  Surgical will be scheduled in the near future  I will kindly request preoperative  Optimization and clearance from his cardiologist as well as pulmonologist             Candice Hawkins MD      Chief Complaint       Renal mass evaluation      History of Present Illness     Stephen Haynes is a 48 y  o  Male referred in consultation for renal mass  This is a very pleasant 73-year-old male with a number of medical comorbidities including pulmonary sarcoidosis and ischemic cardiomyopathy and obesity  Patient is Augusta Mayo underwent abdominal imaging, CT abdomen pelvis with IV as well as oral contrast in December 2020  This was performed for abdominal pain  CT revealed a number of benign-appearing renal cysts bilaterally  However the presence of a 3 3 cm lesion on the right side was noted  This was felt to be not consistent with a simple renal cyst   The lesion is primarily exophytic  There is no evidence of renal vein involvement regional lymphadenopathy or metastatic disease  Patient subsequently underwent a confirmatory ultrasound which did document that this was a solid tumor radiographically consistent with renal cell carcinoma  Patient is asymptomatic from a genitourinary standpoint  He underwent  Keke-en-Y gastric bypass in September of 2020  This was following a prior failed gastric sleeve  He has lost 50 lb since the time of that surgery  He does have a history of pulmonary sarcoidosis and pulmonary embolism  He has completed a course of anticoagulation is on no further treatment for either of these issues  He also has a history of cardiomyopathy  In addition to our face-to-face visit I spoke with his wife via face time during the encounter  Both he and his belkys wife are employed within the [employer]    Detailed Urologic History     - please refer to HPI    Review of Systems     Review of Systems   Constitutional: Negative for activity change and fatigue  HENT: Negative for congestion  Eyes: Negative for visual disturbance  Respiratory: Negative for shortness of breath and wheezing  Cardiovascular: Negative for chest pain and leg swelling  Gastrointestinal: Negative for abdominal pain  Endocrine: Negative for polyuria  Genitourinary: Negative for dysuria, flank pain, hematuria and urgency  Musculoskeletal: Negative for back pain  Allergic/Immunologic: Negative for immunocompromised state  Neurological: Negative for dizziness and numbness  Psychiatric/Behavioral: Negative for dysphoric mood  All other systems reviewed and are negative  Allergies     Allergies   Allergen Reactions    Lisinopril Cough    Nsaids      Other reaction(s): Duodenal ulcer disease    Flunisolide      Other reaction(s): Burning sensation    Testosterone Itching, Rash and Other (See Comments)       Physical Exam     Physical Exam  Constitutional:       General: He is not in acute distress  Appearance: He is well-developed  HENT:      Head: Normocephalic and atraumatic  Neck:      Musculoskeletal: Normal range of motion  Cardiovascular:      Comments: obese  Pulmonary:      Effort: Pulmonary effort is normal       Breath sounds: Normal breath sounds  Abdominal:      Palpations: Abdomen is soft  Genitourinary:     Comments: Laparoscopic surgical incisions are noted  Musculoskeletal: Normal range of motion  Skin:     General: Skin is warm  Neurological:      Mental Status: He is alert and oriented to person, place, and time     Psychiatric:         Behavior: Behavior normal              Vital Signs  Vitals:    04/20/21 1516   BP: 132/88   BP Location: Left arm   Patient Position: Sitting   Cuff Size: Adult   Weight: 102 kg (224 lb)   Height: 5' 4 5" (1 638 m)         Current Medications       Current Outpatient Medications:     capsaicin (ZOSTRIX) 0 025 % cream, APPLY SMALL AMOUNT TOPICALLY EVERY 8 HOURS AS NEEDED FOR PAIN [facility] AFTER USE), Disp: , Rfl:     carvedilol (COREG) 12 5 mg tablet, Take 1 tablet (12 5 mg total) by mouth 2 (two) times a day with meals, Disp: 180 tablet, Rfl: 3    losartan (COZAAR) 50 mg tablet, Take 1 tablet (50 mg total) by mouth daily Has been on it without issues, Disp: 90 tablet, Rfl: 0    Multiple Vitamin (Daily-Mark Multivitamin) TABS, Take 1 tablet by mouth daily, Disp: , Rfl:     omeprazole (PriLOSEC) 20 mg delayed release capsule, Take 1 capsule (20 mg total) by mouth 2 (two) times a day, Disp: 60 capsule, Rfl: 1    traMADol (ULTRAM) 50 mg tablet, Take 50 mg by mouth every 6 (six) hours as needed for moderate pain, Disp: , Rfl:     calcium citrate-vitamin D (CITRACAL+D) 315-200 MG-UNIT per tablet, Take 1 tablet by mouth 2 (two) times a day, Disp: 180 tablet, Rfl: 1      Active Problems     Patient Active Problem List   Diagnosis    Pulmonary embolism ([facility] [address] )    Gastroesophageal reflux disease    History of sleeve gastrectomy    Sarcoidosis    Incidental lung nodule, > 3mm and < 8mm    Right renal mass    Cigar smoker    Essential hypertension    Hyperparathyroidism , secondary, non-renal (HCC)    Low vitamin D level    Low vitamin B12 level    Low calcium levels    NICM (nonischemic cardiomyopathy) (HCC)    Chest pain    Bariatric surgery status    Type 2 diabetes mellitus with hyperglycemia (HCC)    Osteoporosis    Morbid obesity ([address] )    Hypercholesterolemia    Heart burn    Gout    Generalized osteoarthritis    Arthritis    ED (erectile dysfunction) of organic origin    Prediabetes    Mixed hyperlipidemia    Chronic systolic congestive heart failure (HCC)    History of pulmonary embolism    Obesity, Class II, BMI 35-39 9    Encounter for surgical aftercare following surgery of digestive system    Muscle pain    Postsurgical malabsorption    Renal cyst         Past Medical History     Past Medical History:   Diagnosis Date    Bariatric surgery status     GERD (gastroesophageal reflux disease)     Gout     Heartburn     Hyperparathyroidism ([address] )     Hypertension     Lung nodule     Postsurgical malabsorption     Pulmonary embolism ([address] )     Sarcoidosis     Wears glasses          Surgical History     Past Surgical History:   Procedure Laterality Date    BARIATRIC SURGERY      COLONOSCOPY      FOOT SURGERY Right     R ankle    GASTRIC BYPASS LAPAROSCOPIC N/A 9/22/2020    Procedure: LAPAROSCOPIC REVISION/ CONVERSION TO HALEY-EN-Y GASTRIC BYPASS W ROBOTICS, PARAESOPHAGEAL HERNIA REPAIR, AND INTRAOPERATIVE EGD;  Surgeon: Nickie Haynes MD;  Location: [facility];  [facility]      STOMACH SURGERY  2015    gastric sleeve         Family History     Family History   Problem Relation Age of Onset    Hypertension Father          Social History     Social History     Social History     Tobacco Use   Smoking Status Former Smoker    Packs/day: 1 00    Years: 7 00    Pack years: 7 00    Types: Cigars, Cigarettes    Quit date: 2006    Years since quitting: 15 3   Smokeless Tobacco Never Used   Tobacco Comment    only smoked occ cigar, not for over 6 months         Pertinent Lab Values     Lab Results   Component Value Date    CREATININE 0 76 12/22/2020       Lab Results   Component Value Date    PSA 0 4 01/31/2021       @RESULTRCNT(1H])@      Pertinent Imaging        FINDINGS:     ABDOMEN     LOWER CHEST:  No clinically significant abnormality identified in the visualized lower chest      LIVER/BILIARY TREE:  Unremarkable      GALLBLADDER:  No calcified gallstones  No pericholecystic inflammatory change      SPLEEN:  Unremarkable      PANCREAS:  Unremarkable      ADRENAL GLANDS:  Unremarkable      KIDNEYS/URETERS:  There is a unchanged 3 3 cm right renal cystic lesion which does not meet CT criteria for simple cysts on this examination  Bilateral simple cysts are visualized      STOMACH AND BOWEL:  Status post gastric surgery  No evidence of bowel obstruction  Colonic diverticulosis without evidence of acute diverticulitis     APPENDIX:  No findings to suggest appendicitis      ABDOMINOPELVIC CAVITY:  No ascites  No pneumoperitoneum    No lymphadenopathy      VESSELS:  Unremarkable for patient's age      PELVIS     REPRODUCTIVE ORGANS:  Unremarkable for patient's age      URINARY BLADDER:  Unremarkable      ABDOMINAL WALL/INGUINAL REGIONS:  Unremarkable      OSSEOUS STRUCTURES:  No acute fracture or destructive osseous lesion      IMPRESSION:     No evidence of bowel obstruction      Unchanged right-sided renal cystic lesion which does not meet CT criteria for  Further evaluation on a nonemergent basis with a renal ultrasound is recommended      The study was marked in EPIC for immediate notification  FINDINGS:     KIDNEYS:  Symmetric and normal size  Right kidney:  11 3 x 7 x 6 7 cm  Left kidney:  11 8 x 6 2 x 5 5 cm      Right kidney  Normal echogenicity and contour  Solid appearing mid renal lesion measuring 3 4 x 2 5 x 2 7 cm  This appears to correlate with the lesion seen on prior CT  Additional simple appearing cysts  No hydronephrosis  No shadowing calculi  No perinephric fluid collections      Left kidney  Normal echogenicity and contour  No suspicious masses detected  Simple appearing lower pole cyst measures 2 7 x 2 8 x 2 4 cm  No hydronephrosis  5 mm echogenic focus is indeterminate, possibly nonobstructing stone though no stone visualized on prior CT  No perinephric fluid collections      URETERS:  Nonvisualized      BLADDER:   Normally distended  No focal thickening or mass lesions  Bilateral ureteral jets are not detected         IMPRESSION:  1  Solid appearing mid renal lesion measuring 3 4 x 2 5 x 2 7 cm  This is concerning for neoplasm until proven otherwise  Further characterization with contrast MR suggested  2   Bilateral renal cysts  Portions of the record may have been created with voice recognition software   Occasional wrong word or "sound a like" substitutions may have occurred due to the inherent limitations of voice recognition software   Read the chart carefully and recognize, using context, where substitutions have occurred

## 2021-04-20 NOTE — PATIENT INSTRUCTIONS
· Follow-up for annual  We kindly ask that your arrive 15 minutes before your scheduled appointment time with your provider to allow our staff to room you, get your vital signs and update your chart  · Call our office if you have any problems with abdominal pain especially associated with fever, chills, nausea, vomiting or any other concerns  · All  Post-bariatric surgery patients should be aware that very small quantities of any alcohol can cause impairment and it is very possible not to feel the effect  The effect can be in the system for several hours  It is also a stomach irritant  · It is advised to AVOID alcohol, Nonsteroidal antiinflammatory drugs (NSAIDS) and nicotine of all forms   Any of these can cause stomach irritation/pain  · Discussed the effects of alcohol on a bariatric patient and the increased impairment risk  · Keep up the good work!

## 2021-04-21 ENCOUNTER — TELEPHONE (OUTPATIENT)
Dept: UROLOGY | Facility: CLINIC | Age: 56
End: 2021-04-21

## 2021-04-21 NOTE — TELEPHONE ENCOUNTER
Patient seen yesterday in office  he will require both cardiac as well as pulmonary clearance prior to the planned partial nephrectomy        Thank you for arranging

## 2021-04-21 NOTE — PROGRESS NOTES
Let him know I have been following the consults  He is getting ready for surgery   See how he is doing and if he has any questions for me     Is her going to need a medical clearance he will need to find out for the surgeon

## 2021-04-22 NOTE — TELEPHONE ENCOUNTER
Spoke w patients wife and informed her that I am working on a date for her  and that once I have confirmation from the providers that I will be in touch with to go over everything  She thanked me for my call and will contact us if they need anything

## 2021-04-23 ENCOUNTER — TELEPHONE (OUTPATIENT)
Dept: PULMONOLOGY | Facility: CLINIC | Age: 56
End: 2021-04-23

## 2021-04-23 ENCOUNTER — PREP FOR PROCEDURE (OUTPATIENT)
Dept: UROLOGY | Facility: CLINIC | Age: 56
End: 2021-04-23

## 2021-04-23 DIAGNOSIS — N28.89 RIGHT RENAL MASS: Primary | ICD-10-CM

## 2021-04-23 NOTE — TELEPHONE ENCOUNTER
Spoke w wife and patient is sched for 6/14 at the [facility]  She is aware he will be admitted for a few days, she will be his  and the hospital will contact them Friday before w time of arrival  He will need Cardiac clearance which is sched for 5/5 and I do have msg out to Pulmonary in regards to appt with them as well  He will go for PATs week of 5/24 and will be fully vaccinated by time of sugery so he does not need covid testing  Wife is aware of bowel prep  I am mailing he surgical packet with all of these appts and information and she is aware to contact us w any questions or concerns

## 2021-04-23 NOTE — TELEPHONE ENCOUNTER
Established patient needing pre-op clearance      Surgery: ROBOTIC LAPAROSCOPIC PARTIAL NEPHRECTOMY (Right Abdomen)  Surgery date: 6/14/21  Last seen by us: 1/11/21  On oxygen: No  Kind of Sedation: General  Length of surgery: 255 minutes  Surgeon: Nika Astorga  Office contact: [address]  Form/Office Note: Note  Fax:    Scheduled for 6/2

## 2021-04-27 DIAGNOSIS — Z98.84 BARIATRIC SURGERY STATUS: ICD-10-CM

## 2021-04-28 RX ORDER — MULTIVITAMIN WITH FOLIC ACID 400 MCG
TABLET ORAL
Qty: 90 TABLET | Refills: 1 | Status: SHIPPED | OUTPATIENT
Start: 2021-04-28 | End: 2021-11-09

## 2021-04-28 NOTE — TELEPHONE ENCOUNTER
Post op appt tentatively scheduled for 6/30/21 at 330pm wt Dr Cadence Nunez  This is subject to change as it is a 5th week hold

## 2021-05-06 ENCOUNTER — OFFICE VISIT (OUTPATIENT)
Dept: CARDIOLOGY CLINIC | Facility: CLINIC | Age: 56
End: 2021-05-06
Payer: COMMERCIAL

## 2021-05-06 VITALS
SYSTOLIC BLOOD PRESSURE: 140 MMHG | HEART RATE: 66 BPM | OXYGEN SATURATION: 96 % | BODY MASS INDEX: 37.47 KG/M2 | HEIGHT: 65 IN | WEIGHT: 224.9 LBS | DIASTOLIC BLOOD PRESSURE: 88 MMHG

## 2021-05-06 DIAGNOSIS — Z98.84 BARIATRIC SURGERY STATUS: ICD-10-CM

## 2021-05-06 DIAGNOSIS — I42.8 NICM (NONISCHEMIC CARDIOMYOPATHY) (HCC): ICD-10-CM

## 2021-05-06 DIAGNOSIS — I10 ESSENTIAL HYPERTENSION: ICD-10-CM

## 2021-05-06 DIAGNOSIS — I50.22 CHRONIC SYSTOLIC CONGESTIVE HEART FAILURE (HCC): Primary | ICD-10-CM

## 2021-05-06 PROCEDURE — 93000 ELECTROCARDIOGRAM COMPLETE: CPT | Performed by: INTERNAL MEDICINE

## 2021-05-06 PROCEDURE — 3079F DIAST BP 80-89 MM HG: CPT | Performed by: INTERNAL MEDICINE

## 2021-05-06 PROCEDURE — 3077F SYST BP >= 140 MM HG: CPT | Performed by: INTERNAL MEDICINE

## 2021-05-06 PROCEDURE — 99214 OFFICE O/P EST MOD 30 MIN: CPT | Performed by: INTERNAL MEDICINE

## 2021-05-06 NOTE — PROGRESS NOTES
Cardiology Follow Up    Toma Meza  [id number]  1965  [address] [facility] [place] [address] [phone]      1  Chronic systolic congestive heart failure (HCC)  POCT ECG   2  NICM (nonischemic cardiomyopathy) ([address] )  POCT ECG   3  Essential hypertension  POCT ECG   4  Bariatric surgery status  POCT ECG       Discussion/Summary:    No   Contraindication to proceeding with the partial nephrectomy or any urologic surgery necessary at that time from a cardiac standpoint  He has a nonischemic cardiomyopathy, ejection fraction most recently 45%  Volume status remains stable  He should continue the Coreg and losartan  He is not requiring any diuretics  As usual, would recommend very careful attention to his volume status around the time of surgery  Minimize IV fluid  Can use diuretics around the time of surgery if he were to become volume overloaded  Careful monitoring of his kidney function post nephrectomy  If necessary, cardiology evaluation could be sought around the time of surgery, which is to be done at the [facility]  Otherwise no changes, no testing is indicated at this time  Follow-up should be in August as was previously planned  Previous History:  71-year-old gentleman who has a history of gastric sleeve  I met him in May of 2020 in preoperative evaluation for bariatric surgery revision and hiatal hernia repair  He had a PE in 12/2019, and at that time an echo showed a mildly decreased LV function  He was asymptomatic, but given his prior decrease in LV function, I repeated a limited echo which somewhat surprisingly showed an EF of 35%  There is a history of pulmonary sarcoidosis without any significant flares in the past other than when he was first diagnosed  He is hypertensive    I did a stress CMR to look for ischemic CMP, other infiltrative disease, and also to reevaluate EF  This showed biventricular dysfunction, EF 31%  No infiltration, and no evidence of ischemia  Subsequently underwent bariatric surgery  His EF most recently is 45%    Unfortunately, a renal mass was found and he is now going to undergo partial nephrectomy  Interval history:    After doing a telemedicine visit in February, he followed up with his primary care physician  Imaging was reviewed, and the renal mass was noted on his abdominal CT scan  He had follow-up imaging with ultrasound, and has been referred to urology  Concern for renal cell carcinoma, he will be undergoing partial nephrectomy in June  He comes our preoperative evaluation  No major changes from a cardiac standpoint  Has not been losing weight now  However, he denies any shortness of breath  No significant edema  No PND, orthopnea  As reported above, last ejection fraction mildly decreased 45%      Patient Active Problem List   Diagnosis    Pulmonary embolism (HCC)    Gastroesophageal reflux disease    History of sleeve gastrectomy    Sarcoidosis    Incidental lung nodule, > 3mm and < 8mm    Right renal mass    Cigar smoker    Essential hypertension    Hyperparathyroidism , secondary, non-renal ([address] )    Low vitamin D level    Low vitamin B12 level    Low calcium levels    NICM (nonischemic cardiomyopathy) ([address] )    Chest pain    Bariatric surgery status    Type 2 diabetes mellitus with hyperglycemia (HCC)    Osteoporosis    Morbid obesity ([address] )    Hypercholesterolemia    Heart burn    Gout    Generalized osteoarthritis    Arthritis    ED (erectile dysfunction) of organic origin    Prediabetes    Mixed hyperlipidemia    Chronic systolic congestive heart failure (HCC)    History of pulmonary embolism    Obesity, Class II, BMI 35-39 9    Encounter for surgical aftercare following surgery of digestive system    Muscle pain    Postsurgical malabsorption    Renal cyst     Past Medical History:   Diagnosis Date    Bariatric surgery status     GERD (gastroesophageal reflux disease)     Gout     Heartburn     Hyperparathyroidism ([address] )     Hypertension     Lung nodule     Postsurgical malabsorption     Pulmonary embolism (HCC)     Sarcoidosis     Wears glasses      Social History     Tobacco Use    Smoking status: Former Smoker     Packs/day: 1 00     Years: 7 00     Pack years: 7 00     Types: Cigars, Cigarettes     Quit date: 2006     Years since quitting: 15 3    Smokeless tobacco: Never Used    Tobacco comment: only smoked occ cigar, not for over 6 months   Substance Use Topics    Alcohol use: Not Currently     Frequency: Never    Drug use: Not Currently     Types: Marijuana, Cocaine     Comment: pt quit 20 years ago       Family History   Problem Relation Age of Onset    Hypertension Father      Past Surgical History:   Procedure Laterality Date    BARIATRIC SURGERY      COLONOSCOPY      FOOT SURGERY Right     R ankle    GASTRIC BYPASS LAPAROSCOPIC N/A 9/22/2020    Procedure: LAPAROSCOPIC REVISION/ CONVERSION TO HALEY-EN-Y GASTRIC BYPASS W ROBOTICS, PARAESOPHAGEAL HERNIA REPAIR, AND INTRAOPERATIVE EGD;  Surgeon: Nba Briscoe MD;  Location: [facility] OR;  Service: Bariatrics    HAND SURGERY      STOMACH SURGERY  2015    gastric sleeve       Current Outpatient Medications:     calcium citrate-vitamin D (CITRACAL+D) 315-200 MG-UNIT per tablet, Take 1 tablet by mouth 2 (two) times a day, Disp: 180 tablet, Rfl: 1    capsaicin (ZOSTRIX) 0 025 % cream, APPLY SMALL AMOUNT TOPICALLY EVERY 8 HOURS AS NEEDED FOR PAIN ([facility] HANDS AFTER USE), Disp: , Rfl:     carvedilol (COREG) 12 5 mg tablet, Take 1 tablet (12 5 mg total) by mouth 2 (two) times a day with meals, Disp: 180 tablet, Rfl: 3    losartan (COZAAR) 50 mg tablet, Take 1 tablet (50 mg total) by mouth daily Has been on it without issues, Disp: 90 tablet, Rfl: 0    Multiple Vitamin (Daily-Mark) TABS, TAKE 1 TABLET BY MOUTH EVERY DAY, Disp: 90 tablet, Rfl: 1    omeprazole (PriLOSEC) 20 mg delayed release capsule, Take 1 capsule (20 mg total) by mouth 2 (two) times a day, Disp: 60 capsule, Rfl: 1    traMADol (ULTRAM) 50 mg tablet, Take 50 mg by mouth every 6 (six) hours as needed for moderate pain, Disp: , Rfl:   Allergies   Allergen Reactions    Lisinopril Cough    Nsaids      Other reaction(s): Duodenal ulcer disease    Flunisolide      Other reaction(s): Burning sensation    Testosterone Itching, Rash and Other (See Comments)     Vitals:    05/06/21 1254   BP: 140/88   BP Location: Right arm   Patient Position: Sitting   Cuff Size: Standard   Pulse: 66   SpO2: 96%   Weight: 102 kg (224 lb 14 4 oz)   Height: 5' 5" (1 651 m)     Vitals:    05/06/21 1254   Weight: 102 kg (224 lb 14 4 oz)      Height: 5' 5" (165 1 cm)   Body mass index is 37 43 kg/m²  Physical Exam:  GEN: Ulysses Cart appears well, alert and oriented x 3, pleasant and cooperative   HEENT: pupils equal, round, and reactive to light; extraocular muscles intact  NECK: supple, no carotid bruits   HEART: regular rhythm, normal S1 and S2, no murmurs, clicks, gallops or rubs   LUNGS: clear to auscultation bilaterally; no wheezes, rales, or rhonchi   ABDOMEN: normal bowel sounds, soft, no tenderness, no distention  EXTREMITIES: peripheral pulses normal; no clubbing, cyanosis, or edema  NEURO: no focal findings   SKIN: normal without suspicious lesions on exposed skin    ROS:  Positive for GERD  Except as noted in HPI, is otherwise reviewed in detail and a 12 point review of systems is negative      Labs:  Lab Results   Component Value Date     12/26/2015    K 3 9 12/22/2020     12/22/2020    CREATININE 0 76 12/22/2020    BUN 13 12/22/2020    CO2 28 12/22/2020    ALT 41 12/22/2020    AST 21 12/22/2020    INR 0 96 12/16/2019    GLUF 105 (H) 06/30/2020    HGBA1C 5 7 (H) 01/31/2021    WBC 3 60 (L) 01/31/2021    HGB 12 5 01/31/2021    HCT 40 3 01/31/2021  01/31/2021     Testing:  Echo 12/2020:  LEFT VENTRICLE:  Systolic function was mildly reduced by visual assessment  Ejection fraction was estimated to be 45 %  There was mild diffuse hypokinesis  Doppler parameters were consistent with abnormal left ventricular relaxation (grade 1 diastolic dysfunction)      MITRAL VALVE:  There was trace regurgitation      TRICUSPID VALVE:  There was mild regurgitation      COMPARISONS:  Comparison was made with the previous study of 05-Jun-2020  Ejection fraction has increased from 35 % to 45 %  MRI 7/7/20: IMPRESSION:  Impression:  1  Mildly enlarged left ventricle size moderately reduced left ventricle systolic function  Ejection fraction measures 31%  This may be slightly underestimated secondary to some arrhythmia artifact  Nonspecific delayed myocardial enhancement at   inferior RV insertion site  No evidence of perfusion defect in resting or stress state  Findings are consistent with idiopathic cardiomyopathy  2  Normal right ventricle end-diastolic volume with moderately reduced right ventricle systolic function  Ejection fraction measures 20%  3  No valvular abnormality  4  Mild left atrial enlargement      Echo 6/5/2020:  LEFT VENTRICLE:  Size was at the upper limits of normal   Systolic function was moderately reduced  Ejection fraction was estimated to be 35 %  There was moderate global hypokinesis with severe hypokinesis of the entire septum and the basal to mid inferior wall  Wall thickness was normal      RIGHT VENTRICLE:  The size was normal   Systolic function was normal      MITRAL VALVE:  There was mild regurgitation      TRICUSPID VALVE:  There was trace regurgitation      COMPARISONS:  Comparison was made with the previous study of 18-Dec-2019  Ejection fraction has decreased  Echo 12/2019:  LEFT VENTRICLE:  Systolic function was at the lower limits of normal  Ejection fraction was estimated to be 45 % - 50%    Although no diagnostic regional wall motion abnormality was identified, this possibility cannot be completely excluded on the basis of this study  Wall thickness was increased  Hypertrophy was noted  Doppler parameters were consistent with abnormal left ventricular relaxation (grade 1 diastolic dysfunction)      AORTIC VALVE:  There was trace regurgitation  EKG:  Sinus rhythm, 66 beats per minute  Normal EKG

## 2021-05-25 ENCOUNTER — APPOINTMENT (OUTPATIENT)
Dept: LAB | Facility: CLINIC | Age: 56
End: 2021-05-25
Payer: COMMERCIAL

## 2021-05-25 ENCOUNTER — TRANSCRIBE ORDERS (OUTPATIENT)
Dept: LAB | Facility: CLINIC | Age: 56
End: 2021-05-25

## 2021-05-25 DIAGNOSIS — M1A.9XX0 CHRONIC GOUT WITHOUT TOPHUS, UNSPECIFIED CAUSE, UNSPECIFIED SITE: ICD-10-CM

## 2021-05-25 DIAGNOSIS — Z01.818 OTHER SPECIFIED PRE-OPERATIVE EXAMINATION: ICD-10-CM

## 2021-05-25 DIAGNOSIS — Z01.818 OTHER SPECIFIED PRE-OPERATIVE EXAMINATION: Primary | ICD-10-CM

## 2021-05-25 DIAGNOSIS — R73.03 PREDIABETES: ICD-10-CM

## 2021-05-25 DIAGNOSIS — I10 ESSENTIAL HYPERTENSION: ICD-10-CM

## 2021-05-25 DIAGNOSIS — N28.9 RENAL LESION: ICD-10-CM

## 2021-05-25 DIAGNOSIS — E78.2 MIXED HYPERLIPIDEMIA: ICD-10-CM

## 2021-05-25 DIAGNOSIS — N28.89 RIGHT RENAL MASS: ICD-10-CM

## 2021-05-25 LAB
ABO GROUP BLD: NORMAL
ALBUMIN SERPL BCP-MCNC: 3.3 G/DL (ref 3.5–5)
ALP SERPL-CCNC: 69 U/L (ref 46–116)
ALT SERPL W P-5'-P-CCNC: 49 U/L (ref 12–78)
ANION GAP SERPL CALCULATED.3IONS-SCNC: 7 MMOL/L (ref 4–13)
APTT PPP: 31 SECONDS (ref 23–37)
AST SERPL W P-5'-P-CCNC: 27 U/L (ref 5–45)
BASOPHILS # BLD AUTO: 0.02 THOUSANDS/ΜL (ref 0–0.1)
BASOPHILS NFR BLD AUTO: 0 % (ref 0–1)
BILIRUB SERPL-MCNC: 0.5 MG/DL (ref 0.2–1)
BLD GP AB SCN SERPL QL: NEGATIVE
BUN SERPL-MCNC: 14 MG/DL (ref 5–25)
CALCIUM ALBUM COR SERPL-MCNC: 8.7 MG/DL (ref 8.3–10.1)
CALCIUM SERPL-MCNC: 8.1 MG/DL (ref 8.3–10.1)
CHLORIDE SERPL-SCNC: 107 MMOL/L (ref 100–108)
CO2 SERPL-SCNC: 27 MMOL/L (ref 21–32)
CORTIS AM PEAK SERPL-MCNC: 15.3 UG/DL (ref 4.2–22.4)
CREAT SERPL-MCNC: 0.83 MG/DL (ref 0.6–1.3)
EOSINOPHIL # BLD AUTO: 0.22 THOUSAND/ΜL (ref 0–0.61)
EOSINOPHIL NFR BLD AUTO: 4 % (ref 0–6)
ERYTHROCYTE [DISTWIDTH] IN BLOOD BY AUTOMATED COUNT: 14.2 % (ref 11.6–15.1)
EST. AVERAGE GLUCOSE BLD GHB EST-MCNC: 108 MG/DL
GFR SERPL CREATININE-BSD FRML MDRD: 115 ML/MIN/1.73SQ M
GLUCOSE P FAST SERPL-MCNC: 97 MG/DL (ref 65–99)
HBA1C MFR BLD: 5.4 %
HCT VFR BLD AUTO: 39.7 % (ref 36.5–49.3)
HGB BLD-MCNC: 12.5 G/DL (ref 12–17)
IMM GRANULOCYTES # BLD AUTO: 0.02 THOUSAND/UL (ref 0–0.2)
IMM GRANULOCYTES NFR BLD AUTO: 0 % (ref 0–2)
INR PPP: 1.01 (ref 0.84–1.19)
LYMPHOCYTES # BLD AUTO: 0.76 THOUSANDS/ΜL (ref 0.6–4.47)
LYMPHOCYTES NFR BLD AUTO: 13 % (ref 14–44)
MCH RBC QN AUTO: 27.8 PG (ref 26.8–34.3)
MCHC RBC AUTO-ENTMCNC: 31.5 G/DL (ref 31.4–37.4)
MCV RBC AUTO: 88 FL (ref 82–98)
MONOCYTES # BLD AUTO: 0.63 THOUSAND/ΜL (ref 0.17–1.22)
MONOCYTES NFR BLD AUTO: 11 % (ref 4–12)
NEUTROPHILS # BLD AUTO: 4.35 THOUSANDS/ΜL (ref 1.85–7.62)
NEUTS SEG NFR BLD AUTO: 72 % (ref 43–75)
NRBC BLD AUTO-RTO: 0 /100 WBCS
PLATELET # BLD AUTO: 158 THOUSANDS/UL (ref 149–390)
PMV BLD AUTO: 12 FL (ref 8.9–12.7)
POTASSIUM SERPL-SCNC: 3.8 MMOL/L (ref 3.5–5.3)
PROT SERPL-MCNC: 7.1 G/DL (ref 6.4–8.2)
PROTHROMBIN TIME: 13.4 SECONDS (ref 11.6–14.5)
RBC # BLD AUTO: 4.49 MILLION/UL (ref 3.88–5.62)
RH BLD: POSITIVE
SODIUM SERPL-SCNC: 141 MMOL/L (ref 136–145)
SPECIMEN EXPIRATION DATE: NORMAL
WBC # BLD AUTO: 6 THOUSAND/UL (ref 4.31–10.16)

## 2021-05-25 PROCEDURE — 85025 COMPLETE CBC W/AUTO DIFF WBC: CPT

## 2021-05-25 PROCEDURE — 86900 BLOOD TYPING SEROLOGIC ABO: CPT | Performed by: UROLOGY

## 2021-05-25 PROCEDURE — 83918 ORGANIC ACIDS TOTAL QUANT: CPT

## 2021-05-25 PROCEDURE — 86901 BLOOD TYPING SEROLOGIC RH(D): CPT | Performed by: UROLOGY

## 2021-05-25 PROCEDURE — 36415 COLL VENOUS BLD VENIPUNCTURE: CPT

## 2021-05-25 PROCEDURE — 85730 THROMBOPLASTIN TIME PARTIAL: CPT

## 2021-05-25 PROCEDURE — 82533 TOTAL CORTISOL: CPT

## 2021-05-25 PROCEDURE — 85610 PROTHROMBIN TIME: CPT

## 2021-05-25 PROCEDURE — 86923 COMPATIBILITY TEST ELECTRIC: CPT

## 2021-05-25 PROCEDURE — 86920 COMPATIBILITY TEST SPIN: CPT

## 2021-05-25 PROCEDURE — 3044F HG A1C LEVEL LT 7.0%: CPT | Performed by: INTERNAL MEDICINE

## 2021-05-25 PROCEDURE — 83036 HEMOGLOBIN GLYCOSYLATED A1C: CPT

## 2021-05-25 PROCEDURE — 80053 COMPREHEN METABOLIC PANEL: CPT

## 2021-05-25 PROCEDURE — 86850 RBC ANTIBODY SCREEN: CPT | Performed by: UROLOGY

## 2021-06-01 NOTE — TELEPHONE ENCOUNTER
Appointment rescheduled from 06/30/21  MARY/SD approved 15 minute appointment spot on 07/02/21  Appointment confirmed with patient

## 2021-06-02 ENCOUNTER — OFFICE VISIT (OUTPATIENT)
Dept: PULMONOLOGY | Facility: CLINIC | Age: 56
End: 2021-06-02
Payer: COMMERCIAL

## 2021-06-02 VITALS
DIASTOLIC BLOOD PRESSURE: 68 MMHG | SYSTOLIC BLOOD PRESSURE: 140 MMHG | BODY MASS INDEX: 37.42 KG/M2 | RESPIRATION RATE: 18 BRPM | HEIGHT: 65 IN | OXYGEN SATURATION: 98 % | WEIGHT: 224.6 LBS | TEMPERATURE: 98.1 F | HEART RATE: 75 BPM

## 2021-06-02 DIAGNOSIS — D86.9 SARCOIDOSIS: Primary | ICD-10-CM

## 2021-06-02 DIAGNOSIS — Z01.818 PRE-OP EVALUATION: ICD-10-CM

## 2021-06-02 PROCEDURE — 1036F TOBACCO NON-USER: CPT | Performed by: INTERNAL MEDICINE

## 2021-06-02 PROCEDURE — 99214 OFFICE O/P EST MOD 30 MIN: CPT | Performed by: INTERNAL MEDICINE

## 2021-06-02 PROCEDURE — 3008F BODY MASS INDEX DOCD: CPT | Performed by: INTERNAL MEDICINE

## 2021-06-02 NOTE — PRE-PROCEDURE INSTRUCTIONS
Pre-Surgery Instructions:   Medication Instructions    APPLE CIDER VINEGAR PO Instructed patient per Anesthesia Guidelines   calcium citrate-vitamin D (CITRACAL+D) 315-200 MG-UNIT per tablet Instructed patient per Anesthesia Guidelines   capsaicin (ZOSTRIX) 0 025 % cream Instructed to take per normal schedule except DOS    carvedilol (COREG) 12 5 mg tablet Instructed to take per normal schedule including DOS with sips water    losartan (COZAAR) 50 mg tablet Instructed to take per normal schedule except DOS    Multiple Vitamin (Daily-Mark) TABS Instructed patient per Anesthesia Guidelines   NON FORMULARY Instructed patient per Anesthesia Guidelines   omeprazole (PriLOSEC) 20 mg delayed release capsule Instructed to take per normal schedule including DOS with sips water    traMADol (ULTRAM) 50 mg tablet Instructed to take as needed including DOS with sips water    Pre op instructions per My Surgical Experience booklet, medications per anesthesia guidelines and showering instructions per [place] protocol reviewed  Patient getting CHG  Pt  Verbalized understanding of current visitor restrictions  Instructed to avoid all ASA and OTC Vit/Supp 1 week prior to surgery and to avoid NSAIDs 3 days prior to surgery  Tylenol ok to take prn  Bowel Prep per Dr Ra Bustillo reviewed  Reviewed Carb Drink Instructions per Surgeon/Anes  Guidelines   Pt  Verbalized an understanding of all instructions reviewed and offers no concerns at this time

## 2021-06-03 NOTE — TELEPHONE ENCOUNTER
Patient cleared by [facility] note in Epic dated 6/2  PATs have been completed as well

## 2021-06-03 NOTE — PROGRESS NOTES
Pulmonary Follow Up Note   Elsie Hernandez 48 y o  male MRN: [id number]  6/2/2021      Assessment/Plan:    Sarcoidosis  -Diagnosed in 2004, unclear as to how this is definitively diagnosed  Shyam Coles says that he was diagnosed based on chest imaging, may have had a biopsy but is not certain of this   Has not been on any kind of specific sarcoidosis therapy  -PFTs do not show evidence of obstruction orestriction  -Recent chest CT from September of this year did not reveal any evidence of any hilar lymphadenopathy but does show bibasilar atelectasis as well as left hydropneumothorax, imaging was done post surgery  -No acute issues regarding his previous diagnosis of sarcoidosis      Pre-op evaluation  In the absence of an acute exacerbation of his respiratory disease, I would fully recommend he proceed to surgery  He does not appear to be in a decompensation presently  In general, there is no FEV1 cutoff at which surgery is no longer recommended, and there does not appear to be an association between degree of FEV1 impairment and likelihood of post-operative pulmonary complication  From an anesthesia perspective, if reasonable, neuraxial anesthesia has been shown to reduce post-operative pulmonary complications, but not mortality, compared to general anesthesia in patients with COPD [facility] et al , Anesth Analg 2015; 120(6): 1405; and Sylvain et al , Br J Anaesth 2017; 118(1): 105)  Lung protective, low-tidal volume ventilation should be used intra-operatively and recruitment maneuvers (I e , sighs) every 30 minutes are recommended  Post-operative care should include early incentive spirometry and getting out of bed with physical therapy, and resuming his normal pulmonary medications  If the patient cannot perform incentive spirometry CPAP at 10cm H2O should be used for 4-6 hours a day    Albuterol by nebulizer every 4 hours while awake, with every 2 hours as needed, is a reasonable rescue therapy to have available and ordered  VTE prophylaxis should be started as soon as possible, when ok per the surgical team     His ARISCAT score is 18 indicating a 1 6% risk of post-operative respiratory complications (from as minor as wheezing atalectasis to as major as re-intubation)  The patient was screened for obstructive sleep apnea with the STOP-BANG test; he has a low-moderate risk of sleep apnea  He previously has been diagnosed with sleep apnea prior to his gastric sleeve/bypass surgery and after which was told that his sleep apnea improved  Given his elevated BMI at risk for intraoperative/post-operatively hypercapnia which may require BiPAP support post operatively  Health Maintenance   Immunization History   Administered Date(s) Administered    Hep B, adult 02/21/2006    INFLUENZA 09/01/2013    Influenza Split High Dose Preservative Free IM 10/01/2016    Influenza, seasonal, injectable 11/28/2018    Pneumococcal 04/27/2007    Pneumococcal Polysaccharide PPV23 01/01/2017    Tdap 12/03/2014    Tetanus Toxoid, Unspecified 01/01/2003       Return in about 3 months (around 9/2/2021)  History of Present Illness   HPI:  Josefina Cousins is a 48 y o  male with PMHx of sarcoidosis, pulmonary nodules who presents today for follow up  I last saw Viral Cates in January 2021  Since January, he has been doing well from a pulmonary standpoint  He has not noticed a significant change in his breathing  He does not have any new symptoms of cough, wheezing or shortness of breath  He is anticipating surgery later this month with a partial nephrectomy for evaluation and management of a complex right sided renal mass  Review of Systems   Constitutional: Negative for activity change, chills and fever  HENT: Negative for congestion, rhinorrhea, sneezing and voice change  Respiratory: Negative for cough, shortness of breath and wheezing  Cardiovascular: Negative for chest pain and palpitations     Gastrointestinal: Negative for abdominal distention, abdominal pain, diarrhea, nausea and vomiting  Endocrine: Negative for cold intolerance and heat intolerance  Musculoskeletal: Negative for arthralgias, back pain, myalgias and neck stiffness  Skin: Negative for color change, pallor and rash  Neurological: Negative for dizziness, weakness and numbness  Psychiatric/Behavioral: Negative for agitation  The patient is not nervous/anxious          Historical Information   Past Medical History:   Diagnosis Date    Bariatric surgery status     Duodenal ulcer     GERD (gastroesophageal reflux disease)     Gout     Hyperpar[facility] [address] )     Hypertension     Lung nodule     Postsurgical malabsorption     Pulmonary embolism ([facility] ) 2019    Sarcoidosis     Wears glasses      Past Surgical History:   Procedure Laterality Date    BARIATRIC SURGERY      Gastric Sleeve    COLONOSCOPY      FOOT SURGERY Right     R ankle    GASTRIC BYPASS LAPAROSCOPIC N/A 9/22/2020    Procedure: LAPAROSCOPIC REVISION/ CONVERSION TO HALEY-EN-Y GASTRIC BYPASS W ROBOTICS, PARAESOPHAGEAL HERNIA REPAIR, AND INTRAOPERATIVE EGD;  Surgeon: Cyndi Kerns MD;  Location: [facility];  Service: Bariatrics    HAND SURGERY       Family History   Problem Relation Age of Onset    Hypertension Father          Meds/Allergies     Current Outpatient Medications:     APPLE CIDER VINEGAR PO, Take by mouth daily, Disp: , Rfl:     calcium citrate-vitamin D (CITRACAL+D) 315-200 MG-UNIT per tablet, Take 1 tablet by mouth 2 (two) times a day, Disp: 180 tablet, Rfl: 1    capsaicin (ZOSTRIX) 0 025 % cream, Apply topically as needed , Disp: , Rfl:     carvedilol (COREG) 12 5 mg tablet, Take 1 tablet (12 5 mg total) by mouth 2 (two) times a day with meals, Disp: 180 tablet, Rfl: 3    losartan (COZAAR) 50 mg tablet, Take 1 tablet (50 mg total) by mouth daily Has been on it without issues (Patient taking differently: Take 50 mg by mouth daily in the early morning Has been on it without issues), Disp: 90 tablet, Rfl: 0    Multiple Vitamin (Daily-Mark) TABS, TAKE 1 TABLET BY MOUTH EVERY DAY, Disp: 90 tablet, Rfl: 1    NON FORMULARY, daily Beet Root, Disp: , Rfl:     omeprazole (PriLOSEC) 20 mg delayed release capsule, Take 1 capsule (20 mg total) by mouth 2 (two) times a day, Disp: 60 capsule, Rfl: 1    traMADol (ULTRAM) 50 mg tablet, Take 50 mg by mouth every 6 (six) hours as needed for moderate pain, Disp: , Rfl:   Allergies   Allergen Reactions    Nsaids Other (See Comments)     Duodenal ulcer disease    Flunisolide      Other reaction(s): Burning sensation    Lisinopril Cough    Testosterone Itching, Rash and Other (See Comments)       Vitals: Blood pressure 140/68, pulse 75, temperature 98 1 °F (36 7 °C), resp  rate 18, height 5' 5" (1 651 m), weight 102 kg (224 lb 9 6 oz), SpO2 98 %  Body mass index is 37 38 kg/m²  Oxygen Therapy  SpO2: 98 %      Physical Exam  Physical Exam  Constitutional:       Appearance: Normal appearance  HENT:      Head: Normocephalic and atraumatic  Nose: Nose normal       Mouth/Throat:      Mouth: Mucous membranes are moist       Pharynx: Oropharynx is clear  Neck:      Musculoskeletal: Normal range of motion and neck supple  Cardiovascular:      Rate and Rhythm: Normal rate and regular rhythm  Pulses: Normal pulses  Heart sounds: Normal heart sounds  No murmur  Pulmonary:      Effort: Pulmonary effort is normal  No respiratory distress  Breath sounds: Normal breath sounds  No wheezing  Abdominal:      General: Abdomen is flat  There is no distension  Palpations: Abdomen is soft  Tenderness: There is no abdominal tenderness  Musculoskeletal:         General: No swelling or tenderness  Right lower leg: No edema  Left lower leg: No edema  Skin:     General: Skin is warm and dry  Findings: No rash  Neurological:      General: No focal deficit present        Mental Status: He is alert and oriented to person, place, and time  Psychiatric:         Mood and Affect: Mood normal          Behavior: Behavior normal        Labs: I have personally reviewed pertinent lab results  Lab Results   Component Value Date    WBC 6 00 05/25/2021    HGB 12 5 05/25/2021    HCT 39 7 05/25/2021    MCV 88 05/25/2021     05/25/2021     Lab Results   Component Value Date    GLUCOSE 96 12/26/2015    CALCIUM 8 1 (L) 05/25/2021     12/26/2015    K 3 8 05/25/2021    CO2 27 05/25/2021     05/25/2021    BUN 14 05/25/2021    CREATININE 0 83 05/25/2021     No results found for: IGE  Lab Results   Component Value Date    ALT 49 05/25/2021    AST 27 05/25/2021    ALKPHOS 69 05/25/2021         Imaging and other studies: I have personally reviewed pertinent reports  and I have personally reviewed pertinent films in PACS    CTA Chest 9/23/20: 1  Limited CT pulmonary angiogram with no convincing central emboli to the segmental level  2   Small left hydropneumothorax  3  Probable bilateral lower lobe and lingular subsegmental atelectasis  4  Postoperative changes related to gastric bypass  Thoracic esophagus is slightly distended and debris-filled      Pulmonary function testing:   FEV1/FVC Ratio:  86 %   Forced Vital Capacity:  3 42 L   (100 % predicted)  FEV1:  2 95 L (109 % predicted)  Lung volumes by body plethysmography:   Total Lung Capacity 84 % predicted   Residual volume 87 % predicted    RV/TLC ratio 104 %  DLCO for patients hemoglobin level:  78 % (85 % when corrected for Hb)       EKG, Pathology, and Other Studies: I have personally reviewed pertinent reports  and I have personally reviewed pertinent films in PACS      YENI Parikh [facility]

## 2021-06-04 LAB
METHYLMALONATE SERPL-SCNC: 150 NMOL/L (ref 0–378)
SL AMB DISCLAIMER: NORMAL

## 2021-06-13 ENCOUNTER — ANESTHESIA EVENT (INPATIENT)
Dept: PERIOP | Facility: HOSPITAL | Age: 56
DRG: 657 | End: 2021-06-13
Payer: COMMERCIAL

## 2021-06-13 NOTE — ANESTHESIA PREPROCEDURE EVALUATION
Procedure:  ROBOTIC LAPAROSCOPIC PARTIAL NEPHRECTOMY (Right Abdomen)    Relevant Problems   CARDIO   (+) Chest pain   (+) Chronic systolic congestive heart failure (HCC)   (+) Essential hypertension   (+) Hypercholesterolemia   (+) Mixed hyperlipidemia   (+) Pulmonary embolism (HCC)      ENDO   (+) Hyperparathyroidism , secondary, non-renal (HCC)   (+) Type 2 diabetes mellitus with hyperglycemia (HCC)      GI/HEPATIC   (+) Bariatric surgery status   (+) Gastroesophageal reflux disease      /RENAL   (+) Renal cyst      MUSCULOSKELETAL   (+) Arthritis   (+) Generalized osteoarthritis   (+) Gout      NEURO/PSYCH   (+) History of pulmonary embolism      PULMONARY   (+) Cigar smoker      Other   (+) History of sleeve gastrectomy   (+) Morbid obesity (HCC)   (+) NICM (nonischemic cardiomyopathy) (HCC)   (+) Postsurgical malabsorption   (+) Sarcoidosis    LEFT VENTRICLE:   Systolic function was mildly reduced by visual assessment  Ejection fraction was estimated to be 45 %  There was mild diffuse hypokinesis     Physical Exam    Airway    Mallampati score: II  TM Distance: >3 FB  Neck ROM: full     Dental       Cardiovascular      Pulmonary      Other Findings        Anesthesia Plan  ASA Score- 3     Anesthesia Type- general with ASA Monitors  Additional Monitors: arterial line  Airway Plan:           Plan Factors-    Chart reviewed  Induction- intravenous  Postoperative Plan-     Informed Consent- Anesthetic plan and risks discussed with patient  I personally reviewed this patient with the CRNA  Discussed and agreed on the Anesthesia Plan with the CRNA  Olya Smith

## 2021-06-14 ENCOUNTER — ANESTHESIA (INPATIENT)
Dept: PERIOP | Facility: HOSPITAL | Age: 56
DRG: 657 | End: 2021-06-14
Payer: COMMERCIAL

## 2021-06-14 ENCOUNTER — HOSPITAL ENCOUNTER (INPATIENT)
Facility: HOSPITAL | Age: 56
LOS: 2 days | Discharge: HOME | DRG: 657 | End: 2021-06-16
Attending: UROLOGY | Admitting: UROLOGY
Payer: COMMERCIAL

## 2021-06-14 DIAGNOSIS — N28.89 RIGHT RENAL MASS: Primary | ICD-10-CM

## 2021-06-14 DIAGNOSIS — Z90.5 HISTORY OF PARTIAL NEPHRECTOMY: ICD-10-CM

## 2021-06-14 LAB
ABO GROUP BLD: NORMAL
ANION GAP SERPL CALCULATED.3IONS-SCNC: 10 MMOL/L (ref 4–13)
BUN SERPL-MCNC: 14 MG/DL (ref 5–25)
CALCIUM SERPL-MCNC: 8 MG/DL (ref 8.3–10.1)
CHLORIDE SERPL-SCNC: 106 MMOL/L (ref 100–108)
CO2 SERPL-SCNC: 23 MMOL/L (ref 21–32)
CREAT SERPL-MCNC: 0.95 MG/DL (ref 0.6–1.3)
ERYTHROCYTE [DISTWIDTH] IN BLOOD BY AUTOMATED COUNT: 13.7 % (ref 11.6–15.1)
GFR SERPL CREATININE-BSD FRML MDRD: 104 ML/MIN/1.73SQ M
GLUCOSE SERPL-MCNC: 153 MG/DL (ref 65–140)
HCT VFR BLD AUTO: 38 % (ref 36.5–49.3)
HGB BLD-MCNC: 11.9 G/DL (ref 12–17)
MCH RBC QN AUTO: 27.9 PG (ref 26.8–34.3)
MCHC RBC AUTO-ENTMCNC: 31.3 G/DL (ref 31.4–37.4)
MCV RBC AUTO: 89 FL (ref 82–98)
PLATELET # BLD AUTO: 184 THOUSANDS/UL (ref 149–390)
PMV BLD AUTO: 11.5 FL (ref 8.9–12.7)
POTASSIUM SERPL-SCNC: 4.2 MMOL/L (ref 3.5–5.3)
RBC # BLD AUTO: 4.27 MILLION/UL (ref 3.88–5.62)
RH BLD: POSITIVE
SODIUM SERPL-SCNC: 139 MMOL/L (ref 136–145)
WBC # BLD AUTO: 10.58 THOUSAND/UL (ref 4.31–10.16)

## 2021-06-14 PROCEDURE — 50541 LAPARO ABLATE RENAL CYST: CPT | Performed by: UROLOGY

## 2021-06-14 PROCEDURE — 88341 IMHCHEM/IMCYTCHM EA ADD ANTB: CPT | Performed by: PATHOLOGY

## 2021-06-14 PROCEDURE — 0TB04ZZ EXCISION OF RIGHT KIDNEY, PERCUTANEOUS ENDOSCOPIC APPROACH: ICD-10-PCS | Performed by: UROLOGY

## 2021-06-14 PROCEDURE — NC001 PR NO CHARGE: Performed by: PHYSICIAN ASSISTANT

## 2021-06-14 PROCEDURE — 88304 TISSUE EXAM BY PATHOLOGIST: CPT | Performed by: PATHOLOGY

## 2021-06-14 PROCEDURE — C9290 INJ, BUPIVACAINE LIPOSOME: HCPCS | Performed by: UROLOGY

## 2021-06-14 PROCEDURE — NC001 PR NO CHARGE: Performed by: UROLOGY

## 2021-06-14 PROCEDURE — 0TD04ZZ EXTRACTION OF RIGHT KIDNEY, PERCUTANEOUS ENDOSCOPIC APPROACH: ICD-10-PCS | Performed by: UROLOGY

## 2021-06-14 PROCEDURE — 50543 LAPARO PARTIAL NEPHRECTOMY: CPT | Performed by: UROLOGY

## 2021-06-14 PROCEDURE — 80048 BASIC METABOLIC PNL TOTAL CA: CPT | Performed by: UROLOGY

## 2021-06-14 PROCEDURE — 88307 TISSUE EXAM BY PATHOLOGIST: CPT | Performed by: PATHOLOGY

## 2021-06-14 PROCEDURE — 88342 IMHCHEM/IMCYTCHM 1ST ANTB: CPT | Performed by: PATHOLOGY

## 2021-06-14 PROCEDURE — 85027 COMPLETE CBC AUTOMATED: CPT | Performed by: UROLOGY

## 2021-06-14 PROCEDURE — 76998 US GUIDE INTRAOP: CPT | Performed by: UROLOGY

## 2021-06-14 PROCEDURE — 82948 REAGENT STRIP/BLOOD GLUCOSE: CPT

## 2021-06-14 PROCEDURE — 94760 N-INVAS EAR/PLS OXIMETRY 1: CPT

## 2021-06-14 RX ORDER — NEOSTIGMINE METHYLSULFATE 1 MG/ML
INJECTION INTRAVENOUS AS NEEDED
Status: DISCONTINUED | OUTPATIENT
Start: 2021-06-14 | End: 2021-06-14

## 2021-06-14 RX ORDER — ONDANSETRON 2 MG/ML
4 INJECTION INTRAMUSCULAR; INTRAVENOUS ONCE AS NEEDED
Status: DISCONTINUED | OUTPATIENT
Start: 2021-06-14 | End: 2021-06-14 | Stop reason: HOSPADM

## 2021-06-14 RX ORDER — MIDAZOLAM HYDROCHLORIDE 2 MG/2ML
INJECTION, SOLUTION INTRAMUSCULAR; INTRAVENOUS AS NEEDED
Status: DISCONTINUED | OUTPATIENT
Start: 2021-06-14 | End: 2021-06-14

## 2021-06-14 RX ORDER — HEPARIN SODIUM 5000 [USP'U]/ML
5000 INJECTION, SOLUTION INTRAVENOUS; SUBCUTANEOUS EVERY 8 HOURS SCHEDULED
Status: DISCONTINUED | OUTPATIENT
Start: 2021-06-14 | End: 2021-06-16 | Stop reason: HOSPADM

## 2021-06-14 RX ORDER — FENTANYL CITRATE 50 UG/ML
INJECTION, SOLUTION INTRAMUSCULAR; INTRAVENOUS AS NEEDED
Status: DISCONTINUED | OUTPATIENT
Start: 2021-06-14 | End: 2021-06-14

## 2021-06-14 RX ORDER — SODIUM CHLORIDE 9 MG/ML
INJECTION, SOLUTION INTRAVENOUS CONTINUOUS PRN
Status: DISCONTINUED | OUTPATIENT
Start: 2021-06-14 | End: 2021-06-14

## 2021-06-14 RX ORDER — OXYCODONE HYDROCHLORIDE 5 MG/1
5 TABLET ORAL EVERY 4 HOURS PRN
Status: DISCONTINUED | OUTPATIENT
Start: 2021-06-14 | End: 2021-06-16 | Stop reason: HOSPADM

## 2021-06-14 RX ORDER — ACETAMINOPHEN 325 MG/1
650 TABLET ORAL EVERY 6 HOURS SCHEDULED
Status: DISCONTINUED | OUTPATIENT
Start: 2021-06-14 | End: 2021-06-16 | Stop reason: HOSPADM

## 2021-06-14 RX ORDER — ONDANSETRON 2 MG/ML
4 INJECTION INTRAMUSCULAR; INTRAVENOUS EVERY 6 HOURS PRN
Status: DISCONTINUED | OUTPATIENT
Start: 2021-06-14 | End: 2021-06-16 | Stop reason: HOSPADM

## 2021-06-14 RX ORDER — OXYCODONE HYDROCHLORIDE 5 MG/1
5 TABLET ORAL EVERY 4 HOURS PRN
Qty: 5 TABLET | Refills: 0 | Status: SHIPPED | OUTPATIENT
Start: 2021-06-14 | End: 2021-06-17 | Stop reason: SDUPTHER

## 2021-06-14 RX ORDER — LIDOCAINE HYDROCHLORIDE 10 MG/ML
INJECTION, SOLUTION EPIDURAL; INFILTRATION; INTRACAUDAL; PERINEURAL AS NEEDED
Status: DISCONTINUED | OUTPATIENT
Start: 2021-06-14 | End: 2021-06-14

## 2021-06-14 RX ORDER — SENNOSIDES 8.6 MG
1 TABLET ORAL DAILY
Status: DISCONTINUED | OUTPATIENT
Start: 2021-06-14 | End: 2021-06-16 | Stop reason: HOSPADM

## 2021-06-14 RX ORDER — METOCLOPRAMIDE HYDROCHLORIDE 5 MG/ML
10 INJECTION INTRAMUSCULAR; INTRAVENOUS ONCE AS NEEDED
Status: DISCONTINUED | OUTPATIENT
Start: 2021-06-14 | End: 2021-06-14 | Stop reason: HOSPADM

## 2021-06-14 RX ORDER — GLYCOPYRROLATE 0.2 MG/ML
INJECTION INTRAMUSCULAR; INTRAVENOUS AS NEEDED
Status: DISCONTINUED | OUTPATIENT
Start: 2021-06-14 | End: 2021-06-14

## 2021-06-14 RX ORDER — CEFAZOLIN SODIUM 2 G/50ML
2000 SOLUTION INTRAVENOUS ONCE
Status: COMPLETED | OUTPATIENT
Start: 2021-06-14 | End: 2021-06-14

## 2021-06-14 RX ORDER — HYDROMORPHONE HCL 110MG/55ML
PATIENT CONTROLLED ANALGESIA SYRINGE INTRAVENOUS AS NEEDED
Status: DISCONTINUED | OUTPATIENT
Start: 2021-06-14 | End: 2021-06-14

## 2021-06-14 RX ORDER — HYDROMORPHONE HCL/PF 1 MG/ML
0.5 SYRINGE (ML) INJECTION
Status: DISCONTINUED | OUTPATIENT
Start: 2021-06-14 | End: 2021-06-14 | Stop reason: HOSPADM

## 2021-06-14 RX ORDER — DOCUSATE SODIUM 100 MG/1
100 CAPSULE, LIQUID FILLED ORAL 2 TIMES DAILY
Status: DISCONTINUED | OUTPATIENT
Start: 2021-06-14 | End: 2021-06-16 | Stop reason: HOSPADM

## 2021-06-14 RX ORDER — DEXAMETHASONE SODIUM PHOSPHATE 10 MG/ML
INJECTION, SOLUTION INTRAMUSCULAR; INTRAVENOUS AS NEEDED
Status: DISCONTINUED | OUTPATIENT
Start: 2021-06-14 | End: 2021-06-14

## 2021-06-14 RX ORDER — ONDANSETRON 2 MG/ML
INJECTION INTRAMUSCULAR; INTRAVENOUS AS NEEDED
Status: DISCONTINUED | OUTPATIENT
Start: 2021-06-14 | End: 2021-06-14

## 2021-06-14 RX ORDER — SODIUM CHLORIDE, SODIUM LACTATE, POTASSIUM CHLORIDE, CALCIUM CHLORIDE 600; 310; 30; 20 MG/100ML; MG/100ML; MG/100ML; MG/100ML
50 INJECTION, SOLUTION INTRAVENOUS CONTINUOUS
Status: DISCONTINUED | OUTPATIENT
Start: 2021-06-14 | End: 2021-06-14

## 2021-06-14 RX ORDER — B-COMPLEX WITH VITAMIN C
1 TABLET ORAL
Status: DISCONTINUED | OUTPATIENT
Start: 2021-06-15 | End: 2021-06-16 | Stop reason: HOSPADM

## 2021-06-14 RX ORDER — OXYCODONE HYDROCHLORIDE 10 MG/1
10 TABLET ORAL EVERY 4 HOURS PRN
Status: DISCONTINUED | OUTPATIENT
Start: 2021-06-14 | End: 2021-06-16 | Stop reason: HOSPADM

## 2021-06-14 RX ORDER — CARVEDILOL 12.5 MG/1
12.5 TABLET ORAL 2 TIMES DAILY WITH MEALS
Status: DISCONTINUED | OUTPATIENT
Start: 2021-06-14 | End: 2021-06-16 | Stop reason: HOSPADM

## 2021-06-14 RX ORDER — HYDROMORPHONE HCL/PF 1 MG/ML
0.5 SYRINGE (ML) INJECTION EVERY 2 HOUR PRN
Status: DISCONTINUED | OUTPATIENT
Start: 2021-06-14 | End: 2021-06-16 | Stop reason: HOSPADM

## 2021-06-14 RX ORDER — PANTOPRAZOLE SODIUM 40 MG/1
40 TABLET, DELAYED RELEASE ORAL
Status: DISCONTINUED | OUTPATIENT
Start: 2021-06-14 | End: 2021-06-16 | Stop reason: HOSPADM

## 2021-06-14 RX ORDER — ACETAMINOPHEN 325 MG/1
650 TABLET ORAL EVERY 4 HOURS PRN
Qty: 30 TABLET | Refills: 0
Start: 2021-06-14

## 2021-06-14 RX ORDER — MAGNESIUM HYDROXIDE 1200 MG/15ML
LIQUID ORAL AS NEEDED
Status: DISCONTINUED | OUTPATIENT
Start: 2021-06-14 | End: 2021-06-14 | Stop reason: HOSPADM

## 2021-06-14 RX ORDER — GABAPENTIN 300 MG/1
300 CAPSULE ORAL
Status: DISCONTINUED | OUTPATIENT
Start: 2021-06-14 | End: 2021-06-16 | Stop reason: HOSPADM

## 2021-06-14 RX ORDER — PROPOFOL 10 MG/ML
INJECTION, EMULSION INTRAVENOUS AS NEEDED
Status: DISCONTINUED | OUTPATIENT
Start: 2021-06-14 | End: 2021-06-14

## 2021-06-14 RX ORDER — ROCURONIUM BROMIDE 10 MG/ML
INJECTION, SOLUTION INTRAVENOUS AS NEEDED
Status: DISCONTINUED | OUTPATIENT
Start: 2021-06-14 | End: 2021-06-14

## 2021-06-14 RX ORDER — LOSARTAN POTASSIUM 50 MG/1
50 TABLET ORAL DAILY
Status: DISCONTINUED | OUTPATIENT
Start: 2021-06-14 | End: 2021-06-16 | Stop reason: HOSPADM

## 2021-06-14 RX ORDER — SODIUM CHLORIDE, SODIUM LACTATE, POTASSIUM CHLORIDE, CALCIUM CHLORIDE 600; 310; 30; 20 MG/100ML; MG/100ML; MG/100ML; MG/100ML
125 INJECTION, SOLUTION INTRAVENOUS CONTINUOUS
Status: DISCONTINUED | OUTPATIENT
Start: 2021-06-14 | End: 2021-06-15

## 2021-06-14 RX ORDER — DOCUSATE SODIUM 100 MG/1
100 CAPSULE, LIQUID FILLED ORAL 2 TIMES DAILY
Qty: 30 CAPSULE | Refills: 0 | Status: SHIPPED | OUTPATIENT
Start: 2021-06-14 | End: 2021-06-29

## 2021-06-14 RX ADMIN — PROPOFOL 180 MG: 10 INJECTION, EMULSION INTRAVENOUS at 07:35

## 2021-06-14 RX ADMIN — OXYCODONE HYDROCHLORIDE 10 MG: 10 TABLET ORAL at 14:34

## 2021-06-14 RX ADMIN — ONDANSETRON 4 MG: 2 INJECTION INTRAMUSCULAR; INTRAVENOUS at 10:58

## 2021-06-14 RX ADMIN — HYDROMORPHONE HYDROCHLORIDE 0.3 MG: 2 INJECTION INTRAMUSCULAR; INTRAVENOUS; SUBCUTANEOUS at 11:30

## 2021-06-14 RX ADMIN — DOCUSATE SODIUM 100 MG: 100 CAPSULE, LIQUID FILLED ORAL at 17:37

## 2021-06-14 RX ADMIN — ONDANSETRON 4 MG: 2 INJECTION INTRAMUSCULAR; INTRAVENOUS at 17:16

## 2021-06-14 RX ADMIN — PHENYLEPHRINE HYDROCHLORIDE 200 MCG: 10 INJECTION INTRAVENOUS at 08:13

## 2021-06-14 RX ADMIN — ROCURONIUM BROMIDE 20 MG: 10 INJECTION, SOLUTION INTRAVENOUS at 09:16

## 2021-06-14 RX ADMIN — LOSARTAN POTASSIUM 50 MG: 50 TABLET, FILM COATED ORAL at 14:29

## 2021-06-14 RX ADMIN — HYDROMORPHONE HYDROCHLORIDE 0.5 MG: 1 INJECTION, SOLUTION INTRAMUSCULAR; INTRAVENOUS; SUBCUTANEOUS at 12:18

## 2021-06-14 RX ADMIN — HYDROMORPHONE HYDROCHLORIDE 0.2 MG: 2 INJECTION INTRAMUSCULAR; INTRAVENOUS; SUBCUTANEOUS at 11:27

## 2021-06-14 RX ADMIN — SODIUM CHLORIDE, SODIUM LACTATE, POTASSIUM CHLORIDE, AND CALCIUM CHLORIDE 125 ML/HR: .6; .31; .03; .02 INJECTION, SOLUTION INTRAVENOUS at 20:45

## 2021-06-14 RX ADMIN — GABAPENTIN 300 MG: 300 CAPSULE ORAL at 21:04

## 2021-06-14 RX ADMIN — HEPARIN SODIUM 5000 UNITS: 5000 INJECTION INTRAVENOUS; SUBCUTANEOUS at 21:04

## 2021-06-14 RX ADMIN — FENTANYL CITRATE 50 MCG: 50 INJECTION, SOLUTION INTRAMUSCULAR; INTRAVENOUS at 08:08

## 2021-06-14 RX ADMIN — CEFAZOLIN SODIUM 2000 MG: 2 SOLUTION INTRAVENOUS at 07:30

## 2021-06-14 RX ADMIN — SENNOSIDES 8.6 MG: 8.6 TABLET ORAL at 14:29

## 2021-06-14 RX ADMIN — FENTANYL CITRATE 50 MCG: 50 INJECTION, SOLUTION INTRAMUSCULAR; INTRAVENOUS at 07:35

## 2021-06-14 RX ADMIN — OXYCODONE HYDROCHLORIDE 10 MG: 10 TABLET ORAL at 20:56

## 2021-06-14 RX ADMIN — ACETAMINOPHEN 650 MG: 325 TABLET, FILM COATED ORAL at 23:01

## 2021-06-14 RX ADMIN — CARVEDILOL 12.5 MG: 12.5 TABLET, FILM COATED ORAL at 16:05

## 2021-06-14 RX ADMIN — DEXAMETHASONE SODIUM PHOSPHATE 4 MG: 10 INJECTION, SOLUTION INTRAMUSCULAR; INTRAVENOUS at 08:00

## 2021-06-14 RX ADMIN — GLYCOPYRROLATE 0.8 MG: 0.2 INJECTION, SOLUTION INTRAMUSCULAR; INTRAVENOUS at 11:17

## 2021-06-14 RX ADMIN — ROCURONIUM BROMIDE 50 MG: 10 INJECTION, SOLUTION INTRAVENOUS at 07:35

## 2021-06-14 RX ADMIN — HYDROMORPHONE HYDROCHLORIDE 0.5 MG: 1 INJECTION, SOLUTION INTRAMUSCULAR; INTRAVENOUS; SUBCUTANEOUS at 12:05

## 2021-06-14 RX ADMIN — LIDOCAINE HYDROCHLORIDE 50 MG: 10 INJECTION, SOLUTION EPIDURAL; INFILTRATION; INTRACAUDAL at 07:35

## 2021-06-14 RX ADMIN — ROCURONIUM BROMIDE 10 MG: 10 INJECTION, SOLUTION INTRAVENOUS at 08:30

## 2021-06-14 RX ADMIN — ACETAMINOPHEN 650 MG: 325 TABLET, FILM COATED ORAL at 17:37

## 2021-06-14 RX ADMIN — MIDAZOLAM HYDROCHLORIDE 2 MG: 1 INJECTION, SOLUTION INTRAMUSCULAR; INTRAVENOUS at 07:30

## 2021-06-14 RX ADMIN — PANTOPRAZOLE SODIUM 40 MG: 40 TABLET, DELAYED RELEASE ORAL at 16:05

## 2021-06-14 RX ADMIN — NEOSTIGMINE METHYLSULFATE 4 MG: 1 INJECTION INTRAVENOUS at 11:17

## 2021-06-14 RX ADMIN — ROCURONIUM BROMIDE 10 MG: 10 INJECTION, SOLUTION INTRAVENOUS at 10:13

## 2021-06-14 RX ADMIN — PHENYLEPHRINE HYDROCHLORIDE 50 MCG/MIN: 10 INJECTION INTRAVENOUS at 08:15

## 2021-06-14 RX ADMIN — SODIUM CHLORIDE, SODIUM LACTATE, POTASSIUM CHLORIDE, AND CALCIUM CHLORIDE 125 ML/HR: .6; .31; .03; .02 INJECTION, SOLUTION INTRAVENOUS at 15:53

## 2021-06-14 RX ADMIN — SODIUM CHLORIDE, SODIUM LACTATE, POTASSIUM CHLORIDE, AND CALCIUM CHLORIDE: .6; .31; .03; .02 INJECTION, SOLUTION INTRAVENOUS at 07:30

## 2021-06-14 RX ADMIN — SODIUM CHLORIDE: 0.9 INJECTION, SOLUTION INTRAVENOUS at 07:46

## 2021-06-14 RX ADMIN — HYDROMORPHONE HYDROCHLORIDE 1 MG: 2 INJECTION INTRAMUSCULAR; INTRAVENOUS; SUBCUTANEOUS at 11:50

## 2021-06-14 RX ADMIN — CEFAZOLIN SODIUM 2000 MG: 2 SOLUTION INTRAVENOUS at 11:24

## 2021-06-14 RX ADMIN — HYDROMORPHONE HYDROCHLORIDE 0.5 MG: 2 INJECTION INTRAMUSCULAR; INTRAVENOUS; SUBCUTANEOUS at 09:35

## 2021-06-14 NOTE — H&P
UROLOGY HISTORY AND PHYSICAL     Patient Identifiers: Meenakshi Rivera (MRN [id number])      Date of Service: 6/14/2021        ASSESSMENT:     48 y o  old male with  right renal mass consistent with renal cell carcinoma     PLAN:     To OR for robotic assisted laparoscopic right partial nephrectomy, possible radical nephrectomy      History of Present Illness:     Meenakshi Rivera is a 48 y o  old with a history of right renal mass consistent with renal cell carcinoma    Past Medical, Past Surgical History:     Past Medical History:   Diagnosis Date    Bariatric surgery status     Duodenal ulcer     GERD (gastroesophageal reflux disease)     Gout     [facility]par[facility] ([facility] [address] )     Hypertension     Lung nodule     Postsurgical malabsorption     Pulmonary embolism ([facility] [address] ) 2019    Sarcoidosis     Wears glasses    :    Past Surgical History:   Procedure Laterality Date    BARIATRIC SURGERY      Gastric Sleeve    COLONOSCOPY      FOOT SURGERY Right     R ankle    GASTRIC BYPASS LAPAROSCOPIC N/A 9/22/2020    Procedure: LAPAROSCOPIC REVISION/ CONVERSION TO HALEY-EN-Y GASTRIC BYPASS W ROBOTICS, PARAESOPHAGEAL HERNIA REPAIR, AND INTRAOPERATIVE EGD;  Surgeon: Meryl Kang MD;  Location: [facility];  Service: Bariatrics    HAND SURGERY     :    Medications, Allergies:     Current Facility-Administered Medications:     ceFAZolin (ANCEF) IVPB (premix in dextrose) 2,000 mg 50 mL, 2,000 mg, Intravenous, Once, Jerry Delvalle MD    lactated ringers infusion, 50 mL/hr, Intravenous, Continuous, Radha Kelley MD    Allergies:   Allergies   Allergen Reactions    Nsaids Other (See Comments)     Duodenal ulcer disease    Flunisolide      Other reaction(s): Burning sensation    Lisinopril Cough    Testosterone Itching, Rash and Other (See Comments)   :    Social and Family History:   Social History:   Social History     Tobacco Use    Smoking status: Former Smoker     Packs/day: 1 00     Years: 7 00     Pack years: 7 00     Types: Cigars, Cigarettes     Start date: 37     Quit date: 2006     Years since quitting: 15 4    Smokeless tobacco: Never Used    Tobacco comment: only smoked occ cigar, not for over 6 months   Vaping Use    Vaping Use: Never used   Substance Use Topics    Alcohol use: Not Currently    Drug use: Not Currently     Types: Marijuana, Cocaine     Comment: pt quit 20 years ago      Social History     Tobacco Use   Smoking Status Former Smoker    Packs/day: 1 00    Years: 7 00    Pack years: 7 00    Types: Cigars, Cigarettes    Start date: 1999    Quit date: 2006    Years since quitting: 15 4   Smokeless Tobacco Never Used   Tobacco Comment    only smoked occ cigar, not for over 6 months       Family History:  Family History   Problem Relation Age of Onset    Hypertension Father    :     Review of Systems:     General: Fever, chills, or night sweats: negative  Cardiac: Negative for chest pain  Pulmonary: Negative for shortness of breath  Gastrointestinal: Abdominal pain negative  Nausea, vomiting, or diarrhea negative  Genitourinary: See HPI above  Patient does nothave hematuria  All other systems queried were negative  Physical Exam:   General: Patient is pleasant and in NAD  Awake and alert  Ht 5' 5" (1 651 m)   Wt 102 kg (224 lb)   BMI 37 28 kg/m²   HEENT:  Normocephalic atraumatic  Cardiac:  Regular rate and rhythm, Peripheral edema: negative  Pulmonary: Non-labored breathing, CTAB  Abdomen: Soft, non-tender, non-distended  No surgical scars  No masses, tenderness, hernias noted  Genitourinary: negative CVA tenderness, neg suprapubic tenderness    Extremities: normal movement in all 4       Labs:     Lab Results   Component Value Date    HGB 12 5 05/25/2021    HCT 39 7 05/25/2021    WBC 6 00 05/25/2021     05/25/2021   ]    Lab Results   Component Value Date     12/26/2015    K 3 8 05/25/2021     05/25/2021    CO2 27 05/25/2021    BUN 14 05/25/2021 CREATININE 0 83 05/25/2021    CALCIUM 8 1 (L) 05/25/2021    GLUCOSE 96 12/26/2015   ]    Imaging:   I personally reviewed the images and report of the following studies, and reviewed them with the patient:        Thank you for allowing me to participate in this patients care  Please do not hesitate to call with any additional questions    Khushbu Pacheco MD

## 2021-06-14 NOTE — ANESTHESIA POSTPROCEDURE EVALUATION
Post-Op Assessment Note    CV Status:  Stable  Pain Score: 0    Pain management: adequate     Mental Status:  Alert and awake   Hydration Status:  Euvolemic   PONV Controlled:  Controlled   Airway Patency:  Patent      Post Op Vitals Reviewed: Yes      Staff: CRNA   Comments:         No complications documented      BP   149/84   Temp     Pulse  76   Resp   18   SpO2   98

## 2021-06-14 NOTE — DISCHARGE INSTRUCTIONS
Robotic-Assisted Laparoscopic Partial Nephrectomy   WHAT YOU NEED TO KNOW:   Laparoscopic radical nephrectomy is surgery to remove your kidney tumor  Surgery is done through small incisions in your side  DISCHARGE INSTRUCTIONS:   Call your local emergency number (911 in the [address] [facility]) if:   · You have sudden chest pain or trouble breathing  Seek care immediately if:   · Blood soaks through your bandage  · Your stitches come apart  · You cannot urinate  Call your nephrologist or surgeon if:   · You have a fever  · You have blood in your urine  · Your wound is red, swollen, or draining pus  · You have chills, a cough, or feel weak and achy  · You have questions or concerns about your condition or care  Medicines: You may need any of the following:  · Prescription pain medicine  may be given  Ask your healthcare provider how to take this medicine safely  Some prescription pain medicines contain acetaminophen  Do not take other medicines that contain acetaminophen without talking to your healthcare provider  Too much acetaminophen may cause liver damage  Prescription pain medicine may cause constipation  Ask your healthcare provider how to prevent or treat constipation  · Antibiotics  may be given to prevent or treat an infection caused by bacteria  · Bowel movement softeners  may be given to help prevent constipation  · Take your medicine as directed  Contact your healthcare provider if you think your medicine is not helping or if you have side effects  Tell him or her if you are allergic to any medicine  Keep a list of the medicines, vitamins, and herbs you take  Include the amounts, and when and why you take them  Bring the list or the pill bottles to follow-up visits  Carry your medicine list with you in case of an emergency  Care for the surgery area:   · Do not get your stitches wet unless your surgeon says it is okay   When you are allowed to bathe, carefully wash the area with soap and water  Gently pat the area dry and put on new, clean bandages as directed  Change your bandages when they get wet or dirty  · You may have pieces of medical tape on your incision wound  Keep them clean and dry  They will fall off on their own after several days  Do not pull them off  Drink liquids as directed: This will help prevent constipation  Ask your healthcare provider how much liquid to drink each day and which liquids are best for you  Activity:  Do not lift, pull, or push heavy objects  You may also need to limit movement, such as bending your back or twisting  Ask when to can return to work and do other activities  Follow up with your nephrologist or surgeon as directed: You will need to return to have your wound checked and stitches removed  Write down your questions so you remember to ask them during your visits  © Copyright [address] Information is for End User's use only and may not be sold, redistributed or otherwise used for commercial purposes  All illustrations and images included in CareNotes® are the copyrighted property of [employer]  or [facility] [employer]   The above information is an  only  It is not intended as medical advice for individual conditions or treatments  Talk to your doctor, nurse or pharmacist before following any medical regimen to see if it is safe and effective for you

## 2021-06-14 NOTE — PLAN OF CARE
Problem: GASTROINTESTINAL - ADULT  Goal: Minimal or absence of nausea and/or vomiting  Description: INTERVENTIONS:  - Administer IV fluids if ordered to ensure adequate hydration  - Maintain NPO status until nausea and vomiting are resolved  - Nasogastric tube if ordered  - Administer ordered antiemetic medications as needed  - Provide nonpharmacologic comfort measures as appropriate  - Advance diet as tolerated, if ordered  - Consider nutrition services referral to assist patient with adequate nutrition and appropriate food choices  Outcome: Progressing     Problem: GENITOURINARY - ADULT  Goal: Maintains or returns to baseline urinary function  Description: INTERVENTIONS:  - Assess urinary function  - Encourage oral fluids to ensure adequate hydration if ordered  - Administer IV fluids as ordered to ensure adequate hydration  - Administer ordered medications as needed  - Offer frequent toileting  - Follow urinary retention protocol if ordered  Outcome: Progressing     Problem: METABOLIC, FLUID AND ELECTROLYTES - ADULT  Goal: Electrolytes maintained within normal limits  Description: INTERVENTIONS:  - Monitor labs and assess patient for signs and symptoms of electrolyte imbalances  - Administer electrolyte replacement as ordered  - Monitor response to electrolyte replacements, including repeat lab results as appropriate  - Instruct patient on fluid and nutrition as appropriate  Outcome: Progressing

## 2021-06-14 NOTE — ANESTHESIA PROCEDURE NOTES
Arterial Line Insertion  Performed by: Dwight Cesar MD  Authorized by: Dwight Cesar MD   Consent: Written consent obtained  Risks and benefits: risks, benefits and alternatives were discussed  Consent given by: patient  Patient understanding: patient states understanding of the procedure being performed  Patient consent: the patient's understanding of the procedure matches consent given  Procedure consent: procedure consent matches procedure scheduled  Relevant documents: relevant documents present and verified  Test results: test results available and properly labeled  Patient identity confirmed: hospital-assigned identification number  Preparation: Patient was prepped and draped in the usual sterile fashion  Indications: hemodynamic monitoring  Orientation:  Right  Location: radial artery  Sedation:  Patient sedated: under GA      Procedure Details:  Needle gauge: 20  Seldinger technique: Seldinger technique used  Cutdown: cutdown required  Number of attempts: 2    Post-procedure:  Post-procedure: dressing applied  Waveform: good waveform  Patient tolerance: Patient tolerated the procedure well with no immediate complications  Comments: Placed by [facility]

## 2021-06-14 NOTE — OP NOTE
Operative Note     PATIENT:  Oscar Macario (MRN [id number])    DATE OF PROCEDURE:   6/14/2021    PRE-OP DIAGNOSES:   1) Right renal mass  2) Right renal cyst  3) Obesity, s/p prior Keke-en-Y gastric bypass and paraesophageal hernia repair     POST-OP DIAGNOSES AND OPERATIVE FINDINGS:   1) Right renal mass  2) Right renal cyst  3) Obesity, s/p prior Keke-en-Y gastric bypass and paraesophageal hernia repair    PROCEDURES:  1) right robotic-assisted partial nephrectomy  2  Decortication of right simple renal cyst  2) Intra-operative retroperitoneal ultrasound    SURGEON:  Khushbu Pacheco MD    ASSISTANTS:  [place] Dickinson MD    NOTE:  There were no qualified teaching residents to assist with this case  The assistance of a 2nd robotically trained urologist was required due to the patient's obesity, and extensive prior abdominal surgical history  Specifically assistance was required providing bedside exposure and retraction during dissection of the renal hilum    ANESTHESIA TYPE:  General anesthesia    ESTIMATED BLOOD LOSS:  237 mL    COMPLICATIONS:   None    cimen(s):  ID Type Source Tests Collected by Time Destination   1 : RIGHT RENAL MASS Tissue Kidney, Right TISSUE EXAM Khushbu Pacheco MD 6/14/2021 0913    2 : FAT OVERLYING RIGHT RENAL MASS Tissue Soft Tissue, Other TISSUE EXAM Khushbu Pacheco MD 6/14/2021 0957    3 : WALL OF DECORTICATED SIMPLE RIGHT  RENAL CYST Tissue Cyst TISSUE EXAM Khushbu Pacheco MD 6/14/2021 1003    4 : DEEP MARGIN OF RIGHT RENAL TUMOR Tissue Soft Tissue, Other TISSUE EXAM Khushbu Pacheco MD 6/14/2021 1029        ANTIBIOTICS:  Cephazolin    INTRAOPERATIVE THROMBOEMBOLISM PROPHYLAXIS:  Pneumatic compression stockings     INDICATIONS FOR PROCEDURE:  Oscar Macario is an 48 y o  old male with a right renal mass measuring 3 3 cm cm  After discussing the options, the patient elected to undergo a robotic assisted laparoscopic partial nephrectomy    We discussed the procedure in detail, the alternatives, and the risks, and they signed informed consent to proceed  PROCEDURE SUMMARY:  The patient was brought to the operating room and anesthesia obtained  A 16 F Olsen catheter was placed  The patient was then placed in the lateral decubitus position with his right side up and prepped and draped using standard sterile technique  All pressure points were carefully padded  Antibiotics were administered, and thromboembolism prophylaxis was given  A surgical time out was performed with all in the room in agreement with the correct patient, procedure, indications, and laterality  A 15 mm paramedian incision was made  at the level of the umbilicus  Towel clamps were used to elevate the skin and a Veress needle was passed  Good positioning was confirmed with the saline drip test then pneumoperitoneum obtained to 15 mmHg  A 15mm axially dilating trochar was then passed  The robotic camera was inserted and the viscera examined  No evidence of adjacent organ injury was noted  Two 8 mm robotic ports were then placed, one cranial to the camera port in the para-median line and the other caudal to the camera port in the anterior axillary line  A 12 mm Step port was placed in the lower abdomen as an assistant working port  The robot was then docked  The colon was mobilized along the line of Toldt using a combination of sharp and blunt dissection  The inferior vena cava was identified and the duodenum Kocherized  Exquisite care was taken to mobilize the duodenum particularly laterally, beginning the dissection in the anterior aspect of Gerota's fascia  This is accomplished as I expected the patient to have a secondary limb of his duodenum status post his prior Keke-en-Y gastric bypass and based upon the patient's preoperative imaging which was reviewed carefully prior to and during the dissection    Ultimately the Kocherization maneuver was accomplished successfully such that the kidney was then carefully dissected and the renal hilum identified  The ureter and adrenal gland were identified and dissected free of the kidney  The kidney was then defatted and the renal hilum skeletonized to allow for accurate clamping of the renal vasculature  The tumor was clearly identified on the kidney  Intraoperative ultrasound was used  The renal cortex surrounding the tumor was scored with electrocautery  The renal hilum was clamped with atraumatic vascular bulldog clamps  Using a combination of blunt and sharp dissection, then tumor was resected and placed temporarily behind the kidney  The renorrhaphy was then performed in 2 running layers using 3-0 V lock through the tumor crater, and 2-0 Stratifix to close the capsule  Each layer was closed using the sliding Weck clip technique  Once adequate renal parenchymal compression was achieved, the Weck clips were then secured using Lapra-Ty clips  The vascular clamping was then removed  Floseal was then applied  Total warm ischemic time was 23 min  The kidney became immediately firm and pink and shown no evidence of vascular compromise  Finally due to the presence of an ipsilateral simple appearing renal cyst I did elect to perform a prophylactic decortication  I felt that this would be necessary to simplify follow-up, the cyst was certainly benign on imaging with no concerning radiographic features and a Bosniak 1 lesion  Nevertheless this was approached with caution, the wall of the cyst was completely excised and submitted for a separate specimen  The cyst defect was then extensively cauterized and hemostasis was deemed to be excellent  The mass was then placed in a 10 mm Endocatch bag along with any perirenal fat that was adjacent to the tumor  Insufflation pressure was then dropped to 5 mmHg and the surgical site examined  No evidence of active bleeding was noted   The viscera showed no evidence of iatrogenic trauma  The robotic arms were then undocked and the pneumoperitoneum deflated  Ports were removed  The 12 mm assistant port was extended to approximately 2 cm and the rectus sheath incised along a similar length  The mass was then removed intact within the Endocatch bag and sent for pathology  The fascia was closed with running 0 Maxon suture  Subcutaneous fat with interrupted 2-0 Vicryl suture and the skin with subcuticular 4-0 Biosyn  All other port sites were closed at the skin only using subcuticular 4-0 Biosyn and dermal glue  Sterile dressings were then applied  An instrument count was obtained and was correct  DISPOSITION:   PACU - hemodynamically stable

## 2021-06-15 LAB
ANION GAP SERPL CALCULATED.3IONS-SCNC: 5 MMOL/L (ref 4–13)
BUN SERPL-MCNC: 12 MG/DL (ref 5–25)
CALCIUM SERPL-MCNC: 8.4 MG/DL (ref 8.3–10.1)
CHLORIDE SERPL-SCNC: 107 MMOL/L (ref 100–108)
CO2 SERPL-SCNC: 29 MMOL/L (ref 21–32)
CREAT SERPL-MCNC: 1.1 MG/DL (ref 0.6–1.3)
ERYTHROCYTE [DISTWIDTH] IN BLOOD BY AUTOMATED COUNT: 13.8 % (ref 11.6–15.1)
GFR SERPL CREATININE-BSD FRML MDRD: 88 ML/MIN/1.73SQ M
GLUCOSE SERPL-MCNC: 92 MG/DL (ref 65–140)
GLUCOSE SERPL-MCNC: 98 MG/DL (ref 65–140)
HCT VFR BLD AUTO: 34 % (ref 36.5–49.3)
HGB BLD-MCNC: 10.7 G/DL (ref 12–17)
MCH RBC QN AUTO: 28 PG (ref 26.8–34.3)
MCHC RBC AUTO-ENTMCNC: 31.5 G/DL (ref 31.4–37.4)
MCV RBC AUTO: 89 FL (ref 82–98)
PLATELET # BLD AUTO: 160 THOUSANDS/UL (ref 149–390)
PMV BLD AUTO: 11.8 FL (ref 8.9–12.7)
POTASSIUM SERPL-SCNC: 4.2 MMOL/L (ref 3.5–5.3)
RBC # BLD AUTO: 3.82 MILLION/UL (ref 3.88–5.62)
SODIUM SERPL-SCNC: 141 MMOL/L (ref 136–145)
WBC # BLD AUTO: 8.5 THOUSAND/UL (ref 4.31–10.16)

## 2021-06-15 PROCEDURE — 80048 BASIC METABOLIC PNL TOTAL CA: CPT | Performed by: UROLOGY

## 2021-06-15 PROCEDURE — 85027 COMPLETE CBC AUTOMATED: CPT | Performed by: UROLOGY

## 2021-06-15 PROCEDURE — 99024 POSTOP FOLLOW-UP VISIT: CPT | Performed by: NURSE PRACTITIONER

## 2021-06-15 RX ADMIN — SENNOSIDES 8.6 MG: 8.6 TABLET ORAL at 08:14

## 2021-06-15 RX ADMIN — CARVEDILOL 12.5 MG: 12.5 TABLET, FILM COATED ORAL at 08:14

## 2021-06-15 RX ADMIN — HEPARIN SODIUM 5000 UNITS: 5000 INJECTION INTRAVENOUS; SUBCUTANEOUS at 06:10

## 2021-06-15 RX ADMIN — ACETAMINOPHEN 650 MG: 325 TABLET, FILM COATED ORAL at 23:23

## 2021-06-15 RX ADMIN — SODIUM CHLORIDE, SODIUM LACTATE, POTASSIUM CHLORIDE, AND CALCIUM CHLORIDE 125 ML/HR: .6; .31; .03; .02 INJECTION, SOLUTION INTRAVENOUS at 04:03

## 2021-06-15 RX ADMIN — PANTOPRAZOLE SODIUM 40 MG: 40 TABLET, DELAYED RELEASE ORAL at 18:54

## 2021-06-15 RX ADMIN — OXYCODONE HYDROCHLORIDE 10 MG: 10 TABLET ORAL at 23:08

## 2021-06-15 RX ADMIN — DOCUSATE SODIUM 100 MG: 100 CAPSULE, LIQUID FILLED ORAL at 08:14

## 2021-06-15 RX ADMIN — OXYCODONE HYDROCHLORIDE 10 MG: 10 TABLET ORAL at 14:50

## 2021-06-15 RX ADMIN — HEPARIN SODIUM 5000 UNITS: 5000 INJECTION INTRAVENOUS; SUBCUTANEOUS at 22:41

## 2021-06-15 RX ADMIN — OXYCODONE HYDROCHLORIDE 10 MG: 10 TABLET ORAL at 10:15

## 2021-06-15 RX ADMIN — CARVEDILOL 12.5 MG: 12.5 TABLET, FILM COATED ORAL at 18:54

## 2021-06-15 RX ADMIN — DOCUSATE SODIUM 100 MG: 100 CAPSULE, LIQUID FILLED ORAL at 18:54

## 2021-06-15 RX ADMIN — ACETAMINOPHEN 650 MG: 325 TABLET, FILM COATED ORAL at 18:54

## 2021-06-15 RX ADMIN — OXYCODONE HYDROCHLORIDE 10 MG: 10 TABLET ORAL at 06:14

## 2021-06-15 RX ADMIN — LOSARTAN POTASSIUM 50 MG: 50 TABLET, FILM COATED ORAL at 08:14

## 2021-06-15 RX ADMIN — ACETAMINOPHEN 650 MG: 325 TABLET, FILM COATED ORAL at 06:10

## 2021-06-15 RX ADMIN — GABAPENTIN 300 MG: 300 CAPSULE ORAL at 22:41

## 2021-06-15 RX ADMIN — HEPARIN SODIUM 5000 UNITS: 5000 INJECTION INTRAVENOUS; SUBCUTANEOUS at 14:52

## 2021-06-15 RX ADMIN — OXYCODONE HYDROCHLORIDE 10 MG: 10 TABLET ORAL at 18:53

## 2021-06-15 RX ADMIN — PANTOPRAZOLE SODIUM 40 MG: 40 TABLET, DELAYED RELEASE ORAL at 06:10

## 2021-06-15 RX ADMIN — Medication 1 TABLET: at 08:14

## 2021-06-15 NOTE — NURSING NOTE
Patient OOB to chair from 1730 until 2100hrs, able to move without assistive device  Will continue to monitor

## 2021-06-15 NOTE — UTILIZATION REVIEW
Initial Clinical Review    Elective inpatient  surgical procedure    Age/Sex: 48 y o  male     Surgery Date:  6/15/2021     Procedure:  right robotic-assisted partial nephrectomy  2  Decortication of right simple renal cyst  2) Intra-operative retroperitoneal ultrasound    Anesthesia: general     Operative Findings:  Right renal mass  2) Right renal cyst  3) Obesity, s/p prior Keke-en-Y gastric bypass and paraesophageal hernia repair    POD#1 Progress Note: 6/15/2021 Patient is post operative day one right robotic assisted partial nephrectomy, decortication of right simple cyst with intraoperative retroperitoneal US  Pain controlled  BUBBA drained 25 ml today  On exam mild tenderness in lower abdomen  Abdominal puncture sites--clean dry and intact with Dermabond  Well-approximated without evidence of dehiscence  Plan is whyte dc  Continued pain control    Trend BMP    Admission Orders: Date/Time/Statement:   Admission Orders (From admission, onward)     Ordered        06/14/21 0712  Inpatient Admission  Once                   Orders Placed This Encounter   Procedures    Inpatient Admission     Standing Status:   Standing     Number of Occurrences:   1     Order Specific Question:   Level of Care     Answer:   Med Surg [16]     Order Specific Question:   Estimated length of stay     Answer:   Inpatient Only Surgery     Vital Signs: /69 (BP Location: Right arm)   Pulse 60   Temp 98 5 °F (36 9 °C) (Oral)   Resp 14   Ht 5' 5" (1 651 m)   Wt 102 kg (224 lb)   SpO2 97%   BMI 37 28 kg/m²   06/15/21 0700  98 5 °F (36 9 °C)  60  14  141/69  --  --  97 %  --  --  --  None (Room air)  --  Lying   06/14/21 2200  98 6 °F (37 °C)  65  14  119/69  --  --  99 %  --  --  --  None (Room air)  --  Lying   06/14/21 1909  --  --  --  --  --  --  94 %  26  --  1 5 L/min  Nasal cannula  --  --   06/14/21 1907  98 2 °F (36 8 °C)  77  20  130/79  --  --  88 %Abnormal   --  --  --  None (Room air)  --  Sitting   06/14/21 1723  --  --  --  --  --  --  95 %  --  --  --  None (Room air)           Pertinent Labs/Diagnostic Test Results:  No imaging   Results from last 7 days   Lab Units 06/15/21  0634 06/14/21  1201   WBC Thousand/uL 8 50 10 58*   HEMOGLOBIN g/dL 10 7* 11 9*   HEMATOCRIT % 34 0* 38 0   PLATELETS Thousands/uL 160 184     Results from last 7 days   Lab Units 06/15/21  0634 06/14/21  1201   SODIUM mmol/L 141 139   POTASSIUM mmol/L 4 2 4 2   CHLORIDE mmol/L 107 106   CO2 mmol/L 29 23   ANION GAP mmol/L 5 10   BUN mg/dL 12 14   CREATININE mg/dL 1 10 0 95   EGFR ml/min/1 73sq m 88 104   CALCIUM mg/dL 8 4 8 0*     Results from last 7 days   Lab Units 06/14/21  0712   POC GLUCOSE mg/dl 92     Results from last 7 days   Lab Units 06/15/21  0634 06/14/21  1201   GLUCOSE RANDOM mg/dL 98 153*     Diet:  Regular  Mobility: ambulate     DVT Prophylaxis: Heparin  Bilateral SCDs     Medications/Pain Control:   Scheduled Medications:  acetaminophen, 650 mg, Oral, Q6H FLASH  calcium carbonate-vitamin D, 1 tablet, Oral, Daily With Breakfast  carvedilol, 12 5 mg, Oral, BID With Meals  docusate sodium, 100 mg, Oral, BID  gabapentin, 300 mg, Oral, HS  heparin (porcine), 5,000 Units, Subcutaneous, Q8H FLASH  losartan, 50 mg, Oral, Daily  pantoprazole, 40 mg, Oral, BID AC  senna, 1 tablet, Oral, Daily    ceFAZolin (ANCEF) IVPB (premix in dextrose) 2,000 mg 50 mL   Dose: 2,000 mg  Freq:  Once Route: IV  Start: 06/14/21 0700 End: 06/14/21 1124    Continuous IV Infusions:lactated ringers infusion   Rate: 125 mL/hr Dose: 125 mL/hr  Freq: Continuous Route: IV  Last Dose: Stopped (06/15/21 0908)  Start: 06/14/21 1545 End: 06/15/21 0801     PRN Meds:  HYDROmorphone, 0 5 mg, Intravenous, Q2H PRN  lactated ringers, 1,000 mL, Intravenous, Once PRN   And  lactated ringers, 1,000 mL, Intravenous, Once PRN  ondansetron, 4 mg, Intravenous, Q6H PRN - used x 1 6/14/2021   oxyCODONE, 10 mg, Oral, Q4H PRN - used x 2 6/14, x 2 6/15/2021  oxyCODONE, 5 mg, Oral, Q4H PRN  sodium chloride, 1,000 mL, Intravenous, Once PRN   And  sodium chloride, 1,000 mL, Intravenous, Once PRN    BUBBA drains to bulb suction, strip every 2 hours  Network Utilization Review Department  ATTENTION: Please call with any questions or concerns to [phone] and carefully listen to the prompts so that you are directed to the right person  All voicemails are confidential   Dmitri Crystal all requests for admission clinical reviews, approved or denied determinations and any other requests to dedicated fax number below belonging to the campus where the patient is receiving treatment   List of dedicated fax numbers for the Facilities:  [address] DENIALS (Administrative/Medical Necessity) [phone]   [address] (Maternity/NICU/Pediatrics) [phone]   [address] [place] [address] Dr Gracy Mcginnis [address] [id number] [place] [address] Rashida Issa [address] [facility] [place] [phone]

## 2021-06-15 NOTE — PROGRESS NOTES
Progress Note - Lainey Hernandez 48 y o  male MRN: [id number]    Unit/Bed#: S -01 Encounter: [id number]      Assessment:  Ricardo Ugarte is a pleasant 71-year-old male with history of right renal mass and right renal cyst   Postoperative day 1 right robotic assisted laparoscopic partial nephrectomy and decortication of right simple renal cyst with intraoperative retroperitoneal ultrasound  Patient is alert, oriented afebrile, hemodynamically stable and in good spirits this a m  Without complaints of intractable pain  Serum creatinine and WBC within normal limits today  Patient is tolerating solid foods this a m  Without evidence of bulging, nausea/vomiting  BUBBA drain sero sanguinous--25 mL today  Olsen catheter removed --- awaiting spontaneous void  Plan:  · Continue routine postoperative care:  · Encourage ambulation in fried  · Pain control  · Monitor diet advancement  · Incentive spirometry and DVT prophylaxis  · Monitor for voiding  Retention protocol  · Patient is doing well  Will re-evaluate later this afternoon for late today discharge versus tomorrow  Subjective:   Reports feeling well  Anxious for BUBBA drain removal   Denies fever, chills, uncontrolled pain, nausea/vomiting  Objective:     Vitals: Blood pressure 141/69, pulse 60, temperature 98 5 °F (36 9 °C), temperature source Oral, resp  rate 14, height 5' 5" (1 651 m), weight 102 kg (224 lb), SpO2 97 %  ,Body mass index is 37 28 kg/m²        Intake/Output Summary (Last 24 hours) at 6/15/2021 1048  Last data filed at 6/15/2021 1019  Gross per 24 hour   Intake 3272 08 ml   Output 3385 ml   Net -112 92 ml       Physical Exam: General appearance: alert and oriented, in no acute distress, appears stated age, cooperative and no distress  Head: Normocephalic, without obvious abnormality, atraumatic  Neck: no adenopathy, no carotid bruit, no JVD, supple, symmetrical, trachea midline and thyroid not enlarged, symmetric, no tenderness/mass/nodules  Lungs: diminished breath sounds  Heart: regular rate and rhythm, S1, S2 normal, no murmur, click, rub or gallop  Abdomen: abnormal findings:  mild tenderness in the lower abdomen  Extremities: extremities normal, warm and well-perfused; no cyanosis, clubbing, or edema  Pulses: 2+ and symmetric  Neurologic: Grossly normal  Abdominal puncture sites--clean dry and intact with Dermabond  Well-approximated without evidence of dehiscence  Olsen catheter---removed early this patti Bustamante Awaiting void  BUBBA drain--serosanguineous--scant  Invasive Devices     Peripheral Intravenous Line            Peripheral IV 06/14/21 Left Hand 1 day    Peripheral IV 06/14/21 Left;Distal Forearm 1 day          Drain            Closed/Suction Drain Right Abdomen Bulb 19 Fr  265 days    Closed/Suction Drain Right RLQ Bulb <1 day              Lab Results   Component Value Date    WBC 8 50 06/15/2021    HGB 10 7 (L) 06/15/2021    HCT 34 0 (L) 06/15/2021    MCV 89 06/15/2021     06/15/2021     Lab Results   Component Value Date    SODIUM 141 06/15/2021    K 4 2 06/15/2021     06/15/2021    CO2 29 06/15/2021    BUN 12 06/15/2021    CREATININE 1 10 06/15/2021    GLUC 98 06/15/2021    CALCIUM 8 4 06/15/2021       Lab, Imaging and other studies: I have personally reviewed pertinent reports

## 2021-06-16 ENCOUNTER — TELEPHONE (OUTPATIENT)
Dept: UROLOGY | Facility: AMBULATORY SURGERY CENTER | Age: 56
End: 2021-06-16

## 2021-06-16 VITALS
BODY MASS INDEX: 37.32 KG/M2 | WEIGHT: 224 LBS | HEIGHT: 65 IN | DIASTOLIC BLOOD PRESSURE: 73 MMHG | OXYGEN SATURATION: 93 % | HEART RATE: 93 BPM | SYSTOLIC BLOOD PRESSURE: 122 MMHG | RESPIRATION RATE: 18 BRPM | TEMPERATURE: 99 F

## 2021-06-16 LAB
ABO GROUP BLD BPU: NORMAL
ABO GROUP BLD BPU: NORMAL
ANION GAP SERPL CALCULATED.3IONS-SCNC: 9 MMOL/L (ref 4–13)
BPU ID: NORMAL
BPU ID: NORMAL
BUN SERPL-MCNC: 14 MG/DL (ref 5–25)
CALCIUM SERPL-MCNC: 8.6 MG/DL (ref 8.3–10.1)
CHLORIDE SERPL-SCNC: 103 MMOL/L (ref 100–108)
CO2 SERPL-SCNC: 27 MMOL/L (ref 21–32)
CREAT SERPL-MCNC: 1.31 MG/DL (ref 0.6–1.3)
CROSSMATCH: NORMAL
CROSSMATCH: NORMAL
ERYTHROCYTE [DISTWIDTH] IN BLOOD BY AUTOMATED COUNT: 14.2 % (ref 11.6–15.1)
GFR SERPL CREATININE-BSD FRML MDRD: 71 ML/MIN/1.73SQ M
GLUCOSE SERPL-MCNC: 110 MG/DL (ref 65–140)
HCT VFR BLD AUTO: 37.9 % (ref 36.5–49.3)
HGB BLD-MCNC: 12 G/DL (ref 12–17)
MCH RBC QN AUTO: 28.3 PG (ref 26.8–34.3)
MCHC RBC AUTO-ENTMCNC: 31.7 G/DL (ref 31.4–37.4)
MCV RBC AUTO: 89 FL (ref 82–98)
PLATELET # BLD AUTO: 181 THOUSANDS/UL (ref 149–390)
PMV BLD AUTO: 11.3 FL (ref 8.9–12.7)
POTASSIUM SERPL-SCNC: 3.7 MMOL/L (ref 3.5–5.3)
RBC # BLD AUTO: 4.24 MILLION/UL (ref 3.88–5.62)
SODIUM SERPL-SCNC: 139 MMOL/L (ref 136–145)
UNIT DISPENSE STATUS: NORMAL
UNIT DISPENSE STATUS: NORMAL
UNIT PRODUCT CODE: NORMAL
UNIT PRODUCT CODE: NORMAL
UNIT RH: NORMAL
UNIT RH: NORMAL
WBC # BLD AUTO: 8.54 THOUSAND/UL (ref 4.31–10.16)

## 2021-06-16 PROCEDURE — 80048 BASIC METABOLIC PNL TOTAL CA: CPT | Performed by: UROLOGY

## 2021-06-16 PROCEDURE — 85027 COMPLETE CBC AUTOMATED: CPT | Performed by: UROLOGY

## 2021-06-16 PROCEDURE — 99024 POSTOP FOLLOW-UP VISIT: CPT | Performed by: NURSE PRACTITIONER

## 2021-06-16 RX ORDER — METOCLOPRAMIDE HYDROCHLORIDE 5 MG/ML
10 INJECTION INTRAMUSCULAR; INTRAVENOUS EVERY 6 HOURS PRN
Status: DISCONTINUED | OUTPATIENT
Start: 2021-06-16 | End: 2021-06-16 | Stop reason: HOSPADM

## 2021-06-16 RX ORDER — SIMETHICONE 80 MG
80 TABLET,CHEWABLE ORAL EVERY 6 HOURS PRN
Status: DISCONTINUED | OUTPATIENT
Start: 2021-06-16 | End: 2021-06-16 | Stop reason: HOSPADM

## 2021-06-16 RX ADMIN — Medication 1 TABLET: at 08:21

## 2021-06-16 RX ADMIN — ONDANSETRON 4 MG: 2 INJECTION INTRAMUSCULAR; INTRAVENOUS at 09:22

## 2021-06-16 RX ADMIN — ACETAMINOPHEN 650 MG: 325 TABLET, FILM COATED ORAL at 12:19

## 2021-06-16 RX ADMIN — HEPARIN SODIUM 5000 UNITS: 5000 INJECTION INTRAVENOUS; SUBCUTANEOUS at 06:24

## 2021-06-16 RX ADMIN — OXYCODONE HYDROCHLORIDE 10 MG: 10 TABLET ORAL at 09:49

## 2021-06-16 RX ADMIN — SIMETHICONE 80 MG: 80 TABLET, CHEWABLE ORAL at 09:46

## 2021-06-16 RX ADMIN — ACETAMINOPHEN 650 MG: 325 TABLET, FILM COATED ORAL at 06:24

## 2021-06-16 RX ADMIN — LOSARTAN POTASSIUM 50 MG: 50 TABLET, FILM COATED ORAL at 08:21

## 2021-06-16 RX ADMIN — PANTOPRAZOLE SODIUM 40 MG: 40 TABLET, DELAYED RELEASE ORAL at 06:24

## 2021-06-16 RX ADMIN — CARVEDILOL 12.5 MG: 12.5 TABLET, FILM COATED ORAL at 08:21

## 2021-06-16 RX ADMIN — SENNOSIDES 8.6 MG: 8.6 TABLET ORAL at 08:21

## 2021-06-16 RX ADMIN — DOCUSATE SODIUM 100 MG: 100 CAPSULE, LIQUID FILLED ORAL at 08:21

## 2021-06-16 NOTE — TELEPHONE ENCOUNTER
Patient managed by Dr Delorse Alt Barbee Halsted) patients wife Sherri oLgan called in stating patient only received 5 pills of oxycodone, she stated that would not be enough   Sherri Logan asked for refill to be sent to: [address] , [place]  Phone: [phone]    Sherri Logan can be reached at [phone]

## 2021-06-16 NOTE — DISCHARGE SUMMARY
DISCHARGE SUMMARY    Patient Name: Lata Barajas  Patient MRN: [id number]  Admitting Provider: Verner Squires, MD  Discharging Provider: Verner Squires, MD  Primary Care Physician at Discharge: Lorrayne Cooks, [address] [place]   Admission Date: 6/14/2021       Discharge Date: 06/16/21      Admission Diagnosis   Right renal mass [[address]]    Discharge Diagnoses  Patient Active Problem List   Diagnosis    Pulmonary embolism ([facility] )    Gastroesophageal reflux disease    History of sleeve gastrectomy    Sarcoidosis    Incidental lung nodule, > 3mm and < 8mm    Right renal mass    Cigar smoker    Essential hypertension    Hyperparathyroidism , secondary, non-renal ([facility] )    Low vitamin D level    Low vitamin B12 level    Low calcium levels    [facility]ischemic cardiomyopathy) [facility]    Chest pain    Bariatric surgery status    Type 2 diabetes mellitus with hyperglycemia ([facility]    Osteoporosis    Morbid obesity ([facility]    Hypercholesterolemia    Heart burn    Gout    Generalized osteoarthritis    Arthritis    ED (erectile dysfunction) of organic origin    Prediabetes    Mixed hyperlipidemia    Chronic systolic congestive heart failure (HCC)    History of pulmonary embolism    Obesity, Class II, BMI 35-39 9    Encounter for surgical aftercare following surgery of digestive system    Muscle pain    Postsurgical malabsorption    Renal cyst    Pre-op evaluation         Hospital Course  Patient is known to group for onset of flank pain  Patient has undergone office evaluation including diagnostic imaging for such   Unfortunately, patient's CT scan is demonstrative for renal mass suspicious for neoplasm and right renal cyst    Patient verbalized understanding of surgical approach, risks vs  benefits and possible unintended complications as mentioned above and desired to proceed with Robotic Laparascopic Assisted Radical, partial  Nephrectomy with decortication of right simple renal cyst  Refer to operative report  This procedure went well without adverse events or complications  Patient remained afebrile and hemodynamically stable throughout hospital stay  On postoperative day 1, Olsen catheter was removed and patient voided volitionally  He tolerated diet advancement and ambulated fried with minimal assistance  He remained in extra night due to pain control  However this was resolved on postoperative day 2  Patient was deemed  stable for discharge  BUBBA drain was removed secondary to low output  He and spouse expressed understanding of all discharge instructions including:  Medications, diet, activity limitations/restrictions, signs and symptoms to report and follow-up        Medications  Current Discharge Medication List      CONTINUE these medications which have NOT CHANGED    Details   APPLE CIDER VINEGAR PO Take by mouth daily      calcium citrate-vitamin D (CITRACAL+D) 315-200 MG-UNIT per tablet Take 1 tablet by mouth 2 (two) times a day  Qty: 180 tablet, Refills: 1    Associated Diagnoses: S/P bariatric surgery      capsaicin (ZOSTRIX) 0 025 % cream Apply topically as needed       carvedilol (COREG) 12 5 mg tablet Take 1 tablet (12 5 mg total) by mouth 2 (two) times a day with meals  Qty: 180 tablet, Refills: 3    Associated Diagnoses: Essential hypertension; Chronic systolic congestive heart failure (HCC)      losartan (COZAAR) 50 mg tablet Take 1 tablet (50 mg total) by mouth daily Has been on it without issues  Qty: 90 tablet, Refills: 0    Associated Diagnoses: Essential hypertension; Chronic systolic congestive heart failure (HCC)      Multiple Vitamin (Daily-Mark) TABS TAKE 1 TABLET BY MOUTH EVERY DAY  Qty: 90 tablet, Refills: 1    Associated Diagnoses: Bariatric surgery status      NON FORMULARY daily Beet Root      omeprazole (PriLOSEC) 20 mg delayed release capsule Take 1 capsule (20 mg total) by mouth 2 (two) times a day  Qty: 60 capsule, Refills: 1    Associated Diagnoses: Obesity, unspecified      traMADol (ULTRAM) 50 mg tablet Take 50 mg by mouth every 6 (six) hours as needed for moderate pain           Current Discharge Medication List      START taking these medications    Details   acetaminophen (TYLENOL) 325 mg tablet Take 2 tablets (650 mg total) by mouth every 4 (four) hours as needed for mild pain  Qty: 30 tablet, Refills: 0    Associated Diagnoses: Right renal mass      docusate sodium (COLACE) 100 mg capsule Take 1 capsule (100 mg total) by mouth 2 (two) times a day for 15 days  Qty: 30 capsule, Refills: 0    Associated Diagnoses: Right renal mass      oxyCODONE (ROXICODONE) 5 mg immediate release tablet Take 1 tablet (5 mg total) by mouth every 4 (four) hours as needed for severe pain for up to 5 daysMax Daily Amount: 30 mg  Qty: 5 tablet, Refills: 0    Associated Diagnoses: Right renal mass               Allergies  Allergies   Allergen Reactions    Nsaids Other (See Comments)     Duodenal ulcer disease    Flunisolide      Other reaction(s): Burning sensation    Lisinopril Cough    Testosterone Itching, Rash and Other (See Comments)       Outpatient Follow-Up  Future Appointments   Date Time Provider Renetta Daniels   6/21/2021  4:30 PM ANSHUL Mcadams  [facility]   7/2/2021 11:00 AM [facility]   10/4/2021  3:40 PM Zoie Perez MD [facility]   10/19/2021  3:30 PM Radha Kat PA-C [facility]       Active Issues Requiring Follow-Up  None    Test Results Pending at Discharge  Pending Labs     Order Current Status    Tissue Exam In process        Referrals and Follow-ups to Schedule     Basic metabolic panel      This is a patient instruction: Patient fasting for 8 hours or longer recommended      Results to Yolette Francois (primary care)          Discharge Disposition  home    Treatments    Consults   none    Procedures   percutaneous drainage catheter placement--removed    Operative Procedures Performed  Procedure(s):  ROBOTIC LAPAROSCOPIC PARTIAL NEPHRECTOMY DECORTICATION OF RIGHT RENAL CYST    Pertinent Test Results   Lab Results   Component Value Date    WBC 8 54 06/16/2021    HGB 12 0 06/16/2021    HCT 37 9 06/16/2021    MCV 89 06/16/2021     06/16/2021     Lab Results   Component Value Date    SODIUM 139 06/16/2021    K 3 7 06/16/2021     06/16/2021    CO2 27 06/16/2021    BUN 14 06/16/2021    CREATININE 1 31 (H) 06/16/2021    GLUC 110 06/16/2021    CALCIUM 8 6 06/16/2021       Pathology Report Pending    Physical Exam at Discharge  Condition of Patient on Discharge: stable  /73 (BP Location: Left arm)   Pulse 93   Temp 99 °F (37 2 °C) (Oral)   Resp 18   Ht 5' 5" (1 651 m)   Wt 102 kg (224 lb)   SpO2 93%   BMI 37 28 kg/m²   General appearance: alert and oriented, in no acute distress, appears stated age, cooperative and no distress  Head: Normocephalic, without obvious abnormality, atraumatic  Neck: no adenopathy, no carotid bruit, no JVD, supple, symmetrical, trachea midline and thyroid not enlarged, symmetric, no tenderness/mass/nodules  Lungs: clear to auscultation bilaterally  Heart: regular rate and rhythm, S1, S2 normal, no murmur, click, rub or gallop  Abdomen: abnormal findings:  mild and Puncture sites with Dermabond--clean dry and intact, well-approximated without evidence of dehiscence  tenderness in the lower abdomen  Extremities: extremities normal, warm and well-perfused; no cyanosis, clubbing, or edema  Pulses: 2+ and symmetric  Neurologic: Grossly normal  Right BUBBA removed--serosanguineous  Olsen--removed  Patient voiding volitionally clear light evon urine    I/O last 3 completed shifts: In: 1772 1 [P O :120; I V :1652 1]  Out: 8886 [Urine:3675; Drains:165]  No intake/output data recorded                  ANSHUL Corado

## 2021-06-16 NOTE — PLAN OF CARE
Problem: GASTROINTESTINAL - ADULT  Goal: Minimal or absence of nausea and/or vomiting  Description: INTERVENTIONS:  - Administer IV fluids if ordered to ensure adequate hydration  - Maintain NPO status until nausea and vomiting are resolved  - Nasogastric tube if ordered  - Administer ordered antiemetic medications as needed  - Provide nonpharmacologic comfort measures as appropriate  - Advance diet as tolerated, if ordered  - Consider nutrition services referral to assist patient with adequate nutrition and appropriate food choices  6/16/2021 0502 by Agueda Estrada RN  Outcome: Progressing  6/16/2021 0502 by Agueda Estrada RN  Outcome: Progressing     Problem: GENITOURINARY - ADULT  Goal: Maintains or returns to baseline urinary function  Description: INTERVENTIONS:  - Assess urinary function  - Encourage oral fluids to ensure adequate hydration if ordered  - Administer IV fluids as ordered to ensure adequate hydration  - Administer ordered medications as needed  - Offer frequent toileting  - Follow urinary retention protocol if ordered  6/16/2021 0502 by Agueda Estrada RN  Outcome: Progressing  6/16/2021 0502 by Agueda Estrada RN  Outcome: Progressing     Problem: METABOLIC, FLUID AND ELECTROLYTES - ADULT  Goal: Electrolytes maintained within normal limits  Description: INTERVENTIONS:  - Monitor labs and assess patient for signs and symptoms of electrolyte imbalances  - Administer electrolyte replacement as ordered  - Monitor response to electrolyte replacements, including repeat lab results as appropriate  - Instruct patient on fluid and nutrition as appropriate  6/16/2021 0502 by Agueda Estrada RN  Outcome: Progressing  6/16/2021 0502 by Agueda Estrada RN  Outcome: Progressing     Problem: PAIN - ADULT  Goal: Verbalizes/displays adequate comfort level or baseline comfort level  Description: Interventions:  - Encourage patient to monitor pain and request assistance  - Assess pain using appropriate pain scale  - Administer analgesics based on type and severity of pain and evaluate response  - Implement non-pharmacological measures as appropriate and evaluate response  - Consider cultural and social influences on pain and pain management  - Notify physician/advanced practitioner if interventions unsuccessful or patient reports new pain  Outcome: Progressing

## 2021-06-17 ENCOUNTER — TRANSITIONAL CARE MANAGEMENT (OUTPATIENT)
Dept: FAMILY MEDICINE CLINIC | Facility: CLINIC | Age: 56
End: 2021-06-17

## 2021-06-17 ENCOUNTER — TELEPHONE (OUTPATIENT)
Dept: UROLOGY | Facility: CLINIC | Age: 56
End: 2021-06-17

## 2021-06-17 NOTE — TELEPHONE ENCOUNTER
----- Message from Jhon Hernandez on behalf of Alton Gregory sent at 6/16/2021  5:27 PM EDT -----  Regarding: Pre-Op/Post-Op Question  Contact: [phone]  This message is being sent by Julian Beckford on behalf of Alton Gregory  The discharge instructions suggest Dipti Trevizo should have enough pain pills for 5 days as needed, however the pharmacy dispensed 5 pills  He is home in a significant amount of pain  If possible can you [facility] the [facility]  I purchased him some tylenol how should he take this in conjunction with the pain meds

## 2021-06-17 NOTE — UTILIZATION REVIEW
Notification of Discharge   This is a Notification of Discharge from our facility [address]  Please be advised that this patient has been discharge from our facility  Below you will find the admission and discharge date and time including the patients disposition  UTILIZATION REVIEW CONTACT:  Belen Hurd  Utilization   Network Utilization Review Department  Phone: [phone] x carefully listen to the prompts  All voicemails are confidential   Email: [email or web address]     PHYSICIAN ADVISORY SERVICES:  FOR KHNU-GM-GMOQ REVIEW - MEDICAL NECESSITY DENIAL  Phone: [phone]  Fax: [phone]  Email: [email or web address]     PRESENTATION DATE: 6/14/2021  6:35 AM  OBERVATION ADMISSION DATE:   INPATIENT ADMISSION DATE: 6/14/21  7:12 AM   DISCHARGE DATE: 6/16/2021  2:38 PM  DISPOSITION: Home/Self Care Home/Self Care      IMPORTANT INFORMATION:  Send all requests for admission clinical reviews, approved or denied determinations and any other requests to dedicated fax number below belonging to the campus where the patient is receiving treatment   List of dedicated fax numbers:  [address] DENIALS (Administrative/Medical Necessity) [phone]   [address] [place] [address] (Maternity/NICU/Pediatrics) [phone]   [facility] [phone]   Marbin Small [phone]   Carine Moore [phone]   [facility] [address] [facility] [phone]   [facility]  [phone]   [address] [place] [address], [facility]  [facility] [facility] [address] [facility] [phone]

## 2021-06-18 NOTE — TELEPHONE ENCOUNTER
Post Op Note    Radha Lozano is a 48 y o  male s/p ROBOTIC LAPAROSCOPIC PARTIAL NEPHRECTOMY DECORTICATION OF RIGHT RENAL CYST (Right Abdomen) performed 6/14/21  Radha Lozano is a patient of Dr Lainey Khan and is seen at the [facility] office  How would you rate your pain on a scale from 1 to 10, 10 being the worst pain ever? 6  Have you had a fever? No  Have your bowel movements been regular? No, reviewed colace and miralax if needed  Do you have any difficulty urinating? No  If the patient has an incision- do you have any redness around the incision or any drainage from the incision? No    Do you have any other questions or concerns that I can address at this time? Spoke with patient's wife at this time  Reviewed refill of pain medication sent  He should utilize tylenol today as directed on the bottle  Reviewed 'staying on top of the pain' is important to help prevent severe pain  She states he does not typically like to take pain medication, but she states she will ensure he has a regimen of tylenol during this post op period  Reviewed oxycodone should be reserved for severe pain if tylenol ineffective  Reviewed he should be using incentive spirometer and ambulating once per hour during waking hours  She did mention FMLA papers were faxed over and would just like to be alerted when they can be picked up  Advised I did not see anything in the chart currently, but I will coordinate with surgery scheduler and clerical staff and someone should reach out in regards next week  She knows to call in the meantime if any questions or concerns  Post op appt confirmed

## 2021-06-19 ENCOUNTER — ANESTHESIA EVENT (EMERGENCY)
Dept: PERIOP | Facility: HOSPITAL | Age: 56
DRG: 353 | End: 2021-06-19
Payer: COMMERCIAL

## 2021-06-19 ENCOUNTER — HOSPITAL ENCOUNTER (INPATIENT)
Facility: HOSPITAL | Age: 56
LOS: 2 days | Discharge: HOME | DRG: 353 | End: 2021-06-21
Attending: EMERGENCY MEDICINE | Admitting: SURGERY
Payer: COMMERCIAL

## 2021-06-19 ENCOUNTER — ANESTHESIA (EMERGENCY)
Dept: PERIOP | Facility: HOSPITAL | Age: 56
DRG: 353 | End: 2021-06-19
Payer: COMMERCIAL

## 2021-06-19 ENCOUNTER — APPOINTMENT (EMERGENCY)
Dept: RADIOLOGY | Facility: HOSPITAL | Age: 56
DRG: 353 | End: 2021-06-19
Payer: COMMERCIAL

## 2021-06-19 ENCOUNTER — APPOINTMENT (EMERGENCY)
Dept: CT IMAGING | Facility: HOSPITAL | Age: 56
DRG: 353 | End: 2021-06-19
Payer: COMMERCIAL

## 2021-06-19 DIAGNOSIS — I26.99 PULMONARY EMBOLUS (HCC): ICD-10-CM

## 2021-06-19 DIAGNOSIS — K43.6 VENTRAL HERNIA WITH BOWEL OBSTRUCTION: ICD-10-CM

## 2021-06-19 DIAGNOSIS — D86.9 SARCOIDOSIS: ICD-10-CM

## 2021-06-19 DIAGNOSIS — K43.0 INCISIONAL HERNIA OF ANTERIOR ABDOMINAL WALL WITH OBSTRUCTION: Primary | ICD-10-CM

## 2021-06-19 DIAGNOSIS — I26.99 PULMONARY EMBOLISM, UNSPECIFIED CHRONICITY, UNSPECIFIED PULMONARY EMBOLISM TYPE, UNSPECIFIED WHETHER ACUTE COR PULMONALE PRESENT (HCC): ICD-10-CM

## 2021-06-19 DIAGNOSIS — K56.609 SBO (SMALL BOWEL OBSTRUCTION) (HCC): ICD-10-CM

## 2021-06-19 LAB
ABO GROUP BLD: NORMAL
ALBUMIN SERPL BCP-MCNC: 3 G/DL (ref 3.5–5)
ALP SERPL-CCNC: 83 U/L (ref 46–116)
ALT SERPL W P-5'-P-CCNC: 36 U/L (ref 12–78)
ANION GAP SERPL CALCULATED.3IONS-SCNC: 10 MMOL/L (ref 4–13)
APTT PPP: 33 SECONDS (ref 23–37)
AST SERPL W P-5'-P-CCNC: 36 U/L (ref 5–45)
ATRIAL RATE: 87 BPM
BASE EX.OXY STD BLDV CALC-SCNC: 61.9 % (ref 60–80)
BASE EXCESS BLDV CALC-SCNC: 2.4 MMOL/L
BASOPHILS # BLD AUTO: 0.02 THOUSANDS/ΜL (ref 0–0.1)
BASOPHILS NFR BLD AUTO: 0 % (ref 0–1)
BILIRUB SERPL-MCNC: 0.7 MG/DL (ref 0.2–1)
BLD GP AB SCN SERPL QL: NEGATIVE
BUN SERPL-MCNC: 17 MG/DL (ref 5–25)
CALCIUM ALBUM COR SERPL-MCNC: 9.7 MG/DL (ref 8.3–10.1)
CALCIUM SERPL-MCNC: 8.9 MG/DL (ref 8.3–10.1)
CHLORIDE SERPL-SCNC: 98 MMOL/L (ref 100–108)
CK SERPL-CCNC: 118 U/L (ref 39–308)
CO2 SERPL-SCNC: 27 MMOL/L (ref 21–32)
CREAT SERPL-MCNC: 1.11 MG/DL (ref 0.6–1.3)
CRP SERPL HS-MCNC: >90 MG/L
D DIMER PPP FEU-MCNC: 6.78 UG/ML FEU
EOSINOPHIL # BLD AUTO: 0.11 THOUSAND/ΜL (ref 0–0.61)
EOSINOPHIL NFR BLD AUTO: 2 % (ref 0–6)
ERYTHROCYTE [DISTWIDTH] IN BLOOD BY AUTOMATED COUNT: 13.6 % (ref 11.6–15.1)
GFR SERPL CREATININE-BSD FRML MDRD: 87 ML/MIN/1.73SQ M
GLUCOSE SERPL-MCNC: 118 MG/DL (ref 65–140)
GLUCOSE SERPL-MCNC: 124 MG/DL (ref 65–140)
HCO3 BLDV-SCNC: 28.4 MMOL/L (ref 24–30)
HCT VFR BLD AUTO: 41.1 % (ref 36.5–49.3)
HGB BLD-MCNC: 13.3 G/DL (ref 12–17)
IMM GRANULOCYTES # BLD AUTO: 0.01 THOUSAND/UL (ref 0–0.2)
IMM GRANULOCYTES NFR BLD AUTO: 0 % (ref 0–2)
INR PPP: 0.93 (ref 0.84–1.19)
LACTATE SERPL-SCNC: 1.2 MMOL/L (ref 0.5–2)
LIPASE SERPL-CCNC: 35 U/L (ref 73–393)
LYMPHOCYTES # BLD AUTO: 0.71 THOUSANDS/ΜL (ref 0.6–4.47)
LYMPHOCYTES NFR BLD AUTO: 10 % (ref 14–44)
MCH RBC QN AUTO: 28.2 PG (ref 26.8–34.3)
MCHC RBC AUTO-ENTMCNC: 32.4 G/DL (ref 31.4–37.4)
MCV RBC AUTO: 87 FL (ref 82–98)
MONOCYTES # BLD AUTO: 0.67 THOUSAND/ΜL (ref 0.17–1.22)
MONOCYTES NFR BLD AUTO: 10 % (ref 4–12)
NEUTROPHILS # BLD AUTO: 5.37 THOUSANDS/ΜL (ref 1.85–7.62)
NEUTS SEG NFR BLD AUTO: 78 % (ref 43–75)
NRBC BLD AUTO-RTO: 0 /100 WBCS
NT-PROBNP SERPL-MCNC: 151 PG/ML
O2 CT BLDV-SCNC: 11.7 ML/DL
P AXIS: 25 DEGREES
PCO2 BLDV: 49.6 MM HG (ref 42–50)
PH BLDV: 7.38 [PH] (ref 7.3–7.4)
PLATELET # BLD AUTO: 217 THOUSANDS/UL (ref 149–390)
PMV BLD AUTO: 11.6 FL (ref 8.9–12.7)
PO2 BLDV: 35.5 MM HG (ref 35–45)
POTASSIUM SERPL-SCNC: 5 MMOL/L (ref 3.5–5.3)
PR INTERVAL: 132 MS
PROT SERPL-MCNC: 7.8 G/DL (ref 6.4–8.2)
PROTHROMBIN TIME: 12.6 SECONDS (ref 11.6–14.5)
QRS AXIS: 131 DEGREES
QRSD INTERVAL: 84 MS
QT INTERVAL: 344 MS
QTC INTERVAL: 413 MS
RBC # BLD AUTO: 4.71 MILLION/UL (ref 3.88–5.62)
RH BLD: POSITIVE
SODIUM SERPL-SCNC: 135 MMOL/L (ref 136–145)
SPECIMEN EXPIRATION DATE: NORMAL
T WAVE AXIS: -22 DEGREES
TROPONIN I SERPL-MCNC: <0.02 NG/ML
VENTRICULAR RATE: 87 BPM
WBC # BLD AUTO: 6.89 THOUSAND/UL (ref 4.31–10.16)

## 2021-06-19 PROCEDURE — 96376 TX/PRO/DX INJ SAME DRUG ADON: CPT

## 2021-06-19 PROCEDURE — 82550 ASSAY OF CK (CPK): CPT | Performed by: EMERGENCY MEDICINE

## 2021-06-19 PROCEDURE — 0WUF0JZ SUPPLEMENT ABDOMINAL WALL WITH SYNTHETIC SUBSTITUTE, OPEN APPROACH: ICD-10-PCS | Performed by: SURGERY

## 2021-06-19 PROCEDURE — 83690 ASSAY OF LIPASE: CPT | Performed by: EMERGENCY MEDICINE

## 2021-06-19 PROCEDURE — 93010 ELECTROCARDIOGRAM REPORT: CPT | Performed by: INTERNAL MEDICINE

## 2021-06-19 PROCEDURE — 83880 ASSAY OF NATRIURETIC PEPTIDE: CPT | Performed by: EMERGENCY MEDICINE

## 2021-06-19 PROCEDURE — 99291 CRITICAL CARE FIRST HOUR: CPT | Performed by: EMERGENCY MEDICINE

## 2021-06-19 PROCEDURE — 85610 PROTHROMBIN TIME: CPT | Performed by: EMERGENCY MEDICINE

## 2021-06-19 PROCEDURE — 71045 X-RAY EXAM CHEST 1 VIEW: CPT

## 2021-06-19 PROCEDURE — G1004 CDSM NDSC: HCPCS

## 2021-06-19 PROCEDURE — 99285 EMERGENCY DEPT VISIT HI MDM: CPT

## 2021-06-19 PROCEDURE — NC001 PR NO CHARGE: Performed by: SURGERY

## 2021-06-19 PROCEDURE — 86850 RBC ANTIBODY SCREEN: CPT | Performed by: SURGERY

## 2021-06-19 PROCEDURE — 82948 REAGENT STRIP/BLOOD GLUCOSE: CPT

## 2021-06-19 PROCEDURE — 87040 BLOOD CULTURE FOR BACTERIA: CPT | Performed by: EMERGENCY MEDICINE

## 2021-06-19 PROCEDURE — 49568 PR IMPLANT MESH HERNIA REPAIR/DEBRIDEMENT CLOSURE: CPT | Performed by: SURGERY

## 2021-06-19 PROCEDURE — 96365 THER/PROPH/DIAG IV INF INIT: CPT

## 2021-06-19 PROCEDURE — 71275 CT ANGIOGRAPHY CHEST: CPT

## 2021-06-19 PROCEDURE — C1781 MESH (IMPLANTABLE): HCPCS | Performed by: SURGERY

## 2021-06-19 PROCEDURE — 99223 1ST HOSP IP/OBS HIGH 75: CPT | Performed by: SURGERY

## 2021-06-19 PROCEDURE — 96361 HYDRATE IV INFUSION ADD-ON: CPT

## 2021-06-19 PROCEDURE — 85730 THROMBOPLASTIN TIME PARTIAL: CPT | Performed by: EMERGENCY MEDICINE

## 2021-06-19 PROCEDURE — 36415 COLL VENOUS BLD VENIPUNCTURE: CPT | Performed by: EMERGENCY MEDICINE

## 2021-06-19 PROCEDURE — 85025 COMPLETE CBC W/AUTO DIFF WBC: CPT | Performed by: EMERGENCY MEDICINE

## 2021-06-19 PROCEDURE — 85379 FIBRIN DEGRADATION QUANT: CPT | Performed by: EMERGENCY MEDICINE

## 2021-06-19 PROCEDURE — 84484 ASSAY OF TROPONIN QUANT: CPT | Performed by: EMERGENCY MEDICINE

## 2021-06-19 PROCEDURE — 82805 BLOOD GASES W/O2 SATURATION: CPT | Performed by: EMERGENCY MEDICINE

## 2021-06-19 PROCEDURE — 93005 ELECTROCARDIOGRAM TRACING: CPT

## 2021-06-19 PROCEDURE — 96375 TX/PRO/DX INJ NEW DRUG ADDON: CPT

## 2021-06-19 PROCEDURE — 83605 ASSAY OF LACTIC ACID: CPT | Performed by: EMERGENCY MEDICINE

## 2021-06-19 PROCEDURE — 49561 PR REPAIR INCISIONAL HERNIA,STRANG: CPT | Performed by: SURGERY

## 2021-06-19 PROCEDURE — 86901 BLOOD TYPING SEROLOGIC RH(D): CPT | Performed by: SURGERY

## 2021-06-19 PROCEDURE — 86900 BLOOD TYPING SEROLOGIC ABO: CPT | Performed by: SURGERY

## 2021-06-19 PROCEDURE — 74177 CT ABD & PELVIS W/CONTRAST: CPT

## 2021-06-19 PROCEDURE — 86141 C-REACTIVE PROTEIN HS: CPT | Performed by: EMERGENCY MEDICINE

## 2021-06-19 PROCEDURE — NC001 PR NO CHARGE: Performed by: UROLOGY

## 2021-06-19 PROCEDURE — 80053 COMPREHEN METABOLIC PANEL: CPT | Performed by: EMERGENCY MEDICINE

## 2021-06-19 PROCEDURE — 99222 1ST HOSP IP/OBS MODERATE 55: CPT | Performed by: INTERNAL MEDICINE

## 2021-06-19 DEVICE — VENTRALEX ST HERNIA PATCH
Type: IMPLANTABLE DEVICE | Site: ABDOMEN | Status: FUNCTIONAL
Brand: VENTRALEX ST HERNIA PATCH

## 2021-06-19 RX ORDER — PANTOPRAZOLE SODIUM 40 MG/1
40 TABLET, DELAYED RELEASE ORAL
Status: DISCONTINUED | OUTPATIENT
Start: 2021-06-19 | End: 2021-06-21 | Stop reason: HOSPADM

## 2021-06-19 RX ORDER — ACETAMINOPHEN 325 MG/1
975 TABLET ORAL EVERY 8 HOURS SCHEDULED
Status: DISCONTINUED | OUTPATIENT
Start: 2021-06-19 | End: 2021-06-21 | Stop reason: HOSPADM

## 2021-06-19 RX ORDER — ONDANSETRON 2 MG/ML
4 INJECTION INTRAMUSCULAR; INTRAVENOUS ONCE
Status: COMPLETED | OUTPATIENT
Start: 2021-06-19 | End: 2021-06-19

## 2021-06-19 RX ORDER — HYDROMORPHONE HCL/PF 1 MG/ML
0.5 SYRINGE (ML) INJECTION
Status: DISCONTINUED | OUTPATIENT
Start: 2021-06-19 | End: 2021-06-21 | Stop reason: HOSPADM

## 2021-06-19 RX ORDER — OXYCODONE HYDROCHLORIDE 5 MG/1
5 TABLET ORAL EVERY 4 HOURS PRN
Status: DISCONTINUED | OUTPATIENT
Start: 2021-06-19 | End: 2021-06-21 | Stop reason: HOSPADM

## 2021-06-19 RX ORDER — MEPERIDINE HYDROCHLORIDE 25 MG/ML
12.5 INJECTION INTRAMUSCULAR; INTRAVENOUS; SUBCUTANEOUS ONCE AS NEEDED
Status: DISCONTINUED | OUTPATIENT
Start: 2021-06-19 | End: 2021-06-19 | Stop reason: HOSPADM

## 2021-06-19 RX ORDER — CARVEDILOL 12.5 MG/1
12.5 TABLET ORAL 2 TIMES DAILY WITH MEALS
Status: DISCONTINUED | OUTPATIENT
Start: 2021-06-19 | End: 2021-06-21 | Stop reason: HOSPADM

## 2021-06-19 RX ORDER — HYDROMORPHONE HCL/PF 1 MG/ML
1 SYRINGE (ML) INJECTION ONCE
Status: COMPLETED | OUTPATIENT
Start: 2021-06-19 | End: 2021-06-19

## 2021-06-19 RX ORDER — MIDAZOLAM HYDROCHLORIDE 2 MG/2ML
INJECTION, SOLUTION INTRAMUSCULAR; INTRAVENOUS AS NEEDED
Status: DISCONTINUED | OUTPATIENT
Start: 2021-06-19 | End: 2021-06-19

## 2021-06-19 RX ORDER — BUPIVACAINE HYDROCHLORIDE AND EPINEPHRINE 2.5; 5 MG/ML; UG/ML
INJECTION, SOLUTION INFILTRATION; PERINEURAL AS NEEDED
Status: DISCONTINUED | OUTPATIENT
Start: 2021-06-19 | End: 2021-06-19 | Stop reason: HOSPADM

## 2021-06-19 RX ORDER — MAGNESIUM HYDROXIDE 1200 MG/15ML
LIQUID ORAL AS NEEDED
Status: DISCONTINUED | OUTPATIENT
Start: 2021-06-19 | End: 2021-06-19 | Stop reason: HOSPADM

## 2021-06-19 RX ORDER — LIDOCAINE HYDROCHLORIDE 10 MG/ML
INJECTION, SOLUTION EPIDURAL; INFILTRATION; INTRACAUDAL; PERINEURAL AS NEEDED
Status: DISCONTINUED | OUTPATIENT
Start: 2021-06-19 | End: 2021-06-19

## 2021-06-19 RX ORDER — SUCCINYLCHOLINE/SOD CL,ISO/PF 100 MG/5ML
SYRINGE (ML) INTRAVENOUS AS NEEDED
Status: DISCONTINUED | OUTPATIENT
Start: 2021-06-19 | End: 2021-06-19

## 2021-06-19 RX ORDER — DOCUSATE SODIUM 100 MG/1
100 CAPSULE, LIQUID FILLED ORAL 2 TIMES DAILY
Status: DISCONTINUED | OUTPATIENT
Start: 2021-06-19 | End: 2021-06-21 | Stop reason: HOSPADM

## 2021-06-19 RX ORDER — SODIUM CHLORIDE, SODIUM GLUCONATE, SODIUM ACETATE, POTASSIUM CHLORIDE, MAGNESIUM CHLORIDE, SODIUM PHOSPHATE, DIBASIC, AND POTASSIUM PHOSPHATE .53; .5; .37; .037; .03; .012; .00082 G/100ML; G/100ML; G/100ML; G/100ML; G/100ML; G/100ML; G/100ML
110 INJECTION, SOLUTION INTRAVENOUS CONTINUOUS
Status: DISCONTINUED | OUTPATIENT
Start: 2021-06-19 | End: 2021-06-20

## 2021-06-19 RX ORDER — ONDANSETRON 2 MG/ML
4 INJECTION INTRAMUSCULAR; INTRAVENOUS ONCE AS NEEDED
Status: DISCONTINUED | OUTPATIENT
Start: 2021-06-19 | End: 2021-06-19 | Stop reason: HOSPADM

## 2021-06-19 RX ORDER — CEFAZOLIN SODIUM 2 G/50ML
SOLUTION INTRAVENOUS AS NEEDED
Status: DISCONTINUED | OUTPATIENT
Start: 2021-06-19 | End: 2021-06-19

## 2021-06-19 RX ORDER — SODIUM CHLORIDE 9 MG/ML
125 INJECTION, SOLUTION INTRAVENOUS CONTINUOUS
Status: DISCONTINUED | OUTPATIENT
Start: 2021-06-19 | End: 2021-06-19

## 2021-06-19 RX ORDER — ALBUTEROL SULFATE 2.5 MG/3ML
2.5 SOLUTION RESPIRATORY (INHALATION) ONCE AS NEEDED
Status: DISCONTINUED | OUTPATIENT
Start: 2021-06-19 | End: 2021-06-19 | Stop reason: HOSPADM

## 2021-06-19 RX ORDER — HYDROMORPHONE HCL/PF 1 MG/ML
0.5 SYRINGE (ML) INJECTION ONCE
Status: COMPLETED | OUTPATIENT
Start: 2021-06-19 | End: 2021-06-19

## 2021-06-19 RX ORDER — HYDROMORPHONE HCL/PF 1 MG/ML
0.5 SYRINGE (ML) INJECTION
Status: DISCONTINUED | OUTPATIENT
Start: 2021-06-19 | End: 2021-06-19

## 2021-06-19 RX ORDER — CEFAZOLIN SODIUM 1 G/3ML
INJECTION, POWDER, FOR SOLUTION INTRAMUSCULAR; INTRAVENOUS AS NEEDED
Status: DISCONTINUED | OUTPATIENT
Start: 2021-06-19 | End: 2021-06-19

## 2021-06-19 RX ORDER — ONDANSETRON 2 MG/ML
4 INJECTION INTRAMUSCULAR; INTRAVENOUS EVERY 6 HOURS PRN
Status: DISCONTINUED | OUTPATIENT
Start: 2021-06-19 | End: 2021-06-21 | Stop reason: HOSPADM

## 2021-06-19 RX ORDER — DEXAMETHASONE SODIUM PHOSPHATE 4 MG/ML
INJECTION, SOLUTION INTRA-ARTICULAR; INTRALESIONAL; INTRAMUSCULAR; INTRAVENOUS; SOFT TISSUE AS NEEDED
Status: DISCONTINUED | OUTPATIENT
Start: 2021-06-19 | End: 2021-06-19

## 2021-06-19 RX ORDER — PROPOFOL 10 MG/ML
INJECTION, EMULSION INTRAVENOUS AS NEEDED
Status: DISCONTINUED | OUTPATIENT
Start: 2021-06-19 | End: 2021-06-19

## 2021-06-19 RX ORDER — CEFAZOLIN SODIUM 2 G/50ML
2000 SOLUTION INTRAVENOUS EVERY 8 HOURS
Status: COMPLETED | OUTPATIENT
Start: 2021-06-19 | End: 2021-06-20

## 2021-06-19 RX ORDER — FENTANYL CITRATE/PF 50 MCG/ML
50 SYRINGE (ML) INJECTION ONCE AS NEEDED
Status: DISCONTINUED | OUTPATIENT
Start: 2021-06-19 | End: 2021-06-19 | Stop reason: HOSPADM

## 2021-06-19 RX ORDER — ONDANSETRON 2 MG/ML
INJECTION INTRAMUSCULAR; INTRAVENOUS AS NEEDED
Status: DISCONTINUED | OUTPATIENT
Start: 2021-06-19 | End: 2021-06-19

## 2021-06-19 RX ORDER — FENTANYL CITRATE 50 UG/ML
INJECTION, SOLUTION INTRAMUSCULAR; INTRAVENOUS AS NEEDED
Status: DISCONTINUED | OUTPATIENT
Start: 2021-06-19 | End: 2021-06-19

## 2021-06-19 RX ORDER — OXYCODONE HYDROCHLORIDE 10 MG/1
10 TABLET ORAL EVERY 4 HOURS PRN
Status: DISCONTINUED | OUTPATIENT
Start: 2021-06-19 | End: 2021-06-21 | Stop reason: HOSPADM

## 2021-06-19 RX ORDER — HYDROMORPHONE HCL/PF 1 MG/ML
0.5 SYRINGE (ML) INJECTION
Status: ACTIVE | OUTPATIENT
Start: 2021-06-19 | End: 2021-06-19

## 2021-06-19 RX ADMIN — CEFAZOLIN SODIUM 2000 MG: 2 SOLUTION INTRAVENOUS at 08:26

## 2021-06-19 RX ADMIN — PHENYLEPHRINE HYDROCHLORIDE 200 MCG: 10 INJECTION INTRAVENOUS at 08:44

## 2021-06-19 RX ADMIN — HYDROMORPHONE HYDROCHLORIDE 0.5 MG: 1 INJECTION, SOLUTION INTRAMUSCULAR; INTRAVENOUS; SUBCUTANEOUS at 14:03

## 2021-06-19 RX ADMIN — PHENYLEPHRINE HYDROCHLORIDE 100 MCG: 10 INJECTION INTRAVENOUS at 08:26

## 2021-06-19 RX ADMIN — DOCUSATE SODIUM 100 MG: 100 CAPSULE, LIQUID FILLED ORAL at 17:01

## 2021-06-19 RX ADMIN — OXYCODONE HYDROCHLORIDE 10 MG: 10 TABLET ORAL at 17:01

## 2021-06-19 RX ADMIN — IOHEXOL 50 ML: 240 INJECTION, SOLUTION INTRATHECAL; INTRAVASCULAR; INTRAVENOUS; ORAL at 02:02

## 2021-06-19 RX ADMIN — PIPERACILLIN AND TAZOBACTAM 3.38 G: 36; 4.5 INJECTION, POWDER, FOR SOLUTION INTRAVENOUS at 06:00

## 2021-06-19 RX ADMIN — SODIUM CHLORIDE, SODIUM GLUCONATE, SODIUM ACETATE, POTASSIUM CHLORIDE, MAGNESIUM CHLORIDE, SODIUM PHOSPHATE, DIBASIC, AND POTASSIUM PHOSPHATE 110 ML/HR: .53; .5; .37; .037; .03; .012; .00082 INJECTION, SOLUTION INTRAVENOUS at 23:48

## 2021-06-19 RX ADMIN — PANTOPRAZOLE SODIUM 40 MG: 40 TABLET, DELAYED RELEASE ORAL at 13:28

## 2021-06-19 RX ADMIN — HYDROMORPHONE HYDROCHLORIDE 0.5 MG: 1 INJECTION, SOLUTION INTRAMUSCULAR; INTRAVENOUS; SUBCUTANEOUS at 20:06

## 2021-06-19 RX ADMIN — CEFAZOLIN SODIUM 2000 MG: 2 SOLUTION INTRAVENOUS at 15:08

## 2021-06-19 RX ADMIN — PHENYLEPHRINE HYDROCHLORIDE 200 MCG: 10 INJECTION INTRAVENOUS at 08:51

## 2021-06-19 RX ADMIN — HYDROMORPHONE HYDROCHLORIDE 0.5 MG: 1 INJECTION, SOLUTION INTRAMUSCULAR; INTRAVENOUS; SUBCUTANEOUS at 01:55

## 2021-06-19 RX ADMIN — FENTANYL CITRATE 100 MCG: 50 INJECTION, SOLUTION INTRAMUSCULAR; INTRAVENOUS at 08:24

## 2021-06-19 RX ADMIN — FENTANYL CITRATE 50 MCG: 50 INJECTION INTRAMUSCULAR; INTRAVENOUS at 10:21

## 2021-06-19 RX ADMIN — ENOXAPARIN SODIUM 80 MG: 80 INJECTION SUBCUTANEOUS at 13:28

## 2021-06-19 RX ADMIN — SODIUM CHLORIDE 500 ML: 0.9 INJECTION, SOLUTION INTRAVENOUS at 01:54

## 2021-06-19 RX ADMIN — CARVEDILOL 12.5 MG: 12.5 TABLET, FILM COATED ORAL at 17:01

## 2021-06-19 RX ADMIN — HYDROMORPHONE HYDROCHLORIDE 0.5 MG: 1 INJECTION, SOLUTION INTRAMUSCULAR; INTRAVENOUS; SUBCUTANEOUS at 23:50

## 2021-06-19 RX ADMIN — ONDANSETRON 4 MG: 2 INJECTION INTRAMUSCULAR; INTRAVENOUS at 01:55

## 2021-06-19 RX ADMIN — HYDROMORPHONE HYDROCHLORIDE 1 MG: 1 INJECTION, SOLUTION INTRAMUSCULAR; INTRAVENOUS; SUBCUTANEOUS at 03:42

## 2021-06-19 RX ADMIN — OXYCODONE HYDROCHLORIDE 10 MG: 10 TABLET ORAL at 22:10

## 2021-06-19 RX ADMIN — Medication 100 MG: at 08:21

## 2021-06-19 RX ADMIN — ONDANSETRON 4 MG: 2 INJECTION INTRAMUSCULAR; INTRAVENOUS at 10:02

## 2021-06-19 RX ADMIN — DEXAMETHASONE SODIUM PHOSPHATE 4 MG: 4 INJECTION INTRA-ARTICULAR; INTRALESIONAL; INTRAMUSCULAR; INTRAVENOUS; SOFT TISSUE at 08:25

## 2021-06-19 RX ADMIN — PHENYLEPHRINE HYDROCHLORIDE 100 MCG: 10 INJECTION INTRAVENOUS at 08:38

## 2021-06-19 RX ADMIN — LIDOCAINE HYDROCHLORIDE 50 MG: 10 INJECTION, SOLUTION EPIDURAL; INFILTRATION; INTRACAUDAL at 08:21

## 2021-06-19 RX ADMIN — HYDROMORPHONE HYDROCHLORIDE 1 MG: 1 INJECTION, SOLUTION INTRAMUSCULAR; INTRAVENOUS; SUBCUTANEOUS at 04:19

## 2021-06-19 RX ADMIN — MIDAZOLAM HYDROCHLORIDE 2 MG: 1 INJECTION, SOLUTION INTRAMUSCULAR; INTRAVENOUS at 08:16

## 2021-06-19 RX ADMIN — ONDANSETRON 4 MG: 2 INJECTION INTRAMUSCULAR; INTRAVENOUS at 18:54

## 2021-06-19 RX ADMIN — ACETAMINOPHEN 975 MG: 325 TABLET, FILM COATED ORAL at 22:09

## 2021-06-19 RX ADMIN — HYDROMORPHONE HYDROCHLORIDE 0.5 MG: 1 INJECTION, SOLUTION INTRAMUSCULAR; INTRAVENOUS; SUBCUTANEOUS at 11:07

## 2021-06-19 RX ADMIN — OXYCODONE HYDROCHLORIDE 10 MG: 10 TABLET ORAL at 11:15

## 2021-06-19 RX ADMIN — PROPOFOL 150 MG: 10 INJECTION, EMULSION INTRAVENOUS at 08:21

## 2021-06-19 RX ADMIN — SODIUM CHLORIDE: 0.9 INJECTION, SOLUTION INTRAVENOUS at 09:12

## 2021-06-19 RX ADMIN — ACETAMINOPHEN 975 MG: 325 TABLET, FILM COATED ORAL at 13:28

## 2021-06-19 RX ADMIN — PHENYLEPHRINE HYDROCHLORIDE 100 MCG: 10 INJECTION INTRAVENOUS at 08:30

## 2021-06-19 RX ADMIN — FENTANYL CITRATE 50 MCG: 50 INJECTION INTRAMUSCULAR; INTRAVENOUS at 09:55

## 2021-06-19 RX ADMIN — ONDANSETRON 4 MG: 2 INJECTION INTRAMUSCULAR; INTRAVENOUS at 08:25

## 2021-06-19 RX ADMIN — ENOXAPARIN SODIUM 80 MG: 80 INJECTION SUBCUTANEOUS at 22:10

## 2021-06-19 RX ADMIN — PHENYLEPHRINE HYDROCHLORIDE 200 MCG: 10 INJECTION INTRAVENOUS at 08:34

## 2021-06-19 RX ADMIN — SODIUM CHLORIDE 125 ML/HR: 0.9 INJECTION, SOLUTION INTRAVENOUS at 04:16

## 2021-06-19 RX ADMIN — SODIUM CHLORIDE, SODIUM GLUCONATE, SODIUM ACETATE, POTASSIUM CHLORIDE, MAGNESIUM CHLORIDE, SODIUM PHOSPHATE, DIBASIC, AND POTASSIUM PHOSPHATE 125 ML/HR: .53; .5; .37; .037; .03; .012; .00082 INJECTION, SOLUTION INTRAVENOUS at 11:11

## 2021-06-19 RX ADMIN — IOHEXOL 100 ML: 350 INJECTION, SOLUTION INTRAVENOUS at 03:53

## 2021-06-19 RX ADMIN — HYDROMORPHONE HYDROCHLORIDE 0.5 MG: 1 INJECTION, SOLUTION INTRAMUSCULAR; INTRAVENOUS; SUBCUTANEOUS at 18:50

## 2021-06-19 NOTE — QUICK NOTE
Post Op Check:    Saw patient at the bedside once they arrived to the floor  Patients pain is well controlled  Denied any nausea, chest pain, or shortness of breath  GEN: NAD, A&O  Chest: Normal work of breathing, no respiratory distress  Abd: S, mildly distended, appropriately tender at incision    Plan:  Diet Surgical; Clear Liquid;  No Carbonation  Continue to monitor  Pain and nausea control PRN    Mckenna Gilmore MD  Surgery, PGY-4

## 2021-06-19 NOTE — PROGRESS NOTES
Please see resident H and P    Reviewed H&P from his nephrectomy earlier this week    Status post robotic assisted right partial nephrectomy  Over last 24 hours he has developed increasing abdominal pain and distention with nausea and vomiting  Workup to the ER revealed what appears to be an incarcerated hernia  There is a small dot of air next to the bowel  Perforation cannot be ruled out however suspect this is more due to his recent surgery  Abdomen is round and soft  There is a palpable lump in the right rectus area  This is tender non reducible  Impression  Incarcerated incisional hernia  Recent robotic assisted partial right nephrectomy  History of PE  Possible on PE on today's study  History of sarcoidosis    Plan:  Risks and benefits for repair of incarcerated incisional hernia potential for bowel resection were discussed with he and family they agreed to proceed  Will cover with Lovenox mg per kg for surgery    Will ask Pulmonary to see postoperatively

## 2021-06-19 NOTE — ED PROVIDER NOTES
History  Chief Complaint   Patient presents with    Post-op Problem     Pt had partial right nephrectomy on monday and now c/o increased pain and swelling at incision site and shortness of breath  Not eating or drinking   Shortness of Breath     Patient is a 48year old male with worsening right sided abdominal pain and flank pain for past few days  (+) sob  No cough or fever  (+) sweating  Decreased urination  (+) constipation  Had surgery on 6/14/21 by Dr Georgie Prado for partial nephrectomy for decortication of right renal cyst  Was last seen in this ED on 67/35/62 for periumbilical abdominal pain  [facility] website checked on this patient and last Rx filled was on 6/17/21 for oxycodone for 1 day supply  Has had prior gastric bypass surgery  History provided by:  Patient and spouse   used: No    Shortness of Breath  Associated symptoms: abdominal pain and diaphoresis    Associated symptoms: no cough, no fever and no vomiting        Prior to Admission Medications   Prescriptions Last Dose Informant Patient Reported? Taking?    APPLE CIDER VINEGAR PO   Yes No   Sig: Take by mouth daily   Cholecalciferol 25 MCG (1000 UT) tablet   Yes No   Sig: TAKE ONE TABLET BY MOUTH DAILY FOR VITAMIN D SUPPLEMENTATION   Multiple Vitamin (Daily-Mark) TABS  Self No No   Sig: TAKE 1 TABLET BY MOUTH EVERY DAY   NON FORMULARY   Yes No   Sig: daily Beet Root   acetaminophen (TYLENOL) 325 mg tablet   No No   Sig: Take 2 tablets (650 mg total) by mouth every 4 (four) hours as needed for mild pain   calcium carbonate-vitamin D (OSCAL-D) 500 mg-200 units per tablet   Yes No   Sig: TAKE ONE TABLET BY MOUTH DAILY TO SUPPLEMENT CALCIUM/ STRENGTHEN BONES   calcium citrate-vitamin D (CITRACAL+D) 315-200 MG-UNIT per tablet  Self No No   Sig: Take 1 tablet by mouth 2 (two) times a day   capsaicin (ZOSTRIX) 0 025 % cream  Self Yes No   Sig: Apply topically as needed    carvedilol (COREG) 12 5 mg tablet  Self No No Sig: Take 1 tablet (12 5 mg total) by mouth 2 (two) times a day with meals   docusate sodium (COLACE) 100 mg capsule   No No   Sig: Take 1 capsule (100 mg total) by mouth 2 (two) times a day for 15 days   losartan (COZAAR) 50 mg tablet  Self No No   Sig: Take 1 tablet (50 mg total) by mouth daily Has been on it without issues   Patient taking differently: Take 50 mg by mouth daily in the early morning Has been on it without issues   omeprazole (PriLOSEC) 20 mg delayed release capsule  Self No No   Sig: Take 1 capsule (20 mg total) by mouth 2 (two) times a day   oxyCODONE (ROXICODONE) 5 mg immediate release tablet   No No   Sig: Take 1 tablet (5 mg total) by mouth every 4 (four) hours as needed for severe pain for up to 5 daysMax Daily Amount: 30 mg   traMADol (ULTRAM) 50 mg tablet  Self Yes No   Sig: Take 50 mg by mouth every 6 (six) hours as needed for moderate pain      Facility-Administered Medications: None       Past Medical History:   Diagnosis Date    Bariatric surgery status     Duodenal ulcer     GERD (gastroesophageal reflux disease)     Gout     Hyperparathyroidism (HCC)     Hypertension     Lung nodule     Postsurgical malabsorption     Pulmonary embolism ([facility] [address] ) 2019    Sarcoidosis     Wears glasses        Past Surgical History:   Procedure Laterality Date    BARIATRIC SURGERY      Gastric Sleeve    COLONOSCOPY      FOOT SURGERY Right     R ankle    GASTRIC BYPASS LAPAROSCOPIC N/A 9/22/2020    Procedure: LAPAROSCOPIC REVISION/ CONVERSION TO HALEY-EN-Y GASTRIC BYPASS W ROBOTICS, PARAESOPHAGEAL HERNIA REPAIR, AND INTRAOPERATIVE EGD;  Surgeon: Tong Colbert MD;  Location: [facility];  Service: Bariatrics    HAND SURGERY      OH LAP,PARTIAL NEPHRECTOMY Right 6/14/2021    Procedure: ROBOTIC LAPAROSCOPIC PARTIAL NEPHRECTOMY DECORTICATION OF RIGHT RENAL CYST;  Surgeon: Mary Kay Hameed MD;  Location: [facility];  Service: Urology       Family History   Problem Relation Age of Onset    Hypertension Father      I have reviewed and agree with the history as documented  E-Cigarette/Vaping    E-Cigarette Use Never User      E-Cigarette/Vaping Substances    Nicotine No     THC No     Flavoring No     Other No     Unknown No      Social History     Tobacco Use    Smoking status: Former Smoker     Packs/day: 1 00     Years: 7 00     Pack years: 7 00     Types: Cigars, Cigarettes     Start date: 1999     Quit date: 2006     Years since quitting: 15 4    Smokeless tobacco: Never Used    Tobacco comment: only smoked occ cigar, not for over 6 months   Vaping Use    Vaping Use: Never used   Substance Use Topics    Alcohol use: Not Currently    Drug use: Not Currently     Types: Marijuana, Cocaine     Comment: pt quit 20 years ago        Review of Systems   Constitutional: Positive for diaphoresis  Negative for fever  Respiratory: Positive for shortness of breath  Negative for cough  Gastrointestinal: Positive for abdominal pain and constipation  Negative for diarrhea, nausea and vomiting  Genitourinary: Positive for decreased urine volume  All other systems reviewed and are negative  Physical Exam  Physical Exam  Vitals and nursing note reviewed  Constitutional:       General: He is in acute distress (moderate)  HENT:      Head: Normocephalic and atraumatic  Mouth/Throat:      Comments: Mucous membranes somewhat moist    Eyes:      General: No scleral icterus  Cardiovascular:      Rate and Rhythm: Normal rate and regular rhythm  Heart sounds: Normal heart sounds  No murmur heard  Pulmonary:      Breath sounds: No stridor  No wheezing, rhonchi or rales  Comments: Tachypnea  Abdominal:      Palpations: Abdomen is soft  Tenderness: There is abdominal tenderness (diffuse)  There is right CVA tenderness  There is no guarding or rebound  Comments: Bowel sounds decreased  Musculoskeletal:         General: No deformity        Cervical back: Normal range of motion and neck supple  Right lower leg: No edema  Left lower leg: No edema  Skin:     General: Skin is warm and dry  Coloration: Skin is not jaundiced  Findings: No erythema or rash  Comments: Surgical site of abdomen without evidence of infection  Neurological:      General: No focal deficit present  Mental Status: He is alert and oriented to person, place, and time  Psychiatric:      Comments: Somewhat anxious            Vital Signs  ED Triage Vitals   Temperature Pulse Respirations Blood Pressure SpO2   06/19/21 0130 06/19/21 0130 06/19/21 0130 06/19/21 0130 06/19/21 0130   98 2 °F (36 8 °C) 89 (!) 24 162/90 98 %      Temp Source Heart Rate Source Patient Position - Orthostatic VS BP Location FiO2 (%)   06/19/21 0130 06/19/21 0130 -- 06/19/21 0130 --   Oral Monitor  Right arm       Pain Score       06/19/21 0155       9           Vitals:    06/19/21 0500 06/19/21 0600 06/19/21 0700 06/19/21 0730   BP: 133/88 149/98 157/94 140/87   Pulse: 100 92 100 90         Visual Acuity      ED Medications  Medications   sodium chloride 0 9 % infusion ( Intravenous Continue from Pre-op 6/19/21 0730)   enoxaparin (LOVENOX) subcutaneous injection 100 mg (0 mg Subcutaneous Hold 6/19/21 0730)   sodium chloride 0 9 % bolus 500 mL (0 mL Intravenous Stopped 6/19/21 0415)   ondansetron (ZOFRAN) injection 4 mg (4 mg Intravenous Given 6/19/21 0155)   HYDROmorphone (DILAUDID) injection 0 5 mg (0 5 mg Intravenous Given 6/19/21 0155)   iohexol (OMNIPAQUE) 240 MG/ML solution 50 mL (50 mL Oral Given 6/19/21 0202)   HYDROmorphone (DILAUDID) injection 1 mg (1 mg Intravenous Given 6/19/21 0342)   iohexol (OMNIPAQUE) 350 MG/ML injection (SINGLE-DOSE) 100 mL (100 mL Intravenous Given 6/19/21 0353)   HYDROmorphone (DILAUDID) injection 1 mg (1 mg Intravenous Given 6/19/21 0419)   piperacillin-tazobactam (ZOSYN) 3 375 g in sodium chloride 0 9 % 100 mL IVPB (0 g Intravenous Stopped 6/19/21 8222) Diagnostic Studies  Results Reviewed     Procedure Component Value Units Date/Time    High sensitivity CRP [[id number]] Collected: 06/19/21 0228    Lab Status: Final result Specimen: Blood from Arm, Left Updated: 06/19/21 0400     CRP, High Sensitivity >90 00 mg/L     Narrative:            HsCRP Level       Relative Risk           <1 0 mg/L          Low           1 0 to 3 0 mg/L    Average           >3 0 mg/L          High        Comprehensive metabolic panel [[id number]]  (Abnormal) Collected: 06/19/21 0228    Lab Status: Final result Specimen: Blood from Arm, Left Updated: 06/19/21 0340     Sodium 135 mmol/L      Potassium 5 0 mmol/L      Chloride 98 mmol/L      CO2 27 mmol/L      ANION GAP 10 mmol/L      BUN 17 mg/dL      Creatinine 1 11 mg/dL      Glucose 124 mg/dL      Calcium 8 9 mg/dL      Corrected Calcium 9 7 mg/dL      AST 36 U/L      ALT 36 U/L      Alkaline Phosphatase 83 U/L      Total Protein 7 8 g/dL      Albumin 3 0 g/dL      Total Bilirubin 0 70 mg/dL      eGFR 87 ml/min/1 73sq m     Narrative:      [place] guidelines for Chronic Kidney Disease (CKD):     Stage 1 with normal or high GFR (GFR > 90 mL/min/1 73 square meters)    Stage 2 Mild CKD (GFR = 60-89 mL/min/1 73 square meters)    Stage 3A Moderate CKD (GFR = 45-59 mL/min/1 73 square meters)    Stage 3B Moderate CKD (GFR = 30-44 mL/min/1 73 square meters)    Stage 4 Severe CKD (GFR = 15-29 mL/min/1 73 square meters)    Stage 5 End Stage CKD (GFR <15 mL/min/1 73 square meters)  Note: GFR calculation is accurate only with a steady state creatinine    Lipase [[id number]]  (Abnormal) Collected: 06/19/21 0228    Lab Status: Final result Specimen: Blood from Arm, Left Updated: 06/19/21 0332     Lipase 35 u/L     CK Total with Reflex CKMB [[id number]]  (Normal) Collected: 06/19/21 0228    Lab Status: Final result Specimen: Blood from Arm, Left Updated: 06/19/21 0332     Total  U/L     NT-BNP PRO [[id number]] (Abnormal) Collected: 06/19/21 0228    Lab Status: Final result Specimen: Blood from Arm, Left Updated: 06/19/21 0332     NT-proBNP 151 pg/mL     D-Dimer [[id number]]  (Abnormal) Collected: 06/19/21 0228    Lab Status: Final result Specimen: Blood from Arm, Left Updated: 06/19/21 0313     D-Dimer, Quant 6 78 ug/ml FEU     Troponin I [[id number]]  (Normal) Collected: 06/19/21 0228    Lab Status: Final result Specimen: Blood from Arm, Left Updated: 06/19/21 0304     Troponin I <0 02 ng/mL     Lactic acid [[id number]]  (Normal) Collected: 06/19/21 0228    Lab Status: Final result Specimen: Blood from Arm, Left Updated: 06/19/21 0303     LACTIC ACID 1 2 mmol/L     Narrative:      Result may be elevated if tourniquet was used during collection      Protime-INR [[id number]]  (Normal) Collected: 06/19/21 0228    Lab Status: Final result Specimen: Blood from Arm, Left Updated: 06/19/21 0254     Protime 12 6 seconds      INR 0 93    APTT [[id number]]  (Normal) Collected: 06/19/21 0228    Lab Status: Final result Specimen: Blood from Arm, Left Updated: 06/19/21 0254     PTT 33 seconds     Blood gas, venous [[id number]] Collected: 06/19/21 0228    Lab Status: Final result Specimen: Blood from Arm, Left Updated: 06/19/21 0243     pH, [place] 7 376     pCO2, [place] 49 6 mm Hg      pO2, [place] 35 5 mm Hg      HCO3, [place] 28 4 mmol/L      Base Excess, El[place] 2 4 mmol/L      O2 Content, El[place] 11 7 ml/dL      O2 HGB, VENOUS 61 9 %     CBC and differential [[id number]]  (Abnormal) Collected: 06/19/21 0228    Lab Status: Final result Specimen: Blood from Arm, Left Updated: 06/19/21 0243     WBC 6 89 Thousand/uL      RBC 4 71 Million/uL      Hemoglobin 13 3 g/dL      Hematocrit 41 1 %      MCV 87 fL      MCH 28 2 pg      MCHC 32 4 g/dL      RDW 13 6 %      MPV 11 6 fL      Platelets 478 Thousands/uL      nRBC 0 /100 WBCs      Neutrophils Relative 78 %      Immat GRANS % 0 %      Lymphocytes Relative 10 %      Monocytes Relative 10 %      Eosinophils Relative 2 %      Basophils Relative 0 %      Neutrophils Absolute 5 37 Thousands/µL      Immature Grans Absolute 0 01 Thousand/uL      Lymphocytes Absolute 0 71 Thousands/µL      Monocytes Absolute 0 67 Thousand/µL      Eosinophils Absolute 0 11 Thousand/µL      Basophils Absolute 0 02 Thousands/µL     Blood culture #1 [[id number]] Collected: 06/19/21 0228    Lab Status: In process Specimen: Blood from Arm, Left Updated: 06/19/21 0242    Blood culture #2 [[id number]] Collected: 06/19/21 0228    Lab Status: In process Specimen: Blood from Arm, Left Updated: 06/19/21 0237    UA (URINE) with reflex to Scope [[id number]]     Lab Status: No result Specimen: Urine     Urine culture [[id number]]     Lab Status: No result Specimen: Urine, Clean Catch                  PE Study with CT Abdomen and Pelvis with contrast   ED Interpretation by Rick Richards MD (06/19 0540)   FINDINGS:  CHEST  PULMONARY ARTERIAL TREE:    There is a nonocclusive filling defect in a 2nd order left lower lobe pulmonary artery branch (series 2, image 105)  No additional filling defects are seen            LUNGS:    There is respiratory motion artifact, somewhat limiting evaluation      Lung volumes are diminished  Vague groundglass infiltrates are present throughout the lungs bilaterally      Infiltrate left lower lobe presenting atelectasis or pneumonia  Less likely would be pulmonary infarction         PLEURA:  Unremarkable            HEART/AORTA:  The heart is enlarged  Coronary artery calcifications         MEDIASTINUM AND MYRON:  Unremarkable         CHEST WALL AND LOWER NECK:   Unremarkable               ABDOMEN  LIVER/BILIARY TREE:  Mild fatty infiltrative changes in the liver         GALLBLADDER:  No calcified gallstones   No pericholecystic inflammatory change            SPLEEN:  Unremarkable         PANCREAS:  Unremarkable         ADRENAL GLANDS:  Unremarkable            KIDNEYS/URETERS:    RIGHT KIDNEY:  There is heterogeneous enhancement of the mid and lower pole of the right kidney  Small amount of air and fluid are present adjacent to the kidney, within the pararenal space         LEFT KIDNEY:  Stable renal cysts            STOMACH AND BOWEL:    There has been prior gastric bypass  There is a hiatal hernia  There is esophageal dilatation, most compatible with mild achalasia  Mild reflux of oral contrast material into the distal esophagus      The excluded segment of stomach is distended and fluid-filled      The proximal loops of small bowel are distended and fluid filled  The mid small bowel is distended with oral contrast material   There is a midline ventral abdominal wall hernia containing loops of small bowel  The exiting loops of small bowel are   distended  Return in loops of small bowel are decompressed  There is air present in the hernia sac, within the subcutaneous tissues  Distal small bowel loops are decompressed      The    colon is decompressed         APPENDIX:  No findings to suggest appendicitis           ABDOMINOPELVIC CAVITY:  Small amount of free fluid and free air within the abdomen and pelvis  No lymphadenopathy         VESSELS:  Atherosclerotic changes are present  No evidence of aneurysm               PELVIS  REPRODUCTIVE ORGANS:  The prostate is enlarged         URINARY BLADDER:  Unremarkable            ABDOMINAL WALL/INGUINAL REGIONS:  Large supraumbilical ventral abdominal wall hernia containing obstructed loop of small bowel  There is air present in the soft tissues adjacent to the herniated loop, consistent with perforation  Small amount of fluid   tracts along the right lateral aspect of the anterior abdominal wall right      There is mild anasarca         OSSEOUS STRUCTURES:  No acute fracture or destructive osseous lesion  Spinal degenerative changes are noted               IMPRESSION:  1    Solitary nonocclusive filling defect in a secondary left lower lobe pulmonary artery, most    compatible with pulmonary embolus      Measured RV/LV ratio is within normal limits at less than 0 9           2  Left lower lobe infiltrate representing atelectasis or pneumonia  Pulmonary infarction less likely, given the thrombus is nonocclusive         3  High-grade distal small bowel obstruction secondary to supraumbilical ventral abdominal wall hernia  There is air present in the soft tissues adjacent to the hernia sac  Although the subcutaneous air may be related to recent surgery, bowel   perforation not excluded  Recommend emergent surgical consultation         4  Abnormal appearance of the right kidney, likely related to recent surgery  Continued follow-up recommended to exclude evolving abscess  Follow-up urology consultation recommended            I personally discussed this study with Syd Alegria on 6/19/2021 at 5:25 AM               Workstation performed: [id number]         Final Result by Renetta Valladares DO (06/19 7521)   1  Solitary nonocclusive filling defect in a secondary left lower lobe pulmonary artery, most compatible with pulmonary embolus  Measured RV/LV ratio is within normal limits at less than 0 9            2   Left lower lobe infiltrate representing atelectasis or pneumonia  Pulmonary infarction less likely, given the thrombus is nonocclusive  3   High-grade distal small bowel obstruction secondary to supraumbilical ventral abdominal wall hernia  There is air present in the soft tissues adjacent to the hernia sac  Although the subcutaneous air may be related to recent surgery, bowel    perforation not excluded  Recommend emergent surgical consultation  4   Abnormal appearance of the right kidney, likely related to recent surgery  Continued follow-up recommended to exclude evolving abscess  Follow-up urology consultation recommended               I personally discussed this study with Syd Alegria on 6/19/2021 at 5:25 AM                Workstation performed: [id number]         XR chest 1 view portable   ED Interpretation by Bee Villalta MD (06/19 0235)   LLL atx and no PTX read by me  Procedures  ECG 12 Lead Documentation Only    Date/Time: 6/19/2021 2:33 AM  Performed by: Bee Villalta MD  Authorized by: Bee Villalta MD     Indications / Diagnosis:  Sob, abdominal pain  ECG reviewed by me, the ED Provider: yes    Patient location:  ED  Previous ECG:     Previous ECG:  Compared to current    Comparison ECG info:  9/23/20    Similarity:  Changes noted (not s  tachy now; NSSTT changes now)  Rate:     ECG rate:  87    ECG rate assessment: normal    Rhythm:     Rhythm: sinus rhythm    Ectopy:     Ectopy: PVCs      PVCs:  Infrequent  Conduction:     Conduction: abnormal      Abnormal conduction: LPFB    ST segments:     ST segments:  Non-specific  T waves:     T waves: non-specific    Other findings:     Other findings: poor R wave progression      CriticalCare Time  Performed by: Bee Villalta MD  Authorized by: Bee Villalta MD     Critical care provider statement:     Critical care time (minutes):  50    Critical care time was exclusive of:  Separately billable procedures and treating other patients    Critical care was necessary to treat or prevent imminent or life-threatening deterioration of the following conditions: PE and perforated ventral hernia with bowel obstruction      Critical care was time spent personally by me on the following activities:  Obtaining history from patient or surrogate, development of treatment plan with patient or surrogate, discussions with consultants, evaluation of patient's response to treatment, examination of patient, ordering and performing treatments and interventions, ordering and review of laboratory studies, re-evaluation of patient's condition, ordering and review of radiographic studies and review of old charts    I assumed direction of critical care for this patient from another provider in my specialty: no               ED Course  ED Course as of Jun 19 0744   Sat Jun 19, 2021   4360 Patient still with pain as per ED RN so more IV dilaudid ordered  Chica d/w patient and wife with patient's permission  More IV dilaudid was ordered for pain  9085 CT PE study ordered with CT abd/pelvis  Patient states he has had prior PE  D-Dimer, Quant(!): 6 78   [id number] D/w Dr Freddie Zamarripa, Bariatric fellow on call who spoke with bariatric attending and patient did not have ventral hernia at the end of last year and patient should have urology notified and potentially general surgery  D/w urology AP who will notify Dr Gil Fuentes  0183 D/w surgical resident who is going to take patient to OR with Dr Milton Homans and he ordered lovenox                   HEART Risk Score      Most Recent Value   Heart Score Risk Calculator   History  1 Filed at: 06/19/2021 0306   ECG  1 Filed at: 06/19/2021 0306   Age  1 Filed at: 06/19/2021 0306   Risk Factors  2 Filed at: 06/19/2021 0306   Troponin  0 Filed at: 06/19/2021 0306   HEART Score  5 Filed at: 06/19/2021 0306              PERC Rule for PE      Most Recent Value   PERC Rule for PE   Age >=50  1 Filed at: 06/19/2021 0141   HR >=100  --   O2 Sat on room air < 95%  --   History of PE or DVT  1 Filed at: 06/19/2021 0141   Recent trauma or surgery  --   Hemoptysis  --   Exogenous estrogen  --   Unilateral leg swelling  --   PERC Rule for PE Results  2 Filed at: 06/19/2021 0141                  Wells' Criteria for PE      Most Recent Value   Wells' Criteria for PE   Clinical signs and symptoms of DVT  0 Filed at: 06/19/2021 0142   PE is primary diagnosis or equally likely  3 Filed at: 06/19/2021 0142   HR >100  0 Filed at: 06/19/2021 0142   Immobilization at least 3 days or Surgery in the previous 4 weeks  1 5 Filed at: 06/19/2021 0142   Previous, objectively diagnosed PE or DVT  1 5 Filed at: 06/19/2021 0142   Hemoptysis  0 Filed at: 06/19/2021 5707 Malignancy with treatment within 6 months or palliative  0 Filed at: 06/19/2021 0142   Wells' Criteria Total  6 Filed at: 06/19/2021 0142                [facility]  Number of Diagnoses or Management Options  Diagnosis management comments: DDx including but not limited to: appendicitis, gastroenteritis, gastritis, PUD, GERD, gastroparesis, hepatitis, pancreatitis, colitis, enteritis, diverticulitis, food poisoning, mesenteric adenitis, mesenteric ischemia, IBD, IBS, ileus, bowel obstruction, volvulus, internal hernia, cholecystitis, biliary colic, choledocholithiasis, perforated viscus, tumor, splenic etiology, constipation, AAA, testicular torsion, renal colic, pyelonephritis, UTI; doubt cardiac etiology  DDX including but not limited to: pneumonia, pleural effusion, CHF, PE, PTX, ACS, MI, asthma exacerbation, COPD exacerbation; doubt COVID 19; anemia, metabolic abnormality, renal failure, dehydration, ELMER          Amount and/or Complexity of Data Reviewed  Clinical lab tests: ordered and reviewed  Tests in the radiology section of CPT®: reviewed and ordered  Decide to obtain previous medical records or to obtain history from someone other than the patient: yes  Obtain history from someone other than the patient: yes  Review and summarize past medical records: yes  Discuss the patient with other providers: yes  Independent visualization of images, tracings, or specimens: yes        Disposition  Final diagnoses:   SBO (small bowel obstruction) ([address] )   Ventral hernia with bowel obstruction - with probable perforation   Pulmonary embolus ([facility] [address] )     Time reflects when diagnosis was documented in both MDM as applicable and the Disposition within this note     Time User Action Codes Description Comment    6/19/2021  5:47 AM Shira Rhodes Add [K56 609] SBO (small bowel obstruction) ([address] )     6/19/2021  5:47 AM Shira Rhodes Add [K43 6] Ventral hernia with bowel obstruction     6/19/2021  5:47 AM Shira Rhodes Modify [K43 6] Ventral hernia with bowel obstruction with probable perforation    6/19/2021  5:48 AM Jordan Artis Add [I26 99] Pulmonary embolus [facility])       ED Disposition     ED Disposition Condition Date/Time Comment    Send to OR  Sat Jun 19, 2021  7:43 AM       Follow-up Information    None         Patient's Medications   Discharge Prescriptions    No medications on file     No discharge procedures on file      PDMP Review       Value Time User    PDMP Reviewed  Yes 6/19/2021  1:22 AM Skyler Goodman MD          ED Provider  Electronically Signed by           Skyler Goodman MD  06/19/21 7471

## 2021-06-19 NOTE — ED NOTES
Patient aware he may be transferred, call bell within reach, bed low position, side rail up x 1920 [facility] David, RN  06/19/21 0625

## 2021-06-19 NOTE — ANESTHESIA POSTPROCEDURE EVALUATION
Post-Op Assessment Note    CV Status:  Stable  Pain Score: 0    Pain management: adequate     Mental Status:  Alert and awake   Hydration Status:  Euvolemic   PONV Controlled:  Controlled   Airway Patency:  Patent      Post Op Vitals Reviewed: Yes      Staff: CRNA, Anesthesiologist         No complications documented      BP   133/77   Temp   97 5   Pulse  98   Resp   20   SpO2  99%

## 2021-06-19 NOTE — OP NOTE
OPERATIVE REPORT  PATIENT NAME: Titi Peña    :  1965  MRN: [id number]  Pt Location: [facility] OR ROOM 01    SURGERY DATE: 2021    Surgeon(s) and Role:     * Lori Puga DO - Primary     * Suzy Stallworth MD - Assisting    Preop Diagnosis:  SBO secondary to incarcerated incisional hernia    Post-Op Diagnosis Codes:     * SBO (small bowel obstruction) ([facility] [address] ) [N41 019] secondary to incarcerated incisional hernia  Procedure(s) (LRB):  Repair of incarcerated incisional hernia repair with mesh  (N/A)  2 5 in Ventralex patch    Specimen(s):  * No specimens in log *    Estimated Blood Loss:   Minimal    Drains:  Closed/Suction Drain Right Abdomen Bulb 19 Fr  (Active)   Number of days: 270       Anesthesia Type:   General/local    Operative Indications:  [facility] [K56 609]  Incarcerated incisional hernia    Operative Findings:  Incarcerated incisional hernia on the medial side of his right paramedian incision  All bowel was healthy in appearance  No bowel resection was required  This was repaired with a patch  It appeared that a stitch had pulled through in the medial side of the previous fascial incision  ASA for ED  Height 65 in weight 101 kg/223 lb  BMI 37    Complications:   None    Procedure and Technique:  Patient was brought the operative suite and identified by visualization, conversation, by armband  Sequential compression pumps were placed  Given Ancef perioperatively  Once under anesthesia abdomen is then prepped and draped in a sterile fashion  Time-out was performed was assured that the prep was dry  He was given Lovenox 1 milligram/kilogram preoperatively  Prior to prepping the skin glue was removed over the right paramedian incision  Once the time-out was completed, careful division of the subcuticular suture was done was staples  Better finger into the subcutaneous tissue and release all loop of bowel  This all appeared healthy in appearance    Palpation of the deeper tissues did reveal the hernia to be in the medial aspect of this incision  Due to extend this incision a few cm medially to properly expose the hernia  Sexual hernia defect was quite small  Had to cut 1 of the fascial sutures to allow replaced with the bowel back into the abdominal cavity  Again this was all appeared healthy without signs of perforation  2 5 in Ventralex patch was chosen and placed under the fascial defect  I did place some omentum over the small bowel prior to putting the patch in  Patch was anchored at 12, 3, 6, 9:00 a m  Positions with 0 Vicryl suture  0 Vicryl was then used in a figure-of-eight fashion x3 to close the fascia on top of the mesh  Copious irrigation was carried out  Local was instilled  Two 0 Vicryl was used to close subcutaneous tissues in a simple fashion  3-0 PDS was used to close the superficial subcutaneous tissues  Four Monocryl was used to close skin in a subcuticular fashion  Wounds washed and dried  Sterile skin glue was applied  Was awake in the operating returned to the recovery area in stable condition having tolerated procedure well     I was present for the entire procedure    Patient Disposition:  PACU     SIGNATURE: Mandie Oconnell DO  DATE: June 19, 2021  TIME: 9:16 AM

## 2021-06-19 NOTE — PROGRESS NOTES
Patient is postoperative day number 5 status post robotic assisted laparoscopic right partial nephrectomy for a solid renal mass concerning for renal cell carcinoma  He presented to the emergency room with 48 hours progressive abdominal pain and poor p o  Intake as well as dyspnea  CT scan reveals an incisional hernia at the midline port site with a loop of small bowel and associated transition point  Perinephric findings are consistent with normal expected postoperative changes  Also noted on the CT scan his a potential pulmonary embolism  I coordinated care with Dr Nancy Lozano  from general surgery  Patient is now status post ventral hernia repair  All of the bowel identified was viable and no small-bowel resection was required  Hernia repair performed with mesh  I have spoken to the patient's wife by phone  Findings at time of surgery discussed with her  At this point we will plan for an evaluation by Pulmonary Critical Care to determine if any additional intervention is needed for the pulmonary embolism        Full consult by Urology to follow

## 2021-06-19 NOTE — ED NOTES
Spoke with Cyndi Zungia in [address] [place] [address], aware per surgery resident Dr Pola Joyner wants lovenox given, we do not have dose in ER, spoke with Freda Handing in pharmacy and she is sending to [address] [place] [address] Road, RN  06/19/21 3302

## 2021-06-19 NOTE — ED NOTES
Patient transported to [address] [place] [address], [address] [place] [address]  06/19/21 0474 PMD

## 2021-06-19 NOTE — ED NOTES
ivf infusing, aware awaiting ct scan, call bell within reach, bed low position     Ishan Bergman, [employer]  06/19/21 2370
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
## 2021-06-19 NOTE — ANESTHESIA PREPROCEDURE EVALUATION
Procedure:  LAPAROTOMY EXPLORATORY, repair of ventral hernia, possible bowel resection (N/A Abdomen)    Relevant Problems   CARDIO   (+) Chest pain   (+) Chronic systolic congestive heart failure (HCC)   (+) Essential hypertension   (+) Hypercholesterolemia   (+) Mixed hyperlipidemia      ENDO   (+) Hyperparathyroidism , secondary, non-renal (HCC)   (+) Type 2 diabetes mellitus with hyperglycemia (HCC)      GI/HEPATIC   (+) Bariatric surgery status   (+) Gastroesophageal reflux disease      /RENAL   (+) Renal cyst      MUSCULOSKELETAL   (+) Arthritis   (+) Generalized osteoarthritis   (+) Gout      NEURO/PSYCH   (+) History of pulmonary embolism      PULMONARY   (+) Cigar smoker      Other   (+) NICM (nonischemic cardiomyopathy) (HCC)   (+) Obesity, Class II, BMI 35-39 9   (+) Sarcoidosis        Physical Exam    Airway    Mallampati score: II  TM Distance: >3 FB  Neck ROM: full     Dental       Cardiovascular  Cardiovascular exam normal    Pulmonary  Pulmonary exam normal     Other Findings        Anesthesia Plan  ASA Score- 3 Emergent    Anesthesia Type- general with ASA Monitors  Additional Monitors: arterial line  Airway Plan: ETT  Comment: Possible A line discussed if surgery is extensive or takes longer or pt hemodynamically unstable   Plan Factors-Exercise tolerance (METS): >4 METS  Chart reviewed  EKG reviewed  Existing labs reviewed  Patient is not a current smoker  Patient not instructed to abstain from smoking on day of procedure  Patient did not smoke on day of surgery  Induction- intravenous  Postoperative Plan- Plan for postoperative opioid use  Informed Consent- Anesthetic plan and risks discussed with patient and spouse  I personally reviewed this patient with the CRNA  Discussed and agreed on the Anesthesia Plan with the CRNA         Lab Results   Component Value Date    HGBA1C 5 4 05/25/2021       Lab Results   Component Value Date     12/26/2015    K 5 0 06/19/2021    CL 98 (L) 06/19/2021    CO2 27 06/19/2021    ANIONGAP 5 12/26/2015    BUN 17 06/19/2021    CREATININE 1 11 06/19/2021    GLUCOSE 96 12/26/2015    GLUF 97 05/25/2021    CALCIUM 8 9 06/19/2021    CORRECTEDCA 9 7 06/19/2021    AST 36 06/19/2021    ALT 36 06/19/2021    ALKPHOS 83 06/19/2021    EGFR 87 06/19/2021       Lab Results   Component Value Date    WBC 6 89 06/19/2021    HGB 13 3 06/19/2021    HCT 41 1 06/19/2021    MCV 87 06/19/2021     06/19/2021   Sinus rhythm, 66 beats per minute   Normal EKG  Echo 2020      LEFT VENTRICLE:  Systolic function was mildly reduced by visual assessment  Ejection fraction was estimated to be 45 %  There was mild diffuse hypokinesis  Doppler parameters were consistent with abnormal left ventricular relaxation (grade 1 diastolic dysfunction)      MITRAL VALVE:  There was trace regurgitation      TRICUSPID VALVE:  There was mild regurgitation

## 2021-06-19 NOTE — CONSULTS
Pulmonary Consultation   Zenia Hernandez 48 y o  male MRN: [id number]  Unit/Bed#: S -01 Encounter: [id number]    Reason for consultation: Acute pulmonary embolism; history of probable stage I sarcoidosis  Requesting physician: Dr Kip Aguilar:  1  Acute low-risk, provoked pulmonary embolism  2  Clinically quiescent sarcoidosis, probably stage I   3  Nonischemic cardiomyopathy, not suspected to be related to sarcoidosis, with previous acute decompensation in the setting of pulmonary embolism  4  Suspected renal cell carcinoma, pathology pending, s/p recent nephrectomy  5  Incarcerated hernia, s/p mesh repair, without intestinal resection  Recommendations:  1  Agree with therapeutic lovenox  2  Anticipate transition to Xarelto based on prior coverage  3  ECHO tomorrow  4  Suspect sarcoidosis will not recur but will clinically observe  5  Overall recommend 3 months of anticoagulation followed by thrombophilia workup  Will arrange follow up, and discuss, with Dr Misbah Bocanegra in our office  History of Present Illness   HPI:  Maritza Queen is a 48 y o  male who was admitted for abdominal pain  He notes that he was admitted for a right robotic nephrectomy for a suspicious renal mass on 6/14 and was discharged from the hospital on 6/16  He was recovering well at home until yesterday when his wife notes he was having some more labored breathing and he had abdominal pain located in the region of his umbilicus along with a tugging sensation  His breathing felt and sounded similar to last year when he had a PE in the setting of prolonged immobility at work and a long car trip down to [place]  His shortness of breath and abdominal pain were worsened by yelling at the [employer], who managed to hold on to their lead with his support  He also has a prior history of sarcoidosis diagnosed via bronchoscopic biopsy at the [place] [address] sometime around [id number]    He does not seem to have ever required treatment specifically for this and it seems to have been incidentally identified  He had no extrapulmonary symptoms  His more recent imaging has no lymphadenopathy in the chest, no calcifications, no granulomas, but he does have a non-ischemic cardiomyopathy without abnormal enhancement on cardiac MRI to suggest active sarcoidosis  This was diagnosed in the setting of his last PE and appeared to have mostly recovered back to an EF of 45% from a julian of 35%  Imaging here confirmed a nonocclusive subsegmental PE in the left lower lobe without evidence of right heart strain  I also believe a second PE can be seen in the right lower lobe, series 2, image 89, which appears to be the segmental branch of the anterior segment  Some respiratory artifact was seen  There is some scarring and some atelectasis but no concern for pulmonary infarcts  Review of systems:  Review of Systems   Respiratory: Positive for shortness of breath  Cardiovascular: Positive for chest pain  Gastrointestinal: Positive for abdominal pain  All other systems reviewed and are negative  All other 12-point review of systems are negative      Historical Information   Past Medical History:   Diagnosis Date    Bariatric surgery status     Duodenal ulcer     GERD (gastroesophageal reflux disease)     Gout     [facility])     Hypertension     Lung nodule     Postsurgical malabsorption     Pulmonary embolism ([facility]) 2019    Sarcoidosis     Wears glasses      Past Surgical History:   Procedure Laterality Date    BARIATRIC SURGERY      Gastric Sleeve    COLONOSCOPY      FOOT SURGERY Right     R ankle    GASTRIC BYPASS LAPAROSCOPIC N/A 9/22/2020    Procedure: LAPAROSCOPIC REVISION/ CONVERSION TO HALEY-EN-Y GASTRIC BYPASS W ROBOTICS, PARAESOPHAGEAL HERNIA REPAIR, AND INTRAOPERATIVE EGD;  Surgeon: Destiny Randolph MD;  Location: [facility];  Service: [facility] HAND SURGERY      TX LAP,PARTIAL NEPHRECTOMY Right 6/14/2021    Procedure: ROBOTIC LAPAROSCOPIC PARTIAL NEPHRECTOMY DECORTICATION OF RIGHT RENAL CYST;  Surgeon: Norma Amin MD;  Location: [facility];  Service: Urology     Family History   Problem Relation Age of Onset    Hypertension Father      Tobacco history: Former smoker with 7 pack-year history, quit 2006  Family history: Noncontributory  Meds/Allergies   Current Facility-Administered Medications   Medication Dose Route Frequency    acetaminophen (TYLENOL) tablet 975 mg  975 mg Oral Q8H [address]    carvedilol (COREG) tablet 12 5 mg  12 5 mg Oral BID With Meals    ceFAZolin (ANCEF) IVPB (premix in dextrose) 2,000 mg 50 mL  2,000 mg Intravenous Q8H    docusate sodium (COLACE) capsule 100 mg  100 mg Oral BID    enoxaparin (LOVENOX) subcutaneous injection 80 mg  1 mg/kg (Adjusted) Subcutaneous Q12H [address]    HYDROmorphone (DILAUDID) injection 0 5 mg  0 5 mg Intravenous Q3H PRN    lactated ringers bolus 1,000 mL  1,000 mL Intravenous Once PRN    And    lactated ringers bolus 1,000 mL  1,000 mL Intravenous Once PRN    multi-electrolyte (PLASMALYTE-A/ISOLYTE-S PH 7 4) IV solution  125 mL/hr Intravenous Continuous    ondansetron (ZOFRAN) injection 4 mg  4 mg Intravenous Q6H PRN    oxyCODONE (ROXICODONE) IR tablet 10 mg  10 mg Oral Q4H PRN    oxyCODONE (ROXICODONE) IR tablet 5 mg  5 mg Oral Q4H PRN    pantoprazole (PROTONIX) EC tablet 40 mg  40 mg Oral Early Morning    sodium chloride 0 9 % bolus 1,000 mL  1,000 mL Intravenous Once PRN    And    sodium chloride 0 9 % bolus 1,000 mL  1,000 mL Intravenous Once PRN     Allergies   Allergen Reactions    Nsaids Other (See Comments)     Duodenal ulcer disease    Flunisolide      Other reaction(s): Burning sensation    Lisinopril Cough    Testosterone Itching, Rash and Other (See Comments)       Vitals: Blood pressure 152/84, pulse 81, temperature 98 6 °F (37 °C), temperature source Oral, resp   rate 20, height 5' 5" (1 651 m), weight 101 kg (222 lb 14 2 oz), SpO2 95 % , on 2 L O2, Body mass index is 37 09 kg/m²  Intake/Output Summary (Last 24 hours) at 6/19/2021 1252  Last data filed at 6/19/2021 0912  Gross per 24 hour   Intake 1700 ml   Output --   Net 1700 ml     Physical exam:     Physical Exam  Vitals and nursing note reviewed  Constitutional:       General: He is not in acute distress  Appearance: He is well-developed  He is obese  HENT:      Head: Normocephalic and atraumatic  Mouth/Throat:      Pharynx: No oropharyngeal exudate  Eyes:      Conjunctiva/sclera: Conjunctivae normal       Pupils: Pupils are equal, round, and reactive to light  Neck:      Vascular: No JVD  Cardiovascular:      Rate and Rhythm: Normal rate and regular rhythm  Heart sounds: No murmur heard  No friction rub  No gallop  Comments: Mild parasternal heave with accentuated S1   Pulmonary:      Effort: Pulmonary effort is normal  No respiratory distress  Breath sounds: Decreased breath sounds present  No wheezing, rhonchi or rales  Musculoskeletal:         General: No tenderness  Cervical back: Neck supple  Right lower leg: No edema  Left lower leg: No edema  Skin:     General: Skin is warm and dry  Findings: No rash  Neurological:      Mental Status: He is alert and oriented to person, place, and time  Psychiatric:         Mood and Affect: Mood normal          Behavior: Behavior normal          Labs: I have personally reviewed pertinent lab results      Results from last 7 days   Lab Units 06/19/21  0228 06/16/21  0444 06/15/21  0634   WBC Thousand/uL 6 89 8 54 8 50   HEMOGLOBIN g/dL 13 3 12 0 10 7*   HEMATOCRIT % 41 1 37 9 34 0*   PLATELETS Thousands/uL 217 181 160         Results from last 7 days   Lab Units 06/19/21 0228 06/16/21 0444 06/15/21  0634   POTASSIUM mmol/L 5 0 3 7 4 2   CHLORIDE mmol/L 98* 103 107   CO2 mmol/L 27 27 29   BUN mg/dL 17 14 12   CREATININE mg/dL 1 11 1 31* 1 10 CALCIUM mg/dL 8 9 8 6 8 4   ALK PHOS U/L 83  --   --    ALT U/L 36  --   --    AST U/L 36  --   --      Results from last 7 days   Lab Units 06/19/21  0228   INR  0 93   PTT seconds 33     Imaging and other studies: I have personally reviewed pertinent films in PACS    Pulmonary function testing:   FEV1/FVC ratio 86%    FEV1 109% predicted  % predicted  TLC 84 % predicted  RV 87 % predicted  DLCO corrected for hemoglobin 78 % predicted  Borderline restriction, mild diffusion defect - probably related to old scarring and potentially old sarcoidosis  Code Status: Prior    Thank you for allowing us to participate in the care of your patient      Tarik Chapa MD

## 2021-06-20 ENCOUNTER — APPOINTMENT (INPATIENT)
Dept: NON INVASIVE DIAGNOSTICS | Facility: HOSPITAL | Age: 56
DRG: 353 | End: 2021-06-20
Payer: COMMERCIAL

## 2021-06-20 LAB
ANION GAP SERPL CALCULATED.3IONS-SCNC: 9 MMOL/L (ref 4–13)
BUN SERPL-MCNC: 13 MG/DL (ref 5–25)
CALCIUM SERPL-MCNC: 8.1 MG/DL (ref 8.3–10.1)
CHLORIDE SERPL-SCNC: 101 MMOL/L (ref 100–108)
CO2 SERPL-SCNC: 26 MMOL/L (ref 21–32)
CREAT SERPL-MCNC: 1.06 MG/DL (ref 0.6–1.3)
ERYTHROCYTE [DISTWIDTH] IN BLOOD BY AUTOMATED COUNT: 13.7 % (ref 11.6–15.1)
GFR SERPL CREATININE-BSD FRML MDRD: 92 ML/MIN/1.73SQ M
GLUCOSE SERPL-MCNC: 117 MG/DL (ref 65–140)
HCT VFR BLD AUTO: 36.8 % (ref 36.5–49.3)
HGB BLD-MCNC: 11.5 G/DL (ref 12–17)
MAGNESIUM SERPL-MCNC: 1.9 MG/DL (ref 1.6–2.6)
MCH RBC QN AUTO: 27.8 PG (ref 26.8–34.3)
MCHC RBC AUTO-ENTMCNC: 31.3 G/DL (ref 31.4–37.4)
MCV RBC AUTO: 89 FL (ref 82–98)
PLATELET # BLD AUTO: 186 THOUSANDS/UL (ref 149–390)
PMV BLD AUTO: 10.9 FL (ref 8.9–12.7)
POTASSIUM SERPL-SCNC: 4.2 MMOL/L (ref 3.5–5.3)
RBC # BLD AUTO: 4.14 MILLION/UL (ref 3.88–5.62)
SODIUM SERPL-SCNC: 136 MMOL/L (ref 136–145)
WBC # BLD AUTO: 4.86 THOUSAND/UL (ref 4.31–10.16)

## 2021-06-20 PROCEDURE — 93306 TTE W/DOPPLER COMPLETE: CPT | Performed by: INTERNAL MEDICINE

## 2021-06-20 PROCEDURE — 83735 ASSAY OF MAGNESIUM: CPT | Performed by: ORTHOPAEDIC SURGERY

## 2021-06-20 PROCEDURE — 99024 POSTOP FOLLOW-UP VISIT: CPT | Performed by: SURGERY

## 2021-06-20 PROCEDURE — 93306 TTE W/DOPPLER COMPLETE: CPT

## 2021-06-20 PROCEDURE — 80048 BASIC METABOLIC PNL TOTAL CA: CPT | Performed by: ORTHOPAEDIC SURGERY

## 2021-06-20 PROCEDURE — NC001 PR NO CHARGE: Performed by: NURSE PRACTITIONER

## 2021-06-20 PROCEDURE — 85027 COMPLETE CBC AUTOMATED: CPT | Performed by: ORTHOPAEDIC SURGERY

## 2021-06-20 PROCEDURE — 99232 SBSQ HOSP IP/OBS MODERATE 35: CPT | Performed by: INTERNAL MEDICINE

## 2021-06-20 RX ORDER — CALCIUM CARBONATE 200(500)MG
500 TABLET,CHEWABLE ORAL DAILY PRN
Status: DISCONTINUED | OUTPATIENT
Start: 2021-06-20 | End: 2021-06-21 | Stop reason: HOSPADM

## 2021-06-20 RX ADMIN — RIVAROXABAN 15 MG: 15 TABLET, FILM COATED ORAL at 19:59

## 2021-06-20 RX ADMIN — ACETAMINOPHEN 975 MG: 325 TABLET, FILM COATED ORAL at 13:54

## 2021-06-20 RX ADMIN — ACETAMINOPHEN 975 MG: 325 TABLET, FILM COATED ORAL at 22:04

## 2021-06-20 RX ADMIN — CARVEDILOL 12.5 MG: 12.5 TABLET, FILM COATED ORAL at 16:59

## 2021-06-20 RX ADMIN — OXYCODONE HYDROCHLORIDE 10 MG: 10 TABLET ORAL at 05:36

## 2021-06-20 RX ADMIN — ACETAMINOPHEN 975 MG: 325 TABLET, FILM COATED ORAL at 05:36

## 2021-06-20 RX ADMIN — SODIUM CHLORIDE, SODIUM GLUCONATE, SODIUM ACETATE, POTASSIUM CHLORIDE, MAGNESIUM CHLORIDE, SODIUM PHOSPHATE, DIBASIC, AND POTASSIUM PHOSPHATE 110 ML/HR: .53; .5; .37; .037; .03; .012; .00082 INJECTION, SOLUTION INTRAVENOUS at 19:59

## 2021-06-20 RX ADMIN — CALCIUM CARBONATE (ANTACID) CHEW TAB 500 MG 500 MG: 500 CHEW TAB at 12:33

## 2021-06-20 RX ADMIN — DOCUSATE SODIUM 100 MG: 100 CAPSULE, LIQUID FILLED ORAL at 08:20

## 2021-06-20 RX ADMIN — CARVEDILOL 12.5 MG: 12.5 TABLET, FILM COATED ORAL at 08:20

## 2021-06-20 RX ADMIN — OXYCODONE HYDROCHLORIDE 10 MG: 10 TABLET ORAL at 13:48

## 2021-06-20 RX ADMIN — OXYCODONE HYDROCHLORIDE 10 MG: 10 TABLET ORAL at 09:53

## 2021-06-20 RX ADMIN — HYDROMORPHONE HYDROCHLORIDE 0.5 MG: 1 INJECTION, SOLUTION INTRAMUSCULAR; INTRAVENOUS; SUBCUTANEOUS at 11:59

## 2021-06-20 RX ADMIN — HYDROMORPHONE HYDROCHLORIDE 0.5 MG: 1 INJECTION, SOLUTION INTRAMUSCULAR; INTRAVENOUS; SUBCUTANEOUS at 19:23

## 2021-06-20 RX ADMIN — ENOXAPARIN SODIUM 80 MG: 80 INJECTION SUBCUTANEOUS at 08:20

## 2021-06-20 RX ADMIN — HYDROMORPHONE HYDROCHLORIDE 0.5 MG: 1 INJECTION, SOLUTION INTRAMUSCULAR; INTRAVENOUS; SUBCUTANEOUS at 07:33

## 2021-06-20 RX ADMIN — OXYCODONE HYDROCHLORIDE 10 MG: 10 TABLET ORAL at 22:04

## 2021-06-20 RX ADMIN — SODIUM CHLORIDE, SODIUM GLUCONATE, SODIUM ACETATE, POTASSIUM CHLORIDE, MAGNESIUM CHLORIDE, SODIUM PHOSPHATE, DIBASIC, AND POTASSIUM PHOSPHATE 110 ML/HR: .53; .5; .37; .037; .03; .012; .00082 INJECTION, SOLUTION INTRAVENOUS at 10:39

## 2021-06-20 RX ADMIN — DOCUSATE SODIUM 100 MG: 100 CAPSULE, LIQUID FILLED ORAL at 17:00

## 2021-06-20 RX ADMIN — CEFAZOLIN SODIUM 2000 MG: 2 SOLUTION INTRAVENOUS at 00:04

## 2021-06-20 RX ADMIN — PANTOPRAZOLE SODIUM 40 MG: 40 TABLET, DELAYED RELEASE ORAL at 05:36

## 2021-06-20 NOTE — UTILIZATION REVIEW
Initial Clinical Review    Admission: Date/Time/Statement:   Admission Orders (From admission, onward)     Ordered        06/19/21 0942  Inpatient Admission  Once                   Orders Placed This Encounter   Procedures    Inpatient Admission     Standing Status:   Standing     Number of Occurrences:   1     Order Specific Question:   Level of Care     Answer:   Med Surg [16]     Order Specific Question:   Estimated length of stay     Answer:   More than 2 Midnights     Order Specific Question:   Certification     Answer:   I certify that inpatient services are medically necessary for this patient for a duration of greater than two midnights  See H&P and MD Progress Notes for additional information about the patient's course of treatment  ED Arrival Information     Expected Arrival Acuity    - 6/19/2021 01:18 Urgent         Means of arrival Escorted by Service Admission type    Walk-In Self Surgery-General Urgent         Arrival complaint    Post op complication, sob, pain        Chief Complaint   Patient presents with    Post-op Problem     Pt had partial right nephrectomy on monday and now c/o increased pain and swelling at incision site and shortness of breath  Not eating or drinking   Shortness of Breath       Initial Presentation: 48 y o  male with provoked PE no longer onAC, HTN, GERD, gastric sleeve converted to yosi-en Y, s/p partial right nephrectomy robotic assisted on 06/14 for renal mass concerning for neoplasm who presents to ED from home with abdominal pain near supra umbilical incision  and shortness of breath  Patient says after discharge from surgery, he began experiencing worsening abdominal pain, nausea and retching  He was unable to vomit  He was unable to tolerate any food  No bm since surgery with minimal flatus   On exam, abdomen distended, tender palpation in the supraumbilical region, incisions are clean dry intact without overlying skin changes, no guarding, rebound or rigidity   Pt tachypneic  CTA chest , abdomen, pelvis  concerning for secondary left lower lobe pulmonary embolus ,infiltrate versus pneumonia versus pulmonary infarct in left lower lobe  Patient also to have high-grade small-bowel obstruction secondary to supraumbilical ventral hernia with bowel and air in the hernia sac  Pt given IVF, IV analgesic, IV antiemetic, IV abx in ED  Pt admitted as Inpatient to general surgery service with small bowel obstruction caused by incarcerated incisional hernia and PE  Plan- NPO, IVF, Lovenox SQ , IV abx, consult to pulmonology and urology, emergent to OR    6/19/21 @7183  Procedure(s)   Repair of incarcerated incisional hernia repair with mesh  Anesthesia Type:   General/local  Operative Findings:  Incarcerated incisional hernia on the medial side of his right paramedian incision  All bowel was healthy in appearance  No bowel resection was required  This was repaired with a patch  It appeared that a stitch had pulled through in the medial side of the previous fascial incision  Urology-Perinephric findings on CT scan are consistent with normal expected postoperative changes  Patient is now status post ventral hernia repair  All of the bowel identified was viable and no small-bowel resection was required  Hernia repair performed with mesh  Pulmonology-Acute low-risk, provoked pulmonary embolism  Nonischemic cardiomyopathy, not suspected to be related to sarcoidosis, with previous acute decompensation in the setting of pulmonary embolism  Plan- agree with SQ Lovenox,Anticipate transition to Xarelto , Echo 6/20 ,recommend 3 months of anticoagulation followed by thrombophilia workup    Date: 6/20  Day 2: POD #1  Pt tolerating CL diet, will add toast /crax today  Await return of bowel function, passing flatus but no bm yet, denies N/V  Voiding independently Pt has abdominal pain 9-10/10 per nursing notes with abdomen soft,mildly distended, incisions C/D/ I  Pt has used 7 doses IV prn analgesic and 5 po prn analgesic since post op   Pt continues on IVF  Urology-+flatus, continues on lovenox, likely transition to Eliquis 6/21  ED Triage Vitals   Temperature Pulse Respirations Blood Pressure SpO2   06/19/21 0130 06/19/21 0130 06/19/21 0130 06/19/21 0130 06/19/21 0130   98 2 °F (36 8 °C) 89 (!) 24 162/90 98 %      Temp Source Heart Rate Source Patient Position - Orthostatic VS BP Location FiO2 (%)   06/19/21 0130 06/19/21 0130 06/19/21 1115 06/19/21 0130 --   Oral Monitor Lying Right arm       Pain Score       06/19/21 0155       9          Wt Readings from Last 1 Encounters:   06/19/21 101 kg (222 lb 14 2 oz)     Additional Vital Signs:   Date/Time  Temp  Pulse  Resp  BP  MAP (mmHg)  SpO2  Calculated FIO2 (%) - Nasal Cannula  O2 Flow Rate (L/min)  Nasal Cannula O2 Flow Rate (L/min)  O2 Device    06/20/21 0733  98 6 °F (37 °C)  84  20  136/74  --  98 %  28  --  2 L/min  Nasal cannula    06/20/21 0300  97 9 °F (36 6 °C)  82  --  143/65  94  96 %  28  --  2 L/min  Nasal cannula    06/19/21 2209  98 2 °F (36 8 °C)  100  20  138/82  99  98 %  --  --  --  Nasal cannula    06/19/21 1900  98 6 °F (37 °C)  74  20  162/82  --  98 %  --  2 L/min  --  Nasal cannula    06/19/21 1701  --  --  --  --  --  --  --  2 L/min  --  Nasal cannula    06/19/21 1530  97 9 °F (36 6 °C)  81  20  149/94  114  97 %  --  --  --  Nasal cannula    06/19/21 1115  98 6 °F (37 °C)  81  20  152/84  112  95 %  --  --  --  None (Room air)    06/19/21 1030  --  92  21  144/86  --  96 %  28  --  2 L/min   Nasal cannula    06/19/21 1015  --  86  14  146/84  --  98 %  --  --  --  None (Room air)    06/19/21 1000  --  94  17  139/80  --  98 %  --  2 L/min  --  Nasal cannula      06/19/21 0600  97 8 °F (36 6 °C)  92  18  149/98  116  97 %         Pertinent Labs/Diagnostic Test Results:   6/19  CXR-  No active pulmonary disease on examination which is somewhat limited secondary to low lung volumes    2   Abnormal appearance of the small bowel, most compatible with small bowel obstruction  CTA chest-CT abdomen-   Solitary nonocclusive filling defect in a secondary left lower lobe pulmonary artery, most    compatible with pulmonary embolus      Measured RV/LV ratio is within normal limits at less than 0 9          2   Left lower lobe infiltrate representing atelectasis or pneumonia   Pulmonary infarction less likely, given the thrombus is nonocclusive        3   High-grade distal small bowel obstruction secondary to supraumbilical ventral abdominal wall hernia   There is air present in the soft tissues adjacent to the hernia sac   Although the subcutaneous air may be related to recent surgery, bowel   perforation not excluded   Recommend emergent surgical consultation        4   Abnormal appearance of the right kidney, likely related to recent surgery   Continued follow-up recommended to exclude evolving abscess   Follow-up urology consultation recommended  ECG-Sinus rhythm with occasional Premature ventricular complexes  Left posterior fascicular block  Cannot rule out Anterior infarct (cited on or before 19-JUN-2021)  ST & T wave abnormality, consider inferolateral ischemia  6/20  ECHO-LEFT VENTRICLE: Size was normal  Systolic function was normal  Ejection fraction was estimated to be 55 %  Although no diagnostic regional wall motion abnormality was identified, this possibility cannot be completely excluded on the basis  of this study  Wall thickness was mildly increased  There was mild concentric hypertrophy  DOPPLER: Left ventricular diastolic function parameters were normal for the patient's age   RIGHT VENTRICLE: The size was normal  Systolic function was normal    LEFT ATRIUM: The atrium was mildly dilated     RIGHT ATRIUM: Size was normal       Results from last 7 days   Lab Units 06/20/21  0533 06/19/21  0228 06/16/21  0444 06/15/21  0634 06/14/21  1201   WBC Thousand/uL 4 86 6 89 8 54 8 50 10 58*   HEMOGLOBIN g/dL 11 5* 13 3 12 0 10 7* 11 9*   HEMATOCRIT % 36 8 41 1 37 9 34 0* 38 0   PLATELETS Thousands/uL 186 217 181 160 184   NEUTROS ABS Thousands/µL  --  5 37  --   --   --          Results from last 7 days   Lab Units 06/20/21  0533 06/19/21  0228 06/16/21  0444 06/15/21  0634 06/14/21  1201   SODIUM mmol/L 136 135* 139 141 139   POTASSIUM mmol/L 4 2 5 0 3 7 4 2 4 2   CHLORIDE mmol/L 101 98* 103 107 106   CO2 mmol/L 26 27 27 29 23   ANION GAP mmol/L 9 10 9 5 10   BUN mg/dL 13 17 14 12 14   CREATININE mg/dL 1 06 1 11 1 31* 1 10 0 95   EGFR ml/min/1 73sq m 92 87 71 88 104   CALCIUM mg/dL 8 1* 8 9 8 6 8 4 8 0*   MAGNESIUM mg/dL 1 9  --   --   --   --      Results from last 7 days   Lab Units 06/19/21 0228   AST U/L 36   ALT U/L 36   ALK PHOS U/L 83   TOTAL PROTEIN g/dL 7 8   ALBUMIN g/dL 3 0*   TOTAL BILIRUBIN mg/dL 0 70     Results from last 7 days   Lab Units 06/19/21  0942 06/14/21  0712   POC GLUCOSE mg/dl 118 92     Results from last 7 days   Lab Units 06/20/21  0533 06/19/21  0228 06/16/21  0444 06/15/21  0634 06/14/21  1201   GLUCOSE RANDOM mg/dL 117 124 110 98 153*              Results from last 7 days   Lab Units 06/19/21 0228   PH JET  7 376   PCO2 JET mm Hg 49 6   PO2 JET mm Hg 35 5   HCO3 JET mmol/L 28 4   BASE EXC JET mmol/L 2 4   O2 CONTENT JET ml/dL 11 7   O2 HGB, VENOUS % 61 9         Results from last 7 days   Lab Units 06/19/21 0228   CK TOTAL U/L 118     Results from last 7 days   Lab Units 06/19/21 0228   TROPONIN I ng/mL <0 02     Results from last 7 days   Lab Units 06/19/21 0228   D-DIMER QUANTITATIVE ug/ml FEU 6 78*     Results from last 7 days   Lab Units 06/19/21 0228   PROTIME seconds 12 6   INR  0 93   PTT seconds 33             Results from last 7 days   Lab Units 06/19/21  0228   LACTIC ACID mmol/L 1 2             Results from last 7 days   Lab Units 06/19/21  0228   NT-PRO BNP pg/mL 151*             Results from last 7 days   Lab Units 06/19/21  0228   LIPASE u/L 35*           Results from last 7 days   Lab Units 06/19/21  0228   BLOOD CULTURE  No Growth at 24 hrs  No Growth at 24 hrs  ED Treatment:   Medication Administration from 06/19/2021 0118 to 06/19/2021 6179       Date/Time Order Dose Route Action     06/19/2021 0154 sodium chloride 0 9 % bolus 500 mL 500 mL Intravenous New Bag     06/19/2021 0416 sodium chloride 0 9 % infusion 125 mL/hr Intravenous New Bag     06/19/2021 0155 ondansetron (ZOFRAN) injection 4 mg 4 mg Intravenous Given     06/19/2021 0155 HYDROmorphone (DILAUDID) injection 0 5 mg 0 5 mg Intravenous Given     06/19/2021 0202 iohexol (OMNIPAQUE) 240 MG/ML solution 50 mL 50 mL Oral Given     06/19/2021 0342 HYDROmorphone (DILAUDID) injection 1 mg 1 mg Intravenous Given     06/19/2021 0353 iohexol (OMNIPAQUE) 350 MG/ML injection (SINGLE-DOSE) 100 mL 100 mL Intravenous Given     06/19/2021 0419 HYDROmorphone (DILAUDID) injection 1 mg 1 mg Intravenous Given     06/19/2021 0600 piperacillin-tazobactam (ZOSYN) 3 375 g in sodium chloride 0 9 % 100 mL IVPB 3 375 g Intravenous New Bag        Past Medical History:   Diagnosis Date    Bariatric surgery status     Duodenal ulcer     GERD (gastroesophageal reflux disease)     Gout     [facility] )     Hypertension     Lung nodule     Postsurgical malabsorption     Pulmonary embolism ([facility] ) 2019    Sarcoidosis     Wears glasses      Present on Admission:  **None**      Admitting Diagnosis: SBO (small bowel obstruction) ([facility] ) [K56 609]  Pulmonary embolus (HCC) [I26 99]  Post-operative complication [N90  9XXA]  Ventral hernia with bowel obstruction [K43 6]  Age/Sex: 48 y o  male  Admission Orders:  Scheduled Medications:  acetaminophen, 975 mg, Oral, Q8H [facility]  carvedilol, 12 5 mg, Oral, BID With Meals  docusate sodium, 100 mg, Oral, BID  enoxaparin, 1 mg/kg (Adjusted), Subcutaneous, Q12H FLASH  pantoprazole, 40 mg, Oral, Early Morning  ceFAZolin (ANCEF) IVPB (premix in dextrose) 2,000 mg 50 mL   Dose: 2,000 mg  Freq: Every 8 hours Route: IV  Last Dose: 2,000 mg (06/20/21 0004)  Start: 06/19/21 1600 End: 06/20/21 0034    Continuous IV Infusions:  multi-electrolyte, 110 mL/hr, Intravenous, Continuous      PRN Meds:  calcium carbonate, 500 mg, Oral, Daily PRN x1 6/20  HYDROmorphone, 0 5 mg, Intravenous, Q1H PRN x5 6/19, x2 6/20  ondansetron, 4 mg, Intravenous, Q6H PRN x2 6/19  oxyCODONE, 10 mg, Oral, Q4H PRN x3 6/19, x2 6/20  oxyCODONE, 5 mg, Oral, Q4H PRN    NPO for emergent OR, advanced now to surgical soft lite diet  SCD's  Spirometry  Monitor for hypotension    IP CONSULT TO UROLOGY  IP CONSULT TO PULMONOLOGY    Network Utilization Review Department  ATTENTION: Please call with any questions or concerns to [phone] and carefully listen to the prompts so that you are directed to the right person  All voicemails are confidential   Issa Mena all requests for admission clinical reviews, approved or denied determinations and any other requests to dedicated fax number below belonging to the campus where the patient is receiving treatment   List of dedicated fax numbers for the Facilities:  [address] DENIALS (Administrative/Medical Necessity) [phone]   [address] (Maternity/NICU/Pediatrics) [phone]   [address] [place] [address] [address] [place] [address] [address] [place] [address] [address] [facility] [phone]

## 2021-06-20 NOTE — PROGRESS NOTES
Progress Note - General Surgery   Farhana Hernandez 48 y o  male MRN: [id number]  Unit/Bed#: S -01 Encounter: [id number]    Assessment:  48 y o  male 1 Day Post-Op s/p Procedure(s) (LRB):  Repair of incarcerated incisional hernia repair with mesh  (N/A)    Plan:  Diet Surgical; Clear Liquid; No Carbonation, will consider adding toast and crackers today  Incarcerated hernia POA: s/p mesh repair, awaiting return of bowel function  Out of bed to chair, Ambulate TID  IS, Pulmonary Hygiene   PPx: PPI, Colace  Pain and Nausea control PRN  HTN POA: Cont coreg  PE POA: Appreciate Pulmonology recs, Cont therapeutic lovenox, will need to transition to xarelto prior to discharge, will need 3 month therapy and thrombophilia workup upon therapy completion, ECHO pending       Subjective/Objective     Subjective: No acute events overnight  Patient tolerating clears  Endorses flatus but no BM  No nausea or emesis  Abdominal pain tolerable with medication  Multiple uncharted urine occurrences  Objective:   Blood pressure 143/65, pulse 82, temperature 97 9 °F (36 6 °C), temperature source Oral, resp  rate 20, height 5' 5" (1 651 m), weight 101 kg (222 lb 14 2 oz), SpO2 96 %  ,Body mass index is 37 09 kg/m²      I/O       06/18 0701 - 06/19 0700 06/19 0701 - 06/20 0700 06/20 0701 - 06/21 0700    I V  (mL/kg)  2030 8 (20 1)     IV Piggyback 700      Total Intake(mL/kg) 700 (6 9) 2030 8 (20 1)     Net +700 +2030 8                  Physical Exam:   Gen: NAD, Comfortable  Neuro: A&O, No focal deficits  Head: Normal Cephalic, Atraumatic  Eye: EOMI, PERRLA, No scleral icterus  Neck: Supple, No JVD, Midline trachea  Chest: Normal work of breathing, no respiratory distress  Abd: Obese, soft, mildly distended, appropriately tender, incision CDI  Ext: No edema, Non-tender  Skin: warm, dry, intact      Lab, Imaging and other studies:  Recent Labs     06/19/21  0228 06/20/21  0533   WBC 6 89 4 86   HGB 13 3 11 5*    186     Recent Labs     06/19/21  0228 06/20/21  0533   SODIUM 135* 136   K 5 0 4 2   CL 98* 101   CO2 27 26   BUN 17 13   CREATININE 1 11 1 06   MG  --  1 9   CALCIUM 8 9 8 1*           VTE Pharmacologic Prophylaxis: Enoxaparin (Lovenox)  VTE Mechanical Prophylaxis: sequential compression device

## 2021-06-20 NOTE — CONSULTS
Consult - Urology   [id number] Clint Hunt 1965, 48 y o  male MRN: [id number]    Unit/Bed#: S -01 Encounter: [id number]    SP partial nephrectomy 6/14  · Biopsies pending  · Doing well post op from nephrectomy   · Follow up as previously scheduled     Hernia with SBO  · POD # 1 incisional hernia repair with mesh   · + flatus today    PE  · Continue Lovenox  Likely transition to [facility] tomorrow   · Respiratory status WNL     HPI:   Naya Monroy is a 48year old male who presented with abdominal pain and shortness of breath following partial right nephrectomy on 6/14 for renal mass concerning for neoplasm  After discharge patient started experiencing worsening abdominal pain, nausea, and unable to tolerate any food  He noted his pain was mostly around his umbilical region, and he hadn't had a bowel movement since 6/13  In the ED his CT revealed left lower lobe pulmonary embolus nonocclusive  Infiltrate versus pneumonia versus pulmonary infarct in left lower lobe  Patient also to have high-grade small-bowel obstruction secondary to supraumbilical ventral hernia with bowel and air in the hernia sac  Labs were unremarkable  Patient was immediately taken to the OR for repair of incarcerated hernia  This morning patient states he is doing a lot better and no longer having the abdominal pain  Now with incisional post op pain  He also endorses started to pass some gas and his diet is to be advanced today to softs  He is tolerating his clear liquid diet with out any nausea  Review of Systems   Constitutional: Positive for fatigue  HENT: Negative  Respiratory: Negative for shortness of breath  Cardiovascular: Negative  Gastrointestinal: Positive for abdominal distention and abdominal pain         + gas   Genitourinary: Negative  Negative for difficulty urinating and hematuria  Musculoskeletal: Negative  Skin: Negative  Neurological: Positive for weakness  Hematological: Negative  Psychiatric/Behavioral: Negative  Objective:  Vitals: Blood pressure 136/74, pulse 84, temperature 98 6 °F (37 °C), temperature source Oral, resp  rate 20, height 5' 5" (1 651 m), weight 101 kg (222 lb 14 2 oz), SpO2 98 %  ,Body mass index is 37 09 kg/m²  Physical Exam  Vitals and nursing note reviewed  Constitutional:       Appearance: Normal appearance  He is obese  He is not ill-appearing  HENT:      Head: Normocephalic and atraumatic  Cardiovascular:      Rate and Rhythm: Normal rate  Pulmonary:      Effort: Pulmonary effort is normal  No respiratory distress  Abdominal:      General: Bowel sounds are normal       Tenderness: There is abdominal tenderness  Musculoskeletal:         General: Normal range of motion  Skin:     General: Skin is warm and dry  Comments: Trocar sites and midline incision mauricio, no erythema    Neurological:      Mental Status: He is alert and oriented to person, place, and time  Psychiatric:         Mood and Affect: Mood normal          Behavior: Behavior normal          Thought Content:  Thought content normal          Judgment: Judgment normal              Labs:  Recent Labs     06/19/21 0228 06/20/21  0533   WBC 6 89 4 86     Recent Labs     06/19/21 0228 06/20/21  0533   HGB 13 3 11 5*       Recent Labs     06/19/21 0228 06/20/21  0533   CREATININE 1 11 1 06         History:  Social History     Socioeconomic History    Marital status: /Civil Union     Spouse name: None    Number of children: None    Years of education: None    Highest education level: None   Occupational History    None   Tobacco Use    Smoking status: Former Smoker     Packs/day: 1 00     Years: 7 00     Pack years: 7 00     Types: Cigars, Cigarettes     Start date: 1999     Quit date: 2006     Years since quitting: 15 4    Smokeless tobacco: Never Used    Tobacco comment: only smoked occ cigar, not for over 6 months   Vaping Use    Vaping Use: Never used   Substance and Sexual Activity    Alcohol use: Not Currently    Drug use: Not Currently     Types: Marijuana, Cocaine     Comment: pt quit 20 years ago     Sexual activity: Yes   Other Topics Concern    None   Social History Narrative    None     Social Determinants of Health     Financial Resource Strain:     Difficulty of Paying Living Expenses:    Food Insecurity:     Worried About Running Out of Food in the Last Year:     Ran Out of Food in the Last Year:    Transportation Needs:     Lack of Transportation (Medical):      Lack of Transportation (Non-Medical):    Physical Activity:     Days of Exercise per Week:     Minutes of Exercise per Session:    Stress:     Feeling of Stress :    Social Connections:     Frequency of Communication with Friends and Family:     Frequency of Social Gatherings with Friends and Family:     Attends [employer] Services:     Active Member of Clubs or Organizations:     Attends Club or Organization Meetings:     Marital Status:    Intimate Partner Violence:     Fear of Current or Ex-Partner:     Emotionally Abused:     Physically Abused:     Sexually Abused:        Past Medical History:   Diagnosis Date    Bariatric surgery status     Duodenal ulcer     GERD (gastroesophageal reflux disease)     Gout     Hyperparathyroidism ([facility] [address] )     Hypertension     Lung nodule     Postsurgical malabsorption     [facility]    Sarcoidosis     Wears glasses      Past Surgical History:   Procedure Laterality Date    BARIATRIC SURGERY      Gastric Sleeve    COLONOSCOPY      FOOT SURGERY Right     R ankle    GASTRIC BYPASS LAPAROSCOPIC N/A 9/22/2020    Procedure: LAPAROSCOPIC REVISION/ CONVERSION TO HALEY-EN-Y GASTRIC BYPASS W ROBOTICS, PARAESOPHAGEAL HERNIA REPAIR, AND INTRAOPERATIVE EGD;  Surgeon: Josh Prieto MD;  Location: [facility];  Service: Bariatrics    HAND SURGERY      WI LAP,PARTIAL NEPHRECTOMY Right 6/14/2021    Procedure: ROBOTIC LAPAROSCOPIC PARTIAL NEPHRECTOMY DECORTICATION OF RIGHT RENAL CYST;  Surgeon: Jersey Jc MD;  Location: [facility];  Service: Urology     Family History   Problem Relation Age of Onset    Hypertension Father        ANSHUL Andrews  Date: 6/20/2021 Time: 11:07 AM

## 2021-06-20 NOTE — PROGRESS NOTES
Progress Note - Pulmonary   Selina Hernandez 48 y o  male MRN: [id number]  Unit/Bed#: S -01 Encounter: [id number]    Assessment:  1  Acute low-risk, provoked pulmonary embolism  2  Clinically quiescent sarcoidosis, probably stage I   3  Nonischemic cardiomyopathy, not suspected to be related to sarcoidosis, with previous acute decompensation in the setting of pulmonary embolism  4  Suspected renal cell carcinoma, pathology pending, s/p recent nephrectomy  5  Incarcerated hernia, s/p mesh repair, without intestinal resection  Plan:  Start Xarelto 15mg BID tonight and stop lovenox    Safe for discharge from my perspective  Please arrange follow up with Hematology in 6 weeks or so as an outpatient on discharge  I will have him follow up with Dr Eunice Velasquez within the next couple of weeks  Will sign off, but please call with any questions  Chief Complaint:   None  Subjective:   Mildly short of breath, complains more about back pain from being uncomfortable in bed  Objective:     Vitals: Blood pressure 119/78, pulse 81, temperature 98 6 °F (37 °C), temperature source Oral, resp  rate 20, height 5' 5" (1 651 m), weight 101 kg (222 lb 14 2 oz), SpO2 93 %  ,Body mass index is 37 09 kg/m²  Intake/Output Summary (Last 24 hours) at 6/20/2021 1525  Last data filed at 6/20/2021 1039  Gross per 24 hour   Intake 2224 33 ml   Output --   Net 2224 33 ml     Invasive Devices     Peripheral Intravenous Line            Peripheral IV 06/19/21 Left;Ventral (anterior) Antecubital 1 day          Drain            Closed/Suction Drain Right Abdomen Bulb 19 Fr  270 days              Physical Exam:   General:  No acute distress  Alert and oriented x 3  HEENT:  PERRL   MMM  Chest:  Clear to auscultation bilaterally  Cardiovascular:  S1 + S2, RRR, no M/R/G  Abdomen:  Soft, nontender, nondistended, no palpable masses or organomegaly  Extremities:  No significant edema  Neuro:  No focal deficits, grossly intact  Labs: I have personally reviewed pertinent lab results    Imaging and other studies: I have personally reviewed pertinent films in PACS

## 2021-06-20 NOTE — PHYSICAL THERAPY NOTE
Physical Therapy Screen Note       06/20/21 1315   PT Last Visit   PT Visit Date 06/20/21   Note Type   Note type Screen   Assessment   Assessment referral received for PT eval and tx  Bill Patel reports pt is ambulating independently  pt reports walking w/o difficulty and has no concerns regarding return home from mobility perspective  inpatient PT is not indicated due to pt's independent mobility status  discontinue PT       Rice Gottron, PT

## 2021-06-21 VITALS
SYSTOLIC BLOOD PRESSURE: 163 MMHG | TEMPERATURE: 97.8 F | HEIGHT: 65 IN | DIASTOLIC BLOOD PRESSURE: 98 MMHG | BODY MASS INDEX: 37.13 KG/M2 | RESPIRATION RATE: 18 BRPM | HEART RATE: 74 BPM | OXYGEN SATURATION: 92 % | WEIGHT: 222.88 LBS

## 2021-06-21 PROCEDURE — NC001 PR NO CHARGE: Performed by: NURSE PRACTITIONER

## 2021-06-21 PROCEDURE — 99024 POSTOP FOLLOW-UP VISIT: CPT | Performed by: SURGERY

## 2021-06-21 RX ADMIN — OXYCODONE HYDROCHLORIDE 10 MG: 10 TABLET ORAL at 06:26

## 2021-06-21 RX ADMIN — ACETAMINOPHEN 975 MG: 325 TABLET, FILM COATED ORAL at 06:26

## 2021-06-21 RX ADMIN — HYDROMORPHONE HYDROCHLORIDE 0.5 MG: 1 INJECTION, SOLUTION INTRAMUSCULAR; INTRAVENOUS; SUBCUTANEOUS at 08:22

## 2021-06-21 RX ADMIN — MAGNESIUM HYDROXIDE 30 ML: 400 SUSPENSION ORAL at 12:23

## 2021-06-21 RX ADMIN — CARVEDILOL 12.5 MG: 12.5 TABLET, FILM COATED ORAL at 08:16

## 2021-06-21 RX ADMIN — PANTOPRAZOLE SODIUM 40 MG: 40 TABLET, DELAYED RELEASE ORAL at 06:26

## 2021-06-21 RX ADMIN — DOCUSATE SODIUM 100 MG: 100 CAPSULE, LIQUID FILLED ORAL at 08:16

## 2021-06-21 RX ADMIN — RIVAROXABAN 15 MG: 15 TABLET, FILM COATED ORAL at 08:16

## 2021-06-21 RX ADMIN — OXYCODONE HYDROCHLORIDE 5 MG: 5 TABLET ORAL at 13:14

## 2021-06-21 RX ADMIN — ACETAMINOPHEN 975 MG: 325 TABLET, FILM COATED ORAL at 14:40

## 2021-06-21 NOTE — TELEPHONE ENCOUNTER
I spoke to patient's wife and let her know the [facility] paperwork has not been received  She is going to refax it to the [facility] office

## 2021-06-21 NOTE — PROGRESS NOTES
Progress Note - General Surgery   Bernadette Hernandez 48 y o  male MRN: [id number]  Unit/Bed#: S -01 Encounter: [id number]    Assessment:  48 y o  male 2 Days Post-Op s/p Procedure(s) (LRB):  Repair of incarcerated incisional hernia repair with mesh  (N/A)    Plan:  -Diet:  Surgical soft diet as tolerated  -I/Os   -DVT ppx  -Control Pain/Nausea   -OOB as tolerated  -PPI, Colace  -Incentive Spirometry  -pulmonary recommendations appreciated  Patient now on Xarelto plan to continue for 3 months followed by hypercoagulable workup  Echo largely unremarkable  Subjective/Objective     Subjective:  No acute events overnight  Patient seen examined at bedside  Patient states pain is well controlled  Tolerated soft diet  Passing flatus no bowel movement  Voiding adequately  Objective:   Blood pressure 163/98, pulse 74, temperature 97 8 °F (36 6 °C), temperature source Oral, resp  rate 18, height 5' 5" (1 651 m), weight 101 kg (222 lb 14 2 oz), SpO2 92 %  ,Body mass index is 37 09 kg/m²      I/O       06/18 0701 - 06/19 0700 06/19 0701 - 06/20 0700 06/20 0701 - 06/21 0700    I V  (mL/kg)  2030 8 (20 1)     IV Piggyback 700      Total Intake(mL/kg) 700 (6 9) 2030 8 (20 1)     Net +700 +2030 8                  Physical Exam:   General: NAD  Head: normocephalic, atraumatic  CV: Pulse regular  Lungs: no conversational dyspnea  Abdomen:  Soft, nondistended, mild tender to palpation near incision, all incisions are clean dry intact no guarding rebound or rigidity  Extremities: JERONIMO, motor sensory intact  Neuro: awake, alert, answers questions appropriately      Lab, Imaging and other studies:  Recent Labs     06/19/21 0228 06/20/21  0533   WBC 6 89 4 86   HGB 13 3 11 5*    186     Recent Labs     06/19/21 0228 06/20/21  0533   SODIUM 135* 136   K 5 0 4 2   CL 98* 101   CO2 27 26   BUN 17 13   CREATININE 1 11 1 06   MG  --  1 9   CALCIUM 8 9 8 1*           VTE Pharmacologic Prophylaxis: Enoxaparin (Lovenox)  VTE Mechanical Prophylaxis: sequential compression device

## 2021-06-21 NOTE — DISCHARGE INSTRUCTIONS
Please call the office when you leave to schedule an appointment for 2 weeks  This can be a virtual / telephone consultation or it can be in person  The choice is yours  Please call [phone]   [address], [id number]  Off of [address] between [facility] and [facility]  Activity:    May lift 10 lb as many times as desired the 1st week,       20 lb in 2 weeks,       30 lb in 3 weeks  Walking is encouraged  Normal daily activities including climbing steps are okay  Do not engage in strenuous activity ( sit-ups or crutches) or contact sports for 4-6 weeks post-operatively    Return to Work:   Okay to return to work when you feel well if you desire  Diet:   You may return to your normal healthy diet  Wound Care: Your wound is closed with dissolvable stitches and glue  It is okay to shower  Wash incision gently with soap and water and pat dry  Do not soak incisions in bath water or swim for two weeks  Do not apply any creams or ointments  Pain Medication:   Please take as directed if needed  May use Advil or Motrin in addition  Recall, the pain medicine and anesthesia is associated with constipation  No driving while taking narcotic pain medications  Other: It is normal to developed a healing ridge / firm incision after surgery  This is your body making scar tissue  It is a good sign  Constipation is very common after general anesthesia  Please use milk of magnesia as needed in order to help prevent constipation  It is normal to get bruising after surgery  If you have questions after discharge please call the office      If you have increased pain, fever >101 5, increased drainage, redness or a bad smell at your surgery site, please call us immediately or come directly to the Emergency Room        Pulmonary Embolism   WHAT YOU NEED TO KNOW:   A pulmonary embolism (PE) is the sudden blockage of a blood vessel in the lungs by an embolus  A PE can become life-threatening  Go to follow-up appointments and take blood thinners as directed  These are especially important if you were discharged home from the emergency department  DISCHARGE INSTRUCTIONS:   Call your local emergency number (911 in the [address]) if:   · You feel lightheaded, short of breath, and have chest pain  · You cough up blood  Call your doctor if:   · Your arm or leg feels warm, tender, and painful  It may look swollen and red  · You have questions or concerns about your condition or care  Medicines:   · Blood thinners  help prevent blood clots  Clots can cause strokes, heart attacks, and death  The following are general safety guidelines to follow while you are taking a blood thinner:    ? Watch for bleeding and bruising while you take blood thinners  Watch for bleeding from your gums or nose  Watch for blood in your urine and bowel movements  Use a soft washcloth on your skin, and a soft toothbrush to brush your teeth  This can keep your skin and gums from bleeding  If you shave, use an electric shaver  Do not play contact sports  ? Tell your dentist and other healthcare providers that you take a blood thinner  Wear a bracelet or necklace that says you take this medicine  ? Do not start or stop any other medicines unless your healthcare provider tells you to  Many medicines cannot be used with blood thinners  ? Take your blood thinner exactly as prescribed by your healthcare provider  Do not skip does or take less than prescribed  Tell your provider right away if you forget to take your blood thinner, or if you take too much  ? Warfarin  is a blood thinner that you may need to take  The following are things you should be aware of if you take warfarin:     § Foods and medicines can affect the amount of warfarin in your blood  Do not make major changes to your diet while you take warfarin   Warfarin works best when you eat about the same amount of vitamin K every day  Vitamin K is found in green leafy vegetables and certain other foods  Ask for more information about what to eat when you are taking warfarin  § You will need to see your healthcare provider for follow-up visits when you are on warfarin  You will need regular blood tests  These tests are used to decide how much medicine you need  · Take your medicine as directed  Contact your healthcare provider if you think your medicine is not helping or if you have side effects  Tell him or her if you are allergic to any medicine  Keep a list of the medicines, vitamins, and herbs you take  Include the amounts, and when and why you take them  Bring the list or the pill bottles to follow-up visits  Carry your medicine list with you in case of an emergency  Prevent another PE:   · Change your body position or move around often  Move and stretch in your seat several times each hour if you travel by car or work at a desk  In an airplane, get up and walk every hour  · Exercise regularly to help increase your blood flow  Walking is a good low-impact exercise  Talk to your healthcare provider about the best exercise plan for you  · Maintain a healthy weight  Ask your healthcare provider how much you should weigh  Ask him or her to help you create a weight loss plan if you are overweight  · Do not smoke  Nicotine and other chemicals in cigarettes and cigars can damage blood vessels and increase your risk for another PE  Ask your healthcare provider for information if you currently smoke and need help to quit  E-cigarettes or smokeless tobacco still contain nicotine  Talk to your healthcare provider before you use these products  · Ask about birth control if you are a woman who takes the pill  A birth control pill increases the risk for blood clots in certain women  The risk is higher if you are also older than 35, smoke cigarettes, or have a blood clotting disorder   Talk to your healthcare provider about other ways to prevent pregnancy, such as a cervical cap or intrauterine device (IUD)  Follow up with your doctor or specialist as directed: You may need to come in regularly for scans to check for blood clots  Your blood may checked to see how long it takes to clot  Your doctor or specialist will tell you if you need to have this test and how often to have it  Write down your questions so you remember to ask them during your visits  © Copyright [address] Information is for End User's use only and may not be sold, redistributed or otherwise used for commercial purposes  All illustrations and images included in CareNotes® are the copyrighted property of A D A M , Inc  or [facility] [employer]   The above information is an  only  It is not intended as medical advice for individual conditions or treatments  Talk to your doctor, nurse or pharmacist before following any medical regimen to see if it is safe and effective for you  Ventral Hernia Repair   WHAT YOU NEED TO KNOW:   A ventral hernia repair is surgery to fix a ventral hernia  A ventral hernia may be repaired if the hernia is preventing blood flow to organs or blocking the intestines  It may be done laparoscopically or open  Laparoscopically means that your healthcare provider will use several small incisions to fix the hernia  In an open repair, your healthcare provider will make one incision to fix your hernia  DISCHARGE INSTRUCTIONS:   Call 911 for any of the following:   · You feel lightheaded, short of breath, and have chest pain  · You cough up blood  · You have trouble breathing  Seek care immediately if:   · Your arm or leg feels warm, tender, and painful  It may look swollen and red  · Blood soaks through your bandage  · Your abdomen feels hard and looks bigger than usual      · Your bowel movements are black, bloody, or tarry-looking      Contact your healthcare provider if: · You have a fever above 101° F      · You develop a skin rash, hives, or itching  · Your incision is swollen, red, or draining pus or fluid  · You have nausea, or you are vomiting  · You cannot have a bowel movement  · Your pain does not get better after taking pain medicine  · You have questions or concerns about your condition or care  Medicines: You may need any of the following:  · Prescription pain medicine  may be given  Ask your healthcare provider how to take this medicine safely  · NSAIDs , such as ibuprofen, help decrease swelling, pain, and fever  This medicine is available with or without a doctor's order  NSAIDs can cause stomach bleeding or kidney problems in certain people  If you take blood thinner medicine, always ask your healthcare provider if NSAIDs are safe for you  Always read the medicine label and follow directions  · Take your medicine as directed  Contact your healthcare provider if you think your medicine is not helping or if you have side effects  Tell him or her if you are allergic to any medicine  Keep a list of the medicines, vitamins, and herbs you take  Include the amounts, and when and why you take them  Bring the list or the pill bottles to follow-up visits  Carry your medicine list with you in case of an emergency  Care for your wound as directed:  Carefully wash around your wound  It is okay to let soap and water run over your wound  Do not  scrub your wound  Dry the area and put on new, clean bandages as directed  Change your bandages when they get wet or dirty  If you have strips of medical tape over your incision, allow them to fall off on their own  Do not get in a bathtub, swimming pool, or hot tub until your healthcare provider says it is okay  Self-care:   · Eat a variety of healthy foods  Healthy foods include fruits, vegetables, whole-grain breads, low-fat dairy products, beans, lean meats, and fish   Healthy foods may help you heal faster  Ask if you need to be on a special diet  · Drink liquids as directed  Liquids may prevent constipation and straining during a bowel movement  This will help prevent pressure on your incision, and another hernia from happening  Ask how much liquid to drink each day and which liquids are best for you  · Apply ice  on your incision for 15 to 20 minutes every hour or as directed  Use an ice pack, or put crushed ice in a plastic bag  Cover it with a towel  Ice helps prevent tissue damage and decreases swelling and pain  · Take deep breaths and cough  10 times each hour  This will decrease your risk for a lung infection  Take a deep breath and hold it for as long as you can  Let the air out and then cough strongly  Deep breaths help open your airway  You may be given an incentive spirometer to help you take deep breaths  Put the plastic piece in your mouth and take a slow, deep breath, then let the air out and cough  Repeat these steps 10 times every hour  Press a pillow lightly against your incision when you cough  This may decrease pain or discomfort  · Wear a binder as directed  Your healthcare provider may tell you to wear a binder over your incision  A binder is an elastic bandage that wraps around your abdomen and over your incision  It fits snug and helps decrease pain when you move or cough  Driving:  Do not drive for at least one week after surgery  Do not drive if you are taking prescription pain medication  Ask your healthcare provider when it is safe for you to drive  Activity:  Do not lift anything heavy until your healthcare provider says it is okay  This may put too much pressure on your incision and cause it to come apart  It may also increase your risk for another hernia  Do not play sports for 2 to 3 weeks  Ask your healthcare provider when you can return to work and your normal activities     Follow up with your healthcare provider as directed:  Write down your questions so you remember to ask them during your visits  © Copyright [address] Information is for End User's use only and may not be sold, redistributed or otherwise used for commercial purposes  All illustrations and images included in CareNotes® are the copyrighted property of A D A M , Inc  or [employer]  The above information is an  only  It is not intended as medical advice for individual conditions or treatments  Talk to your doctor, nurse or pharmacist before following any medical regimen to see if it is safe and effective for you  Bowel Obstruction   WHAT YOU NEED TO KNOW:   A bowel obstruction occurs when your large or small intestine is completely or partly blocked  The blockage prevents food and waste from passing through normally  DISCHARGE INSTRUCTIONS:   Follow up with your healthcare provider as directed:  Write down your questions so you remember to ask them during your visits  Contact your healthcare provider if:   · You have nausea and are vomiting  · Your abdomen is enlarged  · You cannot pass a bowel movement or gas  · You lose weight without trying  · You have blood in your bowel movement  · You have questions or concerns about your condition or care  Seek care immediately or call 911 if:   · You have severe abdominal pain that does not get better  · Your heart is beating faster than normal for you  · You have a fever  © Copyright [address] Information is for End User's use only and may not be sold, redistributed or otherwise used for commercial purposes  All illustrations and images included in CareNotes® are the copyrighted property of A D A M , Inc  or [employer]  The above information is an  only  It is not intended as medical advice for individual conditions or treatments  Talk to your doctor, nurse or pharmacist before following any medical regimen to see if it is safe and effective for you
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
## 2021-06-21 NOTE — PROGRESS NOTES
Progress Note - Ayaz Hernandez 48 y o  male MRN: [id number]    Unit/Bed#: S -01 Encounter: [id number]      Assessment:  Rhoda Allison is a 52-year-old male postoperative day 7 robotic assisted laparoscopic partial right nephrectomy and decortication of right renal cyst   Unfortunately, patient presented over the weekend with abdominal pain and CT finding of incarcerated incisional ventral hernia  Postoperative day 2 repair of incarcerated incisional hernia repair with mesh by general surgeon, Dr Jarad Barber  He is seen today, out of bed and ambulating fried  Afebrile, hemodynamically stable and pain controlled  Serum creatinine and WBC count within normal limits  Abdominal incisions with Dermabond clean dry and intact, well-approximated  Patient is voiding spontaneously in quantity sufficient volumes  Plan:  · Continue routine postoperative care with aggressive ambulation, pain control, monitoring intake and output and diet tolerance  · On Xarelto for management of nonocclusive PE per pulmonology; appreciated  · Monitor for bleeding  · Path report final--papillary renal cell carcinoma, 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  Additional samples including decorticated simple right renal cyst, fat overlying right renal mass,and deep margin of right renal tumor  No dysplasia or malignancy  · Discussion of final pathology by  attending, Dr Lopez Martinez to ensue as scheduled  · Will continue to follow while hospitalized  Subjective:   Mild incisional related pain  Denies fever, chills, flank, suprapubic, groin or testicular pain, dysuria gross hematuria  Objective:     Vitals: Blood pressure 163/98, pulse 74, temperature 97 8 °F (36 6 °C), temperature source Oral, resp  rate 18, height 5' 5" (1 651 m), weight 101 kg (222 lb 14 2 oz), SpO2 92 %  ,Body mass index is 37 09 kg/m²        Intake/Output Summary (Last 24 hours) at 6/21/2021 0920  Last data filed at 6/21/2021 7124  Gross per 24 hour   Intake 2460 17 ml   Output 200 ml   Net 2260 17 ml       Physical Exam: General appearance: alert and oriented, in no acute distress, appears stated age, cooperative and no distress  Head: Normocephalic, without obvious abnormality, atraumatic  Neck: no adenopathy, no carotid bruit, no JVD, supple, symmetrical, trachea midline and thyroid not enlarged, symmetric, no tenderness/mass/nodules  Lungs: diminished breath sounds  Heart: regular rate and rhythm, S1, S2 normal, no murmur, click, rub or gallop  Abdomen: abnormal findings:  mild and Incision and puncture sites with Dermabond  Well-approximated, clean dry and intact without evidence of dehiscence  tenderness in the RLQ  Extremities: extremities normal, warm and well-perfused; no cyanosis, clubbing, or edema  Pulses: 2+ and symmetric  Neurologic: Grossly normal  No urinary drains     Invasive Devices     Peripheral Intravenous Line            Peripheral IV 06/19/21 Left;Ventral (anterior) Antecubital 2 days          Drain            Closed/Suction Drain Right Abdomen Bulb 19 Fr  271 days              Lab Results   Component Value Date    WBC 4 86 06/20/2021    HGB 11 5 (L) 06/20/2021    HCT 36 8 06/20/2021    MCV 89 06/20/2021     06/20/2021     Lab Results   Component Value Date    SODIUM 136 06/20/2021    K 4 2 06/20/2021     06/20/2021    CO2 26 06/20/2021    BUN 13 06/20/2021    CREATININE 1 06 06/20/2021    GLUC 117 06/20/2021    CALCIUM 8 1 (L) 06/20/2021         Lab, Imaging and other studies: I have personally reviewed pertinent reports

## 2021-06-21 NOTE — TELEPHONE ENCOUNTER
WIFE DROPPED OFF [facility] PAPERWORK AND IT WAS PUT ON BUSTER'S DESK  WIFE AWARE OF $15 CHARGE  WHEN COMPLETED WIFE CAN BE CALLED [phone]  SHE WANTS TO PICK THEM UP

## 2021-06-22 ENCOUNTER — TRANSITIONAL CARE MANAGEMENT (OUTPATIENT)
Dept: FAMILY MEDICINE CLINIC | Facility: CLINIC | Age: 56
End: 2021-06-22

## 2021-06-22 LAB
ABO GROUP BLD BPU: NORMAL
ABO GROUP BLD BPU: NORMAL
BPU ID: NORMAL
BPU ID: NORMAL
CROSSMATCH: NORMAL
CROSSMATCH: NORMAL
UNIT DISPENSE STATUS: NORMAL
UNIT DISPENSE STATUS: NORMAL
UNIT PRODUCT CODE: NORMAL
UNIT PRODUCT CODE: NORMAL
UNIT RH: NORMAL
UNIT RH: NORMAL

## 2021-06-22 NOTE — UTILIZATION REVIEW
Notification of Discharge   This is a Notification of Discharge from our facility [address]  Please be advised that this patient has been discharge from our facility  Below you will find the admission and discharge date and time including the patients disposition  UTILIZATION REVIEW CONTACT:  Pepe Wade  Utilization   Network Utilization Review Department  Phone: [phone] x carefully listen to the prompts  All voicemails are confidential   Email: [email or web address]     PHYSICIAN ADVISORY SERVICES:  FOR [id number] REVIEW - MEDICAL NECESSITY DENIAL  Phone: [phone]  Fax: [phone]  Email: [email or web address]     PRESENTATION DATE: 6/19/2021  1:22 AM  OBERVATION ADMISSION DATE:   INPATIENT ADMISSION DATE: 6/19/21  9:42 AM   DISCHARGE DATE: 6/21/2021  4:08 PM  DISPOSITION: Home/Self Care Home/Self Care      IMPORTANT INFORMATION:  Send all requests for admission clinical reviews, approved or denied determinations and any other requests to dedicated fax number below belonging to the campus where the patient is receiving treatment   List of dedicated fax numbers:  [address] DENIALS (Administrative/Medical Necessity) [phone]   [address] [place] [address] (Maternity/NICU/Pediatrics) [phone]   [facility] [phone]   [facility] [phone]   Smith Hernandez [phone]   E[place]to [facility] 15[address] [facility] [phone]   [facility]  [phone]   [address] [facility] [address]  2401 [facility] 1000 [address] [facility] [phone]

## 2021-06-23 ENCOUNTER — TELEPHONE (OUTPATIENT)
Dept: UROLOGY | Facility: CLINIC | Age: 56
End: 2021-06-23

## 2021-06-23 NOTE — TELEPHONE ENCOUNTER
Spoke w patients wife and informed her FMLA forms are pending signature from Dr Bartoloem Reyes  I did make her aware that he is out of the office this week on call and will be returning on Tuesday and that there are also portions of the FMLA and disability that require their signature before sending  Once Maria G Taylor has signed and returned to clerical they will contact them to come in to sign forms  She was ok with this and thanked me for the call

## 2021-06-24 LAB
BACTERIA BLD CULT: NORMAL
BACTERIA BLD CULT: NORMAL

## 2021-06-25 ENCOUNTER — PATIENT MESSAGE (OUTPATIENT)
Dept: FAMILY MEDICINE CLINIC | Facility: CLINIC | Age: 56
End: 2021-06-25

## 2021-06-25 DIAGNOSIS — I26.99 PULMONARY EMBOLUS (HCC): ICD-10-CM

## 2021-06-28 ENCOUNTER — OFFICE VISIT (OUTPATIENT)
Dept: FAMILY MEDICINE CLINIC | Facility: CLINIC | Age: 56
End: 2021-06-28
Payer: COMMERCIAL

## 2021-06-28 VITALS
TEMPERATURE: 97.4 F | HEIGHT: 65 IN | RESPIRATION RATE: 16 BRPM | SYSTOLIC BLOOD PRESSURE: 140 MMHG | BODY MASS INDEX: 36.29 KG/M2 | DIASTOLIC BLOOD PRESSURE: 86 MMHG | OXYGEN SATURATION: 98 % | WEIGHT: 217.8 LBS | HEART RATE: 86 BPM

## 2021-06-28 DIAGNOSIS — I26.99 OTHER PULMONARY EMBOLISM WITHOUT ACUTE COR PULMONALE, UNSPECIFIED CHRONICITY (HCC): Primary | ICD-10-CM

## 2021-06-28 DIAGNOSIS — I10 ESSENTIAL HYPERTENSION: ICD-10-CM

## 2021-06-28 DIAGNOSIS — Z00.00 ANNUAL PHYSICAL EXAM: ICD-10-CM

## 2021-06-28 DIAGNOSIS — K21.9 GASTROESOPHAGEAL REFLUX DISEASE WITHOUT ESOPHAGITIS: ICD-10-CM

## 2021-06-28 DIAGNOSIS — R73.03 PREDIABETES: ICD-10-CM

## 2021-06-28 PROBLEM — C64.9 RENAL CANCER (HCC): Status: ACTIVE | Noted: 2021-06-28

## 2021-06-28 PROCEDURE — 99496 TRANSJ CARE MGMT HIGH F2F 7D: CPT | Performed by: NURSE PRACTITIONER

## 2021-06-28 PROCEDURE — 1111F DSCHRG MED/CURRENT MED MERGE: CPT | Performed by: NURSE PRACTITIONER

## 2021-06-29 ENCOUNTER — TELEPHONE (OUTPATIENT)
Dept: HEMATOLOGY ONCOLOGY | Facility: CLINIC | Age: 56
End: 2021-06-29

## 2021-06-29 NOTE — TELEPHONE ENCOUNTER
New Patient Encounter    New Patient Intake Form   Patient Details:  Koko Boyer  1965  [id number]    Background Information:  [address] starts by opening a telephone encounter and gathering the following information   Who is calling to schedule? If not self, relationship to patient? Spouse   Referring Provider Gustavo Maradiaga   What is the diagnosis? PE   Is this Cancer or Non-Cancer? Non-Cancer   Is this diagnosis confirmed? Yes   When was the diagnosis? 6/2021   Is there a confirmed diagnosis from a biopsy/tissue reviewed by pathology? NA   Were outside slides requested? NA   Is patient aware of diagnosis? Yes   Is there a personal history and what kind? Renal ca   Is there a family history and what kind? No   Reason for visit? New Diagnosis   Have you had any imaging or labs done? If so: when, where? yes  [facility]   Are records in [place]? yes   If patient has a prior history of cancer were old records obtained? Yes   Was the patient told to bring a disk? No   Does the patient smoke or Vape? No   If yes, how many packs or cartridges per day? Scheduling Information:   Preferred [place]: [address] [place] [address]     Are there any dates/time the patient cannot be seen? no   Miscellaneous: pt has recent h/o [facility] Dr Lizet Tsang is his urologisyt all info in [facility]   After completing the above information, please route to Financial Counselor and the appropriate Nurse Navigator for review

## 2021-06-30 ENCOUNTER — OFFICE VISIT (OUTPATIENT)
Dept: PULMONOLOGY | Facility: CLINIC | Age: 56
End: 2021-06-30
Payer: COMMERCIAL

## 2021-06-30 VITALS
TEMPERATURE: 97.6 F | DIASTOLIC BLOOD PRESSURE: 60 MMHG | HEIGHT: 65 IN | SYSTOLIC BLOOD PRESSURE: 138 MMHG | WEIGHT: 216.4 LBS | BODY MASS INDEX: 36.06 KG/M2

## 2021-06-30 DIAGNOSIS — Z86.711 HISTORY OF PULMONARY EMBOLISM: ICD-10-CM

## 2021-06-30 DIAGNOSIS — D86.9 SARCOIDOSIS: Primary | ICD-10-CM

## 2021-06-30 PROCEDURE — 1036F TOBACCO NON-USER: CPT | Performed by: INTERNAL MEDICINE

## 2021-06-30 PROCEDURE — 99214 OFFICE O/P EST MOD 30 MIN: CPT | Performed by: INTERNAL MEDICINE

## 2021-06-30 RX ORDER — RIVAROXABAN 20 MG/1
20 TABLET, FILM COATED ORAL
COMMUNITY
Start: 2021-06-30 | End: 2022-02-15 | Stop reason: SDUPTHER

## 2021-06-30 NOTE — PROGRESS NOTES
Assessment/Plan:     Reviewed both hospitalizations   Admission Date: 2021   Discharge Date: 21     Admitting Diagnosis:   SBO (small bowel obstruction) ([address] ) [K56 609] s/p right partial nephrectomy for renal CA    Pulmonary embolus (HCC) [I26 99]  Post-operative complication [B21  9XXA]  Ventral hernia with bowel obstruction [K43 6]     Discharge Diagnoses: Principal Problem:    Incisional hernia of anterior abdominal wall with obstruction        Consultations: urology, pulmonology     Imagin21 CXR:    IMPRESSION:  1   No active pulmonary disease on examination which is somewhat limited secondary to low lung volumes  2   Abnormal appearance of the small bowel, most compatible with small bowel obstruction      21 CTA chest CTAP w contrast:    IMPRESSION:  1   Solitary nonocclusive filling defect in a secondary left lower lobe pulmonary artery, most compatible with pulmonary embolus  Measured RV/LV ratio is within normal limits at less than 0 9     2   Left lower lobe infiltrate representing atelectasis or pneumonia   Pulmonary infarction less likely, given the thrombus is nonocclusive  3   High-grade distal small bowel obstruction secondary to supraumbilical ventral abdominal wall hernia   There is air present in the soft tissues adjacent to the hernia sac   Although the subcutaneous air may be related to recent surgery, bowel   perforation not excluded   Recommend emergent surgical consultation  4   Abnormal appearance of the right kidney, likely related to recent surgery   Continued follow-up recommended to exclude evolving abscess   Follow-up urology consultation recommended      Procedures Performed: 21 Repair of incarcerated incisional hernia repair with mesh ([place])     HPI: Dc Hernandez is a 48 y  o  male who presents with abdominal pain and shortness of breath   Patient underwent partial right nephrectomy, robotic assisted on  for renal mass concerning for neoplasm   Patient was subsequently discharged  Roger Drea says after discharge he began experiencing worsening abdominal pain, nausea and retching   He was unable to vomit   He was unable to tolerate any food  Yessica Oliva states his abdomen felt distended and firm   States most was pain was around the supra umbilical incision incision   Also complains of shortness of breath   He has never experienced anything like this before  Yessica Oliva has not passed a bowel movement since surgery has minimal flatus        No problem-specific Assessment & Plan notes found for this encounter  Diagnoses and all orders for this visit:    Other pulmonary embolism without acute cor pulmonale, unspecified chronicity ([facility] )  -     Comprehensive metabolic panel; Future  -     CBC and differential; Future  -     UA (URINE) with reflex to Scope  -     Lipid panel; Future  -     TSH, 3rd generation; Future    Prediabetes  -     Comprehensive metabolic panel; Future  -     CBC and differential; Future  -     UA (URINE) with reflex to Scope  -     Lipid panel; Future  -     TSH, 3rd generation; Future  -     HEMOGLOBIN A1C W/ EAG ESTIMATION; Future    Gastroesophageal reflux disease without esophagitis  -     Comprehensive metabolic panel; Future  -     CBC and differential; Future  -     UA (URINE) with reflex to Scope  -     Lipid panel; Future  -     TSH, 3rd generation; Future    Essential hypertension  -     Comprehensive metabolic panel; Future  -     CBC and differential; Future  -     UA (URINE) with reflex to Scope  -     Lipid panel; Future  -     TSH, 3rd generation; Future         Subjective:     Patient ID: Clemente Peña is a 48 y o  male  TRANSITION OF CARE     Admission Date: 6/19/2021   Discharge Date: 06/21/21   Patient underwent partial right nephrectomy, robotic assisted on 06/14 for renal mass concerning for neoplasm   Patient was subsequently discharged     Returned for abdominal pain   SBO (small bowel obstruction) ([address] ) (K56 609)  Pulmonary embolus (HCC) (I26 99)  Post-operative complication (U56 7KBD)  Ventral hernia with bowel obstruction (K43 6)     Discharge Diagnoses: Principal Problem:    Incisional hernia of anterior abdominal wall with obstruction        Consultations: urology, pulmonology     Imagin21 CXR:    IMPRESSION:  1   No active pulmonary disease on examination which is somewhat limited secondary to low lung volumes  2   Abnormal appearance of the small bowel, most compatible with small bowel obstruction      21 CTA chest CTAP w contrast:    IMPRESSION:  1   Solitary nonocclusive filling defect in a secondary left lower lobe pulmonary artery, most compatible with pulmonary embolus  Measured RV/LV ratio is within normal limits at less than 0 9     2   Left lower lobe infiltrate representing atelectasis or pneumonia   Pulmonary infarction less likely, given the thrombus is nonocclusive  3   High-grade distal small bowel obstruction secondary to supraumbilical ventral abdominal wall hernia   There is air present in the soft tissues adjacent to the hernia sac   Although the subcutaneous air may be related to recent surgery, bowel   perforation not excluded   Recommend emergent surgical consultation  4   Abnormal appearance of the right kidney, likely related to recent surgery   Continued follow-up recommended to exclude evolving abscess   Follow-up urology consultation recommended      Procedures Performed: 21 Repair of incarcerated incisional hernia repair with mesh ([place])     HPI: Cammie Hernandez is a 48 y  o  male who presents with abdominal pain and shortness of breath   Patient underwent partial right nephrectomy, robotic assisted on  for renal mass concerning for neoplasm   Patient was subsequently discharged  Debbie Jamil says after discharge he began experiencing worsening abdominal pain, nausea and retching   He was unable to vomit   He was unable to tolerate any food   He states his abdomen felt distended and firm   States most was pain was around the supra umbilical incision incision   Also complains of shortness of breath   He has never experienced anything like this before  Enmanuel Cage has not passed a bowel movement since surgery has minimal flatus            Review of Systems   Constitutional: Negative for chills, fatigue and fever  HENT: Negative for congestion, sinus pain and sore throat  Eyes: Negative  Negative for visual disturbance  Respiratory: Negative for cough and shortness of breath  Cardiovascular: Negative for chest pain, palpitations and leg swelling  Underlying CHF HTN and hyperlipidemia    Gastrointestinal: Negative for abdominal distention, abdominal pain, diarrhea and nausea  GERD and history of bariatric surgery and hiatal hernia repair   S/p right partial nephrectomy and follow up surgery for SBP --> sterri strips still noted    Endocrine:        Prediabetes / Diabetes in the past    Genitourinary: Negative for difficulty urinating and flank pain  Musculoskeletal: Positive for arthralgias and myalgias  Neurological: Negative for dizziness, weakness and headaches  Hematological: Negative for adenopathy  Psychiatric/Behavioral: Negative for sleep disturbance and suicidal ideas  The patient is not nervous/anxious  Objective:     Physical Exam  Vitals and nursing note reviewed  Constitutional:       Comments: BMI 36 42    HENT:      Head: Normocephalic and atraumatic  Mouth/Throat:      Mouth: Mucous membranes are moist    Eyes:      Extraocular Movements: Extraocular movements intact  Comments: Uses glasses    Cardiovascular:      Rate and Rhythm: Normal rate and regular rhythm  Pulses: Normal pulses  Heart sounds: Normal heart sounds  Pulmonary:      Effort: Pulmonary effort is normal       Breath sounds: Normal breath sounds  Comments: Decreased   Abdominal:      Palpations: Abdomen is soft  Musculoskeletal:         General: Normal range of motion  Cervical back: Normal range of motion  Skin:     General: Skin is warm  Neurological:      General: No focal deficit present  Mental Status: He is oriented to person, place, and time  Psychiatric:         Mood and Affect: Mood normal          Behavior: Behavior normal            Vitals:    06/28/21 1631   BP: 140/86   BP Location: Left arm   Patient Position: Sitting   Cuff Size: Standard   Pulse: 86   Resp: 16   Temp: (!) 97 4 °F (36 3 °C)   TempSrc: Temporal   SpO2: 98%   Weight: 98 8 kg (217 lb 12 8 oz)   Height: 5' 5" (1 651 m)       Transitional Care Management Review:  Comfort Reaves is a 48 y o  male here for TCM follow up  During the TCM phone call patient stated:    TCM Call (since 5/29/2021)     Date and time call was made  6/22/2021  3:14 PM    Hospital care reviewed  Records reviewed    Patient was hospitialized at  [address] [place] [address]        Date of Admission  06/19/21    Date of discharge  06/21/21    Diagnosis  Incisional hernia of anterior abdominal wall with obstruction  Disposition  Home    Were the patients medications reviewed and updated  No    Current Symptoms  Middle abdominal pain; Back pain - right side    Middle abdominal pain onset  Ongoing    Back pain, right side, onset  Ongoing      TCM Call (since 5/29/2021)     Post hospital issues  None    Should patient be enrolled in anticoag monitoring? No    Scheduled for follow up?  -- (Comment)  Patient's wife will call on 6/23 to schedule       Patients specialists  Pulmonlolgist; Urologist    Pulmonologist name  Shelly Wilkinson MD    Pulmonologist contact #  [phone]    Endocrinologist name  [phone]    Urologist name  Dawit Garcia    Urologist contact #  [phone]    Did you obtain your prescribed medications  Yes    Do you need help managing your prescriptions or medications  No    Is transportation to your appointment needed  No    I have advised the patient to call PCP with any new or worsening symptoms  Gene Garcia, [address], [place]

## 2021-06-30 NOTE — PROGRESS NOTES
Pulmonary Follow Up Note   Ninoska Hernandez 48 y o  male MRN: [id number]  6/30/2021      Assessment/Plan:      History of pulmonary embolism  · Recent admission to the hospital for  Partial nephrectomy complicated by a new small bowel obstruction  ·  CT imaging revealed a solitary lower lobe pulmonary embolism, this is Placido's second PE, his previous one was in 2019 and he was taken off his anticontagion afer 6 months  · Low risk PE, clinically was stable with negative troponin and NT-proBNP of 151  · Discharged on Xarelto  · Given that this is a recurrent VTE, recommend life long anticoalugaiton    Sarcoidosis  · Diagnosed in 2004, unclear as to how this is definitively diagnosed  [facility] says that he was diagnosed based on chest imaging, may have had a biopsy but is not certain of this   Has not been on any kind of specific sarcoidosis therapy  · PFTs do not show evidence of obstruction restriction  · Chest CT does not reveal any evidence of any hilar lymphadenopathy but does show bibasilar atelectasis as well as left hydropneumothorax,  · No acute issues regarding his previous diagnosis of sarcoidosis    Health Maintenance  Immunization History   Administered Date(s) Administered    Hep B, adult 02/21/2006    INFLUENZA 09/01/2013    Influenza Split High Dose Preservative Free IM 10/01/2016    Influenza, seasonal, injectable 11/28/2018    Pneumococcal 04/27/2007    Pneumococcal Polysaccharide PPV23 01/01/2017    Tdap 12/03/2014    Tetanus Toxoid, Unspecified 01/01/2003     Return in about 3 months (around 9/30/2021)  History of Present Illness   HPI:  Lata Barajas is a 48 y o  male PMHx of sarcoidosis, pulmonary nodules who presents today for follow up  I last saw Angeles Weber in January 2021  Since January, he has been doing well from a pulmonary standpoint  He has not noticed a significant change in his breathing  He does not have any new symptoms of cough, wheezing or shortness of breath   He recently had surgery with a partial nephrectomy for evaluation and management of a complex right sided renal mass on 6/14  Patient was subsequently discharged after his initial surgery, unfortunately after discharge began experiencing worsening abdominal pain, nausea and retching  He also had significant abdominal distention  He presented to the emergency room with these complaints, his initial lab work was unremarkable however on vital signs was noted to be significantly tachycardic and appeared uncomfortable on exam   In the emergency room he underwent a chest abdomen and pelvis CT which showed evidence of a small PE and also found to have a high-grade small-bowel obstruction secondary to his supraumbilical ventral hernia and there was also evidence of bowel and air in the hernia sac  He was taken emergently to the operating room  For repair of incarcerated incisional hernia causing small bowel obstruction  Postoperatively he was started on a clear liquid diet  Pulmonary was consulted while he was in the hospital for his PE  He was started on Xarelto and based on hi labwork and clinical status, was a low risk PE  He was discharged home and has since been recovering well  Review of Systems   Constitutional: Negative for activity change, chills and fever  HENT: Negative for congestion, rhinorrhea, sneezing and voice change  Respiratory: Negative for cough, shortness of breath and wheezing  Cardiovascular: Negative for chest pain and palpitations  Gastrointestinal: Negative for abdominal distention, abdominal pain, diarrhea, nausea and vomiting  Endocrine: Negative for cold intolerance and heat intolerance  Musculoskeletal: Negative for arthralgias, back pain, myalgias and neck stiffness  Skin: Negative for color change, pallor and rash  Neurological: Negative for dizziness, weakness and numbness  Psychiatric/Behavioral: Negative for agitation  The patient is not nervous/anxious        Historical Information   Past Medical History:   Diagnosis Date    Bariatric surgery status     Duodenal ulcer     GERD (gastroesophageal reflux disease)     Gout     [facility]par[facility]roidism ([facility] )     Hypertension     Lung nodule     Postsurgical malabsorption     Pulmonary embolism ([facility] ) 2019    Sarcoidosis     Wears glasses      Past Surgical History:   Procedure Laterality Date    BARIATRIC SURGERY      Gastric Sleeve    COLONOSCOPY      FOOT SURGERY Right     R ankle    GASTRIC BYPASS LAPAROSCOPIC N/A 9/22/2020    Procedure: LAPAROSCOPIC REVISION/ CONVERSION TO HALEY-EN-Y GASTRIC BYPASS W ROBOTICS, PARAESOPHAGEAL HERNIA REPAIR, AND INTRAOPERATIVE EGD;  Surgeon: Melania Hodges MD;  Location: [facility];  Service: Bariatrics    HAND SURGERY      LAPAROTOMY N/A 6/19/2021    Procedure: Repair of incarcerated incisional hernia repair with mesh   ;  Surgeon: Kristina Woodruff DO;  Location: [facility];  Service: General    HI LAP,PARTIAL NEPHRECTOMY Right 6/14/2021    Procedure: ROBOTIC LAPAROSCOPIC PARTIAL NEPHRECTOMY DECORTICATION OF RIGHT RENAL CYST;  Surgeon: Jersey Jc MD;  Location: [facility];  Service: Urology     Family History   Problem Relation Age of Onset    Hypertension Father          Meds/Allergies     Current Outpatient Medications:     acetaminophen (TYLENOL) 325 mg tablet, Take 2 tablets (650 mg total) by mouth every 4 (four) hours as needed for mild pain, Disp: 30 tablet, Rfl: 0    APPLE CIDER VINEGAR PO, Take by mouth daily, Disp: , Rfl:     calcium carbonate-vitamin D (OSCAL-D) 500 mg-200 units per tablet, TAKE ONE TABLET BY MOUTH DAILY TO SUPPLEMENT CALCIUM/ STRENGTHEN BONES, Disp: , Rfl:     calcium citrate-vitamin D (CITRACAL+D) 315-200 MG-UNIT per tablet, Take 1 tablet by mouth 2 (two) times a day, Disp: 180 tablet, Rfl: 1    capsaicin (ZOSTRIX) 0 025 % cream, Apply topically as needed , Disp: , Rfl:     carvedilol (COREG) 12 5 mg tablet, Take 1 tablet (12 5 mg total) by mouth 2 (two) times a day with meals, Disp: 180 tablet, Rfl: 3    Cholecalciferol 25 MCG (1000 UT) tablet, TAKE ONE TABLET BY MOUTH DAILY FOR VITAMIN D SUPPLEMENTATION, Disp: , Rfl:     losartan (COZAAR) 50 mg tablet, Take 1 tablet (50 mg total) by mouth daily Has been on it without issues (Patient taking differently: Take 50 mg by mouth daily in the early morning Has been on it without issues), Disp: 90 tablet, Rfl: 0    Multiple Vitamin (Daily-Mark) TABS, TAKE 1 TABLET BY MOUTH EVERY DAY, Disp: 90 tablet, Rfl: 1    NON FORMULARY, daily Beet Root, Disp: , Rfl:     omeprazole (PriLOSEC) 20 mg delayed release capsule, Take 1 capsule (20 mg total) by mouth 2 (two) times a day, Disp: 60 capsule, Rfl: 1    rivaroxaban (XARELTO) 15 mg tablet, Take 1 tablet (15 mg total) by mouth 2 (two) times a day with meals, Disp: 180 tablet, Rfl: 1    traMADol (ULTRAM) 50 mg tablet, Take 50 mg by mouth every 6 (six) hours as needed for moderate pain, Disp: , Rfl:     docusate sodium (COLACE) 100 mg capsule, Take 1 capsule (100 mg total) by mouth 2 (two) times a day for 15 days, Disp: 30 capsule, Rfl: 0    Xarelto 20 MG tablet, , Disp: , Rfl:   Allergies   Allergen Reactions    Nsaids Other (See Comments)     Duodenal ulcer disease    Flunisolide      Other reaction(s): Burning sensation    Lisinopril Cough    Testosterone Itching, Rash and Other (See Comments)       Vitals: Blood pressure 138/60, temperature 97 6 °F (36 4 °C), height 5' 5" (1 651 m), weight 98 2 kg (216 lb 6 4 oz)  Body mass index is 36 01 kg/m²  Physical Exam  Physical Exam  Constitutional:       Appearance: Normal appearance  HENT:      Head: Normocephalic and atraumatic  Nose: Nose normal       Mouth/Throat:      Mouth: Mucous membranes are moist       Pharynx: Oropharynx is clear  Cardiovascular:      Rate and Rhythm: Normal rate and regular rhythm  Pulses: Normal pulses  Heart sounds: Normal heart sounds  No murmur heard  Pulmonary:      Effort: Pulmonary effort is normal  No respiratory distress  Breath sounds: Normal breath sounds  No wheezing  Abdominal:      General: Abdomen is flat  There is no distension  Palpations: Abdomen is soft  Tenderness: There is no abdominal tenderness  Musculoskeletal:         General: No swelling or tenderness  Cervical back: Normal range of motion and neck supple  Right lower leg: No edema  Left lower leg: No edema  Skin:     General: Skin is warm and dry  Findings: No rash  Neurological:      General: No focal deficit present  Mental Status: He is alert and oriented to person, place, and time  Psychiatric:         Mood and Affect: Mood normal          Behavior: Behavior normal        Labs: I have personally reviewed pertinent lab results  Lab Results   Component Value Date    WBC 4 86 06/20/2021    HGB 11 5 (L) 06/20/2021    HCT 36 8 06/20/2021    MCV 89 06/20/2021     06/20/2021     Lab Results   Component Value Date    GLUCOSE 96 12/26/2015    CALCIUM 8 1 (L) 06/20/2021     12/26/2015    K 4 2 06/20/2021    CO2 26 06/20/2021     06/20/2021    BUN 13 06/20/2021    CREATININE 1 06 06/20/2021     No results found for: IGE  Lab Results   Component Value Date    ALT 36 06/19/2021    AST 36 06/19/2021    ALKPHOS 83 06/19/2021         Imaging and other studies: I have personally reviewed pertinent reports  and I have personally reviewed pertinent films in PACS    CTA Chest 6/19: Solitary, nonocclusive filling defect, High grade distal SBO,     Pulmonary function testing:   FEV1/FVC Ratio:  86 %   Forced Vital Capacity:  3 42 L   (100 % predicted)  FEV1:  2 95 L (109 % predicted)        Lung volumes by body plethysmography:   Total Lung Capacity 84 % predicted   Residual volume 87 % predicted    RV/TLC ratio 104 %    EKG, Pathology, and Other Studies: I have personally reviewed pertinent reports     and I have personally reviewed pertinent films in PACS      [facility]

## 2021-06-30 NOTE — PATIENT INSTRUCTIONS
Heart Healthy Diet   WHAT YOU NEED TO KNOW:   A heart healthy diet is an eating plan low in unhealthy fats and sodium (salt)  The plan is high in healthy fats and fiber  A heart healthy diet helps improve your cholesterol levels and lowers your risk for heart disease and stroke  A dietitian will teach you how to read and understand food labels  DISCHARGE INSTRUCTIONS:   Heart healthy diet guidelines to follow:   · Choose foods that contain healthy fats  ? Unsaturated fats  include monounsaturated and polyunsaturated fats  Unsaturated fat is found in foods such as soybean, canola, olive, corn, and safflower oils  It is also found in soft tub margarine that is made with liquid vegetable oil  ? Omega-3 fat  is found in certain fish, such as salmon, tuna, and trout, and in walnuts and flaxseed  Eat fish high in omega-3 fats at least 2 times a week  · Get 20 to 30 grams of fiber each day  Fruits, vegetables, whole-grain foods, and legumes (cooked beans) are good sources of fiber  · Limit or do not have unhealthy fats  ? Cholesterol  is found in animal foods, such as eggs and lobster, and in dairy products made from whole milk  Limit cholesterol to less than 200 mg each day  ? Saturated fat  is found in meats, such as penning[place] and hamburger  It is also found in chicken or turkey skin, whole milk, and butter  Limit saturated fat to less than 7% of your total daily calories  ? Trans fat  is found in packaged foods, such as potato chips and cookies  It is also in hard margarine, some fried foods, and shortening  Do not eat foods that contain trans fats  · Limit sodium as directed  You may be told to limit sodium to 2,000 to 2,300 mg each day  Choose low-sodium or no-salt-added foods  Add little or no salt to food you prepare  Use herbs and spices in place of salt         Include the following in your heart healthy plan:  Ask your dietitian or healthcare provider how many servings to have from each of the following food groups:  · Grains:      ? Whole-wheat breads, cereals, and pastas, and brown rice    ? Low-fat, low-sodium crackers and chips    · Vegetables:      ? Broccoli, green beans, green peas, and spinach    ? Collards, kale, and lima beans    ? Carrots, sweet potatoes, tomatoes, and peppers    ? Canned vegetables with no salt added    · Fruits:      ? Bananas, peaches, pears, and pineapple    ? Grapes, raisins, and dates    ? Oranges, tangerines, grapefruit, orange juice, and grapefruit juice    ? Apricots, mangoes, melons, and papaya    ? Raspberries and strawberries    ? Canned fruit with no added sugar    · Low-fat dairy:      ? Nonfat (skim) milk, 1% milk, and low-fat almond, cashew, or soy milks fortified with calcium    ? Low-fat cheese, regular or frozen yogurt, and cottage cheese    · Meats and proteins:      ? Lean cuts of beef and pork (loin, leg, round), skinless chicken and turkey    ? Legumes, soy products, egg whites, or nuts    Limit or do not include the following in your heart healthy plan:   · Unhealthy fats and oils:      ? Whole or 2% milk, cream cheese, sour cream, or cheese    ? High-fat cuts of beef (T-bone steaks, ribs), chicken or turkey with skin, and organ meats such as liver    ? Butter, stick margarine, shortening, and cooking oils such as coconut or palm oil    · Foods and liquids high in sodium:      ? Packaged foods, such as frozen dinners, cookies, macaroni and cheese, and cereals with more than 300 mg of sodium per serving    ? Vegetables with added sodium, such as instant potatoes, vegetables with added sauces, or regular canned vegetables    ? Cured or smoked meats, such as hot dogs, pennington, and sausage    ? High-sodium ketchup, barbecue sauce, salad dressing, pickles, olives, soy sauce, or miso    · Foods and liquids high in sugar:      ? Candy, cake, cookies, pies, or doughnuts    ? Soft drinks (soda), sports drinks, or sweetened tea    ?  Canned or dry mixes for cakes, soups, sauces, or gravies    Other healthy heart guidelines:   · Do not smoke  Nicotine and other chemicals in cigarettes and cigars can cause lung and heart damage  Ask your healthcare provider for information if you currently smoke and need help to quit  E-cigarettes or smokeless tobacco still contain nicotine  Talk to your healthcare provider before you use these products  · Limit or do not drink alcohol as directed  Alcohol can damage your heart and raise your blood pressure  Your healthcare provider may give you specific daily and weekly limits  The general recommended limit is 1 drink a day for women 21 or older and for men 72 or older  Do not have more than 3 drinks in a day or 7 in a week  The recommended limit is 2 drinks a day for men 24to 59years of age  Do not have more than 4 drinks in a day or 14 in a week  A drink of alcohol is 12 ounces of beer, 5 ounces of wine, or 1½ ounces of liquor  · Exercise regularly  Exercise can help you maintain a healthy weight and improve your blood pressure and cholesterol levels  Regular exercise can also decrease your risk for heart problems  Ask your healthcare provider about the best exercise plan for you  Do not start an exercise program without asking your healthcare provider  Follow up with your doctor or cardiologist as directed:  Write down your questions so you remember to ask them during your visits  © Copyright [address] Information is for End User's use only and may not be sold, redistributed or otherwise used for commercial purposes  All illustrations and images included in CareNotes® are the copyrighted property of [employer] D A M [employer] Inc  or [address] [place] [address]  The above information is an  only  It is not intended as medical advice for individual conditions or treatments  Talk to your doctor, nurse or pharmacist before following any medical regimen to see if it is safe and effective for you      Wellness Visit for Adults   AMBULATORY CARE:   A wellness visit  is when you see your healthcare provider to get screened for health problems  Your healthcare provider will also give you advice on how to stay healthy  Write down your questions so you remember to ask them  Ask your healthcare provider how often you should have a wellness visit  What happens at a wellness visit:  Your healthcare provider will ask about your health, and your family history of health problems  This includes high blood pressure, heart disease, and cancer  He or she will ask if you have symptoms that concern you, if you smoke, and about your mood  You may also be asked about your intake of medicines, supplements, food, and alcohol  Any of the following may be done:  · Your weight  will be checked  Your height may also be checked so your body mass index (BMI) can be calculated  Your BMI shows if you are at a healthy weight  · Your blood pressure  and heart rate will be checked  Your temperature may also be checked  · Blood and urine tests  may be done  Blood tests may be done to check your cholesterol levels  Abnormal cholesterol levels increase your risk for heart disease and stroke  You may also need a blood or urine test to check for diabetes if you are at increased risk  Urine tests may be done to look for signs of an infection or kidney disease  · A physical exam  includes checking your heartbeat and lungs with a stethoscope  Your healthcare provider may also check your skin to look for sun damage  · Screening tests  may be recommended  A screening test is done to check for diseases that may not cause symptoms  The screening tests you may need depend on your age, gender, family history, and lifestyle habits  For example, colorectal screening may be recommended if you are 48years old or older  Screening tests you need if you are a woman:   · A Pap smear  is used to screen for cervical cancer   Pap smears are usually done every 3 to 5 years depending on your age  You may need them more often if you have had abnormal Pap smear test results in the past  Ask your healthcare provider how often you should have a Pap smear  · A mammogram  is an x-ray of your breasts to screen for breast cancer  Experts recommend mammograms every 2 years starting at age 48 years  You may need a mammogram at age 52 years or younger if you have an increased risk for breast cancer  Talk to your healthcare provider about when you should start having mammograms and how often you need them  Vaccines you may need:   · Get an influenza vaccine  every year  The influenza vaccine protects you from the flu  Several types of viruses cause the flu  The viruses change over time, so new vaccines are made each year  · Get a tetanus-diphtheria (Td) booster vaccine  every 10 years  This vaccine protects you against tetanus and diphtheria  Tetanus is a severe infection that may cause painful muscle spasms and lockjaw  Diphtheria is a severe bacterial infection that causes a thick covering in the back of your mouth and throat  · Get a human papillomavirus (HPV) vaccine  if you are female and aged 23 to 32 or male 23 to 24 and never received it  This vaccine protects you from HPV infection  HPV is the most common infection spread by sexual contact  HPV may also cause vaginal, penile, and anal cancers  · Get a pneumococcal vaccine  if you are aged 72 years or older  The pneumococcal vaccine is an injection given to protect you from pneumococcal disease  Pneumococcal disease is an infection caused by pneumococcal bacteria  The infection may cause pneumonia, meningitis, or an ear infection  · Get a shingles vaccine  if you are 60 or older, even if you have had shingles before  The shingles vaccine is an injection to protect you from the varicella-zoster virus  This is the same virus that causes chickenpox   Shingles is a painful rash that develops in people who had chickenpox or have been exposed to the virus  How to eat healthy:  My Plate is a model for planning healthy meals  It shows the types and amounts of foods that should go on your plate  Fruits and vegetables make up about half of your plate, and grains and protein make up the other half  A serving of dairy is included on the side of your plate  The amount of calories and serving sizes you need depends on your age, gender, weight, and height  Examples of healthy foods are listed below:  · Eat a variety of vegetables  such as dark green, red, and orange vegetables  You can also include canned vegetables low in sodium (salt) and frozen vegetables without added butter or sauces  · Eat a variety of fresh fruits , canned fruit in 100% juice, frozen fruit, and dried fruit  · Include whole grains  At least half of the grains you eat should be whole grains  Examples include whole-wheat bread, wheat pasta, brown rice, and whole-grain cereals such as oatmeal     · Eat a variety of protein foods such as seafood (fish and shellfish), lean meat, and poultry without skin (turkey and chicken)  Examples of lean meats include pork leg, shoulder, or tenderloin, and beef round, sirloin, tenderloin, and extra lean ground beef  Other protein foods include eggs and egg substitutes, beans, peas, soy products, nuts, and seeds  · Choose low-fat dairy products such as skim or 1% milk or low-fat yogurt, cheese, and cottage cheese  · Limit unhealthy fats  such as butter, hard margarine, and shortening  Exercise:  Exercise at least 30 minutes per day on most days of the week  Some examples of exercise include walking, biking, dancing, and swimming  You can also fit in more physical activity by taking the stairs instead of the elevator or parking farther away from stores  Include muscle strengthening activities 2 days each week  Regular exercise provides many health benefits   It helps you manage your weight, and decreases your risk for type 2 diabetes, heart disease, stroke, and high blood pressure  Exercise can also help improve your mood  Ask your healthcare provider about the best exercise plan for you  General health and safety guidelines:   · Do not smoke  Nicotine and other chemicals in cigarettes and cigars can cause lung damage  Ask your healthcare provider for information if you currently smoke and need help to quit  E-cigarettes or smokeless tobacco still contain nicotine  Talk to your healthcare provider before you use these products  · Limit alcohol  A drink of alcohol is 12 ounces of beer, 5 ounces of wine, or 1½ ounces of liquor  · Lose weight, if needed  Being overweight increases your risk of certain health conditions  These include heart disease, high blood pressure, type 2 diabetes, and certain types of cancer  · Protect your skin  Do not sunbathe or use tanning beds  Use sunscreen with a SPF 15 or higher  Apply sunscreen at least 15 minutes before you go outside  Reapply sunscreen every 2 hours  Wear protective clothing, hats, and sunglasses when you are outside  · Drive safely  Always wear your seatbelt  Make sure everyone in your car wears a seatbelt  A seatbelt can save your life if you are in an accident  Do not use your cell phone when you are driving  This could distract you and cause an accident  Pull over if you need to make a call or send a text message  · Practice safe sex  Use latex condoms if are sexually active and have more than one partner  Your healthcare provider may recommend screening tests for sexually transmitted infections (STIs)  · Wear helmets, lifejackets, and protective gear  Always wear a helmet when you ride a bike or motorcycle, go skiing, or play sports that could cause a head injury  Wear protective equipment when you play sports  Wear a lifejacket when you are on a boat or doing water sports      © Copyright [employer] 2020 Information is for End User's use only and may not be sold, redistributed or otherwise used for commercial purposes  All illustrations and images included in CareNotes® are the copyrighted property of A [employer] A M [employer] Inc  or Tegan Guzman  The above information is an  only  It is not intended as medical advice for individual conditions or treatments  Talk to your doctor, nurse or pharmacist before following any medical regimen to see if it is safe and effective for you  Weight Management   AMBULATORY CARE:   Why it is important to manage your weight:  Being overweight increases your risk of health conditions such as heart disease, high blood pressure, type 2 diabetes, and certain types of cancer  It can also increase your risk for osteoarthritis, sleep apnea, and other respiratory problems  Aim for a slow, steady weight loss  Even a small amount of weight loss can lower your risk of health problems  How to lose weight safely:  A safe and healthy way to lose weight is to eat fewer calories and get regular exercise  · You can lose up about 1 pound a week by decreasing the number of calories you eat by 500 calories each day  You can decrease calories by eating smaller portion sizes or by cutting out high-calorie foods  Read labels to find out how many calories are in the foods you eat  · You can also burn calories with exercise such as walking, swimming, or biking  You will be more likely to keep weight off if you make these changes part of your lifestyle  Exercise at least 30 minutes per day on most days of the week  You can also fit in more physical activity by taking the stairs instead of the elevator or parking farther away from stores  Ask your healthcare provider about the best exercise plan for you  Healthy meal plan for weight management:  A healthy meal plan includes a variety of foods, contains fewer calories, and helps you stay healthy   A healthy meal plan includes the following:     · Eat whole-grain foods more often  A healthy meal plan should contain fiber  Fiber is the part of grains, fruits, and vegetables that is not broken down by your body  Whole-grain foods are healthy and provide extra fiber in your diet  Some examples of whole-grain foods are whole-wheat breads and pastas, oatmeal, brown rice, and bulgur  · Eat a variety of vegetables every day  Include dark, leafy greens such as spinach, kale, gabriela greens, and mustard greens  Eat yellow and orange vegetables such as carrots, sweet potatoes, and winter squash  · Eat a variety of fruits every day  Choose fresh or canned fruit (canned in its own juice or light syrup) instead of juice  Fruit juice has very little or no fiber  · Eat low-fat dairy foods  Drink fat-free (skim) milk or 1% milk  Eat fat-free yogurt and low-fat cottage cheese  Try low-fat cheeses such as mozzarella and other reduced-fat cheeses  · Choose meat and other protein foods that are low in fat  Choose beans or other legumes such as split peas or lentils  Choose fish, skinless poultry (chicken or turkey), or lean cuts of red meat (beef or pork)  Before you cook meat or poultry, cut off any visible fat  · Use less fat and oil  Try baking foods instead of frying them  Add less fat, such as margarine, sour cream, regular salad dressing and mayonnaise to foods  Eat fewer high-fat foods  Some examples of high-fat foods include french fries, doughnuts, ice cream, and cakes  · Eat fewer sweets  Limit foods and drinks that are high in sugar  This includes candy, cookies, regular soda, and sweetened drinks  Ways to decrease calories:   · Eat smaller portions  ? Use a small plate with smaller servings  ? Do not eat second helpings  ? When you eat at a restaurant, ask for a box and place half of your meal in the box before you eat  ? Share an entrée with someone else  · Replace high-calorie snacks with healthy, low-calorie snacks  ?  Choose fresh fruit, vegetables, fat-free rice cakes, or air-popped popcorn instead of potato chips, nuts, or chocolate  ? Choose water or calorie-free drinks instead of soda or sweetened drinks  · Do not shop for groceries when you are hungry  You may be more likely to make unhealthy food choices  Take a grocery list of healthy foods and shop after you have eaten  · Eat regular meals  Do not skip meals  Skipping meals can lead to overeating later in the day  This can make it harder for you to lose weight  Eat a healthy snack in place of a meal if you do not have time to eat a regular meal  Talk with a dietitian to help you create a meal plan and schedule that is right for you  Other things to consider as you try to lose weight:   · Be aware of situations that may give you the urge to overeat, such as eating while watching television  Find ways to avoid these situations  For example, read a book, go for a walk, or do crafts  · Meet with a weight loss support group or friends who are also trying to lose weight  This may help you stay motivated to continue working on your weight loss goals  © Copyright [address] Information is for End User's use only and may not be sold, redistributed or otherwise used for commercial purposes  All illustrations and images included in CareNotes® are the copyrighted property of [employer]  or [facility] Erin Yap   The above information is an  only  It is not intended as medical advice for individual conditions or treatments  Talk to your doctor, nurse or pharmacist before following any medical regimen to see if it is safe and effective for you  Cholesterol and Your Health   AMBULATORY CARE:   Cholesterol  is a waxy, fat-like substance  Your body uses cholesterol to make hormones and new cells, and to protect nerves  Cholesterol is made by your body  It also comes from certain foods you eat, such as meat and dairy products   Your healthcare provider can help you set goals for your cholesterol levels  He or she can help you create a plan to meet your goals  Cholesterol level goals: Your cholesterol level goals depend on your risk for heart disease, your age, and your other health conditions  The following are general guidelines:  · Total cholesterol  includes low-density lipoprotein (LDL), high-density lipoprotein (HDL), and triglyceride levels  The total cholesterol level should be lower than 200 mg/dL and is best at about 150 mg/dL  · LDL cholesterol  is called bad cholesterol  because it forms plaque in your arteries  As plaque builds up, your arteries become narrow, and less blood flows through  When plaque decreases blood flow to your heart, you may have chest pain  If plaque completely blocks an artery that brings blood to your heart, you may have a heart attack  Plaque can break off and form blood clots  Blood clots may block arteries in your brain and cause a stroke  The level should be less than 130 mg/dL and is best at about 100 mg/dL  · HDL cholesterol  is called good cholesterol  because it helps remove LDL cholesterol from your arteries  It does this by attaching to LDL cholesterol and carrying it to your liver  Your liver breaks down LDL cholesterol so your body can get rid of it  High levels of HDL cholesterol can help prevent a heart attack and stroke  Low levels of HDL cholesterol can increase your risk for heart disease, heart attack, and stroke  The level should be 60 mg/dL or higher  · Triglycerides  are a type of fat that store energy from foods you eat  High levels of triglycerides also cause plaque buildup  This can increase your risk for a heart attack or stroke  If your triglyceride level is high, your LDL cholesterol level may also be high  The level should be less than 150 mg/dL      What increases your risk for high cholesterol:   · Smoking cigarettes    · Being overweight or obese, or not getting enough exercise    · Drinking large amounts of alcohol    · A medical condition such as hypertension (high blood pressure) or diabetes    · Certain genes passed from your parents to you    · Age older than 65 years    What you need to know about having your cholesterol levels checked: Adults 21to 39years of age should have their cholesterol levels checked every 4 to 6 years  Adults 45 years or older should have their cholesterol checked every 1 to 2 years  You may need your cholesterol checked more often, or at a younger age, if you have risk factors for heart disease  You may also need to have your cholesterol checked more often if you have other health conditions, such as diabetes  Blood tests are used to check cholesterol levels  Blood tests measure your levels of triglycerides, LDL cholesterol, and HDL cholesterol  How healthy fats affect your cholesterol levels:  Healthy fats, also called unsaturated fats, help lower LDL cholesterol and triglyceride levels  Healthy fats include the following:  · Monounsaturated fats  are found in foods such as olive oil, canola oil, avocado, nuts, and olives  · Polyunsaturated fats,  such as omega 3 fats, are found in fish, such as salmon, trout, and tuna  They can also be found in plant foods such as flaxseed, walnuts, and soybeans  How unhealthy fats affect your cholesterol levels:  Unhealthy fats increase LDL cholesterol and triglyceride levels  They are found in foods high in cholesterol, saturated fat, and trans fat:  · Cholesterol  is found in eggs, dairy, and meat  · Saturated fat  is found in butter, cheese, ice cream, whole milk, and coconut oil  Saturated fat is also found in meat, such as sausage, hot dogs, and bologna  · Trans fat  is found in liquid oils and is used in fried and baked foods  Foods that contain trans fats include chips, crackers, muffins, sweet rolls, microwave popcorn, and cookies      Treatment  for high cholesterol will also decrease your risk of heart disease, heart attack, and stroke  Treatment may include any of the following:  · Lifestyle changes  may include food, exercise, weight loss, and quitting smoking  You may also need to decrease the amount of alcohol you drink  Your healthcare provider will want you to start with lifestyle changes  Other treatment may be added if lifestyle changes are not enough  · Medicines  may be given to lower your LDL cholesterol, triglyceride levels, or total cholesterol level  You may need medicines to lower your cholesterol if any of the following is true:     ? You have a history of stroke, TIA, unstable angina, or a heart attack  ? Your LDL cholesterol level is 190 mg/dL or higher  ? You are age 36 to 76 years, have diabetes or heart disease risk factors, and your LDL cholesterol is 70 mg/dL or higher  · Supplements  include fish oil, red yeast rice, and garlic  Fish oil may help lower your triglyceride and LDL cholesterol levels  It may also increase your HDL cholesterol level  Red yeast rice may help decrease your total cholesterol level and LDL cholesterol level  Garlic may help lower your total cholesterol level  Do not take these supplements without talking to your healthcare provider  Food changes you can make to lower your cholesterol levels:  A dietitian can help you create a healthy eating plan  He or she can show you how to read food labels and choose foods low in saturated fat, trans fats, and cholesterol  · Decrease the total amount of fat you eat  Choose lean meats, fat-free or 1% fat milk, and low-fat dairy products, such as yogurt and cheese  Try to limit or avoid red meats  Limit or do not eat fried foods or baked goods, such as cookies  · Replace unhealthy fats with healthy fats  Cook foods in olive oil or canola oil  Choose soft margarines that are low in saturated fat and trans fat  Seeds, nuts, and avocados are other examples of healthy fats      · Eat foods with omega-3 fats  Examples include salmon, tuna, mackerel, walnuts, and flaxseed  Eat fish 2 times per week  Pregnant women should not eat fish that have high levels of mercury, such as shark, swordfish, and hammad mackerel  · Increase the amount of high-fiber foods you eat  High-fiber foods can help lower your LDL cholesterol  Aim to get between 20 and 30 grams of fiber each day  Fruits and vegetables are high in fiber  Eat at least 5 servings each day  Other high-fiber foods are whole-grain or whole-wheat breads, pastas, or cereals, and brown rice  Eat 3 ounces of whole-grain foods each day  Increase fiber slowly  You may have abdominal discomfort, bloating, and gas if you add fiber to your diet too quickly  · Eat healthy protein foods  Examples include low-fat dairy products, skinless chicken and turkey, fish, and nuts  · Limit foods and drinks that are high in sugar  Your dietitian or healthcare provider can help you create daily limits for high-sugar foods and drinks  The limit may be lower if you have diabetes or another health condition  Limits can also help you lose weight if needed  Lifestyle changes you can make to lower your cholesterol levels:   · Maintain a healthy weight  Ask your healthcare provider what a healthy weight is for you  Ask him or her to help you create a weight loss plan if needed  Weight loss can decrease your total cholesterol and triglyceride levels  Weight loss may also help keep your blood pressure at a healthy level  · Exercise regularly  Exercise can help lower your total cholesterol level and maintain a healthy weight  Exercise can also help increase your HDL cholesterol level  Work with your healthcare provider to create an exercise program that is right for you  Get at least 30 to 40 minutes of moderate exercise most days of the week  Examples of exercise include brisk walking, swimming, or biking  · Do not smoke    Nicotine and other chemicals in cigarettes and cigars can raise your cholesterol levels  Ask your healthcare provider for information if you currently smoke and need help to quit  E-cigarettes or smokeless tobacco still contain nicotine  Talk to your healthcare provider before you use these products  · Limit or do not drink alcohol  Alcohol can increase your triglyceride levels  Ask your healthcare provider before you drink alcohol  Ask how much is okay for you to drink in 1 day or 1 week  © Copyright [address] Information is for End User's use only and may not be sold, redistributed or otherwise used for commercial purposes  All illustrations and images included in CareNotes® are the copyrighted property of [employer]  or [address] [place] [address]  The above information is an  only  It is not intended as medical advice for individual conditions or treatments  Talk to your doctor, nurse or pharmacist before following any medical regimen to see if it is safe and effective for you

## 2021-07-01 NOTE — TELEPHONE ENCOUNTER
Good morning  He was scheduled for 7/20/21  I just wanted you to be aware because of his recent prostate CA dx  Was that ok?

## 2021-07-02 ENCOUNTER — OFFICE VISIT (OUTPATIENT)
Dept: UROLOGY | Facility: CLINIC | Age: 56
End: 2021-07-02

## 2021-07-02 VITALS
WEIGHT: 216 LBS | SYSTOLIC BLOOD PRESSURE: 130 MMHG | HEIGHT: 65 IN | BODY MASS INDEX: 35.99 KG/M2 | HEART RATE: 80 BPM | DIASTOLIC BLOOD PRESSURE: 68 MMHG

## 2021-07-02 DIAGNOSIS — N52.9 ED (ERECTILE DYSFUNCTION) OF ORGANIC ORIGIN: ICD-10-CM

## 2021-07-02 DIAGNOSIS — C64.9 PAPILLARY RENAL CELL CARCINOMA (HCC): Primary | ICD-10-CM

## 2021-07-02 PROCEDURE — 99024 POSTOP FOLLOW-UP VISIT: CPT | Performed by: UROLOGY

## 2021-07-02 PROCEDURE — 3008F BODY MASS INDEX DOCD: CPT | Performed by: INTERNAL MEDICINE

## 2021-07-02 RX ORDER — SILDENAFIL CITRATE 20 MG/1
TABLET ORAL
Qty: 30 TABLET | Refills: 11 | Status: SHIPPED | OUTPATIENT
Start: 2021-07-02 | End: 2021-07-18 | Stop reason: HOSPADM

## 2021-07-02 NOTE — LETTER
July 2, 2021     Lashonda Hinojosa, [address] [place] [address] [address]  [address] [address]    Patient: Tai Reyes   YOB: 1965   Date of Visit: 7/2/2021       Dear Dr Evin Donohue: Thank you for referring Rocio Mckinley to me for evaluation  Below are my notes for this consultation  If you have questions, please do not hesitate to call me  I look forward to following your patient along with you  Sincerely,        Lorie Gamble MD        CC: MD Rupert David, DO Lorie Gamble MD  7/2/2021 12:53 PM  Sign when Signing Visit  Referring Physician: ANSHUL Canales  A copy of this note was sent to the referring physician  Diagnoses and all orders for this visit:    Papillary renal cell carcinoma ([facility] [address] )  -     CT abdomen pelvis w wo contrast; Future  -     XR chest pa & lateral; Future  -     Basic metabolic panel; Future    ED (erectile dysfunction) of organic origin  -     sildenafil (REVATIO) 20 mg tablet; Take 3-5 tablets as needed            Assessment and plan:       1  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  - pT1a, Grade 2, papillary, N0 M0 R0  -  Robotic partial nephrectomy June 2021 -  Also included decortication of ipsilateral renal cyst with benign pathology  -  Complicated by obstructing incarcerated incisional hernia status post emergent herniorrhaphy with mesh, no small-bowel resection required  Oncologic standpoint is excellent    2  Left benign renal cysts    3  obesity  - status post prior gastric sleeve as well as subsequent Keke-en-Y gastric bypass September 2020     4  Pulmonary sarcoidosis     5  History of recurrent pulmonary embolism  -  Previously completed anticoagulation  -  Recurrent PE noted acutely postoperatively, restarted on anticoagulation   -follows closely with pulmonology     Charly Collins is thankfully doing well the present time    His pathology demonstrates low-grade papillary renal cell carcinoma excised robotically with negative surgical margins  His prognosis from an oncologic standpoint is excellent  His GFR is normal     he did develop acute incisional hernia associated with incarceration of bowel obstructive postoperatively  He is now status post hernia repair  He is doing quite well in this regard is physical examination today is normal   Reviewed postoperative physical expectations limitations at this point time  Karthikeyan Mera will follow-up with urology in 3 months with a CT abdomen with without contrast,  Chest x-ray creatinine,  And will continue radiographic surveillance going forward as well  Overall risk of recurrence is low         Ranjit Segura MD      Chief Complaint     postop      History of Present Illness     Emanuel Worthy is a 48 y o  Returns in follow-up status post recent robotic right partial nephrectomy  Patient's hospital course was complicated by acute incisional hernia with incarceration small-bowel obstruction  This was hernia repair with mesh  Performed by my colleague Dr Destinee Marr  No bowel resection was required  Is also found to have a recurrent pulmonary embolism diagnosed upon repeat presentation following surgery    Detailed Urologic History     - please refer to HPI    Review of Systems     Review of Systems   Constitutional: Negative for activity change and fatigue  HENT: Negative for congestion  Eyes: Negative for visual disturbance  Respiratory: Negative for shortness of breath and wheezing  Cardiovascular: Negative for chest pain and leg swelling  Gastrointestinal: Negative for abdominal pain  Endocrine: Negative for polyuria  Genitourinary: Negative for dysuria, flank pain, hematuria and urgency  Musculoskeletal: Negative for back pain  Allergic/Immunologic: Negative for immunocompromised state  Neurological: Negative for dizziness and numbness  Psychiatric/Behavioral: Negative for dysphoric mood     All other systems reviewed and are negative  Allergies     Allergies   Allergen Reactions    Nsaids Other (See Comments)     Duodenal ulcer disease    Flunisolide      Other reaction(s): Burning sensation    Lisinopril Cough    Testosterone Itching, Rash and Other (See Comments)       Physical Exam     Physical Exam  Constitutional:       General: He is not in acute distress  Appearance: He is well-developed  HENT:      Head: Normocephalic and atraumatic  Cardiovascular:      Comments: obese  Pulmonary:      Effort: Pulmonary effort is normal       Breath sounds: Normal breath sounds  Abdominal:      Palpations: Abdomen is soft  Genitourinary:     Comments: Laparoscopic surgical incisions are noted  Musculoskeletal:         General: Normal range of motion  Cervical back: Normal range of motion  Skin:     General: Skin is warm  Neurological:      Mental Status: He is alert and oriented to person, place, and time     Psychiatric:         Behavior: Behavior normal         all incisions are well healed with no palpable hernia noted      Vital Signs  Vitals:    07/02/21 1108   BP: 130/68   BP Location: Left arm   Patient Position: Sitting   Cuff Size: Adult   Pulse: 80   Weight: 98 kg (216 lb)   Height: 5' 5" (1 651 m)         Current Medications       Current Outpatient Medications:     acetaminophen (TYLENOL) 325 mg tablet, Take 2 tablets (650 mg total) by mouth every 4 (four) hours as needed for mild pain, Disp: 30 tablet, Rfl: 0    APPLE CIDER VINEGAR PO, Take by mouth daily, Disp: , Rfl:     calcium carbonate-vitamin D (OSCAL-D) 500 mg-200 units per tablet, TAKE ONE TABLET BY MOUTH DAILY TO SUPPLEMENT CALCIUM/ STRENGTHEN BONES, Disp: , Rfl:     calcium citrate-vitamin D (CITRACAL+D) 315-200 MG-UNIT per tablet, Take 1 tablet by mouth 2 (two) times a day, Disp: 180 tablet, Rfl: 1    capsaicin (ZOSTRIX) 0 025 % cream, Apply topically as needed , Disp: , Rfl:     carvedilol (COREG) 12 5 mg tablet, Take 1 tablet (12 5 mg total) by mouth 2 (two) times a day with meals, Disp: 180 tablet, Rfl: 3    Cholecalciferol 25 MCG (1000 UT) tablet, TAKE ONE TABLET BY MOUTH DAILY FOR VITAMIN D SUPPLEMENTATION, Disp: , Rfl:     losartan (COZAAR) 50 mg tablet, Take 1 tablet (50 mg total) by mouth daily Has been on it without issues (Patient taking differently: Take 50 mg by mouth daily in the early morning Has been on it without issues), Disp: 90 tablet, Rfl: 0    Multiple Vitamin (Daily-Mark) TABS, TAKE 1 TABLET BY MOUTH EVERY DAY, Disp: 90 tablet, Rfl: 1    NON FORMULARY, daily Beet Root, Disp: , Rfl:     omeprazole (PriLOSEC) 20 mg delayed release capsule, Take 1 capsule (20 mg total) by mouth 2 (two) times a day, Disp: 60 capsule, Rfl: 1    rivaroxaban (XARELTO) 15 mg tablet, Take 1 tablet (15 mg total) by mouth 2 (two) times a day with meals, Disp: 180 tablet, Rfl: 1    traMADol (ULTRAM) 50 mg tablet, Take 50 mg by mouth every 6 (six) hours as needed for moderate pain, Disp: , Rfl:     Xarelto 20 MG tablet, , Disp: , Rfl:     docusate sodium (COLACE) 100 mg capsule, Take 1 capsule (100 mg total) by mouth 2 (two) times a day for 15 days, Disp: 30 capsule, Rfl: 0    sildenafil (REVATIO) 20 mg tablet, Take 3-5 tablets as needed, Disp: 30 tablet, Rfl: 11      Active Problems     Patient Active Problem List   Diagnosis    Pulmonary embolism ([facility] [address] )    Gastroesophageal reflux disease    History of sleeve gastrectomy    Sarcoidosis    Incidental lung nodule, > 3mm and < 8mm    Right renal mass    Cigar smoker    Essential hypertension    Hyperparathyroidism , secondary, non-renal (HCC)    Low vitamin D level    Low vitamin B12 level    Low calcium levels    NICM (nonischemic cardiomyopathy) (HCC)    Chest pain    Bariatric surgery status    Type 2 diabetes mellitus with hyperglycemia (HCC)    Osteoporosis    Morbid obesity (HCC)    Hypercholesterolemia    Heart burn    Gout    Generalized osteoarthritis    Arthritis    ED (erectile dysfunction) of organic origin    Prediabetes    Mixed hyperlipidemia    Chronic systolic congestive heart failure (HCC)    History of pulmonary embolism    Obesity, Class II, BMI 35-39 9    Encounter for surgical aftercare following surgery of digestive system    Muscle pain    Postsurgical malabsorption    Renal cyst    Pre-op evaluation    Incisional hernia of anterior abdominal wall with obstruction    Renal cancer [facility])         Past Medical History     Past Medical History:   Diagnosis Date    Bariatric surgery status     Duodenal ulcer     GERD (gastroesophageal reflux disease)     Gout     Hyperparathyroidism ([facility] [address] )     Hypertension     Lung nodule     Postsurgical malabsorption     Pulmonary embolism ([facility] [address] ) 2019    Sarcoidosis     Wears glasses          Surgical History     Past Surgical History:   Procedure Laterality Date    BARIATRIC SURGERY      Gastric Sleeve    COLONOSCOPY      FOOT SURGERY Right     R ankle    GASTRIC BYPASS LAPAROSCOPIC N/A 9/22/2020    Procedure: LAPAROSCOPIC REVISION/ CONVERSION TO HALEY-EN-Y GASTRIC BYPASS W ROBOTICS, PARAESOPHAGEAL HERNIA REPAIR, AND INTRAOPERATIVE EGD;  Surgeon: Dane Gay MD;  Location: [facility];  Service: Bariatrics    HAND SURGERY      LAPAROTOMY N/A 6/19/2021    Procedure: Repair of incarcerated incisional hernia repair with mesh   ;  Surgeon: Peyman Oquendo DO;  [facility];  Service: General    [place] LAP,PARTIAL NEPHRECTOMY Right 6/14/2021    Procedure: ROBOTIC LAPAROSCOPIC PARTIAL NEPHRECTOMY DECORTICATION OF RIGHT RENAL CYST;  Surgeon: Jason Castro MD;  [facility];  Service: Urology         Family History     Family History   Problem Relation Age of Onset    Hypertension Father          Social History     Social History     Social History     Tobacco Use   Smoking Status Former Smoker    Packs/day: 1 00    Years: 7 00    Pack years: 7 00    Types: Cigars, Cigarettes    Start date: 1999    Quit date: 2006    Years since quitting: 15 5   Smokeless Tobacco Never Used   Tobacco Comment    only smoked occ cigar, not for over 6 months         Pertinent Lab Values     Lab Results   Component Value Date    CREATININE 1 06 06/20/2021       Lab Results   Component Value Date    PSA 0 4 01/31/2021       @RESULTRCNT(1H])@      Pertinent Imaging        FINDINGS:     ABDOMEN     LOWER CHEST:  No clinically significant abnormality identified in the visualized lower chest      LIVER/BILIARY TREE:  Unremarkable      GALLBLADDER:  No calcified gallstones  No pericholecystic inflammatory change      SPLEEN:  Unremarkable      PANCREAS:  Unremarkable      ADRENAL GLANDS:  Unremarkable      KIDNEYS/URETERS:  There is a unchanged 3 3 cm right renal cystic lesion which does not meet CT criteria for simple cysts on this examination  Bilateral simple cysts are visualized      STOMACH AND BOWEL:  Status post gastric surgery  No evidence of bowel obstruction  Colonic diverticulosis without evidence of acute diverticulitis     APPENDIX:  No findings to suggest appendicitis      ABDOMINOPELVIC CAVITY:  No ascites  No pneumoperitoneum  No lymphadenopathy      VESSELS:  Unremarkable for patient's age      PELVIS     REPRODUCTIVE ORGANS:  Unremarkable for patient's age      URINARY BLADDER:  Unremarkable      ABDOMINAL WALL/INGUINAL REGIONS:  Unremarkable      OSSEOUS STRUCTURES:  No acute fracture or destructive osseous lesion      IMPRESSION:     No evidence of bowel obstruction      Unchanged right-sided renal cystic lesion which does not meet CT criteria for  Further evaluation on a nonemergent basis with a renal ultrasound is recommended      The study was marked in EPIC for immediate notification  FINDINGS:     KIDNEYS:  Symmetric and normal size  Right kidney:  11 3 x 7 x 6 7 cm    Left kidney:  11 8 x 6 2 x 5 5 cm      Right kidney  Normal echogenicity and contour  Solid appearing mid renal lesion measuring 3 4 x 2 5 x 2 7 cm  This appears to correlate with the lesion seen on prior CT  Additional simple appearing cysts  No hydronephrosis  No shadowing calculi  No perinephric fluid collections      Left kidney  Normal echogenicity and contour  No suspicious masses detected  Simple appearing lower pole cyst measures 2 7 x 2 8 x 2 4 cm  No hydronephrosis  5 mm echogenic focus is indeterminate, possibly nonobstructing stone though no stone visualized on prior CT  No perinephric fluid collections      URETERS:  Nonvisualized      BLADDER:   Normally distended  No focal thickening or mass lesions  Bilateral ureteral jets are not detected         IMPRESSION:  1  Solid appearing mid renal lesion measuring 3 4 x 2 5 x 2 7 cm  This is concerning for neoplasm until proven otherwise  Further characterization with contrast MR suggested  2   Bilateral renal cysts  Portions of the record may have been created with voice recognition software   Occasional wrong word or "sound a like" substitutions may have occurred due to the inherent limitations of voice recognition software   Read the chart carefully and recognize, using context, where substitutions have occurred

## 2021-07-02 NOTE — PROGRESS NOTES
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  - pT1a, Grade 2, papillary, N0 M0 R0  -  Robotic partial nephrectomy June 2021 -  Also included decortication of ipsilateral renal cyst with benign pathology  -  Complicated by obstructing incarcerated incisional hernia status post emergent herniorrhaphy with mesh, no small-bowel resection required  Oncologic standpoint is excellent    2  Left benign renal cysts    3  obesity  - status post prior gastric sleeve as well as subsequent Keke-en-Y gastric bypass September 2020     4  Pulmonary sarcoidosis     5  History of recurrent pulmonary embolism  -  Previously completed anticoagulation  -  Recurrent PE noted acutely postoperatively, restarted on anticoagulation   -follows closely with pulmonology     [place] is thankfully doing well the present time  His pathology demonstrates low-grade papillary renal cell carcinoma excised robotically with negative surgical margins  His prognosis from an oncologic standpoint is excellent  His GFR is normal     he did develop acute incisional hernia associated with incarceration of bowel obstructive postoperatively  He is now status post hernia repair  He is doing quite well in this regard is physical examination today is normal   Reviewed postoperative physical expectations limitations at this point time        [place] will follow-up with urology in 3 months with a CT abdomen with without contrast,  Chest x-ray creatinine,  And will continue radiographic surveillance going forward as well  Overall risk of recurrence is low         Blanca Grace MD      Chief Complaint     postop      History of Present Illness     Bonita Aquino is a 48 y o  Returns in follow-up status post recent robotic right partial nephrectomy  Patient's hospital course was complicated by acute incisional hernia with incarceration small-bowel obstruction  This was hernia repair with mesh  Performed by my colleague Dr Nichole Fuller  No bowel resection was required  Is also found to have a recurrent pulmonary embolism diagnosed upon repeat presentation following surgery    Detailed Urologic History     - please refer to HPI    Review of Systems     Review of Systems   Constitutional: Negative for activity change and fatigue  HENT: Negative for congestion  Eyes: Negative for visual disturbance  Respiratory: Negative for shortness of breath and wheezing  Cardiovascular: Negative for chest pain and leg swelling  Gastrointestinal: Negative for abdominal pain  Endocrine: Negative for polyuria  Genitourinary: Negative for dysuria, flank pain, hematuria and urgency  Musculoskeletal: Negative for back pain  Allergic/Immunologic: Negative for immunocompromised state  Neurological: Negative for dizziness and numbness  Psychiatric/Behavioral: Negative for dysphoric mood  All other systems reviewed and are negative  Allergies     Allergies   Allergen Reactions    Nsaids Other (See Comments)     Duodenal ulcer disease    Flunisolide      Other reaction(s): Burning sensation    Lisinopril Cough    Testosterone Itching, Rash and Other (See Comments)       Physical Exam     Physical Exam  Constitutional:       General: He is not in acute distress  Appearance: He is well-developed  HENT:      Head: Normocephalic and atraumatic     Cardiovascular:      Comments: obese  Pulmonary:      Effort: Pulmonary effort is normal       Breath sounds: Normal breath sounds  Abdominal:      Palpations: Abdomen is soft  Genitourinary:     Comments: Laparoscopic surgical incisions are noted  Musculoskeletal:         General: Normal range of motion  Cervical back: Normal range of motion  Skin:     General: Skin is warm  Neurological:      Mental Status: He is alert and oriented to person, place, and time     Psychiatric:         Behavior: Behavior normal         all incisions are well healed with no palpable hernia noted      Vital Signs  Vitals:    07/02/21 1108   BP: 130/68   BP Location: Left arm   Patient Position: Sitting   Cuff Size: Adult   Pulse: 80   Weight: 98 kg (216 lb)   Height: 5' 5" (1 651 m)         Current Medications       Current Outpatient Medications:     acetaminophen (TYLENOL) 325 mg tablet, Take 2 tablets (650 mg total) by mouth every 4 (four) hours as needed for mild pain, Disp: 30 tablet, Rfl: 0    APPLE CIDER VINEGAR PO, Take by mouth daily, Disp: , Rfl:     calcium carbonate-vitamin D (OSCAL-D) 500 mg-200 units per tablet, TAKE ONE TABLET BY MOUTH DAILY TO SUPPLEMENT CALCIUM/ STRENGTHEN BONES, Disp: , Rfl:     calcium citrate-vitamin D (CITRACAL+D) 315-200 MG-UNIT per tablet, Take 1 tablet by mouth 2 (two) times a day, Disp: 180 tablet, Rfl: 1    capsaicin (ZOSTRIX) 0 025 % cream, Apply topically as needed , Disp: , Rfl:     carvedilol (COREG) 12 5 mg tablet, Take 1 tablet (12 5 mg total) by mouth 2 (two) times a day with meals, Disp: 180 tablet, Rfl: 3    Cholecalciferol 25 MCG (1000 UT) tablet, TAKE ONE TABLET BY MOUTH DAILY FOR VITAMIN D SUPPLEMENTATION, Disp: , Rfl:     losartan (COZAAR) 50 mg tablet, Take 1 tablet (50 mg total) by mouth daily Has been on it without issues (Patient taking differently: Take 50 mg by mouth daily in the early morning Has been on it without issues), Disp: 90 tablet, Rfl: 0    Multiple Vitamin (Daily-Mark) TABS, TAKE 1 TABLET BY MOUTH EVERY DAY, Disp: 90 tablet, Rfl: 1    NON FORMULARY, daily Beet Root, Disp: , Rfl:     omeprazole (PriLOSEC) 20 mg delayed release capsule, Take 1 capsule (20 mg total) by mouth 2 (two) times a day, Disp: 60 capsule, Rfl: 1    rivaroxaban (XARELTO) 15 mg tablet, Take 1 tablet (15 mg total) by mouth 2 (two) times a day with meals, Disp: 180 tablet, Rfl: 1    traMADol (ULTRAM) 50 mg tablet, Take 50 mg by mouth every 6 (six) hours as needed for moderate pain, Disp: , Rfl:     Xarelto 20 MG tablet, , Disp: , Rfl:     docusate sodium (COLACE) 100 mg capsule, Take 1 capsule (100 mg total) by mouth 2 (two) times a day for 15 days, Disp: 30 capsule, Rfl: 0    sildenafil (REVATIO) 20 mg tablet, Take 3-5 tablets as needed, Disp: 30 tablet, Rfl: 11      Active Problems     Patient Active Problem List   Diagnosis    [facility] [address] [facility]    Gastroesophageal reflux disease    History of sleeve gastrectomy    Sarcoidosis    Incidental lung nodule, > 3mm and < 8mm    Right renal mass    Cigar smoker    Essential hypertension    Hyperparathyroidism , secondary, non-renal (HCC)    Low vitamin D level    Low vitamin B12 level    Low calcium levels    [facility]mic cardiomyopathy) [facility])    Chest pain    Bariatric surgery status    Type 2 diabetes mellitus with hyperglycemia (HCC)    Osteoporosis    Morbid obesity ([address] )    Hypercholesterolemia    Heart burn    Gout    Generalized osteoarthritis    Arthritis    ED (erectile dysfunction) of organic origin    Prediabetes    Mixed hyperlipidemia    Chronic systolic congestive heart failure (HCC)    History of pulmonary embolism    Obesity, Class II, BMI 35-39 9    Encounter for surgical aftercare following surgery of digestive system    Muscle pain    Postsurgical malabsorption    Renal cyst    Pre-op evaluation    Incisional hernia of anterior abdominal wall with obstruction    Renal cancer [facility])         Past Medical History     Past Medical History:   Diagnosis Date    Bariatric surgery status     Duodenal ulcer     GERD (gastroesophageal reflux disease)     Gout     [facility] [address] [facility]     Hypertension     Lung nodule     Postsurgical malabsorption     Pulmonary embolism ([facility] [address] ) 2019    Sarcoidosis     Wears glasses          Surgical History     Past Surgical History:   Procedure Laterality Date    BARIATRIC SURGERY      Gastric Sleeve    COLONOSCOPY      FOOT SURGERY Right     R ankle    GASTRIC BYPASS LAPAROSCOPIC N/A 9/22/2020    Procedure: LAPAROSCOPIC REVISION/ CONVERSION TO HALEY-EN-Y GASTRIC BYPASS W ROBOTICS, PARAESOPHAGEAL HERNIA REPAIR, AND INTRAOPERATIVE EGD;  Surgeon: Shahzad Galloway MD;  Location: [facility];  Service: Bariatrics    HAND SURGERY      LAPAROTOMY N/A 6/19/2021    Procedure: Repair of incarcerated incisional hernia repair with mesh   ;  Surgeon: Toribio Curling, DO;  [facility];  Service: General    OR LAP,PARTIAL NEPHRECTOMY Right 6/14/2021    Procedure: ROBOTIC LAPAROSCOPIC PARTIAL NEPHRECTOMY DECORTICATION OF RIGHT RENAL CYST;  Surgeon: Ashley Frankel MD;  [facility];  Service: Urology         Family History     Family History   Problem Relation Age of Onset    Hypertension Father          Social History     Social History     Social History     Tobacco Use   Smoking Status Former Smoker    Packs/day: 1 00    Years: 7 00    Pack years: 7 00    Types: Cigars, Cigarettes    Start date: 1999    Quit date: 2006    Years since quitting: 15 5   Smokeless Tobacco Never Used   Tobacco Comment    only smoked occ cigar, not for over 6 months         Pertinent Lab Values     Lab Results   Component Value Date    CREATININE 1 06 06/20/2021       Lab Results   Component Value Date    PSA 0 4 01/31/2021       @RESULTRCNT(1H])@      Pertinent Imaging        FINDINGS:     ABDOMEN     LOWER CHEST:  No clinically significant abnormality identified in the visualized lower chest      LIVER/BILIARY TREE:  Unremarkable      GALLBLADDER: No calcified gallstones  No pericholecystic inflammatory change      SPLEEN:  Unremarkable      PANCREAS:  Unremarkable      ADRENAL GLANDS:  Unremarkable      KIDNEYS/URETERS:  There is a unchanged 3 3 cm right renal cystic lesion which does not meet CT criteria for simple cysts on this examination  Bilateral simple cysts are visualized      STOMACH AND BOWEL:  Status post gastric surgery  No evidence of bowel obstruction  Colonic diverticulosis without evidence of acute diverticulitis     APPENDIX:  No findings to suggest appendicitis      ABDOMINOPELVIC CAVITY:  No ascites  No pneumoperitoneum  No lymphadenopathy      VESSELS:  Unremarkable for patient's age      PELVIS     REPRODUCTIVE ORGANS:  Unremarkable for patient's age      URINARY BLADDER:  Unremarkable      ABDOMINAL WALL/INGUINAL REGIONS:  Unremarkable      OSSEOUS STRUCTURES:  No acute fracture or destructive osseous lesion      IMPRESSION:     No evidence of bowel obstruction      Unchanged right-sided renal cystic lesion which does not meet CT criteria for  Further evaluation on a nonemergent basis with a renal ultrasound is recommended      The study was marked in EPIC for immediate notification  FINDINGS:     KIDNEYS:  Symmetric and normal size  Right kidney:  11 3 x 7 x 6 7 cm  Left kidney:  11 8 x 6 2 x 5 5 cm      Right kidney  Normal echogenicity and contour  Solid appearing mid renal lesion measuring 3 4 x 2 5 x 2 7 cm  This appears to correlate with the lesion seen on prior CT  Additional simple appearing cysts  No hydronephrosis  No shadowing calculi  No perinephric fluid collections      Left kidney  Normal echogenicity and contour  No suspicious masses detected  Simple appearing lower pole cyst measures 2 7 x 2 8 x 2 4 cm  No hydronephrosis  5 mm echogenic focus is indeterminate, possibly nonobstructing stone though no stone visualized on prior CT    No perinephric fluid collections      URETERS:  Nonvisualized      BLADDER:   Normally distended  No focal thickening or mass lesions  Bilateral ureteral jets are not detected         IMPRESSION:  1  Solid appearing mid renal lesion measuring 3 4 x 2 5 x 2 7 cm  This is concerning for neoplasm until proven otherwise  Further characterization with contrast MR suggested  2   Bilateral renal cysts  Portions of the record may have been created with voice recognition software   Occasional wrong word or "sound a like" substitutions may have occurred due to the inherent limitations of voice recognition software   Read the chart carefully and recognize, using context, where substitutions have occurred

## 2021-07-16 ENCOUNTER — APPOINTMENT (INPATIENT)
Dept: RADIOLOGY | Facility: HOSPITAL | Age: 56
DRG: 390 | End: 2021-07-16
Payer: COMMERCIAL

## 2021-07-16 ENCOUNTER — TELEPHONE (OUTPATIENT)
Dept: UROLOGY | Facility: AMBULATORY SURGERY CENTER | Age: 56
End: 2021-07-16

## 2021-07-16 ENCOUNTER — HOSPITAL ENCOUNTER (INPATIENT)
Facility: HOSPITAL | Age: 56
LOS: 2 days | Discharge: HOME | DRG: 390 | End: 2021-07-18
Attending: EMERGENCY MEDICINE | Admitting: SURGERY
Payer: COMMERCIAL

## 2021-07-16 ENCOUNTER — TELEPHONE (OUTPATIENT)
Dept: HEMATOLOGY ONCOLOGY | Facility: CLINIC | Age: 56
End: 2021-07-16

## 2021-07-16 ENCOUNTER — APPOINTMENT (EMERGENCY)
Dept: CT IMAGING | Facility: HOSPITAL | Age: 56
DRG: 390 | End: 2021-07-16
Payer: COMMERCIAL

## 2021-07-16 DIAGNOSIS — I26.99 ACUTE PULMONARY EMBOLISM, UNSPECIFIED PULMONARY EMBOLISM TYPE, UNSPECIFIED WHETHER ACUTE COR PULMONALE PRESENT (HCC): ICD-10-CM

## 2021-07-16 DIAGNOSIS — K56.609 SBO (SMALL BOWEL OBSTRUCTION) (HCC): Primary | ICD-10-CM

## 2021-07-16 LAB
ALBUMIN SERPL BCP-MCNC: 3.6 G/DL (ref 3.5–5)
ALP SERPL-CCNC: 79 U/L (ref 46–116)
ALT SERPL W P-5'-P-CCNC: 35 U/L (ref 12–78)
ANION GAP SERPL CALCULATED.3IONS-SCNC: 10 MMOL/L (ref 4–13)
AST SERPL W P-5'-P-CCNC: 21 U/L (ref 5–45)
BASOPHILS # BLD AUTO: 0.02 THOUSANDS/ΜL (ref 0–0.1)
BASOPHILS NFR BLD AUTO: 0 % (ref 0–1)
BILIRUB SERPL-MCNC: 0.35 MG/DL (ref 0.2–1)
BUN SERPL-MCNC: 16 MG/DL (ref 5–25)
CALCIUM SERPL-MCNC: 8.6 MG/DL (ref 8.3–10.1)
CHLORIDE SERPL-SCNC: 107 MMOL/L (ref 100–108)
CO2 SERPL-SCNC: 24 MMOL/L (ref 21–32)
CREAT SERPL-MCNC: 0.94 MG/DL (ref 0.6–1.3)
EOSINOPHIL # BLD AUTO: 0.06 THOUSAND/ΜL (ref 0–0.61)
EOSINOPHIL NFR BLD AUTO: 1 % (ref 0–6)
ERYTHROCYTE [DISTWIDTH] IN BLOOD BY AUTOMATED COUNT: 14.7 % (ref 11.6–15.1)
GFR SERPL CREATININE-BSD FRML MDRD: 106 ML/MIN/1.73SQ M
GLUCOSE SERPL-MCNC: 127 MG/DL (ref 65–140)
HCT VFR BLD AUTO: 37.9 % (ref 36.5–49.3)
HGB BLD-MCNC: 11.8 G/DL (ref 12–17)
HOLD SPECIMEN: NORMAL
IMM GRANULOCYTES # BLD AUTO: 0.03 THOUSAND/UL (ref 0–0.2)
IMM GRANULOCYTES NFR BLD AUTO: 0 % (ref 0–2)
LACTATE SERPL-SCNC: 0.9 MMOL/L (ref 0.5–2)
LIPASE SERPL-CCNC: 104 U/L (ref 73–393)
LYMPHOCYTES # BLD AUTO: 0.72 THOUSANDS/ΜL (ref 0.6–4.47)
LYMPHOCYTES NFR BLD AUTO: 10 % (ref 14–44)
MCH RBC QN AUTO: 28.4 PG (ref 26.8–34.3)
MCHC RBC AUTO-ENTMCNC: 31.1 G/DL (ref 31.4–37.4)
MCV RBC AUTO: 91 FL (ref 82–98)
MONOCYTES # BLD AUTO: 0.22 THOUSAND/ΜL (ref 0.17–1.22)
MONOCYTES NFR BLD AUTO: 3 % (ref 4–12)
NEUTROPHILS # BLD AUTO: 6.26 THOUSANDS/ΜL (ref 1.85–7.62)
NEUTS SEG NFR BLD AUTO: 86 % (ref 43–75)
NRBC BLD AUTO-RTO: 0 /100 WBCS
PLATELET # BLD AUTO: 137 THOUSANDS/UL (ref 149–390)
PMV BLD AUTO: 12.6 FL (ref 8.9–12.7)
POTASSIUM SERPL-SCNC: 4.1 MMOL/L (ref 3.5–5.3)
PROT SERPL-MCNC: 7.5 G/DL (ref 6.4–8.2)
RBC # BLD AUTO: 4.15 MILLION/UL (ref 3.88–5.62)
SODIUM SERPL-SCNC: 141 MMOL/L (ref 136–145)
WBC # BLD AUTO: 7.31 THOUSAND/UL (ref 4.31–10.16)

## 2021-07-16 PROCEDURE — 96374 THER/PROPH/DIAG INJ IV PUSH: CPT

## 2021-07-16 PROCEDURE — 83690 ASSAY OF LIPASE: CPT | Performed by: EMERGENCY MEDICINE

## 2021-07-16 PROCEDURE — 83605 ASSAY OF LACTIC ACID: CPT | Performed by: SURGERY

## 2021-07-16 PROCEDURE — 85025 COMPLETE CBC W/AUTO DIFF WBC: CPT | Performed by: EMERGENCY MEDICINE

## 2021-07-16 PROCEDURE — 36415 COLL VENOUS BLD VENIPUNCTURE: CPT

## 2021-07-16 PROCEDURE — G1004 CDSM NDSC: HCPCS

## 2021-07-16 PROCEDURE — 71045 X-RAY EXAM CHEST 1 VIEW: CPT

## 2021-07-16 PROCEDURE — 96375 TX/PRO/DX INJ NEW DRUG ADDON: CPT

## 2021-07-16 PROCEDURE — 99222 1ST HOSP IP/OBS MODERATE 55: CPT | Performed by: SURGERY

## 2021-07-16 PROCEDURE — 99285 EMERGENCY DEPT VISIT HI MDM: CPT | Performed by: EMERGENCY MEDICINE

## 2021-07-16 PROCEDURE — 74177 CT ABD & PELVIS W/CONTRAST: CPT

## 2021-07-16 PROCEDURE — 80053 COMPREHEN METABOLIC PANEL: CPT | Performed by: EMERGENCY MEDICINE

## 2021-07-16 PROCEDURE — 99285 EMERGENCY DEPT VISIT HI MDM: CPT

## 2021-07-16 RX ORDER — PANTOPRAZOLE SODIUM 40 MG/1
40 TABLET, DELAYED RELEASE ORAL
Status: DISCONTINUED | OUTPATIENT
Start: 2021-07-17 | End: 2021-07-16

## 2021-07-16 RX ORDER — HYDROMORPHONE HCL/PF 1 MG/ML
0.2 SYRINGE (ML) INJECTION EVERY 4 HOURS PRN
Status: DISCONTINUED | OUTPATIENT
Start: 2021-07-16 | End: 2021-07-18 | Stop reason: HOSPADM

## 2021-07-16 RX ORDER — MORPHINE SULFATE 4 MG/ML
4 INJECTION, SOLUTION INTRAMUSCULAR; INTRAVENOUS ONCE
Status: COMPLETED | OUTPATIENT
Start: 2021-07-16 | End: 2021-07-16

## 2021-07-16 RX ORDER — HYDROMORPHONE HCL/PF 1 MG/ML
1 SYRINGE (ML) INJECTION
Status: DISCONTINUED | OUTPATIENT
Start: 2021-07-16 | End: 2021-07-18 | Stop reason: HOSPADM

## 2021-07-16 RX ORDER — LABETALOL 20 MG/4 ML (5 MG/ML) INTRAVENOUS SYRINGE
10 EVERY 6 HOURS
Status: DISCONTINUED | OUTPATIENT
Start: 2021-07-16 | End: 2021-07-17

## 2021-07-16 RX ORDER — HYDROMORPHONE HCL/PF 1 MG/ML
0.5 SYRINGE (ML) INJECTION
Status: DISCONTINUED | OUTPATIENT
Start: 2021-07-16 | End: 2021-07-18 | Stop reason: HOSPADM

## 2021-07-16 RX ORDER — HYDRALAZINE HYDROCHLORIDE 20 MG/ML
5 INJECTION INTRAMUSCULAR; INTRAVENOUS EVERY 6 HOURS PRN
Status: DISCONTINUED | OUTPATIENT
Start: 2021-07-16 | End: 2021-07-18 | Stop reason: HOSPADM

## 2021-07-16 RX ORDER — SODIUM CHLORIDE, SODIUM LACTATE, POTASSIUM CHLORIDE, CALCIUM CHLORIDE 600; 310; 30; 20 MG/100ML; MG/100ML; MG/100ML; MG/100ML
125 INJECTION, SOLUTION INTRAVENOUS CONTINUOUS
Status: DISCONTINUED | OUTPATIENT
Start: 2021-07-16 | End: 2021-07-18

## 2021-07-16 RX ORDER — CARVEDILOL 12.5 MG/1
12.5 TABLET ORAL 2 TIMES DAILY WITH MEALS
Status: DISCONTINUED | OUTPATIENT
Start: 2021-07-17 | End: 2021-07-16

## 2021-07-16 RX ORDER — XYLITOL/YERBA SANTA
5 AEROSOL, SPRAY WITH PUMP (ML) MUCOUS MEMBRANE 4 TIMES DAILY PRN
Status: DISCONTINUED | OUTPATIENT
Start: 2021-07-16 | End: 2021-07-18

## 2021-07-16 RX ORDER — PANTOPRAZOLE SODIUM 40 MG/1
40 INJECTION, POWDER, FOR SOLUTION INTRAVENOUS
Status: DISCONTINUED | OUTPATIENT
Start: 2021-07-17 | End: 2021-07-17

## 2021-07-16 RX ORDER — ONDANSETRON 2 MG/ML
4 INJECTION INTRAMUSCULAR; INTRAVENOUS ONCE
Status: COMPLETED | OUTPATIENT
Start: 2021-07-16 | End: 2021-07-16

## 2021-07-16 RX ORDER — ONDANSETRON 2 MG/ML
4 INJECTION INTRAMUSCULAR; INTRAVENOUS EVERY 6 HOURS PRN
Status: DISCONTINUED | OUTPATIENT
Start: 2021-07-16 | End: 2021-07-18 | Stop reason: HOSPADM

## 2021-07-16 RX ADMIN — LABETALOL 20 MG/4 ML (5 MG/ML) INTRAVENOUS SYRINGE 10 MG: at 23:00

## 2021-07-16 RX ADMIN — ONDANSETRON 4 MG: 2 INJECTION INTRAMUSCULAR; INTRAVENOUS at 19:19

## 2021-07-16 RX ADMIN — MORPHINE SULFATE 4 MG: 4 INJECTION INTRAVENOUS at 19:20

## 2021-07-16 RX ADMIN — SODIUM CHLORIDE, SODIUM LACTATE, POTASSIUM CHLORIDE, AND CALCIUM CHLORIDE 125 ML/HR: .6; .31; .03; .02 INJECTION, SOLUTION INTRAVENOUS at 23:00

## 2021-07-16 RX ADMIN — IOHEXOL 100 ML: 350 INJECTION, SOLUTION INTRAVENOUS at 19:46

## 2021-07-16 NOTE — TELEPHONE ENCOUNTER
Patient managed by Ashley Castellano  Wife calling to say she is taking him to emergency room for stomach pain   [place]

## 2021-07-16 NOTE — TELEPHONE ENCOUNTER
Una Garcia is calling to reschedule patients consult appt scheduled for 07/20/21 at 3 pm  She could best be reached at [phone]

## 2021-07-16 NOTE — ED PROVIDER NOTES
History  Chief Complaint   Patient presents with    Abdominal Pain     Reports abdominal pain starting within the last 3 hours  +vomiting, denies diarrhea, fever  Partial nephrectomy on 6/19, recent PE and hernia repair     48 yoM with sudden onset umbilical pain today at work  States that after he had lunch, he had intense, sudden onset, abdominal pain aroung 12:30 pm today  He tried [employer] to alleviate the pain with no change in symptoms  He attempted to have a bowel movement around 12:30 pm in which he only passed a small caliber, pale stool with no blood or melena  States that he also felt nauseous with one epsiode of dry heaving  States that the pain has a pins and needles sensation and is localized to the front of his abdomen  He states that this pain is similar in character to a previous hospital admission in which he had to be seen for bowel herniation after abdominal surgery  He was concerned that this may be another event that requires intervention and came to the ED  Denies changes in vision, headaches, chest pain, SOB, change in urination, lower extremity edema, paraesthesias, changes in hearing        History provided by:  Patient and spouse   used: No    Abdominal Pain  Pain location:  Periumbilical  Pain quality: cramping    Pain radiates to:  Does not radiate  Pain severity:  Moderate  Onset quality:  Sudden  Duration:  6 hours  Timing:  Constant  Progression:  Unchanged  Chronicity:  Recurrent  Context: previous surgery    Context: not diet changes, not eating, not suspicious food intake and not trauma    Relieved by:  Nothing  Worsened by:  Nothing  Ineffective treatments:  OTC medications  Associated symptoms: belching and nausea    Associated symptoms: no chest pain, no chills, no constipation, no cough, no diarrhea, no dysuria, no fever, no hematochezia, no hematuria, no melena and no shortness of breath    Risk factors: multiple surgeries        Prior to Admission Medications   Prescriptions Last Dose Informant Patient Reported? Taking?    APPLE CIDER VINEGAR PO  Self Yes No   Sig: Take by mouth daily   Cholecalciferol 25 MCG (1000 UT) tablet  Self Yes No   Sig: TAKE ONE TABLET BY MOUTH DAILY FOR VITAMIN D SUPPLEMENTATION   Multiple Vitamin (Daily-Mark) TABS  Self No No   Sig: TAKE 1 TABLET BY MOUTH EVERY DAY   NON FORMULARY  Self Yes No   Sig: daily Beet Root   Xarelto 20 MG tablet  Self Yes No   acetaminophen (TYLENOL) 325 mg tablet  Self No No   Sig: Take 2 tablets (650 mg total) by mouth every 4 (four) hours as needed for mild pain   calcium carbonate-vitamin D (OSCAL-D) 500 mg-200 units per tablet  Self Yes No   Sig: TAKE ONE TABLET BY MOUTH DAILY TO SUPPLEMENT CALCIUM/ STRENGTHEN BONES   calcium citrate-vitamin D (CITRACAL+D) 315-200 MG-UNIT per tablet  Self No No   Sig: Take 1 tablet by mouth 2 (two) times a day   capsaicin (ZOSTRIX) 0 025 % cream  Self Yes No   Sig: Apply topically as needed    carvedilol (COREG) 12 5 mg tablet  Self No No   Sig: Take 1 tablet (12 5 mg total) by mouth 2 (two) times a day with meals   docusate sodium (COLACE) 100 mg capsule   No No   Sig: Take 1 capsule (100 mg total) by mouth 2 (two) times a day for 15 days   losartan (COZAAR) 50 mg tablet  Self No No   Sig: Take 1 tablet (50 mg total) by mouth daily Has been on it without issues   Patient taking differently: Take 50 mg by mouth daily in the early morning Has been on it without issues   omeprazole (PriLOSEC) 20 mg delayed release capsule  Self No No   Sig: Take 1 capsule (20 mg total) by mouth 2 (two) times a day   rivaroxaban (XARELTO) 15 mg tablet  Self No No   Sig: Take 1 tablet (15 mg total) by mouth 2 (two) times a day with meals   sildenafil (REVATIO) 20 mg tablet   No No   Sig: Take 3-5 tablets as needed   traMADol (ULTRAM) 50 mg tablet  Self Yes No   Sig: Take 50 mg by mouth every 6 (six) hours as needed for moderate pain      Facility-Administered Medications: None       Past Medical History:   Diagnosis Date    Bariatric surgery status     Duodenal ulcer     GERD (gastroesophageal reflux disease)     Gout     [facility] [address] )     Hypertension     Lung nodule     Postsurgical malabsorption     Pulmonary embolism ([facility] [address] ) 2019    Sarcoidosis     Wears glasses        Past Surgical History:   Procedure Laterality Date    BARIATRIC SURGERY      Gastric Sleeve    COLONOSCOPY      FOOT SURGERY Right     R ankle    GASTRIC BYPASS LAPAROSCOPIC N/A 9/22/2020    Procedure: LAPAROSCOPIC REVISION/ CONVERSION TO HALEY-EN-Y GASTRIC BYPASS W ROBOTICS, PARAESOPHAGEAL HERNIA REPAIR, AND INTRAOPERATIVE EGD;  Surgeon: Gloria Bledsoe MD;  Location: [facility];  Service: Bariatrics    HAND SURGERY      LAPAROTOMY N/A 6/19/2021    Procedure: Repair of incarcerated incisional hernia repair with mesh   ;  Surgeon: Sudeep Quiroz DO;  [facility];  Service: General    [place] LAP,PARTIAL NEPHRECTOMY Right 6/14/2021    Procedure: ROBOTIC LAPAROSCOPIC PARTIAL NEPHRECTOMY DECORTICATION OF RIGHT RENAL CYST;  Surgeon: Tino Proctor MD;  [facility];  Service: Urology       Family History   Problem Relation Age of Onset    Hypertension Father      I have reviewed and agree with the history as documented      E-Cigarette/Vaping    E-Cigarette Use Never User      E-Cigarette/Vaping Substances    Nicotine No     THC No     Flavoring No     Other No     Unknown No      Social History     Tobacco Use    Smoking status: Former Smoker     Packs/day: 1 00     Years: 7 00     Pack years: 7 00     Types: Cigars, Cigarettes     Start date: 1999     Quit date: 2006     Years since quitting: 15 5    Smokeless tobacco: Never Used    Tobacco comment: only smoked occ cigar, not for over 6 months   Vaping Use    Vaping Use: Never used   Substance Use Topics    Alcohol use: Not Currently    Drug use: Not Currently     Types: Marijuana, Cocaine     Comment: pt quit 20 years ago Review of Systems   Constitutional: Negative for activity change, appetite change, chills and fever  HENT: Negative for voice change  Eyes: Negative for visual disturbance  Respiratory: Negative for cough, choking, chest tightness and shortness of breath  Cardiovascular: Negative for chest pain and palpitations  Gastrointestinal: Positive for abdominal pain and nausea  Negative for constipation, diarrhea, hematochezia and melena  Genitourinary: Negative for dysuria, enuresis, flank pain and hematuria  Musculoskeletal: Negative for arthralgias and myalgias  Skin: Negative for color change and rash  Neurological: Negative for dizziness, seizures, syncope, weakness and headaches  Psychiatric/Behavioral: Negative for agitation  All other systems reviewed and are negative  Physical Exam  ED Triage Vitals   Temperature Pulse Respirations Blood Pressure SpO2   07/16/21 1715 07/16/21 1715 07/16/21 1715 07/16/21 1715 07/16/21 1715   98 2 °F (36 8 °C) 78 20 161/91 98 %      Temp Source Heart Rate Source Patient Position - Orthostatic VS BP Location FiO2 (%)   07/16/21 1715 07/16/21 1715 07/16/21 1715 07/16/21 1715 --   Oral Monitor Sitting Left arm       Pain Score       07/16/21 1920       8             Orthostatic Vital Signs  Vitals:    07/16/21 1715 07/16/21 2016   BP: 161/91 136/84   Pulse: 78 74   Patient Position - Orthostatic VS: Sitting Sitting       Physical Exam  Constitutional:       Appearance: He is well-developed  He is obese  He is not ill-appearing or diaphoretic  HENT:      Head: Normocephalic  Mouth/Throat:      Mouth: Mucous membranes are moist    Eyes:      Extraocular Movements: Extraocular movements intact  Cardiovascular:      Rate and Rhythm: Normal rate and regular rhythm  Heart sounds: Normal heart sounds  Pulmonary:      Effort: Pulmonary effort is normal       Breath sounds: Normal breath sounds  Abdominal:      General: Abdomen is flat   Bowel sounds are normal       Palpations: Abdomen is soft  There is no mass  Tenderness: There is abdominal tenderness in the periumbilical area  There is no guarding or rebound  Hernia: No hernia is present  Skin:     General: Skin is warm and dry  Capillary Refill: Capillary refill takes less than 2 seconds  Neurological:      General: No focal deficit present  Mental Status: He is alert  Psychiatric:         Mood and Affect: Mood normal          ED Medications  Medications   lactated ringers infusion (has no administration in time range)   ondansetron (ZOFRAN) injection 4 mg (has no administration in time range)   pantoprazole (PROTONIX) injection 40 mg (has no administration in time range)   Labetalol HCl (NORMODYNE) injection 10 mg (has no administration in time range)   hydrALAZINE (APRESOLINE) injection 5 mg (has no administration in time range)   phenol (CHLORASEPTIC) 1 4 % mucosal liquid 1 spray (has no administration in time range)   saliva substitute (MOUTH KOTE) mucosal solution 5 spray (has no administration in time range)   HYDROmorphone (DILAUDID) injection 1 mg (has no administration in time range)     Or   HYDROmorphone (DILAUDID) injection 0 5 mg (has no administration in time range)   HYDROmorphone (DILAUDID) injection 0 2 mg (has no administration in time range)   morphine (PF) 4 mg/mL injection 4 mg (4 mg Intravenous Given 7/16/21 1920)   ondansetron (ZOFRAN) injection 4 mg (4 mg Intravenous Given 7/16/21 1919)   iohexol (OMNIPAQUE) 350 MG/ML injection (SINGLE-DOSE) 100 mL (100 mL Intravenous Given 7/16/21 1946)       Diagnostic Studies  Results Reviewed     Procedure Component Value Units Date/Time    Lactic acid, plasma [[id number]] Collected: 07/16/21 2158    Lab Status:  In process Specimen: Blood from Arm, Left Updated: 07/16/21 2224    [place] draw [[id number]] Collected: 07/16/21 1735    Lab Status: Final result Specimen: Blood from Arm, Left Updated: 07/16/21 1901 Narrative: The following orders were created for panel order [place] draw  Procedure                               Abnormality         Status                     ---------                               -----------         ------                     Vickye Commander Top on hold[[id number]]                           Final result               Gold top on OYQX[[id number]]                                 Final result               Green / Black tube on hold[[id number]]                       Final result                 Please view results for these tests on the individual orders      Lipase [[id number]]  (Normal) Collected: 07/16/21 1733    Lab Status: Final result Specimen: Blood from Arm, Left Updated: 07/16/21 1803     Lipase 104 u/L     Comprehensive metabolic panel [[id number]] Collected: 07/16/21 1733    Lab Status: Final result Specimen: Blood from Arm, Left Updated: 07/16/21 1803     Sodium 141 mmol/L      Potassium 4 1 mmol/L      Chloride 107 mmol/L      CO2 24 mmol/L      ANION GAP 10 mmol/L      BUN 16 mg/dL      Creatinine 0 94 mg/dL      Glucose 127 mg/dL      Calcium 8 6 mg/dL      AST 21 U/L      ALT 35 U/L      Alkaline Phosphatase 79 U/L      Total Protein 7 5 g/dL      Albumin 3 6 g/dL      Total Bilirubin 0 35 mg/dL      eGFR 106 ml/min/1 73sq m     Narrative:      [place] guidelines for Chronic Kidney Disease (CKD):     Stage 1 with normal or high GFR (GFR > 90 mL/min/1 73 square meters)    Stage 2 Mild CKD (GFR = 60-89 mL/min/1 73 square meters)    Stage 3A Moderate CKD (GFR = 45-59 mL/min/1 73 square meters)    Stage 3B Moderate CKD (GFR = 30-44 mL/min/1 73 square meters)    Stage 4 Severe CKD (GFR = 15-29 mL/min/1 73 square meters)    Stage 5 End Stage CKD (GFR <15 mL/min/1 73 square meters)  Note: GFR calculation is accurate only with a steady state creatinine    CBC and differential [[id number]]  (Abnormal) Collected: 07/16/21 1733    Lab Status: Final result Specimen: Blood from Arm, Left Updated: 07/16/21 1752     WBC 7 31 Thousand/uL      RBC 4 15 Million/uL      Hemoglobin 11 8 g/dL      Hematocrit 37 9 %      MCV 91 fL      MCH 28 4 pg      MCHC 31 1 g/dL      RDW 14 7 %      MPV 12 6 fL      Platelets 691 Thousands/uL      nRBC 0 /100 WBCs      Neutrophils Relative 86 %      Immat GRANS % 0 %      Lymphocytes Relative 10 %      Monocytes Relative 3 %      Eosinophils Relative 1 %      Basophils Relative 0 %      Neutrophils Absolute 6 26 Thousands/µL      Immature Grans Absolute 0 03 Thousand/uL      Lymphocytes Absolute 0 72 Thousands/µL      Monocytes Absolute 0 22 Thousand/µL      Eosinophils Absolute 0 06 Thousand/µL      Basophils Absolute 0 02 Thousands/µL                  CT abdomen pelvis with contrast   Final Result by Shala Bragg MD (07/16 2115)      Distended loops of ileum in the left anterior abdomen  Twisting of the root of the mesentery and likely transition point in the distal ileum suggesting at least partial small bowel volvulus  I personally discussed this study with Va Shlomo on 7/16/2021 at 8:16 PM                Workstation performed: [id number]               Procedures  Procedures      ED Course  ED Course as of Jul 16 2224 Fri Jul 16, 2021 2224 Discussed with Radiology  Impression of SBO  CT abdomen pelvis with contrast                                       MDM  Number of Diagnoses or Management Options  SBO (small bowel obstruction) ([facility] [address] )  Diagnosis management comments: Mitch Bauer is a patient with past abdominal surgeries who is presenting with sudden onset abdominal pain, nausea, and one episode of vomiting  Due to past history, CT of the abdomen and pelvis was ordered  CBC, CMP, and Lipase were also drawn and were unremarkable  CT abdomen was interpretted as a SBO  General surgery was reached out to included in Placido's care discussion   General Surgery decided on admission for continued work up of his SBO with initial placement of an NG tube  Disposition: Admission to General Surgery for continued evaluation of SBO found on CT scan  Amount and/or Complexity of Data Reviewed  Clinical lab tests: ordered and reviewed  Tests in the radiology section of CPT®: ordered and reviewed  Discussion of test results with the performing providers: yes  Decide to obtain previous medical records or to obtain history from someone other than the patient: yes  Obtain history from someone other than the patient: yes  Review and summarize past medical records: yes  Discuss the patient with other providers: yes  Independent visualization of images, tracings, or specimens: yes        Disposition  Final diagnoses:   SBO (small bowel obstruction) ([facility] [address] )     Time reflects when diagnosis was documented in both MDM as applicable and the Disposition within this note     Time User Action Codes Description Comment    7/16/2021 10:02 PM Jim Gonzalez Add [K56 [id number]] SBO (small bowel obstruction) [facility])       ED Disposition     ED Disposition Condition Date/Time Comment    Admit Stable Fri Jul 16, 2021 10:05 PM Case was discussed with Dr Puma Matt and the patient's admission status was agreed to be Admission Status: inpatient status to the service of Dr Radha Ricardo  Follow-up Information    None         Patient's Medications   Discharge Prescriptions    No medications on file     No discharge procedures on file  PDMP Review       Value Time User    PDMP Reviewed  Yes 6/21/2021  9:41 AM Janel Jones PA-C           ED Provider  Attending physically available and evaluated Bonita Aquino  LICHA managed the patient along with the ED Attending      Electronically Signed by         Indira Loyd MD  07/16/21 6705

## 2021-07-17 PROBLEM — K56.50 SMALL BOWEL OBSTRUCTION DUE TO ADHESIONS (HCC): Status: ACTIVE | Noted: 2021-07-17

## 2021-07-17 LAB
ANION GAP SERPL CALCULATED.3IONS-SCNC: 8 MMOL/L (ref 4–13)
BUN SERPL-MCNC: 11 MG/DL (ref 5–25)
CALCIUM SERPL-MCNC: 8.3 MG/DL (ref 8.3–10.1)
CHLORIDE SERPL-SCNC: 106 MMOL/L (ref 100–108)
CO2 SERPL-SCNC: 27 MMOL/L (ref 21–32)
CREAT SERPL-MCNC: 0.82 MG/DL (ref 0.6–1.3)
ERYTHROCYTE [DISTWIDTH] IN BLOOD BY AUTOMATED COUNT: 14.9 % (ref 11.6–15.1)
GFR SERPL CREATININE-BSD FRML MDRD: 116 ML/MIN/1.73SQ M
GLUCOSE SERPL-MCNC: 95 MG/DL (ref 65–140)
HCT VFR BLD AUTO: 34 % (ref 36.5–49.3)
HGB BLD-MCNC: 10.6 G/DL (ref 12–17)
MAGNESIUM SERPL-MCNC: 2 MG/DL (ref 1.6–2.6)
MCH RBC QN AUTO: 28 PG (ref 26.8–34.3)
MCHC RBC AUTO-ENTMCNC: 31.2 G/DL (ref 31.4–37.4)
MCV RBC AUTO: 90 FL (ref 82–98)
PLATELET # BLD AUTO: 124 THOUSANDS/UL (ref 149–390)
PMV BLD AUTO: 12.3 FL (ref 8.9–12.7)
POTASSIUM SERPL-SCNC: 3.4 MMOL/L (ref 3.5–5.3)
RBC # BLD AUTO: 3.78 MILLION/UL (ref 3.88–5.62)
SODIUM SERPL-SCNC: 141 MMOL/L (ref 136–145)
WBC # BLD AUTO: 4.8 THOUSAND/UL (ref 4.31–10.16)

## 2021-07-17 PROCEDURE — 83735 ASSAY OF MAGNESIUM: CPT | Performed by: SURGERY

## 2021-07-17 PROCEDURE — 99232 SBSQ HOSP IP/OBS MODERATE 35: CPT | Performed by: SURGERY

## 2021-07-17 PROCEDURE — 80048 BASIC METABOLIC PNL TOTAL CA: CPT | Performed by: SURGERY

## 2021-07-17 PROCEDURE — 85027 COMPLETE CBC AUTOMATED: CPT | Performed by: SURGERY

## 2021-07-17 PROCEDURE — 36415 COLL VENOUS BLD VENIPUNCTURE: CPT | Performed by: SURGERY

## 2021-07-17 PROCEDURE — C9113 INJ PANTOPRAZOLE SODIUM, VIA: HCPCS | Performed by: SURGERY

## 2021-07-17 RX ORDER — POTASSIUM CHLORIDE 20 MEQ/1
40 TABLET, EXTENDED RELEASE ORAL ONCE
Status: COMPLETED | OUTPATIENT
Start: 2021-07-17 | End: 2021-07-17

## 2021-07-17 RX ORDER — ACETAMINOPHEN 325 MG/1
650 TABLET ORAL EVERY 6 HOURS PRN
Status: DISCONTINUED | OUTPATIENT
Start: 2021-07-17 | End: 2021-07-18 | Stop reason: HOSPADM

## 2021-07-17 RX ORDER — CARVEDILOL 12.5 MG/1
12.5 TABLET ORAL 2 TIMES DAILY WITH MEALS
Status: DISCONTINUED | OUTPATIENT
Start: 2021-07-18 | End: 2021-07-18 | Stop reason: HOSPADM

## 2021-07-17 RX ORDER — POTASSIUM CHLORIDE 14.9 MG/ML
20 INJECTION INTRAVENOUS ONCE
Status: DISCONTINUED | OUTPATIENT
Start: 2021-07-17 | End: 2021-07-17

## 2021-07-17 RX ORDER — POTASSIUM CHLORIDE 14.9 MG/ML
20 INJECTION INTRAVENOUS
Status: DISCONTINUED | OUTPATIENT
Start: 2021-07-17 | End: 2021-07-17

## 2021-07-17 RX ORDER — PANTOPRAZOLE SODIUM 40 MG/1
40 TABLET, DELAYED RELEASE ORAL
Status: DISCONTINUED | OUTPATIENT
Start: 2021-07-18 | End: 2021-07-18 | Stop reason: HOSPADM

## 2021-07-17 RX ORDER — POTASSIUM CHLORIDE 29.8 MG/ML
40 INJECTION INTRAVENOUS ONCE
Status: DISCONTINUED | OUTPATIENT
Start: 2021-07-17 | End: 2021-07-17

## 2021-07-17 RX ADMIN — LABETALOL 20 MG/4 ML (5 MG/ML) INTRAVENOUS SYRINGE 10 MG: at 17:54

## 2021-07-17 RX ADMIN — ENOXAPARIN SODIUM 105 MG: 120 INJECTION SUBCUTANEOUS at 11:34

## 2021-07-17 RX ADMIN — POTASSIUM CHLORIDE 40 MEQ: 1500 TABLET, EXTENDED RELEASE ORAL at 15:15

## 2021-07-17 RX ADMIN — ONDANSETRON 4 MG: 2 INJECTION INTRAMUSCULAR; INTRAVENOUS at 00:51

## 2021-07-17 RX ADMIN — PANTOPRAZOLE SODIUM 40 MG: 40 INJECTION, POWDER, FOR SOLUTION INTRAVENOUS at 11:36

## 2021-07-17 RX ADMIN — POTASSIUM CHLORIDE 20 MEQ: 14.9 INJECTION, SOLUTION INTRAVENOUS at 11:34

## 2021-07-17 RX ADMIN — ACETAMINOPHEN 650 MG: 325 TABLET, FILM COATED ORAL at 13:13

## 2021-07-17 RX ADMIN — ACETAMINOPHEN 650 MG: 325 TABLET, FILM COATED ORAL at 05:23

## 2021-07-17 RX ADMIN — LABETALOL 20 MG/4 ML (5 MG/ML) INTRAVENOUS SYRINGE 10 MG: at 05:22

## 2021-07-17 RX ADMIN — Medication 1 SPRAY: at 05:11

## 2021-07-17 RX ADMIN — LABETALOL 20 MG/4 ML (5 MG/ML) INTRAVENOUS SYRINGE 10 MG: at 11:36

## 2021-07-17 RX ADMIN — HYDROMORPHONE HYDROCHLORIDE 1 MG: 1 INJECTION, SOLUTION INTRAMUSCULAR; INTRAVENOUS; SUBCUTANEOUS at 00:51

## 2021-07-17 RX ADMIN — ACETAMINOPHEN 650 MG: 325 TABLET, FILM COATED ORAL at 21:31

## 2021-07-17 RX ADMIN — HYDROMORPHONE HYDROCHLORIDE 0.5 MG: 1 INJECTION, SOLUTION INTRAMUSCULAR; INTRAVENOUS; SUBCUTANEOUS at 07:13

## 2021-07-17 NOTE — ED ATTENDING ATTESTATION
7/16/2021  I, Amaya Griffin MD, saw and evaluated the patient  I have discussed the patient with the resident/non-physician practitioner and agree with the resident's/non-physician practitioner's findings, Plan of Care, and MDM as documented in the resident's/non-physician practitioner's note, except where noted  All available labs and Radiology studies were reviewed  I was present for key portions of any procedure(s) performed by the resident/non-physician practitioner and I was immediately available to provide assistance  At this point I agree with the current assessment done in the Emergency Department  I have conducted an independent evaluation of this patient a history and physical is as follows:    ED Course     49 yo male with abdominal pain after lunch  Pain sudden onset, with associated nausea and dry heaving  Pt reports h/o hernia requiring surgical intervention/hospitilization with similar presentation  On exam pt non-peritoneal, workup c/w SBO  Seen by surgery in ED, admitted to their service for further management as indicated  HD stable throughout  ED Course as of Jul 17 0803   Fri Jul 16, 2021   1810 Reassuring, no significant leukocytosis, mild thrombocypenia   CBC and differential(!)   2118 IMPRESSION:     Distended loops of ileum in the left anterior abdomen    Twisting of the root of the mesentery and likely transition point in the distal ileum suggesting at least partial small bowel volvulus            I personally discussed this study with Mario Guzman on 7/16/2021 at 8:16 PM       CT abdomen pelvis with contrast   2258 wnl   LACTIC ACID: 0 9         Critical Care Time  Procedures

## 2021-07-17 NOTE — PROGRESS NOTES
Progress Note - General Surgery   Excell Devon Hernandez 48 y o  male MRN: [id number]  Unit/Bed#: ED 28 Encounter: [id number]    Assessment:  60-year-old of male with past medical history of PE on Xarelto, GERD gastric sleeve converted to gastric bypass in 09/2020, right partial nephrectomy complicated by incarcerated incisional status post incisional hernia repair in 06/2021 who presents with partial small-bowel obstruction  Could not pass NGT after multiple attempts  1 am evaluation: pt had decreased pain, was passing flatus and less tender on exam  Lactate: 0 9  Afebrile, VSS  WBC: 4 8 from 7 83  Hypokalemia: 3 4    Feeling better overall this morning, less pain, passing flatus, denies n/v     Plan:  NPO  [email or web address]  Monitor abdominal exam  Address Xarelto today  Continue protonix  Replete electrolytes    Subjective/Objective     Subjective:   Events as noted above  Objective:    Blood pressure 125/55, pulse 64, temperature 98 °F (36 7 °C), temperature source Oral, resp  rate 16, height 5' 5" (1 651 m), weight 98 4 kg (217 lb), SpO2 96 %  ,Body mass index is 36 11 kg/m²  No intake or output data in the 24 hours ending 07/17/21 0131    Invasive Devices     Peripheral Intravenous Line            Peripheral IV 07/16/21 Left Antecubital <1 day          Drain            Closed/Suction Drain Right Abdomen Bulb 19 Fr  297 days                Physical Exam  Vitals reviewed  Constitutional:       General: He is not in acute distress  Appearance: Normal appearance  He is not ill-appearing or toxic-appearing  HENT:      Head: Normocephalic and atraumatic  Mouth/Throat:      Mouth: Mucous membranes are moist    Eyes:      Extraocular Movements: Extraocular movements intact  Cardiovascular:      Rate and Rhythm: Normal rate  Pulmonary:      Effort: Pulmonary effort is normal  No respiratory distress  Abdominal:      General: There is no distension  Palpations: Abdomen is soft  Tenderness:  There is no abdominal tenderness (less)  There is no guarding or rebound  Musculoskeletal:         General: Normal range of motion  Cervical back: Normal range of motion  Skin:     General: Skin is warm and dry  Neurological:      Mental Status: He is alert and oriented to person, place, and time  Psychiatric:         Mood and Affect: Mood normal          Behavior: Behavior normal          Thought Content:  Thought content normal          Judgment: Judgment normal              Results from last 7 days   Lab Units 07/16/21  1733   WBC Thousand/uL 7 31   HEMOGLOBIN g/dL 11 8*   HEMATOCRIT % 37 9   PLATELETS Thousands/uL 137*     Results from last 7 days   Lab Units 07/16/21  1733   POTASSIUM mmol/L 4 1   CHLORIDE mmol/L 107   CO2 mmol/L 24   BUN mg/dL 16   CREATININE mg/dL 0 94   CALCIUM mg/dL 8 6

## 2021-07-17 NOTE — PROGRESS NOTES
Progress Note - General Surgery   Erica Hernandez 48 y o  male MRN: [id number]  Unit/Bed#: S -01 Encounter: [id number]    Assessment:  48 M presenting with partial small bowel obstruction, likely adhesive in etiology  VSS, afebrile    UOP:  Numerous unmeasured urine occurrences    Abdomen soft, nontender, nondistended, multiple surgical scars noted    A m  Labs pending  Plan:  -advance to full liquid diet  -therapeutic Lovenox dose given on 07/17, consider resumption of PO Xarelto today given clinical progress  -ambulation out of bed  -pain/nausea control  -disposition planning    Subjective/Objective     Subjective: The patient notes improved abdominal pain, denies nausea and vomiting, and passed flatus overnight and a bowel movement this morning  He is ambulating out of bed well  Objective:     Blood pressure 138/78, pulse 56, temperature 97 7 °F (36 5 °C), temperature source Oral, resp  rate 18, height 5' 5" (1 651 m), weight 98 4 kg (217 lb), SpO2 96 %  ,Body mass index is 36 11 kg/m²        Intake/Output Summary (Last 24 hours) at 7/18/2021 0550  Last data filed at 7/17/2021 2101  Gross per 24 hour   Intake 960 ml   Output --   Net 960 ml       Invasive Devices     Peripheral Intravenous Line            Peripheral IV 07/16/21 Left Antecubital 1 day          Drain            Closed/Suction Drain Right Abdomen Bulb 19 Fr  298 days                Physical Exam:     General:  No acute distress, obese  HEENT:  Normocephalic, atraumatic  Cardiovascular:  Regular rate and rhythm  Respiratory:  No distress, comfortable on room air  Abdomen:  Soft, nontender, nondistended, multiple surgical scars  Extremities:  No clubbing, cyanosis, or edema  Neuro:  AAO x3  Skin:  Warm, dry    Lab, Imaging and other studies:  Pending   VTE Pharmacologic Prophylaxis: Enoxaparin (Lovenox)  VTE Mechanical Prophylaxis: sequential compression device

## 2021-07-17 NOTE — PLAN OF CARE
Problem: Potential for Falls  Goal: Patient will remain free of falls  Description: INTERVENTIONS:  - Educate patient/family on patient safety including physical limitations  - Instruct patient to call for assistance with activity   - Consult OT/PT to assist with strengthening/mobility   - Keep Call bell within reach  - Keep bed low and locked with side rails adjusted as appropriate  - Keep care items and personal belongings within reach  - Initiate and maintain comfort rounds  - Make Fall Risk Sign visible to staff  - Offer Toileting every Hours, in advance of need  - Obtain necessary fall risk management equipment:   - Apply yellow socks and bracelet for high fall risk patients  - Consider moving patient to room near nurses station  Outcome: Progressing

## 2021-07-17 NOTE — UTILIZATION REVIEW
Initial Clinical Review    Admission: Date/Time/Statement:   Admission Orders (From admission, onward)     Ordered        07/16/21 2149  Inpatient Admission  Once                   Orders Placed This Encounter   Procedures    Inpatient Admission     Standing Status:   Standing     Number of Occurrences:   1     Order Specific Question:   Level of Care     Answer:   Med Surg [16]     Order Specific Question:   Estimated length of stay     Answer:   More than 2 Midnights     Order Specific Question:   Certification     Answer:   I certify that inpatient services are medically necessary for this patient for a duration of greater than two midnights  See H&P and MD Progress Notes for additional information about the patient's course of treatment  ED Arrival Information     Expected Arrival Acuity    - 7/16/2021 16:22 Urgent         Means of arrival Escorted by Service Admission type    Walk-In Spouse Surgery-General Urgent         Arrival complaint    SOB/ Abdominal Pain        Chief Complaint   Patient presents with    Abdominal Pain     Reports abdominal pain starting within the last 3 hours  +vomiting, denies diarrhea, fever  Partial nephrectomy on 6/19, recent PE and hernia repair       Initial Presentation: 48  Y O male presents to ED  From home  After an acute onset of periumbilical abdominal pain, describes as pins and needles, started while patient ws  At work, became very severe with nausea, no vomiting  Had a  Small  BM and + flatus  PMH  Is PE on  Xarelto, GERD, gastric sleeve converted to gastric bypass  In 2020, right partial nephrectomy complicated by incarcerated incisional, S/P incisional hernia repair 6/21  Ct scan reveals  Partial SBO  Admit  Ip with  Partial small bowel obstruction and plan is  NPO/NGT, IVF,  Monitor labs, pain control, serial abdominal exam and hold xarelto for now  Date:      7/17        Day 2:   Staff had difficulty passing  NGT after multiple attempts  + flatus  Nichole Bridges Continue pain   Control  Encourage ambulation  Pain improved, abdomen less tender on exam   Monitor labs  Remains Npo with  IVF  ED Triage Vitals   Temperature Pulse Respirations Blood Pressure SpO2   07/16/21 1715 07/16/21 1715 07/16/21 1715 07/16/21 1715 07/16/21 1715   98 2 °F (36 8 °C) 78 20 161/91 98 %      Temp Source Heart Rate Source Patient Position - Orthostatic VS BP Location FiO2 (%)   07/16/21 1715 07/16/21 1715 07/16/21 1715 07/16/21 1715 --   Oral Monitor Sitting Left arm       Pain Score       07/16/21 1920       8          Wt Readings from Last 1 Encounters:   07/16/21 98 4 kg (217 lb)     Additional Vital Signs:     --  78  18  123/67  --  98 %  None (Room air)  Sitting   07/17/21 0730  --  65  18  135/80  --  98 %  None (Room air)  --   07/17/21 0600  --  62  18  136/81  103  97 %  None (Room air)  Lying   07/17/21 0100  --  64  16  125/55  98  96 %  None (Room air)  Sitting   07/17/21 0045  98 °F (36 7 °C)  72  18  131/78  97  98 %  None (Room air)  Lying   07/16/21 2300  --  74  18  165/86  116  99 %  None (Room air)  Lying   07/16/21 2016  --  74  16  136/84  --  98 %  None (Room air)  Sitting   07/16/21 1920  --  --  --  --  --  --  None (Room air)  --   07/16/21 1715  98 2 °F (36 8 °C)  78  20  161/91  --  98 %  None (Room air)  Sitting         Pertinent Labs/Diagnostic Test Results:   Ct  Abd/pelvis   ( 7/16)     Distended loops of ileum in the left anterior abdomen   Twisting of the root of the mesentery and likely transition point in the distal ileum suggesting at least partial small bowel volvulus       CXR  ( 7/17)    NG tube coils in the distal esophagus, with tip in the proximal esophagus   2   Hypoinflation       Results from last 7 days   Lab Units 07/17/21  0713 07/16/21  1733   WBC Thousand/uL 4 80 7 31   HEMOGLOBIN g/dL 10 6* 11 8*   HEMATOCRIT % 34 0* 37 9   PLATELETS Thousands/uL 124* 137*   NEUTROS ABS Thousands/µL  --  6 26         Results from last 7 days   Lab Units 07/17/21  0713 07/16/21  1733   SODIUM mmol/L 141 141   POTASSIUM mmol/L 3 4* 4 1   CHLORIDE mmol/L 106 107   CO2 mmol/L 27 24   ANION GAP mmol/L 8 10   BUN mg/dL 11 16   CREATININE mg/dL 0 82 0 94   EGFR ml/min/1 73sq m 116 106   CALCIUM mg/dL 8 3 8 6   MAGNESIUM mg/dL 2 0  --      Results from last 7 days   Lab Units 07/16/21  1733   AST U/L 21   ALT U/L 35   ALK PHOS U/L 79   TOTAL PROTEIN g/dL 7 5   ALBUMIN g/dL 3 6   TOTAL BILIRUBIN mg/dL 0 35         Results from last 7 days   Lab Units 07/17/21  0713 07/16/21  1733   GLUCOSE RANDOM mg/dL 95 127           Results from last 7 days   Lab Units 07/16/21  2158   LACTIC ACID mmol/L 0 9           Results from last 7 days   Lab Units 07/16/21  1733   LIPASE u/L 104         ED Treatment:   Medication Administration from 07/16/2021 1622 to 07/17/2021 0913       Date/Time Order Dose Route Action Comments     07/16/2021 1920 morphine (PF) 4 mg/mL injection 4 mg 4 mg Intravenous Given      07/16/2021 1919 ondansetron (ZOFRAN) injection 4 mg 4 mg Intravenous Given      07/16/2021 1946 iohexol (OMNIPAQUE) 350 MG/ML injection (SINGLE-DOSE) 100 mL 100 mL Intravenous Given      07/16/2021 2300 lactated ringers infusion 125 mL/hr Intravenous New Bag      07/17/2021 0051 ondansetron (ZOFRAN) injection 4 mg 4 mg Intravenous Given      07/17/2021 0522 Labetalol HCl (NORMODYNE) injection 10 mg 10 mg Intravenous Given hr 68  bp134/73     07/16/2021 2300 Labetalol HCl (NORMODYNE) injection 10 mg 10 mg Intravenous Given      07/17/2021 0511 phenol (CHLORASEPTIC) 1 4 % mucosal liquid 1 spray 1 spray Mouth/Throat Given      07/17/2021 0713 HYDROmorphone (DILAUDID) injection 1 mg   Intravenous See Alternative      07/17/2021 0051 HYDROmorphone (DILAUDID) injection 1 mg 1 mg Intravenous Given      07/17/2021 0713 HYDROmorphone (DILAUDID) injection 0 5 mg 0 5 mg Intravenous Given      07/17/2021 0051 HYDROmorphone (DILAUDID) injection 0 5 mg   Intravenous See Alternative      07/17/2021 7402 acetaminophen (TYLENOL) tablet 650 mg 650 mg Oral Given           Admitting Diagnosis: SBO (small bowel obstruction) ([facility]) [K56 609]  Unspecified abdominal pain [R10 9]  Age/Sex: 48 y o  male  Admission Orders:  Scheduled Medications:  Labetalol HCl, 10 mg, Intravenous, Q6H  pantoprazole, 40 mg, Intravenous, Q24H [address]  potassium chloride, 20 mEq, Intravenous, Q2H      Continuous IV Infusions:  lactated ringers, 125 mL/hr, Intravenous, Continuous      PRN Meds:  acetaminophen, 650 mg, Oral, Q6H PRN  hydrALAZINE, 5 mg, Intravenous, Q6H PRN  HYDROmorphone, 0 2 mg, Intravenous, Q4H PRN  HYDROmorphone, 1 mg, Intravenous, Q3H PRN   ( x1  7/17 thus far)   Or  HYDROmorphone, 0 5 mg, Intravenous, Q3H PRN  ( x1  7/17 thus far)  ondansetron, 4 mg, Intravenous, Q6H PRN   ( x1  7/17 thus far)  phenol, 1 spray, Mouth/Throat, Q2H PRN  saliva substitute, 5 spray, Mouth/Throat, 4x Daily PRN    NGT    Network Utilization Review Department  ATTENTION: Please call with any questions or concerns to [phone] and carefully listen to the prompts so that you are directed to the right person  All voicemails are confidential   Gopal Macdonald all requests for admission clinical reviews, approved or denied determinations and any other requests to dedicated fax number below belonging to the campus where the patient is receiving treatment   List of dedicated fax numbers for the Facilities:  [address] DENIALS (Administrative/Medical Necessity) [phone]   [address] (Maternity/NICU/Pediatrics) [phone]   [address] [address] [facility] 8024 [address] [address] 377 [facility] [phone]   [place] [address] [place] [address] [phone]

## 2021-07-17 NOTE — ED NOTES
NGT placement attempted x2 by this RN unsuccessfully  NGT placement attempted x1 by second RN also unsuccessfully  During last attempt to place NGT patient requested tube be removed  Tiger text sent to Dr Haritha Kohli who placed order for NGT  Awaiting response  Will continue to monitor  2340- Per Dr Tu Aguayo we will hold off on NGT placement at this time as patient is not vomiting  Will continue to monitor        Lyle Astorga RN  07/16/21 5592

## 2021-07-17 NOTE — H&P
H&P Exam - General Surgery   Ilsa Hernandez 48 y o  male MRN: [id number]  Unit/Bed#: OVR 01 Encounter: [id number]    Assessment/Plan     Assessment:  23-year-old of male with past medical history of PE on Xarelto, GERD gastric sleeve converted to gastric bypass in 09/2020, right partial nephrectomy complicated by incarcerated incisional status post incisional hernia repair in 06/2021 who presents with partial small-bowel obstruction  Afebrile, VSS on room air  WBC 7 31  Electrolytes within normal limits    Abdomen is soft, nondistended with Epigastric tenderness with guarding, no rebound  Plan:  NPO/NGT  LR at 125  Will monitor abdominal exam  Hold Xarelto for now, we will discuss starting patient on heparin drip tomorrow due to recent PE  Protonix    History of Present Illness     HPI:  Amaya Fairbanks is a 48 y o  male with past medical history of PE on Xarelto, GERD gastric sleeve converted to gastric bypass in 09/2020, right partial nephrectomy complicated by incarcerated incisional status post incisional hernia repair in 06/2021who presents with periumbilical abdominal pain  Patient describes pain as pins and needles came on suddenly while at work  Patient describes pain is so severe he had difficulty walking and finding a comfortable spot  He described pain in his intermittent in nature  He had associated nausea but no vomiting  Patient reports having small bowel movement, and continuing to pass some gas from below  He reports passing gas from above as well and his abdomen feeling heavy  Patient denies chest pain, shortness of breath, fevers, chills  Review of Systems   Constitutional: Negative for chills and fever  Respiratory: Negative for shortness of breath  Cardiovascular: Negative for chest pain  Gastrointestinal: Positive for abdominal pain and nausea  Negative for vomiting  Genitourinary: Negative for difficulty urinating         Historical Information   Past Medical History: Diagnosis Date    Bariatric surgery status     Duodenal ulcer     GERD (gastroesophageal reflux disease)     Gout     Hyperpar[facility]roidism ([facility] [address] )     Hypertension     Lung nodule     Postsurgical malabsorption     Pulmonary embolism ([facility] [address] ) 2019    Sarcoidosis     Wears glasses      Past Surgical History:   Procedure Laterality Date    BARIATRIC SURGERY      Gastric Sleeve    COLONOSCOPY      FOOT SURGERY Right     R ankle    GASTRIC BYPASS LAPAROSCOPIC N/A 9/22/2020    Procedure: LAPAROSCOPIC REVISION/ CONVERSION TO HALEY-EN-Y GASTRIC BYPASS W ROBOTICS, PARAESOPHAGEAL HERNIA REPAIR, AND INTRAOPERATIVE EGD;  Surgeon: Shahzad Galloway MD;  Location: [facility];  Service: Bariatrics    HAND SURGERY      LAPAROTOMY N/A 6/19/2021    Procedure: Repair of incarcerated incisional hernia repair with mesh   ;  Surgeon: Toribio Curling, DO;  [facility];  Service: General    TN LAP,PARTIAL NEPHRECTOMY Right 6/14/2021    Procedure: ROBOTIC LAPAROSCOPIC PARTIAL NEPHRECTOMY DECORTICATION OF RIGHT RENAL CYST;  Surgeon: Ashley Frankel MD;  [facility];  Service: Urology     Social History   Social History     Substance and Sexual Activity   Alcohol Use Not Currently     Social History     Substance and Sexual Activity   Drug Use Not Currently    Types: Marijuana, Cocaine    Comment: pt quit 20 years ago      Social History     Tobacco Use   Smoking Status Former Smoker    Packs/day: 1 00    Years: 7 00    Pack years: 7 00    Types: Cigars, Cigarettes    Start date: 1999    Quit date: 2006    Years since quitting: 15 5   Smokeless Tobacco Never Used   Tobacco Comment    only smoked occ cigar, not for over 6 months     E-Cigarette/Vaping    E-Cigarette Use Never User      E-Cigarette/Vaping Substances    Nicotine No     THC No     Flavoring No     Other No     Unknown No      Family History:   Family History   Problem Relation Age of Onset    Hypertension Father Meds/Allergies   PTA meds:   Prior to Admission Medications   Prescriptions Last Dose Informant Patient Reported? Taking?    APPLE CIDER VINEGAR PO  Self Yes No   Sig: Take by mouth daily   Cholecalciferol 25 MCG (1000 UT) tablet  Self Yes No   Sig: TAKE ONE TABLET BY MOUTH DAILY FOR VITAMIN D SUPPLEMENTATION   Multiple Vitamin (Daily-Mark) TABS  Self No No   Sig: TAKE 1 TABLET BY MOUTH EVERY DAY   NON FORMULARY  Self Yes No   Sig: daily Beet Root   Xarelto 20 MG tablet  Self Yes No   acetaminophen (TYLENOL) 325 mg tablet  Self No No   Sig: Take 2 tablets (650 mg total) by mouth every 4 (four) hours as needed for mild pain   calcium carbonate-vitamin D (OSCAL-D) 500 mg-200 units per tablet  Self Yes No   Sig: TAKE ONE TABLET BY MOUTH DAILY TO SUPPLEMENT CALCIUM/ STRENGTHEN BONES   calcium citrate-vitamin D (CITRACAL+D) 315-200 MG-UNIT per tablet  Self No No   Sig: Take 1 tablet by mouth 2 (two) times a day   capsaicin (ZOSTRIX) 0 025 % cream  Self Yes No   Sig: Apply topically as needed    carvedilol (COREG) 12 5 mg tablet  Self No No   Sig: Take 1 tablet (12 5 mg total) by mouth 2 (two) times a day with meals   docusate sodium (COLACE) 100 mg capsule   No No   Sig: Take 1 capsule (100 mg total) by mouth 2 (two) times a day for 15 days   losartan (COZAAR) 50 mg tablet  Self No No   Sig: Take 1 tablet (50 mg total) by mouth daily Has been on it without issues   Patient taking differently: Take 50 mg by mouth daily in the early morning Has been on it without issues   omeprazole (PriLOSEC) 20 mg delayed release capsule  Self No No   Sig: Take 1 capsule (20 mg total) by mouth 2 (two) times a day   rivaroxaban (XARELTO) 15 mg tablet  Self No No   Sig: Take 1 tablet (15 mg total) by mouth 2 (two) times a day with meals   sildenafil (REVATIO) 20 mg tablet   No No   Sig: Take 3-5 tablets as needed   traMADol (ULTRAM) 50 mg tablet  Self Yes No   Sig: Take 50 mg by mouth every 6 (six) hours as needed for moderate pain Facility-Administered Medications: None     Allergies   Allergen Reactions    Nsaids Other (See Comments)     Duodenal ulcer disease    Flunisolide      Other reaction(s): Burning sensation    Lisinopril Cough    Testosterone Itching, Rash and Other (See Comments)       Objective   First Vitals:   Blood Pressure: 161/91 (07/16/21 1715)  Pulse: 78 (07/16/21 1715)  Temperature: 98 2 °F (36 8 °C) (07/16/21 1715)  Temp Source: Oral (07/16/21 1715)  Respirations: 20 (07/16/21 1715)  Height: 5' 5" (165 1 cm) (07/16/21 1715)  Weight - Scale: 98 4 kg (217 lb) (07/16/21 1715)  SpO2: 98 % (07/16/21 1715)    Current Vitals:   Blood Pressure: 136/84 (07/16/21 2016)  Pulse: 74 (07/16/21 2016)  Temperature: 98 2 °F (36 8 °C) (07/16/21 1715)  Temp Source: Oral (07/16/21 1715)  Respirations: 16 (07/16/21 2016)  Height: 5' 5" (165 1 cm) (07/16/21 1715)  Weight - Scale: 98 4 kg (217 lb) (07/16/21 1715)  SpO2: 98 % (07/16/21 2016)    No intake or output data in the 24 hours ending 07/16/21 2044    Invasive Devices     Peripheral Intravenous Line            Peripheral IV 07/16/21 Left Antecubital <1 day          Drain            Closed/Suction Drain Right Abdomen Bulb 19 Fr  297 days                Physical Exam  Vitals reviewed  Constitutional:       General: He is not in acute distress  Appearance: He is not ill-appearing or toxic-appearing  HENT:      Head: Normocephalic and atraumatic  Eyes:      Extraocular Movements: Extraocular movements intact  Pulmonary:      Effort: Pulmonary effort is normal  No respiratory distress  Abdominal:      General: There is no distension  Palpations: Abdomen is soft  Tenderness: There is abdominal tenderness (epigastric)  There is no guarding or rebound  Hernia: No hernia is present  Musculoskeletal:         General: Normal range of motion  Cervical back: Normal range of motion  Skin:     General: Skin is warm and dry     Neurological:      Mental Status: He is alert and oriented to person, place, and time  Psychiatric:         Mood and Affect: Mood normal          Behavior: Behavior normal          Thought Content: Thought content normal          Lab Results:   CBC:   Lab Results   Component Value Date    WBC 7 31 07/16/2021    HGB 11 8 (L) 07/16/2021    HCT 37 9 07/16/2021    MCV 91 07/16/2021     (L) 07/16/2021    MCH 28 4 07/16/2021    MCHC 31 1 (L) 07/16/2021    RDW 14 7 07/16/2021    MPV 12 6 07/16/2021    NRBC 0 07/16/2021   , CMP:   Lab Results   Component Value Date    SODIUM 141 07/16/2021    K 4 1 07/16/2021     07/16/2021    CO2 24 07/16/2021    BUN 16 07/16/2021    CREATININE 0 94 07/16/2021    CALCIUM 8 6 07/16/2021    AST 21 07/16/2021    ALT 35 07/16/2021    ALKPHOS 79 07/16/2021    EGFR 106 07/16/2021     Imaging: I have personally reviewed pertinent reports  CT abdomen pelvis with contrast   Final Result by Eliseo Mccracken MD (07/16 2115)      Distended loops of ileum in the left anterior abdomen  Twisting of the root of the mesentery and likely transition point in the distal ileum suggesting at least partial small bowel volvulus  I personally discussed this study with Meenakshi Terrell on 7/16/2021 at 8:16 PM                Workstation performed: [id number]             EKG, Pathology, and Other Studies: I have personally reviewed pertinent reports  Code Status: Prior  Advance Directive and Living Will:      Power of :    POLST:      Counseling / Coordination of Care  Total floor / unit time spent today 25 minutes  Greater than 50% of total time was spent with the patient and / or family counseling and / or coordination of care

## 2021-07-18 VITALS
TEMPERATURE: 98.1 F | DIASTOLIC BLOOD PRESSURE: 76 MMHG | BODY MASS INDEX: 36.15 KG/M2 | OXYGEN SATURATION: 95 % | WEIGHT: 217 LBS | SYSTOLIC BLOOD PRESSURE: 134 MMHG | RESPIRATION RATE: 16 BRPM | HEART RATE: 61 BPM | HEIGHT: 65 IN

## 2021-07-18 LAB
ANION GAP SERPL CALCULATED.3IONS-SCNC: 7 MMOL/L (ref 4–13)
BASOPHILS # BLD AUTO: 0.02 THOUSANDS/ΜL (ref 0–0.1)
BASOPHILS NFR BLD AUTO: 1 % (ref 0–1)
BUN SERPL-MCNC: 6 MG/DL (ref 5–25)
CALCIUM SERPL-MCNC: 8 MG/DL (ref 8.3–10.1)
CHLORIDE SERPL-SCNC: 106 MMOL/L (ref 100–108)
CO2 SERPL-SCNC: 27 MMOL/L (ref 21–32)
CREAT SERPL-MCNC: 0.82 MG/DL (ref 0.6–1.3)
EOSINOPHIL # BLD AUTO: 0.26 THOUSAND/ΜL (ref 0–0.61)
EOSINOPHIL NFR BLD AUTO: 9 % (ref 0–6)
ERYTHROCYTE [DISTWIDTH] IN BLOOD BY AUTOMATED COUNT: 14.9 % (ref 11.6–15.1)
GFR SERPL CREATININE-BSD FRML MDRD: 116 ML/MIN/1.73SQ M
GLUCOSE SERPL-MCNC: 87 MG/DL (ref 65–140)
HCT VFR BLD AUTO: 35.7 % (ref 36.5–49.3)
HGB BLD-MCNC: 11.2 G/DL (ref 12–17)
IMM GRANULOCYTES # BLD AUTO: 0.01 THOUSAND/UL (ref 0–0.2)
IMM GRANULOCYTES NFR BLD AUTO: 0 % (ref 0–2)
LYMPHOCYTES # BLD AUTO: 0.78 THOUSANDS/ΜL (ref 0.6–4.47)
LYMPHOCYTES NFR BLD AUTO: 28 % (ref 14–44)
MAGNESIUM SERPL-MCNC: 1.9 MG/DL (ref 1.6–2.6)
MCH RBC QN AUTO: 28.6 PG (ref 26.8–34.3)
MCHC RBC AUTO-ENTMCNC: 31.4 G/DL (ref 31.4–37.4)
MCV RBC AUTO: 91 FL (ref 82–98)
MONOCYTES # BLD AUTO: 0.36 THOUSAND/ΜL (ref 0.17–1.22)
MONOCYTES NFR BLD AUTO: 13 % (ref 4–12)
NEUTROPHILS # BLD AUTO: 1.37 THOUSANDS/ΜL (ref 1.85–7.62)
NEUTS SEG NFR BLD AUTO: 49 % (ref 43–75)
NRBC BLD AUTO-RTO: 0 /100 WBCS
PLATELET # BLD AUTO: 127 THOUSANDS/UL (ref 149–390)
PMV BLD AUTO: 12.4 FL (ref 8.9–12.7)
POTASSIUM SERPL-SCNC: 3.8 MMOL/L (ref 3.5–5.3)
RBC # BLD AUTO: 3.92 MILLION/UL (ref 3.88–5.62)
SODIUM SERPL-SCNC: 140 MMOL/L (ref 136–145)
WBC # BLD AUTO: 2.8 THOUSAND/UL (ref 4.31–10.16)

## 2021-07-18 PROCEDURE — 80048 BASIC METABOLIC PNL TOTAL CA: CPT | Performed by: SURGERY

## 2021-07-18 PROCEDURE — 85025 COMPLETE CBC W/AUTO DIFF WBC: CPT | Performed by: SURGERY

## 2021-07-18 PROCEDURE — 83735 ASSAY OF MAGNESIUM: CPT | Performed by: SURGERY

## 2021-07-18 PROCEDURE — 99238 HOSP IP/OBS DSCHRG MGMT 30/<: CPT | Performed by: SURGERY

## 2021-07-18 PROCEDURE — 99024 POSTOP FOLLOW-UP VISIT: CPT | Performed by: SURGERY

## 2021-07-18 RX ORDER — POTASSIUM CHLORIDE 20 MEQ/1
20 TABLET, EXTENDED RELEASE ORAL ONCE
Status: COMPLETED | OUTPATIENT
Start: 2021-07-18 | End: 2021-07-18

## 2021-07-18 RX ADMIN — POTASSIUM CHLORIDE 20 MEQ: 1500 TABLET, EXTENDED RELEASE ORAL at 10:01

## 2021-07-18 RX ADMIN — CARVEDILOL 12.5 MG: 12.5 TABLET, FILM COATED ORAL at 06:27

## 2021-07-18 RX ADMIN — PANTOPRAZOLE SODIUM 40 MG: 40 TABLET, DELAYED RELEASE ORAL at 06:27

## 2021-07-18 RX ADMIN — RIVAROXABAN 15 MG: 15 TABLET, FILM COATED ORAL at 10:01

## 2021-07-18 NOTE — DISCHARGE SUMMARY
Discharge Summary    Chris Hernandez 48 y o  male MRN: [id number]    Unit/Bed#: S -01 Encounter: [id number]    Admission Date: 7/16/2021     Discharge Date:/7/18/2021    Attending: Sarah Mao DO    Consultants: none    Admitting Diagnosis: SBO (small bowel obstruction) ([facility] ) [K56 609]  Unspecified abdominal pain [R10 9]    Principle Diagnosis: as above    Secondary Diagnosis:  Past Medical History:   Diagnosis Date    Bariatric surgery status     Duodenal ulcer     GERD (gastroesophageal reflux disease)     Gout     Hyperparathyroidism ([facility] )     Hypertension     Lung nodule     Postsurgical malabsorption     Pulmonary embolism ([place] [address] ) 2019    Sarcoidosis     Wears glasses      Past Surgical History:   Procedure Laterality Date    BARIATRIC SURGERY      Gastric Sleeve    COLONOSCOPY      FOOT SURGERY Right     R ankle    GASTRIC BYPASS LAPAROSCOPIC N/A 9/22/2020    Procedure: LAPAROSCOPIC REVISION/ CONVERSION TO HALEY-EN-Y GASTRIC BYPASS W ROBOTICS, PARAESOPHAGEAL HERNIA REPAIR, AND INTRAOPERATIVE EGD;  Surgeon: Marcela Lopez MD;  Location: [facility];  Service: Bariatrics    HAND SURGERY      LAPAROTOMY N/A 6/19/2021    Procedure: Repair of incarcerated incisional hernia repair with mesh   ;  Surgeon: Sarah Mao DO;  [facility];  Service: General    TX LAP,PARTIAL NEPHRECTOMY Right 6/14/2021    Procedure: ROBOTIC LAPAROSCOPIC PARTIAL NEPHRECTOMY DECORTICATION OF RIGHT RENAL CYST;  Surgeon: Martha Awad MD;  [facility];  Service: Urology        Procedures Performed:     Imaging:Procedure: XR chest portable    Result Date: 7/17/2021  Narrative: CHEST INDICATION:   confirm NG placement  COMPARISON:  6/19/21 EXAM PERFORMED/VIEWS:  XR CHEST PORTABLE FINDINGS:  NG tube coils within the distal esophagus, and terminates within the upper esophagus Cardiomediastinal silhouette appears unremarkable  Lungs are significantly hypoinflated  No pneumothorax    No large pleural effusion Osseous structures appear within normal limits for patient age  Impression: 1  NG tube coils in the distal esophagus, with tip in the proximal esophagus 2  Hypoinflation The study was marked in EPIC for immediate notification  Workstation performed: [id number]     Procedure: CT abdomen pelvis with contrast    Result Date: 7/16/2021  Narrative: CT ABDOMEN AND PELVIS WITH IV CONTRAST INDICATION:   Abdominal pain  COMPARISON:  6/19/2021  TECHNIQUE:  CT examination of the abdomen and pelvis was performed  Axial, sagittal, and coronal 2D reformatted images were created from the source data and submitted for interpretation  Radiation dose length product (DLP) for this visit:  2292 mGy-cm   This examination, like all CT scans performed in the [facility], was performed utilizing techniques to minimize radiation dose exposure, including the use of iterative reconstruction and automated exposure control  IV Contrast:  100 mL of iohexol (OMNIPAQUE) Enteric Contrast:  Enteric contrast was not administered  FINDINGS: ABDOMEN LOWER CHEST:  Clear lung bases  LIVER/BILIARY TREE:  Unremarkable  GALLBLADDER:  No calcified gallstones  No pericholecystic inflammatory change  SPLEEN:  Unremarkable  PANCREAS:  Unremarkable  ADRENAL GLANDS:  Unremarkable  KIDNEYS/URETERS:  Stable to somewhat decreased area of cortical hypoattenuation and adjacent perinephric fat stranding in the right kidney consistent with evolving postsurgical change  No obstructive uropathy  Bilateral renal cysts are stable  STOMACH AND BOWEL:  Postsurgical change from gastric bypass  Fluid-filled distended loops of ileum in the left anterior abdomen, some containing fecal material   Transition point within a distal ileal loop at the root of the mesentery  Significant twisting of the root of the mesentery vascular pedicle  Moderate amount stool and gas throughout the colon  No evidence of colitis or diverticulitis   APPENDIX: Normal appendix  ABDOMINOPELVIC CAVITY:  No free intraperitoneal air, fluid collection or lymphadenopathy VESSELS:  Significant posterior inferior of the mesentery and vascular pedicle  Patent mesenteric veins PELVIS REPRODUCTIVE ORGANS:  No prostate enlargement  URINARY BLADDER:  Unremarkable  ABDOMINAL WALL/INGUINAL REGIONS:  Unremarkable  OSSEOUS STRUCTURES:  No acute fracture or destructive osseous lesion  Impression: Distended loops of ileum in the left anterior abdomen  Twisting of the root of the mesentery and likely transition point in the distal ileum suggesting at least partial small bowel volvulus  I personally discussed this study with Junoblanca Gutierrez on 7/16/2021 at 8:16 PM  Workstation performed: [id number]       Discharge Medications:  See after visit summary for reconciled discharge medications provided to patient and family  Brief HPI (per H&P by Joao Paula MD)  HPI:  Saba Diggs is a 48 y o  male with past medical history of PE on Xarelto, GERD gastric sleeve converted to gastric bypass in 09/2020, right partial nephrectomy complicated by incarcerated incisional status post incisional hernia repair in 06/2021who presents with periumbilical abdominal pain  Patient describes pain as pins and needles came on suddenly while at work  Patient describes pain is so severe he had difficulty walking and finding a comfortable spot  He described pain in his intermittent in nature  He had associated nausea but no vomiting  Patient reports having small bowel movement, and continuing to pass some gas from below  He reports passing gas from above as well and his abdomen feeling heavy  Patient denies chest pain, shortness of breath, fevers, chills  Hospital Course: Saba Diggs is a 48 y o  male who presented 7/16/2021 as per HPI  He was admitted to the General surgery Service and managed conservatively with bowel rest and fluid resuscitation    Xarelto was was held and patient was started on therapeutic Lovenox in the interim  NG tube was attempted without success x2  The patient hospital course was uncomplicated  He had return of bowel function and diet was advanced as tolerated  Patient was tolerating p o  His Xarelto was restarted  Patient was discharged on HD2  On the day of discharge, the patient was voiding spontaneously, ambulating at baseline, and pain was well controlled  The patient was sent home on home medications  Patient was also sent with referral to Hematology Oncology  He understood all instructions for discharge  He was also given the names and numbers of the providers as well as instructions for follow up appointments  Condition at Discharge: good     Provisions for Follow-Up Care:  See after visit summary for information related to follow-up care and any pertinent home health orders  Disposition: Home    Discharge instructions/Information to patient and family:   See after visit summary for information provided to patient and family  Planned Readmission: No    Discharge Statement   I spent 25 minutes discharging the patient  This time was spent on the day of discharge  I had direct contact with the patient on the day of discharge  Additional documentation is required if more than 30 minutes were spent on discharge

## 2021-07-19 ENCOUNTER — TELEPHONE (OUTPATIENT)
Dept: HEMATOLOGY ONCOLOGY | Facility: CLINIC | Age: 56
End: 2021-07-19

## 2021-07-19 NOTE — TELEPHONE ENCOUNTER
I called Marysol Stevens (Patient) @ [phone] - No answer  I left voicemail reminding him of his appointment Tues, 07/20/2021, @ 3 PM at [place] w/ Malissa Nelson   I requested he call us to go over Covid Screening questions  -Julio Cesar Davis

## 2021-07-19 NOTE — TELEPHONE ENCOUNTER
Patient wife Alton Lora called to follow up on the consult reschedule request for 7/20 with Shanique Barreto due to receiving a call from the office to confirm the appt  Best call back [phone]

## 2021-07-20 ENCOUNTER — TRANSITIONAL CARE MANAGEMENT (OUTPATIENT)
Dept: FAMILY MEDICINE CLINIC | Facility: CLINIC | Age: 56
End: 2021-07-20

## 2021-07-21 NOTE — UTILIZATION REVIEW
Notification of Discharge   This is a Notification of Discharge from our facility [address]  Please be advised that this patient has been discharge from our facility  Below you will find the admission and discharge date and time including the patients disposition  UTILIZATION REVIEW CONTACT:  Marco Antonio Guerra  Utilization   Network Utilization Review Department  Phone: [phone] x carefully listen to the prompts  All voicemails are confidential   Email: [email or web address]     PHYSICIAN ADVISORY SERVICES:  FOR [id number]-[id number] REVIEW - MEDICAL NECESSITY DENIAL  Phone: [phone]  Fax: [phone]  Email: [email or web address]     PRESENTATION DATE: 7/16/2021  5:20 PM  OBERVATION ADMISSION DATE:   INPATIENT ADMISSION DATE: 7/16/21  9:48 PM   DISCHARGE DATE: 7/18/2021  1:56 PM  DISPOSITION: Home/Self Care Home/Self Care      IMPORTANT INFORMATION:  Send all requests for admission clinical reviews, approved or denied determinations and any other requests to dedicated fax number below belonging to the campus where the patient is receiving treatment   List of dedicated fax numbers:  [address] [place] [address] DENIALS (Administrative/Medical Necessity) [phone]   [address]  (Maternity/NICU/Pediatrics) [phone]   Hank Torres [phone]   Alexys Donis [phone]   Jo Ann Jones [phone]   [address] [phone]   [facility]  [phone]   [address] [place] [address], [address]  [facility] [address] [facility] [phone]

## 2021-08-03 ENCOUNTER — TELEPHONE (OUTPATIENT)
Dept: FAMILY MEDICINE CLINIC | Facility: CLINIC | Age: 56
End: 2021-08-03

## 2021-08-03 NOTE — TELEPHONE ENCOUNTER
The approval to hold the blood thinners has to come for the hematologist please or the lung specialist

## 2021-08-04 NOTE — PROGRESS NOTES
Hematology/Oncology Outpatient Consult Note  Magno Hernandez 48 y o  male MRN: @ Encounter: [id number]        Date:  8/5/2021        CC:  Recurrent Pulmonary embolism      HPI:  Guilherme Alarcon is a 59-year-old gentleman with a history of morbid obesity s/p Keke-En-Y Gastric Bypass on 09/22/2020 and renal cell carcinoma status post laparoscopic right partial nephrectomy on 06/14/2021  Patient was readmitted several days following discharge from his nephrectomy with symptoms of significant abdominal pain, nausea and vomiting  CT scan demonstrated incarcerated incisional hernia resulting in small-bowel obstruction as well as a left lower lobe nonocclusive pulmonary embolism  Measured RV/LV ratio is within normal limits at less than 0 9      He underwent exploratory laparotomy with repair of ventral hernia on 06/19/2021  No bowel resection was required  Was initially started on Lovenox for treatment of pulmonary embolism and transitioned to Xarelto upon discharge  He has been referred to Hematology as this is patient's 2nd PE  He is being seen for initial consultation on 08/05/2021    Patient was 1st diagnosed with acute pulmonary embolism in December 2019  This was suspected to be provoked due to prolonged immobility secondary prolonged driving on his job with relatively sedentary lifestyle and morbid obesity  CT PE study demonstrated a right lower lobe segmental and subsegmental pulmonary embolism  There was no evidence of right heart strain  Duplex study of bilateral lower extremities was negative for acute DVT  He was treated with 3 months of anticoagulation with Xarelto  Patient has never had hypercoagulable workup  Patient denies any family history of blood clots    Patient reports he is feeling well  He denies any chest pain, shortness of breath, pleuritic pain  He denies any abnormal bleeding or easy bruisability on Xarelto        Cancer Staging:  Cancer Staging  No matching staging information was found for the patient  Molecular Testing:     Previous Hematologic/ Oncologic History:    Pulmonary embolism in 12/2019 status post 3 months of Xarelto    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  Final pathology pT1a, Grade 2, papillary, N0 M0 R0    Pulmonary embolism June 2021, currently on Xarelto      Test Results:    Imaging: XR chest portable    Result Date: 7/17/2021  Narrative: CHEST INDICATION:   confirm NG placement  COMPARISON:  6/19/21 EXAM PERFORMED/VIEWS:  XR CHEST PORTABLE FINDINGS:  NG tube coils within the distal esophagus, and terminates within the upper esophagus Cardiomediastinal silhouette appears unremarkable  Lungs are significantly hypoinflated  No pneumothorax  No large pleural effusion Osseous structures appear within normal limits for patient age  Impression: 1  NG tube coils in the distal esophagus, with tip in the proximal esophagus 2  Hypoinflation The study was marked in EPIC for immediate notification  Workstation performed: [id number]     CT abdomen pelvis with contrast    Result Date: 7/16/2021  Narrative: CT ABDOMEN AND PELVIS WITH IV CONTRAST INDICATION:   Abdominal pain  COMPARISON:  6/19/2021  TECHNIQUE:  CT examination of the abdomen and pelvis was performed  Axial, sagittal, and coronal 2D reformatted images were created from the source data and submitted for interpretation  Radiation dose length product (DLP) for this visit:  2292 mGy-cm   This examination, like all CT scans performed in the [facility], was performed utilizing techniques to minimize radiation dose exposure, including the use of iterative reconstruction and automated exposure control  IV Contrast:  100 mL of iohexol (OMNIPAQUE) Enteric Contrast:  Enteric contrast was not administered  FINDINGS: ABDOMEN LOWER CHEST:  Clear lung bases  LIVER/BILIARY TREE:  Unremarkable  GALLBLADDER:  No calcified gallstones   No pericholecystic inflammatory change  SPLEEN:  Unremarkable  PANCREAS:  Unremarkable  ADRENAL GLANDS:  Unremarkable  KIDNEYS/URETERS:  Stable to somewhat decreased area of cortical hypoattenuation and adjacent perinephric fat stranding in the right kidney consistent with evolving postsurgical change  No obstructive uropathy  Bilateral renal cysts are stable  STOMACH AND BOWEL:  Postsurgical change from gastric bypass  Fluid-filled distended loops of ileum in the left anterior abdomen, some containing fecal material   Transition point within a distal ileal loop at the root of the mesentery  Significant twisting of the root of the mesentery vascular pedicle  Moderate amount stool and gas throughout the colon  No evidence of colitis or diverticulitis  APPENDIX:  Normal appendix  ABDOMINOPELVIC CAVITY:  No free intraperitoneal air, fluid collection or lymphadenopathy VESSELS:  Significant posterior inferior of the mesentery and vascular pedicle  Patent mesenteric veins PELVIS REPRODUCTIVE ORGANS:  No prostate enlargement  URINARY BLADDER:  Unremarkable  ABDOMINAL WALL/INGUINAL REGIONS:  Unremarkable  OSSEOUS STRUCTURES:  No acute fracture or destructive osseous lesion  Impression: Distended loops of ileum in the left anterior abdomen  Twisting of the root of the mesentery and likely transition point in the distal ileum suggesting at least partial small bowel volvulus    I personally discussed this study with Peggy Schaffer on 2021 at 8:16 PM  Workstation performed: [id number]       Labs:   Lab Results   Component Value Date    WBC 2 80 (L) 2021    HGB 11 2 (L) 2021    HCT 35 7 (L) 2021    MCV 91 2021     (L) 2021     Lab Results   Component Value Date     2015    K 3 8 2021     2021    CO2 27 2021    ANIONGAP 5 2015    BUN 6 2021    CREATININE 0 82 2021    GLUCOSE 96 2015    GLUF 97 2021 CALCIUM 8 0 (L) 07/18/2021    CORRECTEDCA 9 7 06/19/2021    AST 21 07/16/2021    ALT 35 07/16/2021    ALKPHOS 79 07/16/2021    EGFR 116 07/18/2021             ROS:  Review of Systems   All other systems reviewed and are negative            Active Problems:   Patient Active Problem List   Diagnosis    Pulmonary embolism (HCC)    Gastroesophageal reflux disease    History of sleeve gastrectomy    Sarcoidosis    Incidental lung nodule, > 3mm and < 8mm    Right renal mass    Cigar smoker    Essential hypertension    Hyperparathyroidism , secondary, non-renal ([address] )    Low vitamin D level    Low vitamin B12 level    Low calcium levels    NICM (nonischemic cardiomyopathy) (HCC)    Chest pain    Bariatric surgery status    Type 2 diabetes mellitus with hyperglycemia (HCC)    Osteoporosis    Morbid obesity ([address] )    Hypercholesterolemia    Heart burn    Gout    Generalized osteoarthritis    Arthritis    ED (erectile dysfunction) of organic origin    Prediabetes    Mixed hyperlipidemia    Chronic systolic congestive heart failure (HCC)    History of pulmonary embolism    Obesity, Class II, BMI 35-39 9    Encounter for surgical aftercare following surgery of digestive system    Muscle pain    Postsurgical malabsorption    Renal cyst    Pre-op evaluation    Incisional hernia of anterior abdominal wall with obstruction    Renal cancer ([address] )    Small bowel obstruction due to adhesions [facility])       Past Medical History:   Past Medical History:   Diagnosis Date    Bariatric surgery status     Duodenal ulcer     GERD (gastroesophageal reflux disease)     Gout     Hyperparathyroidism ([address] )     Hypertension     Lung nodule     Postsurgical malabsorption     Pulmonary embolism ([address] ) 2019    Sarcoidosis     Wears glasses        Surgical History:   Past Surgical History:   Procedure Laterality Date    BARIATRIC SURGERY      Gastric Sleeve    COLONOSCOPY      FOOT SURGERY Right     R ankle  GASTRIC BYPASS LAPAROSCOPIC N/A 9/22/2020    Procedure: LAPAROSCOPIC REVISION/ CONVERSION TO HALEY-EN-Y GASTRIC BYPASS W ROBOTICS, PARAESOPHAGEAL HERNIA REPAIR, AND INTRAOPERATIVE EGD;  Surgeon: Marcelo Hill MD;  Location: [facility];  Service: Bariatrics    HAND SURGERY      LAPAROTOMY N/A 6/19/2021    Procedure: Repair of incarcerated incisional hernia repair with mesh   ;  Surgeon: Ramon Opitz, DO;  [facility];  Service: General    FL LAP,PARTIAL NEPHRECTOMY Right 6/14/2021    Procedure: ROBOTIC LAPAROSCOPIC PARTIAL NEPHRECTOMY DECORTICATION OF RIGHT RENAL CYST;  Surgeon: Soledad Goodell, MD;  [facility];  Service: Urology       Family History:    Family History   Problem Relation Age of Onset    Hypertension Father        Cancer-related family history is not on file      Social History:   Social History     Socioeconomic History    Marital status: /Civil Union     Spouse name: Not on file    Number of children: Not on file    Years of education: Not on file    Highest education level: Not on file   Occupational History    Not on file   Tobacco Use    Smoking status: Former Smoker     Packs/day: 1 00     Years: 7 00     Pack years: 7 00     Types: Cigars, Cigarettes     Start date: 1999     Quit date: 2006     Years since quitting: 15 6    Smokeless tobacco: Never Used    Tobacco comment: only smoked occ cigar, not for over 6 months   Vaping Use    Vaping Use: Never used   Substance and Sexual Activity    Alcohol use: Not Currently    Drug use: Not Currently     Types: Marijuana, Cocaine     Comment: pt quit 20 years ago     Sexual activity: Yes   Other Topics Concern    Not on file   Social History Narrative    Not on file     Social Determinants of Health     Financial Resource Strain:     Difficulty of Paying Living Expenses:    Food Insecurity:     Worried About Running Out of Food in the Last Year:     [address] in the Last Year: Transportation Needs:     Lack of Transportation (Medical):  Lack of Transportation (Non-Medical):    Physical Activity:     Days of Exercise per Week:     Minutes of Exercise per Session:    Stress:     Feeling of Stress :    Social Connections:     Frequency of Communication with Friends and Family:     Frequency of Social Gatherings with Friends and Family:     Attends [employer] Services:     Active Member of Clubs or Organizations:     Attends Club or Organization Meetings:     Marital Status:    Intimate Partner Violence:     Fear of Current or Ex-Partner:     Emotionally Abused:     Physically Abused:     Sexually Abused:        Current Medications:   Current Outpatient Medications   Medication Sig Dispense Refill    acetaminophen (TYLENOL) 325 mg tablet Take 2 tablets (650 mg total) by mouth every 4 (four) hours as needed for mild pain 30 tablet 0    APPLE CIDER VINEGAR PO Take by mouth daily      carvedilol (COREG) 12 5 mg tablet Take 1 tablet (12 5 mg total) by mouth 2 (two) times a day with meals 180 tablet 3    losartan (COZAAR) 50 mg tablet Take 1 tablet (50 mg total) by mouth daily Has been on it without issues (Patient taking differently: Take 50 mg by mouth daily in the early morning Has been on it without issues) 90 tablet 0    Multiple Vitamin (Daily-Mark) TABS TAKE 1 TABLET BY MOUTH EVERY DAY 90 tablet 1    NON FORMULARY daily Beet Root      rivaroxaban (XARELTO) 15 mg tablet Take 1 tablet (15 mg total) by mouth 2 (two) times a day with meals 180 tablet 1    Xarelto 20 MG tablet Take 20 mg by mouth daily with breakfast       docusate sodium (COLACE) 100 mg capsule Take 1 capsule (100 mg total) by mouth 2 (two) times a day for 15 days 30 capsule 0     No current facility-administered medications for this visit  Allergies:    Allergies   Allergen Reactions    Nsaids Other (See Comments)     Duodenal ulcer disease    Flunisolide      Other reaction(s): Burning sensation    Lisinopril Cough    Testosterone Itching, Rash and Other (See Comments)         Physical Exam:    Body surface area is 2 05 meters squared  Wt Readings from Last 3 Encounters:   08/05/21 98 4 kg (217 lb)   07/16/21 98 4 kg (217 lb)   07/02/21 98 kg (216 lb)        Temp Readings from Last 3 Encounters:   08/05/21 99 3 °F (37 4 °C)   07/18/21 98 1 °F (36 7 °C) (Oral)   06/30/21 97 6 °F (36 4 °C)        BP Readings from Last 3 Encounters:   08/05/21 122/84   07/18/21 134/76   07/02/21 130/68         Pulse Readings from Last 3 Encounters:   08/05/21 101   07/18/21 61   07/02/21 80          Physical Exam  Constitutional:       General: He is not in acute distress  Appearance: He is well-developed  He is not diaphoretic  HENT:      Head: Normocephalic and atraumatic  Mouth/Throat:      Pharynx: No oropharyngeal exudate  Eyes:      General: No scleral icterus  Pupils: Pupils are equal, round, and reactive to light  Cardiovascular:      Rate and Rhythm: Normal rate and regular rhythm  Heart sounds: No murmur heard  Pulmonary:      Effort: Pulmonary effort is normal  No respiratory distress  Breath sounds: Normal breath sounds  Abdominal:      General: Bowel sounds are normal  There is no distension  Palpations: Abdomen is soft  There is no mass  Tenderness: There is no abdominal tenderness  Musculoskeletal:         General: Normal range of motion  Cervical back: Normal range of motion and neck supple  Lymphadenopathy:      Cervical: No cervical adenopathy  Skin:     General: Skin is warm and dry  Neurological:      General: No focal deficit present  Mental Status: He is alert and oriented to person, place, and time  Psychiatric:         Mood and Affect: Mood normal          Behavior: Behavior normal          Thought Content: Thought content normal          Judgment: Judgment normal            Assessment/ Plan:    1   Pulmonary embolism, unspecified chronicity, unspecified pulmonary embolism type, unspecified whether acute cor pulmonale present [facility])      The patient is a 64-year-old gentleman who has been referred to Hematology for evaluation and recommendations for anticoagulation therapy  He has a history of recurrent pulmonary embolism  First pulmonary embolism with diagnosed in December 2019 and was presume to be provoked secondary to period of immobility, sedentary lifestyle and obesity  There was no evidence of right heart strain associated with this PE  He was treated with 3 months of Xarelto  More recently, he was found to have a pulmonary embolism in June 2021, this was after he had surgery for his renal cancer  Again, no evidence of right heart strain was noted  He is currently maintained on Xarelto daily  Patient has never had a hypercoagulable workup  He denies any family history of blood clots  Time spent discussing recommendations with patient today  An argument can be made that both of his PE's were provoked, however given his history my recommendation would be for lifelong anticoagulation  I will order a thrombosis panel to rule out any genetic or underlying predisposition for blood clotting but I did explain to the patient that regardless of these findings a my recommendation would still be for lifelong anticoagulation  Patient verbalized understanding  He will have requested blood work completed and return for a follow-up visit in 3 weeks to review results  He will stay on Xarelto    He is due to have a dental procedure, he needs to have a tooth extracted  His Xarelto should be held 2-3 days prior to his procedure  He should resume treatment with Xarelto immediately following this procedure  Patient verbalized understanding  He is instructed to call at any time with questions or concerns prior to his next visit  Barriers: None  Patient able to self care      Portions of the record may have been created with voice recognition software  Occasional wrong word or "sound a like" substitutions may have occurred due to the inherent limitations of voice recognition software  Read the chart carefully and recognize, using context, where substitutions have occurred

## 2021-08-05 ENCOUNTER — CONSULT (OUTPATIENT)
Dept: HEMATOLOGY ONCOLOGY | Facility: CLINIC | Age: 56
End: 2021-08-05
Payer: COMMERCIAL

## 2021-08-05 ENCOUNTER — APPOINTMENT (OUTPATIENT)
Dept: LAB | Facility: CLINIC | Age: 56
End: 2021-08-05
Payer: COMMERCIAL

## 2021-08-05 VITALS
OXYGEN SATURATION: 97 % | DIASTOLIC BLOOD PRESSURE: 84 MMHG | RESPIRATION RATE: 18 BRPM | HEART RATE: 101 BPM | HEIGHT: 65 IN | WEIGHT: 217 LBS | SYSTOLIC BLOOD PRESSURE: 122 MMHG | TEMPERATURE: 99.3 F | BODY MASS INDEX: 36.15 KG/M2

## 2021-08-05 DIAGNOSIS — I26.99 PULMONARY EMBOLUS (HCC): ICD-10-CM

## 2021-08-05 DIAGNOSIS — I26.99 PULMONARY EMBOLISM, UNSPECIFIED CHRONICITY, UNSPECIFIED PULMONARY EMBOLISM TYPE, UNSPECIFIED WHETHER ACUTE COR PULMONALE PRESENT (HCC): Primary | ICD-10-CM

## 2021-08-05 LAB — DEPRECATED AT III PPP: 115 % OF NORMAL (ref 92–136)

## 2021-08-05 PROCEDURE — 85306 CLOT INHIBIT PROT S FREE: CPT

## 2021-08-05 PROCEDURE — 3074F SYST BP LT 130 MM HG: CPT | Performed by: NURSE PRACTITIONER

## 2021-08-05 PROCEDURE — 85613 RUSSELL VIPER VENOM DILUTED: CPT

## 2021-08-05 PROCEDURE — 36415 COLL VENOUS BLD VENIPUNCTURE: CPT

## 2021-08-05 PROCEDURE — 85300 ANTITHROMBIN III ACTIVITY: CPT

## 2021-08-05 PROCEDURE — 85670 THROMBIN TIME PLASMA: CPT

## 2021-08-05 PROCEDURE — 3008F BODY MASS INDEX DOCD: CPT | Performed by: NURSE PRACTITIONER

## 2021-08-05 PROCEDURE — 3079F DIAST BP 80-89 MM HG: CPT | Performed by: NURSE PRACTITIONER

## 2021-08-05 PROCEDURE — 99204 OFFICE O/P NEW MOD 45 MIN: CPT | Performed by: NURSE PRACTITIONER

## 2021-08-05 PROCEDURE — 81240 F2 GENE: CPT

## 2021-08-05 PROCEDURE — 1036F TOBACCO NON-USER: CPT | Performed by: NURSE PRACTITIONER

## 2021-08-05 PROCEDURE — 85305 CLOT INHIBIT PROT S TOTAL: CPT

## 2021-08-05 PROCEDURE — 86146 BETA-2 GLYCOPROTEIN ANTIBODY: CPT

## 2021-08-05 PROCEDURE — 81241 F5 GENE: CPT

## 2021-08-05 PROCEDURE — 85303 CLOT INHIBIT PROT C ACTIVITY: CPT

## 2021-08-05 PROCEDURE — 86147 CARDIOLIPIN ANTIBODY EA IG: CPT

## 2021-08-05 PROCEDURE — 85732 THROMBOPLASTIN TIME PARTIAL: CPT

## 2021-08-05 PROCEDURE — 85705 THROMBOPLASTIN INHIBITION: CPT

## 2021-08-06 LAB
B2 GLYCOPROT1 IGA SERPL IA-ACNC: 2.9
B2 GLYCOPROT1 IGG SERPL IA-ACNC: 1.1
B2 GLYCOPROT1 IGM SERPL IA-ACNC: <2.9
CARDIOLIPIN IGA SER IA-ACNC: 2.6
CARDIOLIPIN IGG SER IA-ACNC: 0.9
CARDIOLIPIN IGM SER IA-ACNC: 2

## 2021-08-07 LAB
APTT SCREEN TO CONFIRM RATIO: 0.92 RATIO (ref 0–1.4)
CONFIRM APTT/NORMAL: 53.5 SEC (ref 0–55)
DRVVT IMM 1:2 NP PPP: 72.1 SEC (ref 0–40.4)
DRVVT SCREEN TO CONFIRM RATIO: 2 RATIO (ref 0.8–1.2)
LA PPP-IMP: ABNORMAL
PROT S ACT/NOR PPP: 70 % (ref 57–157)
PROT S PPP-ACNC: 63 % (ref 60–150)
SCREEN APTT: 47.7 SEC (ref 0–51.9)
SCREEN DRVVT: 115.8 SEC (ref 0–47)
THROMBIN TIME: 19.4 SEC (ref 0–23)

## 2021-08-09 LAB
PROT C AG ACT/NOR PPP IA: >150 % OF NORMAL (ref 60–150)
PROT S ACT/NOR PPP: 90 % (ref 71–117)

## 2021-08-10 LAB — F2 GENE MUT ANL BLD/T: NORMAL

## 2021-08-11 LAB — F5 GENE MUT ANL BLD/T: NORMAL

## 2021-08-22 NOTE — PROGRESS NOTES
Please review and make sure that he understands that he will be on Xearlto -lifelong   As per Hematology   given his history my recommendation would be for lifelong anticoagulation  I will order a thrombosis panel to rule out any genetic or underlying predisposition for blood clotting but I did explain to the patient that regardless of these findings a my recommendation would still be for lifelong anticoagulation  Patient verbalized understanding  He will have requested blood work completed and return for a follow-up visit in 3 weeks to review results    He will stay on Xarelto

## 2021-08-30 ENCOUNTER — TELEPHONE (OUTPATIENT)
Dept: HEMATOLOGY ONCOLOGY | Facility: CLINIC | Age: 56
End: 2021-08-30

## 2021-08-30 NOTE — TELEPHONE ENCOUNTER
Scheduling Appointment     Who Is Calling to Schedule Patient wife    Doctor Shanique Barreto    Location [place]    Date and Time 9-14-  3:30 pm          Patient verbalized understanding    yes

## 2021-09-13 ENCOUNTER — TELEPHONE (OUTPATIENT)
Dept: HEMATOLOGY ONCOLOGY | Facility: CLINIC | Age: 56
End: 2021-09-13

## 2021-09-13 NOTE — TELEPHONE ENCOUNTER
Appointment Cancellation Or Reschedule     Person calling in Patient    Provider [address] [facility]   Office Visit Date and Time  09/14    Office Visit Location [facility]   Did patient want to reschedule their office appointment? If so, when was it scheduled to? Yes 10/12   Reason for Cancellation or Reschedule Family emergency patient r/s appt  If the patient is a treatment patient, please route this to the office nurse  If the patient is not on treatment, please route to the office MA

## 2021-09-27 ENCOUNTER — HOSPITAL ENCOUNTER (OUTPATIENT)
Dept: CT IMAGING | Facility: HOSPITAL | Age: 56
Discharge: HOME | End: 2021-09-27
Attending: UROLOGY
Payer: COMMERCIAL

## 2021-09-27 DIAGNOSIS — C64.9 PAPILLARY RENAL CELL CARCINOMA (HCC): ICD-10-CM

## 2021-09-27 PROCEDURE — 74178 CT ABD&PLV WO CNTR FLWD CNTR: CPT

## 2021-09-27 PROCEDURE — G1004 CDSM NDSC: HCPCS

## 2021-09-27 RX ADMIN — IOHEXOL 100 ML: 350 INJECTION, SOLUTION INTRAVENOUS at 16:58

## 2021-10-03 DIAGNOSIS — I50.22 CHRONIC SYSTOLIC CONGESTIVE HEART FAILURE (HCC): ICD-10-CM

## 2021-10-03 DIAGNOSIS — I10 ESSENTIAL HYPERTENSION: ICD-10-CM

## 2021-10-04 RX ORDER — CARVEDILOL 12.5 MG/1
TABLET ORAL
Qty: 180 TABLET | Refills: 3 | Status: SHIPPED | OUTPATIENT
Start: 2021-10-04 | End: 2022-02-15 | Stop reason: SDUPTHER

## 2021-10-08 ENCOUNTER — OFFICE VISIT (OUTPATIENT)
Dept: UROLOGY | Facility: CLINIC | Age: 56
End: 2021-10-08
Payer: COMMERCIAL

## 2021-10-08 VITALS
BODY MASS INDEX: 36.82 KG/M2 | WEIGHT: 221 LBS | SYSTOLIC BLOOD PRESSURE: 130 MMHG | HEART RATE: 93 BPM | HEIGHT: 65 IN | DIASTOLIC BLOOD PRESSURE: 82 MMHG

## 2021-10-08 DIAGNOSIS — C64.1 MALIGNANT NEOPLASM OF RIGHT KIDNEY (HCC): Primary | ICD-10-CM

## 2021-10-08 PROCEDURE — 99213 OFFICE O/P EST LOW 20 MIN: CPT | Performed by: PHYSICIAN ASSISTANT

## 2021-10-12 ENCOUNTER — TELEPHONE (OUTPATIENT)
Dept: HEMATOLOGY ONCOLOGY | Facility: CLINIC | Age: 56
End: 2021-10-12

## 2021-10-14 ENCOUNTER — OFFICE VISIT (OUTPATIENT)
Dept: HEMATOLOGY ONCOLOGY | Facility: CLINIC | Age: 56
End: 2021-10-14
Payer: COMMERCIAL

## 2021-10-14 VITALS
BODY MASS INDEX: 36.99 KG/M2 | TEMPERATURE: 98.4 F | OXYGEN SATURATION: 98 % | RESPIRATION RATE: 14 BRPM | HEART RATE: 81 BPM | DIASTOLIC BLOOD PRESSURE: 98 MMHG | SYSTOLIC BLOOD PRESSURE: 140 MMHG | WEIGHT: 222 LBS | HEIGHT: 65 IN

## 2021-10-14 DIAGNOSIS — I26.99 PULMONARY EMBOLISM, UNSPECIFIED CHRONICITY, UNSPECIFIED PULMONARY EMBOLISM TYPE, UNSPECIFIED WHETHER ACUTE COR PULMONALE PRESENT (HCC): Primary | ICD-10-CM

## 2021-10-14 PROCEDURE — 3008F BODY MASS INDEX DOCD: CPT | Performed by: NURSE PRACTITIONER

## 2021-10-14 PROCEDURE — 3080F DIAST BP >= 90 MM HG: CPT | Performed by: NURSE PRACTITIONER

## 2021-10-14 PROCEDURE — 1036F TOBACCO NON-USER: CPT | Performed by: NURSE PRACTITIONER

## 2021-10-14 PROCEDURE — 99214 OFFICE O/P EST MOD 30 MIN: CPT | Performed by: NURSE PRACTITIONER

## 2021-10-14 PROCEDURE — 3077F SYST BP >= 140 MM HG: CPT | Performed by: NURSE PRACTITIONER

## 2021-11-09 DIAGNOSIS — Z98.84 BARIATRIC SURGERY STATUS: ICD-10-CM

## 2021-11-09 RX ORDER — MULTIVITAMIN WITH FOLIC ACID 400 MCG
TABLET ORAL
Qty: 90 TABLET | Refills: 1 | Status: SHIPPED | OUTPATIENT
Start: 2021-11-09

## 2021-12-08 ENCOUNTER — TELEPHONE (OUTPATIENT)
Dept: UROLOGY | Facility: CLINIC | Age: 56
End: 2021-12-08

## 2022-01-09 ENCOUNTER — PATIENT MESSAGE (OUTPATIENT)
Dept: FAMILY MEDICINE CLINIC | Facility: CLINIC | Age: 57
End: 2022-01-09

## 2022-01-09 DIAGNOSIS — R09.81 HEAD CONGESTION: ICD-10-CM

## 2022-01-09 DIAGNOSIS — Z20.822 EXPOSURE TO COVID-19 VIRUS: Primary | ICD-10-CM

## 2022-01-10 PROCEDURE — U0003 INFECTIOUS AGENT DETECTION BY NUCLEIC ACID (DNA OR RNA); SEVERE ACUTE RESPIRATORY SYNDROME CORONAVIRUS 2 (SARS-COV-2) (CORONAVIRUS DISEASE [COVID-19]), AMPLIFIED PROBE TECHNIQUE, MAKING USE OF HIGH THROUGHPUT TECHNOLOGIES AS DESCRIBED BY CMS-2020-01-R: HCPCS | Performed by: NURSE PRACTITIONER

## 2022-01-10 PROCEDURE — U0005 INFEC AGEN DETEC AMPLI PROBE: HCPCS | Performed by: NURSE PRACTITIONER

## 2022-01-23 ENCOUNTER — APPOINTMENT (OUTPATIENT)
Dept: LAB | Facility: CLINIC | Age: 57
End: 2022-01-23
Payer: COMMERCIAL

## 2022-01-23 DIAGNOSIS — I10 ESSENTIAL HYPERTENSION: ICD-10-CM

## 2022-01-23 DIAGNOSIS — K21.9 GASTROESOPHAGEAL REFLUX DISEASE WITHOUT ESOPHAGITIS: ICD-10-CM

## 2022-01-23 DIAGNOSIS — I26.99 OTHER PULMONARY EMBOLISM WITHOUT ACUTE COR PULMONALE, UNSPECIFIED CHRONICITY (HCC): ICD-10-CM

## 2022-01-23 DIAGNOSIS — C64.9 PAPILLARY RENAL CELL CARCINOMA (HCC): ICD-10-CM

## 2022-01-23 DIAGNOSIS — R73.03 PREDIABETES: ICD-10-CM

## 2022-01-23 LAB
ALBUMIN SERPL BCP-MCNC: 3.4 G/DL (ref 3.5–5)
ALP SERPL-CCNC: 77 U/L (ref 46–116)
ALT SERPL W P-5'-P-CCNC: 32 U/L (ref 12–78)
ANION GAP SERPL CALCULATED.3IONS-SCNC: 8 MMOL/L (ref 4–13)
AST SERPL W P-5'-P-CCNC: 19 U/L (ref 5–45)
BACTERIA UR QL AUTO: ABNORMAL /HPF
BASOPHILS # BLD AUTO: 0.03 THOUSANDS/ΜL (ref 0–0.1)
BASOPHILS NFR BLD AUTO: 1 % (ref 0–1)
BILIRUB SERPL-MCNC: 0.39 MG/DL (ref 0.2–1)
BILIRUB UR QL STRIP: NEGATIVE
BUN SERPL-MCNC: 17 MG/DL (ref 5–25)
CALCIUM ALBUM COR SERPL-MCNC: 9 MG/DL (ref 8.3–10.1)
CALCIUM SERPL-MCNC: 8.5 MG/DL (ref 8.3–10.1)
CHLORIDE SERPL-SCNC: 105 MMOL/L (ref 100–108)
CHOLEST SERPL-MCNC: 180 MG/DL
CLARITY UR: ABNORMAL
CO2 SERPL-SCNC: 25 MMOL/L (ref 21–32)
COLOR UR: YELLOW
CREAT SERPL-MCNC: 0.87 MG/DL (ref 0.6–1.3)
EOSINOPHIL # BLD AUTO: 0.28 THOUSAND/ΜL (ref 0–0.61)
EOSINOPHIL NFR BLD AUTO: 7 % (ref 0–6)
ERYTHROCYTE [DISTWIDTH] IN BLOOD BY AUTOMATED COUNT: 14.3 % (ref 11.6–15.1)
EST. AVERAGE GLUCOSE BLD GHB EST-MCNC: 120 MG/DL
GFR SERPL CREATININE-BSD FRML MDRD: 97 ML/MIN/1.73SQ M
GLUCOSE P FAST SERPL-MCNC: 103 MG/DL (ref 65–99)
GLUCOSE UR STRIP-MCNC: NEGATIVE MG/DL
HBA1C MFR BLD: 5.8 %
HCT VFR BLD AUTO: 39.2 % (ref 36.5–49.3)
HDLC SERPL-MCNC: 98 MG/DL
HGB BLD-MCNC: 12.3 G/DL (ref 12–17)
HGB UR QL STRIP.AUTO: NEGATIVE
IMM GRANULOCYTES # BLD AUTO: 0.01 THOUSAND/UL (ref 0–0.2)
IMM GRANULOCYTES NFR BLD AUTO: 0 % (ref 0–2)
KETONES UR STRIP-MCNC: NEGATIVE MG/DL
LDLC SERPL CALC-MCNC: 76 MG/DL (ref 0–100)
LEUKOCYTE ESTERASE UR QL STRIP: ABNORMAL
LYMPHOCYTES # BLD AUTO: 0.88 THOUSANDS/ΜL (ref 0.6–4.47)
LYMPHOCYTES NFR BLD AUTO: 22 % (ref 14–44)
MCH RBC QN AUTO: 27.8 PG (ref 26.8–34.3)
MCHC RBC AUTO-ENTMCNC: 31.4 G/DL (ref 31.4–37.4)
MCV RBC AUTO: 89 FL (ref 82–98)
MONOCYTES # BLD AUTO: 0.36 THOUSAND/ΜL (ref 0.17–1.22)
MONOCYTES NFR BLD AUTO: 9 % (ref 4–12)
NEUTROPHILS # BLD AUTO: 2.43 THOUSANDS/ΜL (ref 1.85–7.62)
NEUTS SEG NFR BLD AUTO: 61 % (ref 43–75)
NITRITE UR QL STRIP: POSITIVE
NON-SQ EPI CELLS URNS QL MICRO: ABNORMAL /HPF
NONHDLC SERPL-MCNC: 82 MG/DL
NRBC BLD AUTO-RTO: 0 /100 WBCS
PH UR STRIP.AUTO: 5.5 [PH]
PLATELET # BLD AUTO: 186 THOUSANDS/UL (ref 149–390)
PMV BLD AUTO: 12 FL (ref 8.9–12.7)
POTASSIUM SERPL-SCNC: 3.9 MMOL/L (ref 3.5–5.3)
PROT SERPL-MCNC: 7 G/DL (ref 6.4–8.2)
PROT UR STRIP-MCNC: NEGATIVE MG/DL
RBC # BLD AUTO: 4.43 MILLION/UL (ref 3.88–5.62)
RBC #/AREA URNS AUTO: ABNORMAL /HPF
SODIUM SERPL-SCNC: 138 MMOL/L (ref 136–145)
SP GR UR STRIP.AUTO: 1.02 (ref 1–1.03)
TRIGL SERPL-MCNC: 29 MG/DL
TSH SERPL DL<=0.05 MIU/L-ACNC: 0.91 UIU/ML (ref 0.36–3.74)
UROBILINOGEN UR QL STRIP.AUTO: 0.2 E.U./DL
WBC # BLD AUTO: 3.99 THOUSAND/UL (ref 4.31–10.16)
WBC #/AREA URNS AUTO: ABNORMAL /HPF

## 2022-01-23 PROCEDURE — 80061 LIPID PANEL: CPT

## 2022-01-23 PROCEDURE — 81001 URINALYSIS AUTO W/SCOPE: CPT | Performed by: NURSE PRACTITIONER

## 2022-01-23 PROCEDURE — 3044F HG A1C LEVEL LT 7.0%: CPT | Performed by: PODIATRIST

## 2022-01-23 PROCEDURE — 36415 COLL VENOUS BLD VENIPUNCTURE: CPT

## 2022-01-23 PROCEDURE — 80053 COMPREHEN METABOLIC PANEL: CPT

## 2022-01-23 PROCEDURE — 83036 HEMOGLOBIN GLYCOSYLATED A1C: CPT

## 2022-01-23 PROCEDURE — 84443 ASSAY THYROID STIM HORMONE: CPT

## 2022-01-23 PROCEDURE — 85025 COMPLETE CBC W/AUTO DIFF WBC: CPT

## 2022-01-24 ENCOUNTER — OFFICE VISIT (OUTPATIENT)
Dept: BARIATRICS | Facility: CLINIC | Age: 57
End: 2022-01-24
Payer: COMMERCIAL

## 2022-01-24 VITALS
TEMPERATURE: 97.4 F | SYSTOLIC BLOOD PRESSURE: 138 MMHG | HEART RATE: 80 BPM | BODY MASS INDEX: 37.07 KG/M2 | HEIGHT: 65 IN | DIASTOLIC BLOOD PRESSURE: 94 MMHG | WEIGHT: 222.5 LBS

## 2022-01-24 DIAGNOSIS — K91.2 POSTSURGICAL MALABSORPTION: ICD-10-CM

## 2022-01-24 DIAGNOSIS — I26.99 PULMONARY EMBOLISM, UNSPECIFIED CHRONICITY, UNSPECIFIED PULMONARY EMBOLISM TYPE, UNSPECIFIED WHETHER ACUTE COR PULMONALE PRESENT (HCC): ICD-10-CM

## 2022-01-24 DIAGNOSIS — I50.22 CHRONIC SYSTOLIC (CONGESTIVE) HEART FAILURE (HCC): ICD-10-CM

## 2022-01-24 DIAGNOSIS — B37.2 CUTANEOUS CANDIDIASIS: ICD-10-CM

## 2022-01-24 DIAGNOSIS — D86.9 SARCOIDOSIS: ICD-10-CM

## 2022-01-24 DIAGNOSIS — I10 ESSENTIAL HYPERTENSION: ICD-10-CM

## 2022-01-24 DIAGNOSIS — Z98.84 BARIATRIC SURGERY STATUS: ICD-10-CM

## 2022-01-24 DIAGNOSIS — Z48.815 ENCOUNTER FOR SURGICAL AFTERCARE FOLLOWING SURGERY OF DIGESTIVE SYSTEM: Primary | ICD-10-CM

## 2022-01-24 DIAGNOSIS — C64.1 MALIGNANT NEOPLASM OF RIGHT KIDNEY (HCC): ICD-10-CM

## 2022-01-24 DIAGNOSIS — E66.9 OBESITY, CLASS II, BMI 35-39.9: ICD-10-CM

## 2022-01-24 PROCEDURE — 99214 OFFICE O/P EST MOD 30 MIN: CPT | Performed by: PHYSICIAN ASSISTANT

## 2022-01-24 RX ORDER — NYSTATIN 100000 U/G
CREAM TOPICAL AS NEEDED
Qty: 30 G | Refills: 0 | Status: SHIPPED | OUTPATIENT
Start: 2022-01-24

## 2022-01-24 NOTE — PROGRESS NOTES
Assessment/Plan:     Patient ID: Isabell Velazquez is a 55 y o  male  Bariatric Surgery Status    -s/p Keke-En-Y Gastric Bypass with Dr Yari Silva on 9/22/2020  Overall doing Fair  Renal cancer   - underwent tumor removal this past summer  Did not require chemo and radiation but was put on xarelto due to development of PE postop  - he is on protonix     HTN  - continue antihypertensives and management with PCP     Sarcoidosis   - followed through [facility]    · Continued/Maintain healthy weight loss with good nutrition intakes  · Adequate hydration with at least 64oz  fluid intake  · Follow diet as discussed  · Follow vitamin and mineral recommendations as reviewed with you  · Exercise as tolerated  · Colonoscopy referral made: due soon, will go through PCP     · Follow-up in 1 year  We kindly ask that your arrive 15 minutes before your scheduled appointment time with your provider to allow our staff to room you, get your vital signs and update your chart  · Get lab work done  Please call the office if you need a script  It is recommended to check with your insurance BEFORE getting labs done to make sure they are covered by your policy  · Call our office if you have any problems with abdominal pain especially associated with fever, chills, nausea, vomiting or any other concerns  · All  Post-bariatric surgery patients should be aware that very small quantities of any alcohol can cause impairment and it is very possible not to feel the effect  The effect can be in the system for several hours  It is also a stomach irritant  · It is advised to AVOID alcohol, Nonsteroidal antiinflammatory drugs (NSAIDS) and nicotine of all forms   Any of these can cause stomach irritation/pain  · Discussed the effects of alcohol on a bariatric patient and the increased impairment risk  · Keep up the good work!      Postsurgical Malabsorption   -At risk for malabsorption of vitamins/minerals secondary to malabsorption and restriction of intake from bariatric surgery  -Currently taking adequate postop bariatric surgery vitamin supplementation  - cbc and cmp done by pcp   -Next set of bariatric labs ordered for approximately 2 weeks  -Patient received education about the importance of adhering to a lifelong supplementation regimen to avoid vitamin/mineral deficiencies      Diagnoses and all orders for this visit:    Encounter for surgical aftercare following surgery of digestive system  -     Zinc; Future  -     Vitamin D 25 hydroxy; Future  -     Vitamin B12; Future  -     Vitamin B1, whole blood; Future  -     Vitamin A; Future  -     Ferritin; Future  -     Folate; Future  -     Iron Saturation %; Future  -     PTH, intact; Future    Bariatric surgery status  -     Zinc; Future  -     Vitamin D 25 hydroxy; Future  -     Vitamin B12; Future  -     Vitamin B1, whole blood; Future  -     Vitamin A; Future  -     Ferritin; Future  -     Folate; Future  -     Iron Saturation %; Future  -     PTH, intact; Future    Postsurgical malabsorption  -     Zinc; Future  -     Vitamin D 25 hydroxy; Future  -     Vitamin B12; Future  -     Vitamin B1, whole blood; Future  -     Vitamin A; Future  -     Ferritin; Future  -     Folate; Future  -     Iron Saturation %; Future  -     PTH, intact; Future    Malignant neoplasm of right kidney (HCC)  -     Zinc; Future  -     Vitamin D 25 hydroxy; Future  -     Vitamin B12; Future  -     Vitamin B1, whole blood; Future  -     Vitamin A; Future  -     Ferritin; Future  -     Folate; Future  -     Iron Saturation %; Future  -     PTH, intact; Future    Essential hypertension  -     Zinc; Future  -     Vitamin D 25 hydroxy; Future  -     Vitamin B12; Future  -     Vitamin B1, whole blood; Future  -     Vitamin A; Future  -     Ferritin; Future  -     Folate; Future  -     Iron Saturation %; Future  -     PTH, intact;  Future    Pulmonary embolism, unspecified chronicity, unspecified pulmonary embolism type, unspecified whether acute cor pulmonale present (HCC)  -     Zinc; Future  -     Vitamin D 25 hydroxy; Future  -     Vitamin B12; Future  -     Vitamin B1, whole blood; Future  -     Vitamin A; Future  -     Ferritin; Future  -     Folate; Future  -     Iron Saturation %; Future  -     PTH, intact; Future    Sarcoidosis  -     Zinc; Future  -     Vitamin D 25 hydroxy; Future  -     Vitamin B12; Future  -     Vitamin B1, whole blood; Future  -     Vitamin A; Future  -     Ferritin; Future  -     Folate; Future  -     Iron Saturation %; Future  -     PTH, intact; Future    Obesity, Class II, BMI 35-39 9  -     Zinc; Future  -     Vitamin D 25 hydroxy; Future  -     Vitamin B12; Future  -     Vitamin B1, whole blood; Future  -     Vitamin A; Future  -     Ferritin; Future  -     Folate; Future  -     Iron Saturation %; Future  -     PTH, intact; Future  -     nystatin (MYCOSTATIN) cream; Apply topically as needed (rash)    Cutaneous candidiasis  -     nystatin (MYCOSTATIN) cream; Apply topically as needed (rash)    Chronic systolic (congestive) heart failure (HCC)         Subjective:      Patient ID: Autumn Layton is a 55 y o  male  -s/p Keke-En-Y Gastric Bypass with Dr Florentino Favre on 9/22/2020  Overall doing Fair  Initial:  Current: 222  EWL: (Weight loss is ahead of schedule at this post surgical period )  Trav  Current BMI is Body mass index is 37 03 kg/m²      · Tolerating a regular diet-yes  · Eating at least 60 grams of protein per day-yes  · Following 30/60 minute rule with liquids-yes  · Drinking at least 64 ounces of fluid per day-yes  · Drinking carbonated beverages-no  · Sufficient exercise-no, needs to improve   · Using NSAIDs regularly-no  · Using nicotine-no  · Using alcohol-no  · Supplements: rome mvi + calcium     · EWL is 32%    The following portions of the patient's history were reviewed and updated as appropriate: allergies, current medications, past family history, past medical history, past social history, past surgical history and problem list     Review of Systems   Constitutional: Negative  Respiratory: Negative  Gastrointestinal: Negative  Musculoskeletal: Positive for arthralgias (knee pain at baseline )  Skin: Positive for rash (in between abd skin folds, using baby powder prn )  Neurological: Positive for headaches (occasional )  Negative for dizziness and numbness  Psychiatric/Behavioral: Negative  Objective:    /94 (BP Location: Right arm, Patient Position: Sitting)   Pulse 80   Temp (!) 97 4 °F (36 3 °C) (Tympanic)   Ht 5' 5" (1 651 m)   Wt 101 kg (222 lb 8 oz)   BMI 37 03 kg/m²      Physical Exam  Vitals and nursing note reviewed  Constitutional:       Appearance: Normal appearance  HENT:      Head: Normocephalic and atraumatic  Eyes:      Extraocular Movements: Extraocular movements intact  Pupils: Pupils are equal, round, and reactive to light  Cardiovascular:      Rate and Rhythm: Normal rate and regular rhythm  Pulmonary:      Effort: Pulmonary effort is normal       Breath sounds: Normal breath sounds  Abdominal:      General: Bowel sounds are normal       Tenderness: There is no abdominal tenderness  Musculoskeletal:         General: Normal range of motion  Cervical back: Normal range of motion  Skin:     General: Skin is warm and dry  Neurological:      General: No focal deficit present  Mental Status: He is alert and oriented to person, place, and time     Psychiatric:         Mood and Affect: Mood normal

## 2022-01-24 NOTE — PATIENT INSTRUCTIONS
· Follow-up in 1 year  We kindly ask that your arrive 15 minutes before your scheduled appointment time with your provider to allow our staff to room you, get your vital signs and update your chart  · Get lab work done  Please call the office if you need a script  It is recommended to check with your insurance BEFORE getting labs done to make sure they are covered by your policy  · Call our office if you have any problems with abdominal pain especially associated with fever, chills, nausea, vomiting or any other concerns  · All  Post-bariatric surgery patients should be aware that very small quantities of any alcohol can cause impairment and it is very possible not to feel the effect  The effect can be in the system for several hours  It is also a stomach irritant  · It is advised to AVOID alcohol, Nonsteroidal antiinflammatory drugs (NSAIDS) and nicotine of all forms   Any of these can cause stomach irritation/pain  · Discussed the effects of alcohol on a bariatric patient and the increased impairment risk  · Keep up the good work!

## 2022-01-25 ENCOUNTER — HOSPITAL ENCOUNTER (OUTPATIENT)
Dept: CT IMAGING | Facility: HOSPITAL | Age: 57
Discharge: HOME | End: 2022-01-25
Payer: COMMERCIAL

## 2022-01-25 ENCOUNTER — OFFICE VISIT (OUTPATIENT)
Dept: PODIATRY | Facility: CLINIC | Age: 57
End: 2022-01-25
Payer: COMMERCIAL

## 2022-01-25 VITALS
HEIGHT: 65 IN | SYSTOLIC BLOOD PRESSURE: 149 MMHG | WEIGHT: 222 LBS | DIASTOLIC BLOOD PRESSURE: 92 MMHG | BODY MASS INDEX: 36.99 KG/M2 | HEART RATE: 82 BPM

## 2022-01-25 DIAGNOSIS — E11.40 TYPE 2 DIABETES MELLITUS WITH DIABETIC NEUROPATHY, WITHOUT LONG-TERM CURRENT USE OF INSULIN (HCC): Primary | ICD-10-CM

## 2022-01-25 DIAGNOSIS — M21.42 PES PLANUS OF BOTH FEET: ICD-10-CM

## 2022-01-25 DIAGNOSIS — M21.41 PES PLANUS OF BOTH FEET: ICD-10-CM

## 2022-01-25 DIAGNOSIS — B35.1 ONYCHOMYCOSIS: ICD-10-CM

## 2022-01-25 DIAGNOSIS — I26.99 PULMONARY EMBOLISM, UNSPECIFIED CHRONICITY, UNSPECIFIED PULMONARY EMBOLISM TYPE, UNSPECIFIED WHETHER ACUTE COR PULMONALE PRESENT (HCC): ICD-10-CM

## 2022-01-25 DIAGNOSIS — L85.3 XEROSIS OF SKIN: ICD-10-CM

## 2022-01-25 PROCEDURE — 11721 DEBRIDE NAIL 6 OR MORE: CPT | Performed by: PODIATRIST

## 2022-01-25 PROCEDURE — 3077F SYST BP >= 140 MM HG: CPT | Performed by: PODIATRIST

## 2022-01-25 PROCEDURE — 3080F DIAST BP >= 90 MM HG: CPT | Performed by: PODIATRIST

## 2022-01-25 PROCEDURE — 3008F BODY MASS INDEX DOCD: CPT | Performed by: PODIATRIST

## 2022-01-25 PROCEDURE — 99203 OFFICE O/P NEW LOW 30 MIN: CPT | Performed by: PODIATRIST

## 2022-01-25 PROCEDURE — 71275 CT ANGIOGRAPHY CHEST: CPT

## 2022-01-25 PROCEDURE — 1036F TOBACCO NON-USER: CPT | Performed by: PODIATRIST

## 2022-01-25 RX ORDER — AMMONIUM LACTATE 12 G/100G
CREAM TOPICAL AS NEEDED
Qty: 385 G | Refills: 3 | Status: SHIPPED | OUTPATIENT
Start: 2022-01-25 | End: 2022-02-15 | Stop reason: SDUPTHER

## 2022-01-25 RX ADMIN — IOHEXOL 85 ML: 350 INJECTION, SOLUTION INTRAVENOUS at 19:24

## 2022-01-25 NOTE — PATIENT INSTRUCTIONS
Foot Care for People with Diabetes   AMBULATORY CARE:   What you need to know about foot care:   · Foot care helps protect your feet and prevent foot ulcers or sores  Long-term high blood sugar levels can damage the blood vessels and nerves in your legs and feet  This damage makes it hard to feel pressure, pain, temperature, and touch  You may not be able to feel a cut or sore, or shoes that are too tight  Foot care is needed to prevent serious problems, such as an infection or amputation  · Diabetes may cause your toes to become crooked or curved under  These changes may affect the way you walk and can lead to increased pressure on your foot  The pressure can decrease blood flow to your feet  Lack of blood flow increases your risk for a foot ulcer  Do not ignore small problems, such as dry skin or small wounds  These can become life-threatening over time without proper care  Call your care team provider if:   · Your feet become numb, weak, or hard to move  · You have pus draining from a sore on your foot  · You have a wound on your foot that gets bigger, deeper, or does not heal      · You see blisters, cuts, scratches, calluses, or sores on your foot  · You have a fever, and your feet become red, warm, and swollen  · Your toenails become thick, curled, or yellow  · You find it hard to check your feet because your vision is poor  · You have questions or concerns about your condition or care  How to care for your feet:   · Check your feet each day  Look at your whole foot, including the bottom, and between and under your toes  Check for wounds, corns, and calluses  Use a mirror to see the bottom of your feet  The skin on your feet may be shiny, tight, or darker than normal  Your feet may also be cold and pale  Feel your feet by running your hands along the tops, bottoms, sides, and between your toes   Redness, swelling, and warmth are signs of blood flow problems that can lead to a foot ulcer  Do not try to remove corns or calluses yourself  · Wash your feet each day with soap and warm water  Do not use hot water, because this can injure your foot  Dry your feet gently with a towel after you wash them  Dry between and under your toes  · Apply lotion or a moisturizer on your dry feet  Ask your care team provider what lotions are best to use  Do not put lotion or moisturizer between your toes  Moisture between your toes could lead to skin breakdown  · Cut your toenails correctly  File or cut your toenails straight across  Use a soft brush to clean around your toenails  If your toenails are very thick, you may need to have a care team provider or specialist cut them  · Protect your feet  Do not walk barefoot or wear your shoes without socks  Check your shoes for rocks or other objects that can hurt your feet  Wear cotton socks to help keep your feet dry  Wear socks without toe seams, or wear them with the seams inside out  Change your socks each day  Do not wear socks that are dirty or damp  · Wear shoes that fit well  Wear shoes that do not rub against any area of your feet  Your shoes should be ½ to ¾ inch (1 to 2 centimeters) longer than your feet  Your shoes should also have extra space around the widest part of your feet  Walking or athletic shoes with laces or straps that adjust are best  Ask your care team provider for help to choose shoes that fit you best  Ask him or her if you need to wear an insert, orthotic, or bandage on your feet  · Go to your follow-up visits  Your care team provider will do a foot exam at least once a year  You may need a foot exam more often if you have nerve damage, foot deformities, or ulcers  He will check for nerve damage and how well you can feel your feet  He will check your shoes to see if they fit well  · Do not smoke  Smoking can damage your blood vessels and put you at increased risk for foot ulcers   Ask your care team provider for information if you currently smoke and need help to quit  E-cigarettes or smokeless tobacco still contain nicotine  Talk to your care team provider before you use these products  Follow up with your diabetes care team provider or foot specialist as directed: You will need to have your feet checked at least once a year  You may need a foot exam more often if you have nerve damage, foot deformities, or ulcers  Write down your questions so you remember to ask them during your visits  © Copyright [employer] 2021 Information is for End User's use only and may not be sold, redistributed or otherwise used for commercial purposes  All illustrations and images included in CareNotes® are the copyrighted property of A D A M , Inc  or [facility] Erin Yap   The above information is an  only  It is not intended as medical advice for individual conditions or treatments  Talk to your doctor, nurse or pharmacist before following any medical regimen to see if it is safe and effective for you

## 2022-01-25 NOTE — PROGRESS NOTES
PATIENT:  Nahum Hernandez    1965    ASSESSMENT:     1  Type 2 diabetes mellitus with diabetic neuropathy, without long-term current use of insulin ([facility]ly [facility])  Diabetic Shoe Inserts    Diabetic Shoe   2  Pes planus of both feet  Diabetic Shoe Inserts    Diabetic Shoe   3  Onychomycosis  Debridement   4  Xerosis of skin  ammonium lactate (LAC-HYDRIN) 12 % cream         PLAN:  1  Patient was counseled on the condition and diagnosis  2   Educated disease prevention and risks related to diabetes  3   Educated proper daily foot care and exam   Instructed proper skin care / protection and footwear  Instructed to identify any signs of infection and related foot problem  4   The recent blood work was reviewed and the last HbA1c was 5 8  Discussed proper blood glucose control with diet and exercise  5   He has diabetes with foot deformity  Referred him to  for diabetic shoes  6  Rx: Ammonium lactate cream for dry skin in feet  7  Recommends periodic foot exam and care  8  The patient will return in 9 weeks  Procedure: All mycotic toenails were reduced and debrided in length, width, and girth using a nail nipper and dremel  Patient tolerated procedure(s) well without complications  Subjective:      HPI   The patient presents for diabetic foot evaluation  The patient has diabetes for 4-5 years  He had weight loss surgery and the blood glucose is under control with diet and exercise  No significant numbness  Mild tingling in toes  He feels well without acute illness  He complains that he has flat foot  He feels regular shoes do not provide support well  No acute edema in his feet  No acute injury  He also complained of thick toenails and dry skin  He used to see podiatrist, but he retired        The following portions of the patient's history were reviewed and updated as appropriate: allergies, current medications, past family history, past medical history, past social history, past surgical history and problem list   All pertinent labs and images were reviewed      Past Medical History  Past Medical History:   Diagnosis Date    Bariatric surgery status     Duodenal ulcer     GERD (gastroesophageal reflux disease)     Gout     [facility]par[facility] [address] [facility]     Hypertension     Lung nodule     Postsurgical malabsorption     Pulmonary embolism ([facility] [address] ) 2019    Sarcoidosis     Wears glasses        Past Surgical History  Past Surgical History:   Procedure Laterality Date    BARIATRIC SURGERY      Gastric Sleeve    COLONOSCOPY      FOOT SURGERY Right     R ankle    GASTRIC BYPASS LAPAROSCOPIC N/A 9/22/2020    Procedure: LAPAROSCOPIC REVISION/ CONVERSION TO HALEY-EN-Y GASTRIC BYPASS W ROBOTICS, PARAESOPHAGEAL HERNIA REPAIR, AND INTRAOPERATIVE EGD;  Surgeon: Don Hunter MD;  Location: [facility];  Service: Bariatrics    HAND SURGERY      LAPAROTOMY N/A 6/19/2021    Procedure: Repair of incarcerated incisional hernia repair with mesh   ;  Surgeon: Tommy Gomez DO;  [facility];  Service: General    MD LAP,PARTIAL NEPHRECTOMY Right 6/14/2021    Procedure: ROBOTIC LAPAROSCOPIC PARTIAL NEPHRECTOMY DECORTICATION OF RIGHT RENAL CYST;  Surgeon: Courtney Grande MD;  [facility];  Service: Urology        Allergies:  Nsaids, Flunisolide, Lisinopril, and Testosterone    Medications:  Current Outpatient Medications   Medication Sig Dispense Refill    acetaminophen (TYLENOL) 325 mg tablet Take 2 tablets (650 mg total) by mouth every 4 (four) hours as needed for mild pain 30 tablet 0    acetaminophen-codeine (TYLENOL #3) 300-30 mg per tablet Take 1 tablet by mouth      APPLE CIDER VINEGAR PO Take by mouth daily      calcium carbonate-vitamin D (OSCAL-D) 500 mg-200 units per tablet TAKE 1 TABLET BY MOUTH DAILY TO SUPPLEMENT CALCIUM/ STRENGTHEN BONES      carvedilol (COREG) 12 5 mg tablet TAKE 1 TABLET BY MOUTH TWICE A  tablet 3    cephalexin (KEFLEX) 500 mg capsule Take 1 capsule (500 mg total) by mouth every 12 (twelve) hours for 5 days 10 capsule 0    chlorhexidine (PERIDEX) 0 12 % solution SWISH AND SPIT 15ML TWICE DAILY FOR ONE WEEK      Cholecalciferol 25 MCG (1000 UT) tablet TAKE ONE TABLET BY MOUTH DAILY FOR VITAMIN D SUPPLEMENTATION      losartan (COZAAR) 50 mg tablet Take 1 tablet (50 mg total) by mouth daily Has been on it without issues (Patient taking differently: Take 50 mg by mouth daily in the early morning Has been on it without issues) 90 tablet 0    Multiple Vitamin (Daily-Mark Multivitamin) TABS TAKE 1 TABLET BY MOUTH EVERY DAY 90 tablet 1    Multiple Vitamins-Minerals (MULTIVITAMIN ADULT, MINERALS, PO) 1 tablet      NON FORMULARY daily Beet Root      nystatin (MYCOSTATIN) cream Apply topically as needed (rash) 30 g 0    pantoprazole (PROTONIX) 40 mg tablet TAKE ONE TABLET BY MOUTH AT BEDTIME      Xarelto 20 MG tablet Take 20 mg by mouth daily with breakfast       ammonium lactate (LAC-HYDRIN) 12 % cream Apply topically as needed for dry skin 385 g 3    docusate sodium (COLACE) 100 mg capsule Take 1 capsule (100 mg total) by mouth 2 (two) times a day for 15 days 30 capsule 0    rivaroxaban (XARELTO) 15 mg tablet Take 1 tablet (15 mg total) by mouth 2 (two) times a day with meals 180 tablet 1     No current facility-administered medications for this visit         Social History:  Social History     Socioeconomic History    Marital status: /Civil Union     Spouse name: None    Number of children: None    Years of education: None    Highest education level: None   Occupational History    None   Tobacco Use    Smoking status: Former Smoker     Packs/day: 1 00     Years: 7 00     Pack years: 7 00     Types: Cigars, Cigarettes     Start date:      Quit date: 2006     Years since quittin 0    Smokeless tobacco: Never Used    Tobacco comment: only smoked occ cigar, not for over 6 months   Vaping Use    Vaping Use: Never used   Substance and Sexual Activity    Alcohol use: Not Currently    Drug use: Not Currently     Types: Marijuana, Cocaine     Comment: pt quit 20 years ago     Sexual activity: Yes   Other Topics Concern    None   Social History Narrative    None     Social Determinants of Health     Financial Resource Strain: Not on file   Food Insecurity: Not on file   Transportation Needs: Not on file   Physical Activity: Not on file   Stress: Not on file   Social Connections: Not on file   Intimate Partner Violence: Not on file   Housing Stability: Not on file        Review of Systems   Constitutional: Negative for appetite change, chills and fever  HENT: Negative for sore throat  Eyes: Negative for visual disturbance  Respiratory: Negative for cough and shortness of breath  Cardiovascular: Negative for chest pain  Gastrointestinal: Negative for diarrhea, nausea and vomiting  Musculoskeletal: Negative for gait problem  Skin: Negative for rash and wound  Neurological: Negative for weakness and numbness  Hematological: Negative  Psychiatric/Behavioral: Negative for behavioral problems and confusion  Objective:      /92   Pulse 82   Ht 5' 5" (1 651 m) Comment: verbal  Wt 101 kg (222 lb)   BMI 36 94 kg/m²          Physical Exam  Vitals reviewed  Constitutional:       General: He is not in acute distress  Appearance: He is obese  He is not toxic-appearing or diaphoretic  HENT:      Head: Normocephalic and atraumatic  Eyes:      Extraocular Movements: Extraocular movements intact  Cardiovascular:      Rate and Rhythm: Normal rate and regular rhythm  Pulses: no weak pulses          Dorsalis pedis pulses are 2+ on the right side and 2+ on the left side  Posterior tibial pulses are 1+ on the right side and 1+ on the left side  Pulmonary:      Effort: Pulmonary effort is normal  No respiratory distress     Musculoskeletal:         General: Deformity present  No tenderness or signs of injury  Cervical back: Normal range of motion and neck supple  Right lower leg: Edema present  Left lower leg: Edema present  Right foot: No Charcot foot or foot drop  Left foot: No Charcot foot or foot drop  Comments: Pes planus presents bilaterally  No acute edema or inflammation  Feet:      Right foot:      Protective Sensation: 10 sites tested  10 sites sensed  Skin integrity: Dry skin present  No ulcer or callus  Left foot:      Protective Sensation: 10 sites tested  10 sites sensed  Skin integrity: Dry skin present  No ulcer or callus  Comments: Thick, mycotic nails X 7 with onycholysis and discoloration  Skin:     General: Skin is warm and dry  Capillary Refill: Capillary refill takes less than 2 seconds  Coloration: Skin is not cyanotic  Findings: No abscess, ecchymosis or wound  Nails: There is no clubbing  Neurological:      General: No focal deficit present  Mental Status: He is alert and oriented to person, place, and time  Cranial Nerves: No cranial nerve deficit  Motor: No weakness  Coordination: Coordination normal       Gait: Gait normal    Psychiatric:         Mood and Affect: Mood normal          Behavior: Behavior normal          Thought Content: Thought content normal          Judgment: Judgment normal            Diabetic Foot Exam    Patient's shoes and socks removed  Right Foot/Ankle   Right Foot Inspection  Skin Exam: skin intact and dry skin  No callus, no pre-ulcer, no ulcer and no callus  Toe Exam: right toe deformity  No swelling, no tenderness and erythema    Sensory   Vibration: diminished  Proprioception: intact  Monofilament testing: intact    Vascular  Capillary refills: < 3 seconds  The right DP pulse is 2+  The right PT pulse is 1+  Left Foot/Ankle  Left Foot Inspection  Skin Exam: skin intact and dry skin   No pre-ulcer, no ulcer and no callus  Toe Exam: left toe deformity  No swelling, no tenderness and no erythema  Sensory   Vibration: diminished  Proprioception: intact  Monofilament testing: intact    Vascular  Capillary refills: < 3 seconds  The left DP pulse is 2+  The left PT pulse is 1+       Assign Risk Category  Deformity present  No loss of protective sensation  No weak pulses  Risk: 0

## 2022-01-25 NOTE — LETTER
January 27, 2022     [address]  [address] [address]    Patient: Macey Ceja   YOB: 1965   Date of Visit: 1/25/2022       Dear Dr Garcia Graft: Thank you for referring Iris Cornejo to me for evaluation  Below are my notes for this consultation  If you have questions, please do not hesitate to call me  I look forward to following your patient along with you  Sincerely,        Michael Tabor DPM        CC: No Recipients  [facility]  1/27/2022 10:01 AM  Sign when Signing Visit        PATIENT:  Macey Ceja    1965    ASSESSMENT:     1  Type 2 diabetes mellitus with diabetic neuropathy, without long-term current use of insulin (HCC)  Diabetic Shoe Inserts    Diabetic Shoe   2  Pes planus of both feet  Diabetic Shoe Inserts    Diabetic Shoe   3  Onychomycosis  Debridement   4  Xerosis of skin  ammonium lactate (LAC-HYDRIN) 12 % cream         PLAN:  1  Patient was counseled on the condition and diagnosis  2   Educated disease prevention and risks related to diabetes  3   Educated proper daily foot care and exam   Instructed proper skin care / protection and footwear  Instructed to identify any signs of infection and related foot problem  4   The recent blood work was reviewed and the last HbA1c was 5 8  Discussed proper blood glucose control with diet and exercise  5   He has diabetes with foot deformity  Referred [facility] to [facility] for diabetic shoes  6  Rx: Ammonium lactate cream for dry skin in feet  7  Recommends periodic foot exam and care  8  The patient will return in 9 weeks  Procedure: All mycotic toenails were reduced and debrided in length, width, and girth using a nail nipper and dremel  Patient tolerated procedure(s) well without complications  Subjective:      HPI   The patient presents for diabetic foot evaluation  The patient has diabetes for 4-5 years     He had weight loss surgery and the blood glucose is under control with diet and exercise  No significant numbness  Mild tingling in toes  He feels well without acute illness  He complains that he has flat foot  He feels regular shoes do not provide support well  No acute edema in his feet  No acute injury  He also complained of thick toenails and dry skin  He used to see podiatrist, but he retired  The following portions of the patient's history were reviewed and updated as appropriate: allergies, current medications, past family history, past medical history, past social history, past surgical history and problem list   All pertinent labs and images were reviewed      Past Medical History  Past Medical History:   Diagnosis Date    Bariatric surgery status     Duodenal ulcer     GERD (gastroesophageal reflux disease)     Gout     Hyperparathyroidism ([facility] [address] )     Hypertension     Lung nodule     Postsurgical malabsorption     [facility]monary embolism [facility] ) 2019    Sarcoidosis     Wears glasses        Past Surgical History  Past Surgical History:   Procedure Laterality Date    BARIATRIC SURGERY      Gastric Sleeve    COLONOSCOPY      FOOT SURGERY Right     R ankle    GASTRIC BYPASS LAPAROSCOPIC N/A 9/22/2020    Procedure: LAPAROSCOPIC REVISION/ CONVERSION TO HALEY-EN-Y GASTRIC BYPASS W ROBOTICS, PARAESOPHAGEAL HERNIA REPAIR, AND INTRAOPERATIVE EGD;  Surgeon: Genet Sandoval MD;  Location: [facility];  Service: Bariatrics    HAND SURGERY      LAPAROTOMY N/A 6/19/2021    Procedure: Repair of incarcerated incisional hernia repair with mesh   ;  Surgeon: Lori Puga DO;  [facility];  Service: General    MT LAP,PARTIAL NEPHRECTOMY Right 6/14/2021    Procedure: ROBOTIC LAPAROSCOPIC PARTIAL NEPHRECTOMY DECORTICATION OF RIGHT RENAL CYST;  Surgeon: Elysia Angulo MD;  [facility];  Service: Urology        Allergies:  Nsaids, Flunisolide, Lisinopril, and Testosterone    Medications:  Current Outpatient Medications   Medication Sig Dispense Refill    acetaminophen (TYLENOL) 325 mg tablet Take 2 tablets (650 mg total) by mouth every 4 (four) hours as needed for mild pain 30 tablet 0    acetaminophen-codeine (TYLENOL #3) 300-30 mg per tablet Take 1 tablet by mouth      APPLE CIDER VINEGAR PO Take by mouth daily      calcium carbonate-vitamin D (OSCAL-D) 500 mg-200 units per tablet TAKE 1 TABLET BY MOUTH DAILY TO SUPPLEMENT CALCIUM/ STRENGTHEN BONES      carvedilol (COREG) 12 5 mg tablet TAKE 1 TABLET BY MOUTH TWICE A  tablet 3    cephalexin (KEFLEX) 500 mg capsule Take 1 capsule (500 mg total) by mouth every 12 (twelve) hours for 5 days 10 capsule 0    chlorhexidine (PERIDEX) 0 12 % solution SWISH AND SPIT 15ML TWICE DAILY FOR ONE WEEK      Cholecalciferol 25 MCG (1000 UT) tablet TAKE ONE TABLET BY MOUTH DAILY FOR VITAMIN D SUPPLEMENTATION      losartan (COZAAR) 50 mg tablet Take 1 tablet (50 mg total) by mouth daily Has been on it without issues (Patient taking differently: Take 50 mg by mouth daily in the early morning Has been on it without issues) 90 tablet 0    Multiple Vitamin (Daily-Mark Multivitamin) TABS TAKE 1 TABLET BY MOUTH EVERY DAY 90 tablet 1    Multiple Vitamins-Minerals (MULTIVITAMIN ADULT, MINERALS, PO) 1 tablet      NON FORMULARY daily Beet Root      nystatin (MYCOSTATIN) cream Apply topically as needed (rash) 30 g 0    pantoprazole (PROTONIX) 40 mg tablet TAKE ONE TABLET BY MOUTH AT BEDTIME      Xarelto 20 MG tablet Take 20 mg by mouth daily with breakfast       ammonium lactate (LAC-HYDRIN) 12 % cream Apply topically as needed for dry skin 385 g 3    docusate sodium (COLACE) 100 mg capsule Take 1 capsule (100 mg total) by mouth 2 (two) times a day for 15 days 30 capsule 0    rivaroxaban (XARELTO) 15 mg tablet Take 1 tablet (15 mg total) by mouth 2 (two) times a day with meals 180 tablet 1     No current facility-administered medications for this visit         Social History:  Social History     Socioeconomic History    Marital status: /Civil Union     Spouse name: None    Number of children: None    Years of education: None    Highest education level: None   Occupational History    None   Tobacco Use    Smoking status: Former Smoker     Packs/day: 1 00     Years: 7 00     Pack years: 7 00     Types: Cigars, Cigarettes     Start date:      Quit date: 2006     Years since quittin 0    Smokeless tobacco: Never Used    Tobacco comment: only smoked occ cigar, not for over 6 months   Vaping Use    Vaping Use: Never used   Substance and Sexual Activity    Alcohol use: Not Currently    Drug use: Not Currently     Types: Marijuana, Cocaine     Comment: pt quit 20 years ago     Sexual activity: Yes   Other Topics Concern    None   Social History Narrative    None     Social Determinants of Health     Financial Resource Strain: Not on file   Food Insecurity: Not on file   Transportation Needs: Not on file   Physical Activity: Not on file   Stress: Not on file   Social Connections: Not on file   Intimate Partner Violence: Not on file   Housing Stability: Not on file        Review of Systems   Constitutional: Negative for appetite change, chills and fever  HENT: Negative for sore throat  Eyes: Negative for visual disturbance  Respiratory: Negative for cough and shortness of breath  Cardiovascular: Negative for chest pain  Gastrointestinal: Negative for diarrhea, nausea and vomiting  Musculoskeletal: Negative for gait problem  Skin: Negative for rash and wound  Neurological: Negative for weakness and numbness  Hematological: Negative  Psychiatric/Behavioral: Negative for behavioral problems and confusion  Objective:      /92   Pulse 82   Ht 5' 5" (1 651 m) Comment: verbal  Wt 101 kg (222 lb)   BMI 36 94 kg/m²          Physical Exam  Vitals reviewed  Constitutional:       General: He is not in acute distress  Appearance: He is obese  He is not toxic-appearing or diaphoretic  HENT:      Head: Normocephalic and atraumatic  Eyes:      Extraocular Movements: Extraocular movements intact  Cardiovascular:      Rate and Rhythm: Normal rate and regular rhythm  Pulses: no weak pulses          Dorsalis pedis pulses are 2+ on the right side and 2+ on the left side  Posterior tibial pulses are 1+ on the right side and 1+ on the left side  Pulmonary:      Effort: Pulmonary effort is normal  No respiratory distress  Musculoskeletal:         General: Deformity present  No tenderness or signs of injury  Cervical back: Normal range of motion and neck supple  Right lower leg: Edema present  Left lower leg: Edema present  Right foot: No Charcot foot or foot drop  Left foot: No Charcot foot or foot drop  Comments: Pes planus presents bilaterally  No acute edema or inflammation  Feet:      Right foot:      Protective Sensation: 10 sites tested  10 sites sensed  Skin integrity: Dry skin present  No ulcer or callus  Left foot:      Protective Sensation: 10 sites tested  10 sites sensed  Skin integrity: Dry skin present  No ulcer or callus  Comments: Thick, mycotic nails X 7 with onycholysis and discoloration  Skin:     General: Skin is warm and dry  Capillary Refill: Capillary refill takes less than 2 seconds  Coloration: Skin is not cyanotic  Findings: No abscess, ecchymosis or wound  Nails: There is no clubbing  Neurological:      General: No focal deficit present  Mental Status: He is alert and oriented to person, place, and time  Cranial Nerves: No cranial nerve deficit  Motor: No weakness  Coordination: Coordination normal       Gait: Gait normal    Psychiatric:         Mood and Affect: Mood normal          Behavior: Behavior normal          Thought Content:  Thought content normal          Judgment: Judgment normal  Diabetic Foot Exam    Patient's shoes and socks removed  Right Foot/Ankle   Right Foot Inspection  Skin Exam: skin intact and dry skin  No callus, no pre-ulcer, no ulcer and no callus  Toe Exam: right toe deformity  No swelling, no tenderness and erythema    Sensory   Vibration: diminished  Proprioception: intact  Monofilament testing: intact    Vascular  Capillary refills: < 3 seconds  The right DP pulse is 2+  The right PT pulse is 1+  Left Foot/Ankle  Left Foot Inspection  Skin Exam: skin intact and dry skin  No pre-ulcer, no ulcer and no callus  Toe Exam: left toe deformity  No swelling, no tenderness and no erythema  Sensory   Vibration: diminished  Proprioception: intact  Monofilament testing: intact    Vascular  Capillary refills: < 3 seconds  The left DP pulse is 2+  The left PT pulse is 1+       Assign Risk Category  Deformity present  No loss of protective sensation  No weak pulses  Risk: 0

## 2022-02-15 ENCOUNTER — OFFICE VISIT (OUTPATIENT)
Dept: FAMILY MEDICINE CLINIC | Facility: CLINIC | Age: 57
End: 2022-02-15
Payer: COMMERCIAL

## 2022-02-15 ENCOUNTER — PATIENT MESSAGE (OUTPATIENT)
Dept: FAMILY MEDICINE CLINIC | Facility: CLINIC | Age: 57
End: 2022-02-15

## 2022-02-15 VITALS
WEIGHT: 231 LBS | OXYGEN SATURATION: 98 % | RESPIRATION RATE: 18 BRPM | HEIGHT: 65 IN | SYSTOLIC BLOOD PRESSURE: 136 MMHG | TEMPERATURE: 97 F | HEART RATE: 74 BPM | DIASTOLIC BLOOD PRESSURE: 84 MMHG | BODY MASS INDEX: 38.49 KG/M2

## 2022-02-15 DIAGNOSIS — N28.1 RENAL CYST: ICD-10-CM

## 2022-02-15 DIAGNOSIS — Z00.00 ANNUAL PHYSICAL EXAM: Primary | ICD-10-CM

## 2022-02-15 DIAGNOSIS — K21.9 GASTROESOPHAGEAL REFLUX DISEASE, UNSPECIFIED WHETHER ESOPHAGITIS PRESENT: ICD-10-CM

## 2022-02-15 DIAGNOSIS — I10 ESSENTIAL HYPERTENSION: ICD-10-CM

## 2022-02-15 DIAGNOSIS — I26.99 PULMONARY EMBOLISM, UNSPECIFIED CHRONICITY, UNSPECIFIED PULMONARY EMBOLISM TYPE, UNSPECIFIED WHETHER ACUTE COR PULMONALE PRESENT (HCC): ICD-10-CM

## 2022-02-15 DIAGNOSIS — L85.3 XEROSIS OF SKIN: ICD-10-CM

## 2022-02-15 DIAGNOSIS — C64.1 MALIGNANT NEOPLASM OF RIGHT KIDNEY (HCC): ICD-10-CM

## 2022-02-15 DIAGNOSIS — E21.1 HYPERPARATHYROIDISM , SECONDARY, NON-RENAL (HCC): ICD-10-CM

## 2022-02-15 DIAGNOSIS — I50.22 CHRONIC SYSTOLIC CONGESTIVE HEART FAILURE (HCC): ICD-10-CM

## 2022-02-15 DIAGNOSIS — Z12.5 SCREENING FOR PROSTATE CANCER: ICD-10-CM

## 2022-02-15 DIAGNOSIS — E11.65 TYPE 2 DIABETES MELLITUS WITH HYPERGLYCEMIA, WITHOUT LONG-TERM CURRENT USE OF INSULIN (HCC): ICD-10-CM

## 2022-02-15 PROCEDURE — 3008F BODY MASS INDEX DOCD: CPT | Performed by: NURSE PRACTITIONER

## 2022-02-15 PROCEDURE — 3079F DIAST BP 80-89 MM HG: CPT | Performed by: NURSE PRACTITIONER

## 2022-02-15 PROCEDURE — 3075F SYST BP GE 130 - 139MM HG: CPT | Performed by: NURSE PRACTITIONER

## 2022-02-15 PROCEDURE — 3725F SCREEN DEPRESSION PERFORMED: CPT | Performed by: NURSE PRACTITIONER

## 2022-02-15 PROCEDURE — 1036F TOBACCO NON-USER: CPT | Performed by: NURSE PRACTITIONER

## 2022-02-15 PROCEDURE — 99396 PREV VISIT EST AGE 40-64: CPT | Performed by: NURSE PRACTITIONER

## 2022-02-15 PROCEDURE — 4010F ACE/ARB THERAPY RXD/TAKEN: CPT | Performed by: NURSE PRACTITIONER

## 2022-02-15 RX ORDER — RIVAROXABAN 20 MG/1
20 TABLET, FILM COATED ORAL
Qty: 90 TABLET | Refills: 1 | Status: SHIPPED | OUTPATIENT
Start: 2022-02-15 | End: 2022-06-24

## 2022-02-15 RX ORDER — AMMONIUM LACTATE 12 G/100G
CREAM TOPICAL AS NEEDED
Qty: 385 G | Refills: 3 | Status: SHIPPED | OUTPATIENT
Start: 2022-02-15

## 2022-02-15 RX ORDER — LOSARTAN POTASSIUM 50 MG/1
50 TABLET ORAL
Qty: 90 TABLET | Refills: 0 | Status: SHIPPED | OUTPATIENT
Start: 2022-02-15 | End: 2022-03-04

## 2022-02-15 RX ORDER — CARVEDILOL 12.5 MG/1
12.5 TABLET ORAL 2 TIMES DAILY
Qty: 180 TABLET | Refills: 1 | Status: SHIPPED | OUTPATIENT
Start: 2022-02-15 | End: 2022-06-24

## 2022-02-15 RX ORDER — PANTOPRAZOLE SODIUM 40 MG/1
40 TABLET, DELAYED RELEASE ORAL
Qty: 90 TABLET | Refills: 1 | Status: SHIPPED | OUTPATIENT
Start: 2022-02-15 | End: 2022-05-16

## 2022-02-15 NOTE — PROGRESS NOTES
BMI Counseling: Body mass index is 38 44 kg/m²  The BMI is above normal  Nutrition recommendations include decreasing portion sizes, encouraging healthy choices of fruits and vegetables, decreasing fast food intake, consuming healthier snacks, limiting drinks that contain sugar, moderation in carbohydrate intake, increasing intake of lean protein, reducing intake of saturated and trans fat and reducing intake of cholesterol  Exercise recommendations include vigorous physical activity 75 minutes/week, exercising 3-5 times per week and strength training exercises  No pharmacotherapy was ordered  Patient referred to PCP  Rationale for BMI follow-up plan is due to patient being overweight or obese  Depression Screening and Follow-up Plan: Patient was screened for depression during today's encounter  They screened negative with a PHQ-2 score of 0  Assessment/Plan:     Selina Hernandez is a 48 y  o  male  underwent partial right nephrectomy, robotic assisted on 06/14/2021  for renal mass(renal CA)  concerning for neoplasm  Shortly after developed a  PE and was started on [employer] in office for follow up multiple diagnoses and annual physical needed   HTN stable   PE- CT chest was done and reviewed with pt - it appears as PE has resolved   He was seen by Hem ONC - recommended lifetime treatment with xarlto as he has had multiple blood clots   He is due for colonoscopy at this point   Urology continues to monitor renal status post cancer   CT scan stable does have a small right renal cyst has been stable - not concerning as per    Diet controlled diabetes - stable   Does see Cardiology for CHF and HTN - stable denies any shortness of breath chest pain or edema   Was treated for UTI - feels better - urinalysis done in office and clear   Reviewed labs  Follow up in 4 months   Repeat labs at that point      Problem List Items Addressed This Visit        Digestive    Gastroesophageal reflux disease    Relevant Medications    pantoprazole (PROTONIX) 40 mg tablet    Other Relevant Orders    Ambulatory Referral to Gastroenterology    HIV 1/2 Antigen/Antibody (4th Generation) w Reflex [facility]    Hepatitis C antibody    Comprehensive metabolic panel    CBC and differential    TSH, 3rd generation    Lipid panel    UA (URINE) with reflex to Scope    HEMOGLOBIN A1C W/ EAG ESTIMATION       Endocrine    Hyperparathyroidism , secondary, non-renal ([facility] [address] )    Relevant Orders    Ambulatory Referral to Gastroenterology    HIV 1/2 Antigen/Antibody (4th Generation) w Reflex [facility]    Hepatitis C antibody    Comprehensive metabolic panel    CBC and differential    TSH, 3rd generation    Lipid panel    UA (URINE) with reflex to Scope    HEMOGLOBIN A1C W/ EAG ESTIMATION    Type 2 diabetes mellitus with hyperglycemia ([facility]    Relevant Orders    Ambulatory Referral to Gastroenterology    HIV 1/2 Antigen/Antibody (4th Generation) w Reflex [facility]    Hepatitis C antibody    Comprehensive metabolic panel    CBC and differential    TSH, 3rd generation    Lipid panel    UA (URINE) with reflex to Scope    HEMOGLOBIN A1C W/ EAG ESTIMATION       Cardiovascular and Mediastinum    Pulmonary embolism (HCC)    Relevant Medications    Xarelto 20 MG tablet    pantoprazole (PROTONIX) 40 mg tablet    Other Relevant Orders    Ambulatory Referral to Gastroenterology    HIV 1/2 Antigen/Antibody (4th Generation) w Reflex [facility]    Hepatitis C antibody    Comprehensive metabolic panel    CBC and differential    TSH, 3rd generation    Lipid panel    UA (URINE) with reflex to Scope    HEMOGLOBIN A1C W/ EAG ESTIMATION    Essential hypertension    Relevant Medications    losartan (COZAAR) 50 mg tablet    carvedilol (COREG) 12 5 mg tablet    Other Relevant Orders    Ambulatory Referral to Gastroenterology    HIV 1/2 Antigen/Antibody (4th Generation) w Reflex [facility]    Hepatitis C antibody    Comprehensive metabolic panel    CBC and differential    TSH, 3rd generation    Lipid panel    UA (URINE) with reflex to Scope    HEMOGLOBIN A1C W/ EAG ESTIMATION    Chronic systolic congestive heart failure (HCC)    Relevant Medications    losartan (COZAAR) 50 mg tablet    carvedilol (COREG) 12 5 mg tablet    Other Relevant Orders    Ambulatory Referral to Gastroenterology    HIV 1/2 Antigen/Antibody (4th Generation) w Reflex [facility]    Hepatitis C antibody    Comprehensive metabolic panel    CBC and differential    TSH, 3rd generation    Lipid panel    UA (URINE) with reflex to Scope    HEMOGLOBIN A1C W/ EAG ESTIMATION       Genitourinary    Renal cyst    Renal cancer ([facility] [address] )    Relevant Orders    Ambulatory Referral to Gastroenterology    HIV 1/2 Antigen/Antibody (4th Generation) w Reflex [facility]    Hepatitis C antibody    Comprehensive metabolic panel    CBC and differential    TSH, 3rd generation    Lipid panel    UA (URINE) with reflex to Scope    HEMOGLOBIN A1C W/ EAG ESTIMATION      Other Visit Diagnoses     Annual physical exam    -  Primary    Screening for prostate cancer        Relevant Orders    PSA, Total Screen    Xerosis of skin        Relevant Medications    ammonium lactate (LAC-HYDRIN) 12 % cream            Subjective:      Patient ID: Sera Alarcon is a 55 y o  male  The following portions of the patient's history were reviewed and updated as appropriate:   Past Medical History:  He has a past medical history of Bariatric surgery status, Duodenal ulcer, GERD (gastroesophageal reflux disease), Gout, Hyperparathyroidism ([facility] [address] ), Hypertension, Lung nodule, Postsurgical malabsorption, Pulmonary embolism ([facility] [address] ) (2019), Sarcoidosis, and Wears glasses  ,  _______________________________________________________________________  Medical Problems:  does not have any pertinent problems on file ,  _______________________________________________________________________  Past Surgical History:   has a past surgical history that includes Hand surgery; Bariatric Surgery;  Foot surgery (Right); Colonoscopy; GASTRIC BYPASS LAPAROSCOPIC (N/A, 9/22/2020); pr lap,partial nephrectomy (Right, 6/14/2021); and LAPAROTOMY (N/A, 6/19/2021)  ,  _______________________________________________________________________  Family History:  family history includes Hypertension in his father ,  _______________________________________________________________________  Social History:   reports that he quit smoking about 16 years ago  His smoking use included cigars and cigarettes  He started smoking about 23 years ago  He has a 7 00 pack-year smoking history  He has never used smokeless tobacco  He reports previous alcohol use  He reports previous drug use  Drugs: Marijuana and Cocaine  ,  _______________________________________________________________________  Allergies:  is allergic to nsaids, flunisolide, lisinopril, and testosterone     _______________________________________________________________________  Current Outpatient Medications   Medication Sig Dispense Refill    acetaminophen (TYLENOL) 325 mg tablet Take 2 tablets (650 mg total) by mouth every 4 (four) hours as needed for mild pain 30 tablet 0    ammonium lactate (LAC-HYDRIN) 12 % cream Apply topically as needed for dry skin 385 g 3    APPLE CIDER VINEGAR PO Take by mouth daily      calcium carbonate-vitamin D (OSCAL-D) 500 mg-200 units per tablet TAKE 1 TABLET BY MOUTH DAILY TO SUPPLEMENT CALCIUM/ STRENGTHEN BONES      carvedilol (COREG) 12 5 mg tablet Take 1 tablet (12 5 mg total) by mouth 2 (two) times a day 180 tablet 1    chlorhexidine (PERIDEX) 0 12 % solution SWISH AND SPIT 15ML TWICE DAILY FOR ONE WEEK      Cholecalciferol 25 MCG (1000 UT) tablet TAKE ONE TABLET BY MOUTH DAILY FOR VITAMIN D SUPPLEMENTATION      docusate sodium (COLACE) 100 mg capsule Take 1 capsule (100 mg total) by mouth 2 (two) times a day for 15 days 30 capsule 0    losartan (COZAAR) 50 mg tablet Take 1 tablet (50 mg total) by mouth daily in the early morning Has been on it without issues 90 tablet 0    Multiple Vitamin (Daily-Mark Multivitamin) TABS TAKE 1 TABLET BY MOUTH EVERY DAY 90 tablet 1    Multiple Vitamins-Minerals (MULTIVITAMIN ADULT, MINERALS, PO) 1 tablet      NON FORMULARY daily Beet Root      nystatin (MYCOSTATIN) cream Apply topically as needed (rash) 30 g 0    pantoprazole (PROTONIX) 40 mg tablet Take 1 tablet (40 mg total) by mouth daily at bedtime 90 tablet 1    Xarelto 20 MG tablet Take 1 tablet (20 mg total) by mouth daily with breakfast 90 tablet 1     No current facility-administered medications for this visit      _______________________________________________________________________  Review of Systems   Constitutional: Negative for chills, fatigue and fever  HENT: Negative for congestion, sinus pain and sore throat  Eyes: Negative  Negative for visual disturbance  Respiratory: Negative for cough and shortness of breath  PE - currently on Xeralto   Last CT showed resolution    Cardiovascular: Negative for chest pain, palpitations and leg swelling  Underlying CHF HTN and hyperlipidemia    Gastrointestinal: Negative for abdominal distention, abdominal pain, diarrhea and nausea  GERD and history of bariatric surgery and hiatal hernia repair   S/p right partial nephrectomy and follow up surgery for SBO   Denies any issues currently doing well    Endocrine:        Prediabetes / Diabetes in the past    Genitourinary: Negative for difficulty urinating and flank pain  Musculoskeletal: Positive for arthralgias and myalgias  Skin: Negative for rash  Neurological: Negative for dizziness, weakness and headaches  Hematological: Negative for adenopathy  Psychiatric/Behavioral: Negative for sleep disturbance and suicidal ideas  The patient is not nervous/anxious            Objective:  Vitals:    02/15/22 1646   BP: 136/84   Pulse: 74   Resp: 18   Temp: (!) 97 °F (36 1 °C)   SpO2: 98%   Weight: 105 kg (231 lb)   Height: 5' 5" (8 031 m)     Body mass index is 38 44 kg/m²  Physical Exam  Vitals and nursing note reviewed  Constitutional:       Comments: BMI 38 44   HENT:      Head: Atraumatic  Nose: Nose normal       Mouth/Throat:      Mouth: Mucous membranes are dry  Eyes:      Extraocular Movements: Extraocular movements intact  Cardiovascular:      Rate and Rhythm: Normal rate and regular rhythm  Pulses: Normal pulses  Heart sounds: Normal heart sounds  Pulmonary:      Effort: Pulmonary effort is normal       Breath sounds: Normal breath sounds  Abdominal:      Palpations: Abdomen is soft  Musculoskeletal:         General: Normal range of motion  Cervical back: Normal range of motion  Skin:     General: Skin is warm  Capillary Refill: Capillary refill takes less than 2 seconds  Neurological:      Mental Status: He is alert and oriented to person, place, and time  Psychiatric:         Mood and Affect: Mood normal          Behavior: Behavior normal        Contains abnormal data UA (URINE) with reflex to Scope  Order: [id number]   Status: Final result     Visible to patient: Yes (not seen)     Next appt: 04/05/2022 at 05:00 PM in Podiatry Minerva Gitelman, DPM)     Dx: Other pulmonary embolism without acut        2 Result Notes     1 Patient Communication    Component Ref Range & Units 1/23/22  8:09 AM 1/31/21  9:44 AM 12/22/20 12:13 AM 12/16/19  7:45 PM   Color, UA  Yellow  Yellow  Yellow  Yellow    Clarity, UA  Slightly Cloudy  Clear  Clear  Clear    Specific [place], UA 1 003 - 1 030 1 025  1 020  >=1 030  1 020    pH, UA 4 5, 5 0, 5 5, 6 0, 6 5, 7 0, 7 5, 8 0 5 5  6 0  6 0 R  7 0    Leukocytes, UA Negative Trace Abnormal   Negative  Negative  Negative    Nitrite, UA Negative Positive Abnormal   Negative  Negative  Negative    Protein, UA Negative mg/dl Negative  Negative  Negative  Negative    Glucose, UA Negative mg/dl Negative  Negative  Negative  Negative    Ketones, UA Negative mg/dl Negative Negative  15 (1+) Abnormal   Negative    Urobilinogen, UA 0 2, 1 0 E U /dl E U /dl 0 2  0 2  0 2  1 0    Bilirubin, UA Negative Negative  Negative  Negative  Negative    Blood, UA Negative Negative  Negative  Negative  Negative               Specimen Collected: 01/23/22  8:09 AM Last Resulted: 01/23/22  9:07 AM        Lab Flowsheet     Order Details     View Encounter     Lab and Collection Details     Routing     Result History           HEMOGLOBIN A1C W/ EAG ESTIMATION  Order: [id number]   Status: Final result     Visible to patient: Yes (not seen)     Next appt: 04/05/2022 at 05:00 PM in Podiatry Cady Edgar DPM)     Dx: Prediabetes     1 Result Note     1 Patient Communication     1  Topic    Component Ref Range & Units 1/23/22  8:09 AM 5/25/21  6:56 AM 1/31/21  9:44 AM 6/6/15  9:26 AM   Hemoglobin A1C Normal 3 8-5 6%; PreDiabetic 5 7-6 4%; Diabetic >=6 5%; Glycemic control for adults with diabetes <7 0% % 5 8 High   5 4  5 7 High   5 6 R, CM    EAG mg/dl 120  108  117  114         TSH, 3rd generation  Order: [id number]   Status: Final result     Visible to patient: Yes (not seen)     Next appt: 04/05/2022 at 05:00 PM in Podiatry Cady Edgar DPM)     Dx: Essential hypertension; [address]        1 Result Note     1 Patient Communication    Component Ref Range & Units 1/23/22  8:09 AM 1/31/21  9:44 AM 6/30/20  7:06 AM   TSH 3RD GENERATON 0 358 - 3 740 uIU/mL 0 907  0 795  1 021         Lipid panel  Order: [id number]   Status: Final result     Visible to patient: Yes (not seen)     Next appt: 04/05/2022 at 05:00 PM in Podiatry Cady Edgar DPM)     Dx: Essential hypertension; [address]        1 Result Note     1 Patient Communication    Component Ref Range & Units 1/23/22  8:09 AM 1/31/21  9:44 AM 6/30/20  7:06 AM   Cholesterol See Comment mg/dL 180  158 R, CM  163 R, CM    Comment: Cholesterol:         Pediatric <18 Years         Desirable          <170 mg/dL       Borderline High    170-199 mg/dL       High               >=200 mg/dL         Adult >=18 Years             Desirable         <200 mg/dL       Borderline High   200-239 mg/dL       High             >239 mg/dL    Triglycerides See Comment mg/dL 29  42 R, CM  76 R, CM    Comment: Triglyceride:      0-9Y            <75mg/dL      10Y-17Y         <90 mg/dL        >=18Y      Normal          <150 mg/dL      Borderline High 150-199 mg/dL      High            200-499 mg/dL        Very High       >499 mg/dL     Specimen collection should occur prior to N-Acetylcysteine or Metamizole administration due to the potential for falsely depressed results  HDL, Direct >=40 mg/dL 98  80 CM  60 CM    Comment: Specimen collection should occur prior to Metamizole administration due to the potential for falsley depressed results  LDL Calculated 0 - 100 mg/dL 76  70 CM  88 CM    Comment: LDL Cholesterol:     Optimal           <100 mg/dl     Near Optimal      100-129 mg/dl     Above Optimal       Borderline High 130-159 mg/dl       High            160-189 mg/dl       Very High       >189 mg/dl           This screening LDL is a calculated result  It does not have the accuracy of the Direct Measured LDL in the monitoring of patients with hyperlipidemia and/or statin therapy  Direct Measure LDL (AAJ864) must be ordered separately in these patients  Non-HDL-Chol (CHOL-HDL) mg/dl 82  78  103         CBC and differential  Order: [id number]   Status: Final result     Visible to patient: Yes (not seen)     Next appt: 04/05/2022 at 05:00 PM in Podiatry Yany Lovelace DPM)     Dx: Essential hypertension; [address] [place]        1 Result Note     1 Patient Communication    Component Ref Range & Units 1/23/22  8:09 AM 7/18/21  5:53 AM 7/17/21  7:13 AM 7/16/21  5:33 PM 6/20/21  5:33 AM 6/19/21  2:28 AM 6/16/21  4:44 AM   WBC 4 31 - 10 16 Thousand/uL 3 99 Low   2 80 Low   4 80  7 31  4 86  6 89  8 54    RBC 3 88 - 5 62 Million/uL 4 43  3 92  3 78 Low   4 15  4 14  4 71  4 24    Hemoglobin 12 0 - 17 0 g/dL 12 3  11 2 Low   10 6 Low   11 8 Low   11 5 Low   13 3  12 0    Hematocrit 36 5 - 49 3 % 39 2  35 7 Low   34 0 Low   37 9  36 8  41 1  37 9    MCV 82 - 98 fL 89  91  90  91  89  87  89    MCH 26 8 - 34 3 pg 27 8  28 6  28 0  28 4  27 8  28 2  28 3    MCHC 31 4 - 37 4 g/dL 31 4  31 4  31 2 Low   31 1 Low   31 3 Low   32 4  31 7    RDW 11 6 - 15 1 % 14 3  14 9  14 9  14 7  13 7  13 6  14 2    MPV 8 9 - 12 7 fL 12 0  12 4  12 3  12 6  10 9  11 6  11 3    Platelets 435 - 641 Thousands/uL 186  127 Low   124 Low   137 Low   186  217  181    nRBC /100 WBCs 0  0   0   0     Neutrophils Relative 43 - 75 % 61  49   86 High    78 High      Immat GRANS % 0 - 2 % 0  0   0   0     Lymphocytes Relative 14 - 44 % 22  28   10 Low    10 Low      Monocytes Relative 4 - 12 % 9  13 High    3 Low    10     Eosinophils Relative 0 - 6 % 7 High   9 High    1   2     Basophils Relative 0 - 1 % 1  1   0   0     Neutrophils Absolute 1 85 - 7 62 Thousands/µL 2 43  1 37 Low    6 26   5 37     Immature Grans Absolute 0 00 - 0 20 Thousand/uL 0 01  0 01   0 03   0 01     Lymphocytes Absolute 0 60 - 4 47 Thousands/µL 0 88  0 78   0 72   0 71     Monocytes Absolute 0 17 - 1 22 Thousand/µL 0 36  0 36   0 22   0 67     Eosinophils Absolute 0 00 - 0 61 Thousand/µL 0 28  0 26   0 06   0 11     Basophils Absolute 0 00 - 0 10 Thousands/µL 0 03  0 02   0 02            Comprehensive metabolic panel  Order: [id number]   Status: Final result     Visible to patient: Yes (not seen)     Next appt: 04/05/2022 at 05:00 PM in Podiatry Silviochun Galeana DPM)     Dx: Essential hypertension; Gastroesophag        1 Result Note     1 Patient Communication    Component Ref Range & Units 1/23/22  8:09 AM 7/18/21  5:53 AM 7/17/21  7:13 AM 7/16/21  5:33 PM 6/20/21  5:33 AM 6/19/21  2:28 AM 6/16/21  4:44 AM   Sodium 136 - 145 mmol/L 138  140  141  141  136  135 Low   139    Potassium 3 5 - 5 3 mmol/L 3 9  3 8  3 4 Low   4 1  4 2  5 0 CM  3 7    Chloride 100 - 108 mmol/L 105 106  106  107  101  98 Low   103    CO2 21 - 32 mmol/L 25  27  27  24  26  27  27    ANION GAP 4 - 13 mmol/L 8  7  8  10  9  10  9    BUN 5 - 25 mg/dL 17  6  11  16  13  17  14    Creatinine 0 60 - 1 30 mg/dL 0 87  0 82 CM  0 82 CM  0 94 CM  1 06 CM  1 11 CM  1 31 High  CM    Comment: Standardized to IDMS reference method   Glucose, Fasting 65 - 99 mg/dL 103 High           Comment: Specimen collection should occur prior to Sulfasalazine administration due to the potential for falsely depressed results  Specimen collection should occur prior to Sulfapyridine administration due to the potential for falsely elevated results  Calcium 8 3 - 10 1 mg/dL 8 5  8 0 Low   8 3  8 6  8 1 Low   8 9  8 6    Corrected Calcium 8 3 - 10 1 mg/dL 9 0      9 7     AST 5 - 45 U/L 19    21 CM   36 CM     Comment: Specimen collection should occur prior to Sulfasalazine administration due to the potential for falsely depressed results  ALT 12 - 78 U/L 32    35 CM   36 CM     Comment: Specimen collection should occur prior to Sulfasalazine administration due to the potential for falsely depressed results  Alkaline Phosphatase 46 - 116 U/L 77    79   83     Total Protein 6 4 - 8 2 g/dL 7 0    7 5   7 8     Albumin 3 5 - 5 0 g/dL 3 4 Low     3 6   3 0 Low      Total Bilirubin 0 20 - 1 00 mg/dL 0 39    0 35 CM   0 70 CM     Comment: Use of this assay is not recommended for patients undergoing treatment with eltrombopag due to the potential for falsely elevated results  eGFR ml/min/1 73sq m 97  116  116  106              CTA chest pe study    Status: Final result       PACS Images     Show images for CTA chest pe study    Study Result    Narrative & Impression   CTA - CHEST WITH IV CONTRAST - PULMONARY ANGIOGRAM     INDICATION:   I26 [id number]: Other pulmonary embolism without acute cor pulmonale        Check for resolution of pulmonary emboli from 6/20/2021    History of sarcoidosis      COMPARISON: Chest radiograph from 7/16/2021 and chest CT from 6/19/2021      TECHNIQUE: CT angiogram timed for optimal opacification of the pulmonary arteries  Axial, sagittal, and coronal 2D reformats created from source data  Coronal 3D MIP postprocessing on the acquisition scanner        Radiation dose length product (DLP):  471 mGy-cm   Radiation dose exposure minimized using iterative reconstruction and automated exposure control      IV Contrast:  85 mL of iohexol (OMNIPAQUE)     FINDINGS:     PULMONARY ARTERIES:  Resolution of the previous pulmonary emboli      LUNGS:  Lungs clear      AIRWAYS: No significant filling defects      PLEURA:  Unremarkable      HEART/GREAT VESSELS:  Mild cardiomegaly  Mild coronary artery calcification indicating atherosclerotic heart disease      MEDIASTINUM AND MYRON:  Unremarkable      CHEST WALL AND LOWER NECK: Unremarkable      UPPER ABDOMEN:  5 mm hyperdense right renal cyst (2/220)  Nonobstructing left renal calcification    Gastric bypass      OSSEOUS STRUCTURES:  Normal for age      IMPRESSION:     Resolution of the previous pulmonary emboli      Lungs clear      Mild cardiomegaly      Minimal coronary artery calcification indicating atherosclerotic heart disease            Workstation performed: [id number]

## 2022-02-15 NOTE — PATIENT INSTRUCTIONS
Wellness Visit for Adults   AMBULATORY CARE:   A wellness visit  is when you see your healthcare provider to get screened for health problems  Your healthcare provider will also give you advice on how to stay healthy  Write down your questions so you remember to ask them  Ask your healthcare provider how often you should have a wellness visit  What happens at a wellness visit:  Your healthcare provider will ask about your health, and your family history of health problems  This includes high blood pressure, heart disease, and cancer  He or she will ask if you have symptoms that concern you, if you smoke, and about your mood  You may also be asked about your intake of medicines, supplements, food, and alcohol  Any of the following may be done:  · Your weight  will be checked  Your height may also be checked so your body mass index (BMI) can be calculated  Your BMI shows if you are at a healthy weight  · Your blood pressure  and heart rate will be checked  Your temperature may also be checked  · Blood and urine tests  may be done  Blood tests may be done to check your cholesterol levels  Abnormal cholesterol levels increase your risk for heart disease and stroke  You may also need a blood or urine test to check for diabetes if you are at increased risk  Urine tests may be done to look for signs of an infection or kidney disease  · A physical exam  includes checking your heartbeat and lungs with a stethoscope  Your healthcare provider may also check your skin to look for sun damage  · Screening tests  may be recommended  A screening test is done to check for diseases that may not cause symptoms  The screening tests you may need depend on your age, gender, family history, and lifestyle habits  For example, colorectal screening may be recommended if you are 48years old or older  Screening tests you need if you are a woman:   · A Pap smear  is used to screen for cervical cancer   Pap smears are usually done every 3 to 5 years depending on your age  You may need them more often if you have had abnormal Pap smear test results in the past  Ask your healthcare provider how often you should have a Pap smear  · A mammogram  is an x-ray of your breasts to screen for breast cancer  Experts recommend mammograms every 2 years starting at age 48 years  You may need a mammogram at age 52 years or younger if you have an increased risk for breast cancer  Talk to your healthcare provider about when you should start having mammograms and how often you need them  Vaccines you may need:   · Get an influenza vaccine  every year  The influenza vaccine protects you from the flu  Several types of viruses cause the flu  The viruses change over time, so new vaccines are made each year  · Get a tetanus-diphtheria (Td) booster vaccine  every 10 years  This vaccine protects you against tetanus and diphtheria  Tetanus is a severe infection that may cause painful muscle spasms and lockjaw  Diphtheria is a severe bacterial infection that causes a thick covering in the back of your mouth and throat  · Get a human papillomavirus (HPV) vaccine  if you are female and aged 23 to 32 or male 23 to 24 and never received it  This vaccine protects you from HPV infection  HPV is the most common infection spread by sexual contact  HPV may also cause vaginal, penile, and anal cancers  · Get a pneumococcal vaccine  if you are aged 72 years or older  The pneumococcal vaccine is an injection given to protect you from pneumococcal disease  Pneumococcal disease is an infection caused by pneumococcal bacteria  The infection may cause pneumonia, meningitis, or an ear infection  · Get a shingles vaccine  if you are 60 or older, even if you have had shingles before  The shingles vaccine is an injection to protect you from the varicella-zoster virus  This is the same virus that causes chickenpox   Shingles is a painful rash that develops in people who had chickenpox or have been exposed to the virus  How to eat healthy:  My Plate is a model for planning healthy meals  It shows the types and amounts of foods that should go on your plate  Fruits and vegetables make up about half of your plate, and grains and protein make up the other half  A serving of dairy is included on the side of your plate  The amount of calories and serving sizes you need depends on your age, gender, weight, and height  Examples of healthy foods are listed below:  · Eat a variety of vegetables  such as dark green, red, and orange vegetables  You can also include canned vegetables low in sodium (salt) and frozen vegetables without added butter or sauces  · Eat a variety of fresh fruits , canned fruit in 100% juice, frozen fruit, and dried fruit  · Include whole grains  At least half of the grains you eat should be whole grains  Examples include whole-wheat bread, wheat pasta, brown rice, and whole-grain cereals such as oatmeal     · Eat a variety of protein foods such as seafood (fish and shellfish), lean meat, and poultry without skin (turkey and chicken)  Examples of lean meats include pork leg, shoulder, or tenderloin, and beef round, sirloin, tenderloin, and extra lean ground beef  Other protein foods include eggs and egg substitutes, beans, peas, soy products, nuts, and seeds  · Choose low-fat dairy products such as skim or 1% milk or low-fat yogurt, cheese, and cottage cheese  · Limit unhealthy fats  such as butter, hard margarine, and shortening  Exercise:  Exercise at least 30 minutes per day on most days of the week  Some examples of exercise include walking, biking, dancing, and swimming  You can also fit in more physical activity by taking the stairs instead of the elevator or parking farther away from stores  Include muscle strengthening activities 2 days each week  Regular exercise provides many health benefits   It helps you manage your weight, and decreases your risk for type 2 diabetes, heart disease, stroke, and high blood pressure  Exercise can also help improve your mood  Ask your healthcare provider about the best exercise plan for you  General health and safety guidelines:   · Do not smoke  Nicotine and other chemicals in cigarettes and cigars can cause lung damage  Ask your healthcare provider for information if you currently smoke and need help to quit  E-cigarettes or smokeless tobacco still contain nicotine  Talk to your healthcare provider before you use these products  · Limit alcohol  A drink of alcohol is 12 ounces of beer, 5 ounces of wine, or 1½ ounces of liquor  · Lose weight, if needed  Being overweight increases your risk of certain health conditions  These include heart disease, high blood pressure, type 2 diabetes, and certain types of cancer  · Protect your skin  Do not sunbathe or use tanning beds  Use sunscreen with a SPF 15 or higher  Apply sunscreen at least 15 minutes before you go outside  Reapply sunscreen every 2 hours  Wear protective clothing, hats, and sunglasses when you are outside  · Drive safely  Always wear your seatbelt  Make sure everyone in your car wears a seatbelt  A seatbelt can save your life if you are in an accident  Do not use your cell phone when you are driving  This could distract you and cause an accident  Pull over if you need to make a call or send a text message  · Practice safe sex  Use latex condoms if are sexually active and have more than one partner  Your healthcare provider may recommend screening tests for sexually transmitted infections (STIs)  · Wear helmets, lifejackets, and protective gear  Always wear a helmet when you ride a bike or motorcycle, go skiing, or play sports that could cause a head injury  Wear protective equipment when you play sports  Wear a lifejacket when you are on a boat or doing water sports      © Copyright [employer] 2021 Information is for End User's use only and may not be sold, redistributed or otherwise used for commercial purposes  All illustrations and images included in CareNotes® are the copyrighted property of [employer] M [employer]  or Tegan Guzman  The above information is an  only  It is not intended as medical advice for individual conditions or treatments  Talk to your doctor, nurse or pharmacist before following any medical regimen to see if it is safe and effective for you  Heart Healthy Diet   WHAT YOU NEED TO KNOW:   A heart healthy diet is an eating plan low in unhealthy fats and sodium (salt)  The plan is high in healthy fats and fiber  A heart healthy diet helps improve your cholesterol levels and lowers your risk for heart disease and stroke  A dietitian will teach you how to read and understand food labels  DISCHARGE INSTRUCTIONS:   Heart healthy diet guidelines to follow:   · Choose foods that contain healthy fats  ? Unsaturated fats  include monounsaturated and polyunsaturated fats  Unsaturated fat is found in foods such as soybean, canola, olive, corn, and safflower oils  It is also found in soft tub margarine that is made with liquid vegetable oil  ? Omega-3 fat  is found in certain fish, such as salmon, tuna, and trout, and in walnuts and flaxseed  Eat fish high in omega-3 fats at least 2 times a week  · Get 20 to 30 grams of fiber each day  Fruits, vegetables, whole-grain foods, and legumes (cooked beans) are good sources of fiber  · Limit or do not have unhealthy fats  ? Cholesterol  is found in animal foods, such as eggs and lobster, and in dairy products made from whole milk  Limit cholesterol to less than 200 mg each day  ? Saturated fat  is found in meats, such as pennington and hamburger  It is also found in chicken or turkey skin, whole milk, and butter  Limit saturated fat to less than 7% of your total daily calories      ? Trans fat  is found in packaged foods, such as potato chips and cookies  It is also in hard margarine, some fried foods, and shortening  Do not eat foods that contain trans fats  · Limit sodium as directed  You may be told to limit sodium to 2,000 to 2,300 mg each day  Choose low-sodium or no-salt-added foods  Add little or no salt to food you prepare  Use herbs and spices in place of salt  Include the following in your heart healthy plan:  Ask your dietitian or healthcare provider how many servings to have from each of the following food groups:  · Grains:      ? Whole-wheat breads, cereals, and pastas, and brown rice    ? Low-fat, low-sodium crackers and chips    · Vegetables:      ? Broccoli, green beans, green peas, and spinach    ? Collards, kale, and lima beans    ? Carrots, sweet potatoes, tomatoes, and peppers    ? Canned vegetables with no salt added    · Fruits:      ? Bananas, peaches, pears, and pineapple    ? Grapes, raisins, and dates    ? Oranges, tangerines, grapefruit, orange juice, and grapefruit juice    ? Apricots, mangoes, melons, and papaya    ? Raspberries and strawberries    ? Canned fruit with no added sugar    · Low-fat dairy:      ? Nonfat (skim) milk, 1% milk, and low-fat almond, cashew, or soy milks fortified with calcium    ? Low-fat cheese, regular or frozen yogurt, and cottage cheese    · Meats and proteins:      ? Lean cuts of beef and pork (loin, leg, round), skinless chicken and turkey    ? Legumes, soy products, egg whites, or nuts    Limit or do not include the following in your heart healthy plan:   · Unhealthy fats and oils:      ? Whole or 2% milk, cream cheese, sour cream, or cheese    ? High-fat cuts of beef (T-bone steaks, ribs), chicken or turkey with skin, and organ meats such as liver    ?  Butter, stick margarine, shortening, and cooking oils such as coconut or palm oil    · Foods and liquids high in sodium:      ? Packaged foods, such as frozen dinners, cookies, macaroni and cheese, and cereals with more than 300 mg of sodium per serving    ? Vegetables with added sodium, such as instant potatoes, vegetables with added sauces, or regular canned vegetables    ? Cured or smoked meats, such as hot dogs, pennington, and sausage    ? High-sodium ketchup, barbecue sauce, salad dressing, pickles, olives, soy sauce, or miso    · Foods and liquids high in sugar:      ? Candy, cake, cookies, pies, or doughnuts    ? Soft drinks (soda), sports drinks, or sweetened tea    ? Canned or dry mixes for cakes, soups, sauces, or gravies    Other healthy heart guidelines:   · Do not smoke  Nicotine and other chemicals in cigarettes and cigars can cause lung and heart damage  Ask your healthcare provider for information if you currently smoke and need help to quit  E-cigarettes or smokeless tobacco still contain nicotine  Talk to your healthcare provider before you use these products  · Limit or do not drink alcohol as directed  Alcohol can damage your heart and raise your blood pressure  Your healthcare provider may give you specific daily and weekly limits  The general recommended limit is 1 drink a day for women 21 or older and for men 72 or older  Do not have more than 3 drinks in a day or 7 in a week  The recommended limit is 2 drinks a day for men 24to 59years of age  Do not have more than 4 drinks in a day or 14 in a week  A drink of alcohol is 12 ounces of beer, 5 ounces of wine, or 1½ ounces of liquor  · Exercise regularly  Exercise can help you maintain a healthy weight and improve your blood pressure and cholesterol levels  Regular exercise can also decrease your risk for heart problems  Ask your healthcare provider about the best exercise plan for you  Do not start an exercise program without asking your healthcare provider  Follow up with your doctor or cardiologist as directed:  Write down your questions so you remember to ask them during your visits    © Copyright [employer] 2021 Information is for End User's use only and may not be sold, redistributed or otherwise used for commercial purposes  All illustrations and images included in CareNotes® are the copyrighted property of [employer] M [employer]  or Tegan Guzman  The above information is an  only  It is not intended as medical advice for individual conditions or treatments  Talk to your doctor, nurse or pharmacist before following any medical regimen to see if it is safe and effective for you  Kidney Cyst   WHAT YOU NEED TO KNOW:   A kidney cyst is a fluid-filled sac that grows in your kidney  A simple cyst usually does not contain cancer  A complex cyst contains calcium deposits and needs to be checked over time for cancer  You may have one or more cysts  Both kidneys may form cysts at the same time  DISCHARGE INSTRUCTIONS:   Medicines:   · Medicines  may be given to relieve pain or to prevent or treat a bacterial infection  Ask your healthcare provider how to take prescription pain medicine safely  · Take your medicine as directed  Contact your healthcare provider if you think your medicine is not helping or if you have side effects  Tell him or her if you are allergic to any medicine  Keep a list of the medicines, vitamins, and herbs you take  Include the amounts, and when and why you take them  Bring the list or the pill bottles to follow-up visits  Carry your medicine list with you in case of an emergency  Return to the emergency department if:   · You have blood in your urine or your urine is dark  · You have trouble urinating, or you are urinating more often than usual     · You have a fever along with pain or tenderness below your ribcage and above your hip bones  Contact your healthcare provider if:   · You have questions or concerns about your condition or care  Follow up with your healthcare provider as directed: You may need to have ultrasounds to check the size, shape, and contents of the cyst over time   Write down your questions so you remember to ask them during your visits  Drink liquids as directed:  Liquids help your kidneys work correctly  They can also help prevent a urinary tract infection  Ask your healthcare provider how much liquid to have each day and which liquids are best for you  Ask if you need to limit or not drink alcohol  Alcohol may damage your kidneys  Manage health conditions:  Over time, conditions such as diabetes and high blood pressure that are not controlled may damage your kidneys  Do not smoke:  Smoking may narrow blood vessels in your kidneys and raise your blood pressure  Smoking can also damage your kidneys  Ask your healthcare provider for information if you currently smoke and need help quitting  E-cigarettes or smokeless tobacco still contain nicotine  Talk to your healthcare provider before you use these products  © Copyright [employer] 2021 Information is for End User's use only and may not be sold, redistributed or otherwise used for commercial purposes  All illustrations and images included in CareNotes® are the copyrighted property of [employer]  or [facility] Yap   The above information is an  only  It is not intended as medical advice for individual conditions or treatments  Talk to your doctor, nurse or pharmacist before following any medical regimen to see if it is safe and effective for you

## 2022-03-04 DIAGNOSIS — I10 ESSENTIAL HYPERTENSION: ICD-10-CM

## 2022-03-04 DIAGNOSIS — I50.22 CHRONIC SYSTOLIC CONGESTIVE HEART FAILURE (HCC): ICD-10-CM

## 2022-03-04 RX ORDER — LOSARTAN POTASSIUM 50 MG/1
50 TABLET ORAL
Qty: 90 TABLET | Refills: 0 | Status: SHIPPED | OUTPATIENT
Start: 2022-03-04 | End: 2022-08-02

## 2022-04-19 ENCOUNTER — PATIENT MESSAGE (OUTPATIENT)
Dept: FAMILY MEDICINE CLINIC | Facility: CLINIC | Age: 57
End: 2022-04-19

## 2022-04-19 DIAGNOSIS — Z52.10 SKIN DONOR: ICD-10-CM

## 2022-04-19 DIAGNOSIS — L85.3 XEROSIS OF SKIN: Primary | ICD-10-CM

## 2022-04-20 ENCOUNTER — CONSULT (OUTPATIENT)
Dept: DERMATOLOGY | Facility: CLINIC | Age: 57
End: 2022-04-20
Payer: COMMERCIAL

## 2022-04-20 VITALS — WEIGHT: 315 LBS | HEIGHT: 65 IN | BODY MASS INDEX: 52.48 KG/M2 | TEMPERATURE: 97.6 F

## 2022-04-20 DIAGNOSIS — L30.4 INTERTRIGO: Primary | ICD-10-CM

## 2022-04-20 DIAGNOSIS — Z52.10 SKIN DONOR: ICD-10-CM

## 2022-04-20 PROCEDURE — 99204 OFFICE O/P NEW MOD 45 MIN: CPT | Performed by: DERMATOLOGY

## 2022-04-20 PROCEDURE — 1036F TOBACCO NON-USER: CPT | Performed by: DERMATOLOGY

## 2022-04-20 PROCEDURE — 3008F BODY MASS INDEX DOCD: CPT | Performed by: DERMATOLOGY

## 2022-04-20 NOTE — PATIENT INSTRUCTIONS
INTERTRIGO    Assessment and Plan:  Based on a thorough discussion of this condition and the management approach to it (including a comprehensive discussion of the known risks, side effects and potential benefits of treatment), the patient (family) agrees to implement the following specific plan:   Ciclopirox cream 0 77% apply twice daily for 2 -4 weeks to affected areas of skin   Can try over the counter Triple paste          Assessment and Plan  Intertrigo describes a rash in the flexures or body folds, such as behind the ears, in the folds of the neck, under the arms (axillae), under a protruding abdomen, in the groin, between the buttocks, in the finger webs or toe spaces  Although intertrigo may affect one skin fold, it is common for it to involve multiple sites  Intertrigo can affect males and females of any age  It is particularly common in people that are overweight or obese (see metabolic syndrome)  Other contributing factors are:   Genetic tendency to skin disease   Hyperhidrosis (excessive sweating)    Intertrigo can be acute (recent onset), relapsing (recurrent), or chronic (present for more than 6 weeks)  The exact appearance and behaviour depends on the underlying cause or causes  The skin affected by intertrigo is inflamed, ie reddened and uncomfortable  It may become moist and macerated, leading to fissuring (cracks) and peeling  Intertrigo is due to genetic and environmental factors   Flexural skin has relatively high surface temperature   Moisture from insensible water loss and sweating cannot evaporate due to occlusion   Friction from movement of adjacent skin results in chafing  The microorganisms that are normally resident on flexural skin, the microbiome, include corynebacteria, other bacteria and yeasts  These multiply in warm moist environments and may cause disease      We can classify intertrigo into infectious and inflammatory origin but there is often overlap   Infections tend to be unilateral and asymmetrical    Inflammatory disorders tend to be symmetrical affecting armpits, groins, under the breasts and the abdominal folds, except atopic dermatitis, which more often arises on the neck, and in elbow and knee creases  Investigations may be necessary to determine the cause of intertrigo   A swab for microscopy and culture of bacteria (microbiology)   A scraping for microscopy and culture of fungi (mycology)   A skin biopsy may be performed for histopathology if the skin condition is unusual or fails to respond to treatment  What is the treatment for intertrigo? Treatment depends on the underlying cause, if identified, and on which micro-organisms are present in the rash  Combinations are common   Sweating may be reduced with a gentle antiperspirant   Physical exertion should be followed by a bath and completely drying the skin folds using a hair dryer on cool setting, soft towel and/or corn starch powder   Triple paste contains petrolatum, zinc oxide, and aluminum acetate solution to reduce friction, irritation and sweating   Bacteria may be treated with topical antibiotics such as fusidic acid cream, mupirocin ointment, or oral antibiotics such as flucloxacillin and erythromycin   Yeasts and fungi may be treated with topical antifungals such as clotrimazole and terbinafine cream or oral antifungal agents such as itraconazole or terbinafine   Inflammatory skin diseases are often treated with low potency topical steroid creams such as hydrocortisone  More potent steroids are usually avoided in the flexures because they may cause skin thinning resulting in stretch marks (striae) and even ulcers  Calcineurin inhibitors such as tacrolimus ointment or pimecrolimus cream may also prove effective

## 2022-04-20 NOTE — PROGRESS NOTES
[address] [facility] Note     Patient Name: Eulis Severance  Encounter Date: 04/20/22     Have you been cared for by a [facility] Dermatologist in the last 3 years and, if so, which one? No    · Have you traveled outside of the [address] [place] [address] in the past 3 months or outside of the [facility] area in the last 2 weeks? No     May we call your Preferred Phone number to discuss your specific medical information? Yes     May we leave a detailed message that includes your specific medical information? Yes      Today's Chief Concerns:   Concern #1:  Rash on stomach      Past Medical History:  Have you personally ever had or currently have any of the following? · Skin cancer (such as Melanoma, Basal Cell Carcinoma, Squamous Cell Carcinoma? (If Yes, please provide more detail)- No  · Eczema: No  · Psoriasis: No  · HIV/AIDS: No  · Hepatitis B or C: No  · Tuberculosis: No  · Systemic Immunosuppression such as Diabetes, Biologic or Immunotherapy, Chemotherapy, Organ Transplantation, Bone Marrow Transplantation (If YES, please provide more detail): No  · Radiation Treatment (If YES, please provide more detail): No  · Any other major medical conditions/concerns? 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 Family History:  Have any of your "first degree relatives" (parent, brother, sister, or child) had any of the following       · Skin cancer such as Melanoma or Merkel Cell Carcinoma or Pancreatic Cancer? No  · Eczema, Asthma, Hay Fever or Seasonal Allergies: No  · Psoriasis or Psoriatic Arthritis: No  · Do any other medical conditions seem to run in your family? If Yes, what condition and which relatives?   YES, father has hx of diabetes, cancer, hypertension    Current Medications:         Current Outpatient Medications:     acetaminophen (TYLENOL) 325 mg tablet, Take 2 tablets (650 mg total) by mouth every 4 (four) hours as needed for mild pain, Disp: 30 tablet, Rfl: 0    ammonium lactate (LAC-HYDRIN) 12 % cream, Apply topically as needed for dry skin, Disp: 385 g, Rfl: 3    APPLE CIDER VINEGAR PO, Take by mouth daily, Disp: , Rfl:     calcium carbonate-vitamin D (OSCAL-D) 500 mg-200 units per tablet, TAKE 1 TABLET BY MOUTH DAILY TO SUPPLEMENT CALCIUM/ STRENGTHEN BONES, Disp: , Rfl:     carvedilol (COREG) 12 5 mg tablet, Take 1 tablet (12 5 mg total) by mouth 2 (two) times a day, Disp: 180 tablet, Rfl: 1    Cholecalciferol 25 MCG (1000 UT) tablet, TAKE ONE TABLET BY MOUTH DAILY FOR VITAMIN D SUPPLEMENTATION, Disp: , Rfl:     losartan (COZAAR) 50 mg tablet, TAKE 1 TABLET (50 MG TOTAL) BY MOUTH DAILY IN THE EARLY MORNING HAS BEEN ON IT WITHOUT ISSUES, Disp: 90 tablet, Rfl: 0    Multiple Vitamin (Daily-Mark Multivitamin) TABS, TAKE 1 TABLET BY MOUTH EVERY DAY, Disp: 90 tablet, Rfl: 1    Multiple Vitamins-Minerals (MULTIVITAMIN ADULT, MINERALS, PO), 1 tablet, Disp: , Rfl:     nystatin (MYCOSTATIN) cream, Apply topically as needed (rash), Disp: 30 g, Rfl: 0    pantoprazole (PROTONIX) 40 mg tablet, Take 1 tablet (40 mg total) by mouth daily at bedtime, Disp: 90 tablet, Rfl: 1    Xarelto 20 MG tablet, Take 1 tablet (20 mg total) by mouth daily with breakfast, Disp: 90 tablet, Rfl: 1    chlorhexidine (PERIDEX) 0 12 % solution, SWISH AND SPIT 15ML TWICE DAILY FOR ONE WEEK (Patient not taking: Reported on 4/20/2022), Disp: , Rfl:     docusate sodium (COLACE) 100 mg capsule, Take 1 capsule (100 mg total) by mouth 2 (two) times a day for 15 days, Disp: 30 capsule, Rfl: 0    NON FORMULARY, daily Beet Root (Patient not taking: Reported on 4/20/2022 ), Disp: , Rfl:       Review of Systems:  Have you recently had or currently have any of the following? If YES, what are you doing for the problem?     · Fever, chills or unintended weight loss: No  · Sudden loss or change in your vision: No  · Nausea, vomiting or blood in your stool: No  · Painful or swollen joints: YES, degenerative disease   · Wheezing or cough: No  · Changing mole or non-healing wound: No  · Nosebleeds: No  · Excessive sweating: No  · Easy or prolonged bleeding? No  · Over the last 2 weeks, how often have you been bothered by the following problems? · Taking little interest or pleasure in doing things: 1 - Not at All  · Feeling down, depressed, or hopeless: 1 - Not at All  · Rapid heartbeat with epinephrine:  No      · Any known allergies? Allergies   Allergen Reactions    Nsaids Other (See Comments)     Duodenal ulcer disease    Flunisolide      Other reaction(s): Burning sensation    Lisinopril Cough    Testosterone Itching, Rash and Other (See Comments)   ·       Physical Exam:     Was a chaperone (Derm Clinical Assistant) present throughout the entire Physical Exam? Yes     Did the Dermatology Team specifically  the patient on the importance of a Full Skin Exam to be sure that nothing is missed clinically?  Yes}  o Did the patient ultimately request or accept a Full Skin Exam?  NO  o Did the patient specifically refuse to have the areas "under-the-bra" examined by the Dermatologist? No  o Did the patient specifically refuse to have the areas "under-the-underwear" examined by the Dermatologist? No    CONSTITUTIONAL:   Vitals:    04/20/22 0858   Temp: 97 6 °F (36 4 °C)   TempSrc: Temporal   Weight: (!) 149 kg (328 lb)   Height: 5' 5" (1 651 m)         PSYCH: Normal mood and affect  EYES: Normal conjunctiva  CARDIOVASCULAR: No edema  RESPIRATORY: Normal respirations    SKIN:  FOCUSED ORGAN SYSTEM EXAM  Abdomen, Umbilicus Normal except as noted below in Assessment        Assessment and Plan by Diagnosis:    History of Present Condition:     Duration:  How long has this been an issue for you?    o  about 1 year   Location Affected:  Where on the body is this affecting you?    o  abdomen   Quality:  Is there any bleeding, pain, itch, burning/irritation, or redness associated with the skin lesion? o  itched, burned, odor   Severity:  Describe any bleeding, pain, itch, burning/irritation, or redness on a scale of 1 to 10 (with 10 being the worst)  o  5   Timing:  Does this condition seem to be there pretty constantly or do you notice it more at specific times throughout the day? o  comes and goes   Context:  Have you ever noticed that this condition seems to be associated with specific activities you do?    o  denies   Modifying Factors:    o Anything that seems to make the condition worse?    -  warm weather, sweating  o What have you tried to do to make the condition better?    -  baby powder, gold bond spray powder, nystatin cream, Lac hydrin 12 % cream   Associated Signs and Symptoms:  Does this skin lesion seem to be associated with any of the following:  o  SL AMB DERM SIGNS AND SYMPTOMS: Itching and Scratching     INTERTRIGO    Physical Exam:   Anatomic Location Affected:  Abdomen, groin area, thighs   Morphological Description:  See photos   Pertinent Positives:   Pertinent Negatives:             Additional History of Present Condition:  Patient had about 1 year; itches, burns and gets an odor patient used baby powder, gold bond spray powder, prescribed nystatin cream and Lac hydrin 12 % cream which didn't work    Assessment and Plan:  Based on a thorough discussion of this condition and the management approach to it (including a comprehensive discussion of the known risks, side effects and potential benefits of treatment), the patient (family) agrees to implement the following specific plan:   Ciclopirox cream 0 77% apply twice daily for 2 -4 weeks to affected areas of skin   Can try over the counter Triple Paste AF          Assessment and Plan  Intertrigo describes a rash in the flexures or body folds, such as behind the ears, in the folds of the neck, under the arms (axillae), under a protruding abdomen, in the groin, between the buttocks, in the finger webs or toe spaces  Although intertrigo may affect one skin fold, it is common for it to involve multiple sites  Intertrigo can affect males and females of any age  It is particularly common in people that are overweight or obese (see metabolic syndrome)  Other contributing factors are:   Genetic tendency to skin disease   Hyperhidrosis (excessive sweating)    Intertrigo can be acute (recent onset), relapsing (recurrent), or chronic (present for more than 6 weeks)  The exact appearance and behaviour depends on the underlying cause or causes  The skin affected by intertrigo is inflamed, ie reddened and uncomfortable  It may become moist and macerated, leading to fissuring (cracks) and peeling  Intertrigo is due to genetic and environmental factors   Flexural skin has relatively high surface temperature   Moisture from insensible water loss and sweating cannot evaporate due to occlusion   Friction from movement of adjacent skin results in chafing  The microorganisms that are normally resident on flexural skin, the microbiome, include corynebacteria, other bacteria and yeasts  These multiply in warm moist environments and may cause disease  We can classify intertrigo into infectious and inflammatory origin but there is often overlap   Infections tend to be unilateral and asymmetrical    Inflammatory disorders tend to be symmetrical affecting armpits, groins, under the breasts and the abdominal folds, except atopic dermatitis, which more often arises on the neck, and in elbow and knee creases  Investigations may be necessary to determine the cause of intertrigo     A swab for microscopy and culture of bacteria (microbiology)   A scraping for microscopy and culture of fungi (mycology)   A skin biopsy may be performed for histopathology if the skin condition is unusual or fails to respond to treatment  What is the treatment for intertrigo? Treatment depends on the underlying cause, if identified, and on which micro-organisms are present in the rash  Combinations are common   Sweating may be reduced with a gentle antiperspirant   Physical exertion should be followed by a bath and completely drying the skin folds using a hair dryer on cool setting, soft towel and/or corn starch powder   Triple paste contains petrolatum, zinc oxide, and aluminum acetate solution to reduce friction, irritation and sweating   Bacteria may be treated with topical antibiotics such as fusidic acid cream, mupirocin ointment, or oral antibiotics such as flucloxacillin and erythromycin   Yeasts and fungi may be treated with topical antifungals such as clotrimazole and terbinafine cream or oral antifungal agents such as itraconazole or terbinafine   Inflammatory skin diseases are often treated with low potency topical steroid creams such as hydrocortisone  More potent steroids are usually avoided in the flexures because they may cause skin thinning resulting in stretch marks (striae) and even ulcers  Calcineurin inhibitors such as tacrolimus ointment or pimecrolimus cream may also prove effective      Scribe Attestation    I,:  Jolene Márquez am acting as a scribe while in the presence of the attending physician :       I,:  Melanie Bone MD personally performed the services described in this documentation    as scribed in my presence :

## 2022-06-23 NOTE — PATIENT INSTRUCTIONS
Wrist Injury   WHAT YOU NEED TO KNOW:   What is a wrist injury? A wrist injury is damage to the tissues of your wrist joint  Examples are a fracture, sprain (stretched or torn ligament), or strain (stretched or torn tendon)  What are the signs and symptoms of a wrist injury? Pain, weakness, or numbness in your wrist or hand    A feeling of something clicking, popping, or tearing inside your wrist    A change in the shape of your wrist, hand, or fingers    Trouble moving your wrist or hand    How is a wrist injury diagnosed? Your healthcare provider will check for pain, swelling, or numbness  He or she may check the movement and strength of your wrist  An x-ray, MRI, or CT scan may show if you have broken a bone or other injury  You may be given contrast liquid to help your wrist show up better in the pictures  Tell the healthcare provider if you have ever had an allergic reaction to contrast liquid  Do not enter the MRI room with anything metal  Metal can cause serious injury  Tell the healthcare provider if you have any metal in or on your body  How is a wrist injury treated? Your treatment depends on the type of wrist injury and amount of tissue damage you have  You may need any of the following:  A wrist support,  such as a cast or splint, will help support your wrist and prevent more damage  NSAIDs  help decrease swelling and pain or fever  This medicine is available with or without a doctor's order  NSAIDs can cause stomach bleeding or kidney problems in certain people  If you take blood thinner medicine, always ask your healthcare provider if NSAIDs are safe for you  Always read the medicine label and follow directions  Prescription pain medicine  may be given  Ask your healthcare provider how to take this medicine safely  Some prescription pain medicines contain acetaminophen  Do not take other medicines that contain acetaminophen without talking to your healthcare provider   Too much acetaminophen may cause liver damage  Prescription pain medicine may cause constipation  Ask your healthcare provider how to prevent or treat constipation  Steroids  decrease swelling and pain in your wrist  This may be given as a shot  Surgery  may be used to repair a tear or remove injured and loose tissues  A torn ligament may be repaired or replaced  Screws or wires may be used to attach the bones in your wrist together  How can I manage my symptoms? Rest  your wrist for 48 hours, or as directed  Avoid activities that cause pain  Ask which activities you should avoid and for how long  Ask when you may return to your regular physical activities or sports  If you start to use your wrist too soon, you may injure it wrist again  Put ice  on your wrist to decrease pain and swelling  Use an ice pack, or put crushed ice in a plastic bag  Wrap a towel around the bag before you put it on your wrist  Apply ice for 15 minutes every hour, or as directed  Apply compression  by wrapping your wrist with an elastic bandage  This will help decrease swelling, support your wrist, and help it heal  Wear your wrist wrap as directed  The bandage should be snug but not so tight that your fingers are numb or tingly  Elevate  your wrist above the level of your heart when you sit or lie down  Prop your arm and hand on pillows to keep your wrist elevated comfortably  Go to physical therapy,  if directed  A physical therapist can teach you exercises to strengthen your wrist and improve the range of movement  These exercises may also help decrease your pain  How can I prevent a wrist injury? Do strengthening exercises  Your healthcare provider or physical therapist may suggest that you do exercises to strengthen your hand and arm muscles  He or she will tell you when to start doing these exercises and how long to continue  Protect your wrists    Wrist guard splints or protective tape can help support your wrist during exercise and sports  These devices may also keep your wrist from bending too far back  Ask for more information about the type of wrist support that you should use  When should I seek immediate care? The skin on or near your wrist or hand feels cold or turns blue or white  The skin on or near your wrist or hand is tight, raised, and swollen  You have new trouble moving and using your hands, fingers, or wrist     Your wrist, hands, or fingers become swollen, red, numb, or tingle  You have any open wounds that are red, swollen, warm, or have pus coming from them  When should I call my doctor? You have a fever  The bruising, swelling, or pain in your wrist gets worse  You have questions or concerns about your condition or care  CARE AGREEMENT:   You have the right to help plan your care  Learn about your health condition and how it may be treated  Discuss treatment options with your healthcare providers to decide what care you want to receive  You always have the right to refuse treatment  The above information is an  only  It is not intended as medical advice for individual conditions or treatments  Talk to your doctor, nurse or pharmacist before following any medical regimen to see if it is safe and effective for you  © Copyright [employer] 2022 Information is for End User's use only and may not be sold, redistributed or otherwise used for commercial purposes  All illustrations and images included in CareNotes® are the copyrighted property of [employer]  or [employer]  Tendinitis   WHAT YOU NEED TO KNOW:   What is tendinitis? Tendinitis is painful inflammation or breakdown of your tendons  It may also be called tendinopathy  Tendinitis often occurs in the knee, shoulder, ankle, hip, or elbow  What increases my risk for tendinitis?    Injury, overuse, or repeated movement of a joint     Not warming up before exercise, or not resting enough between activities    Use of shoes or exercise equipment that do not fit well    Muscle weakness, balance problems, or poor posture    What are the signs and symptoms of tendinitis? You may have redness, pain, or swelling around your joint, tendon, or muscle  You may also have pain, stiffness, or decreased movement in your joint  How is tendinitis diagnosed? Your healthcare provider will check your range of motion of the affected joint  He may also lightly press on your tendon to check for pain  You may also need an ultrasound, x-ray, or MRI to show if you have tendinitis or another condition  How is tendinitis treated? Pain medicines  such as NSAIDs and acetaminophen may decrease swelling and pain  These medicines are available without a doctor's order  Ask how much to take and when to take it  Follow directions  NSAIDs and acetaminophen may cause liver or kidney damage if not taken correctly  Steroids  may be used to decrease pain and swelling  They may be given as a pill or as an injection into the affected area  Steroids are rarely used in children  Surgery  may rarely be needed if other treatment does not work  How can I manage my symptoms? Rest  your tendon as directed to help it heal  Ask your healthcare provider if you need to stop putting weight on your affected area  Ice  helps decrease swelling and pain, and may help prevent tissue damage  Use an ice pack, or put crushed ice in a plastic bag  Cover it with a towel and place it on the affected area for 10 to 15 minutes every hour or as directed  Support devices  such as a cane, splint, shoe insert, or brace may help reduce your pain  Physical therapy  may be ordered by your healthcare provider  This may be used to teach you exercises to help improve movement and strength, and to decrease pain  You may also learn how to improve your posture, and how to lift or exercise correctly  How can I prevent tendinitis?    Warm up or stretch  before you exercise  Exercise regularly  to strengthen the muscles around your joint  Ease into an exercise routine for the first 3 weeks to prevent another injury  Ask your healthcare provider about the best exercise plan for you  Rest fully between activities  Use the right equipment  for sports and exercise  Wear braces or tape around weak joints as directed  When should I contact my healthcare provider? You have increased pain even after you take medicine  You have questions or concerns about your condition or care  When should I seek immediate help? You have increased redness over the joint, or swelling in the joint  You suddenly cannot move your joint  CARE AGREEMENT:   You have the right to help plan your care  Learn about your health condition and how it may be treated  Discuss treatment options with your healthcare providers to decide what care you want to receive  You always have the right to refuse treatment  The above information is an  only  It is not intended as medical advice for individual conditions or treatments  Talk to your doctor, nurse or pharmacist before following any medical regimen to see if it is safe and effective for you  © Copyright [employer] 2022 Information is for End User's use only and may not be sold, redistributed or otherwise used for commercial purposes  All illustrations and images included in CareNotes® are the copyrighted property of [employer]  or [employer]   Gout   WHAT YOU NEED TO KNOW:   What is gout? Gout is a form of arthritis that causes severe joint pain and stiffness  Acute gout pain starts suddenly, gets worse quickly, and stops on its own  Acute gout can become chronic and cause permanent damage to your joints  What causes gout? Gout develops when uric acid builds up in your joints  Uric acid is made when your body breaks down purines  Purines are found in some medicines and foods   Your body gets rid of most uric acid through your urine  When your body cannot get rid of enough uric acid, it can build up and form crystals in your joints  The crystals cause your joints to become swollen and painful  This is called a gout attack  What increases my risk for gout? You may have been born with a decreased ability to break down and get rid of purines  Your body's ability to break down purines may be very slow  Gout is more common in men than in women  Any of the following can also increase your risk:  A family history of gout    Kidney disease or problems with how your kidneys work    Eating foods that are high in purines, such as red meat    Alcohol or tobacco use    Diuretic medicine (water pills), or aspirin    A medical condition such as diabetes, high blood pressure, or high cholesterol    A condition such as an irregular heartbeat or a blood clot in your lungs    What are the stages of gout? Hyperuricemia  is a high level of uric acid  Hyperuricemia is not gout, but it increases your risk for gout  You may have no symptoms at this stage, and it usually does not need treatment  Acute gouty arthritis  starts with a sudden attack of pain and swelling, usually in 1 joint  This may be in your big toe  The attack may last from a few days to 2 weeks  Intercritical gout  is the time between attacks  You may go months or years without another attack  You will not have joint pain or stiffness, but this does not mean your gout is cured  You will still need treatment to prevent chronic gout  Chronic tophaceous gout  develops if gout is not treated  Large amounts of uric acid crystals, called tophi, collect around your joints  The crystals can destroy or deform the joints  Gout attacks occur more often, and last hours to weeks  More than 1 joint may be painful and swollen  At this stage, gout symptoms do not go away on their own  How is gout diagnosed?   Your healthcare provider will ask about your medicines, health problems, and allergies  Tell him or her when your joint pain and swelling started  He or she will need to know if the swelling and pain were worst within 1 day or if got worse over time  He or she will check the skin over your joints for redness  You may also need any of the following:  Blood tests  are used to check the level of uric acid  You may need to have blood tested more than 1 time  A synovial fluid test  is used to collect a sample of fluid from around your painful joint  The fluid is sent to a lab to check for uric acid crystals  Synovial fluid surrounds and protects your joints  An x-ray, ultrasound, CT, or MRI  may show the gout or damage to bones caused by gout  You may be given contrast liquid to help your joints show up better in the pictures  Tell the healthcare provider if you have ever had an allergic reaction to contrast liquid  Do not enter the MRI room with anything metal  Metal can cause serious injury  Tell the healthcare provider if you have any metal in or on your body  How is gout treated? The following can make your symptoms stop sooner, prevent attacks, and decrease your risk for joint damage:  Medicines:      NSAIDs , such as ibuprofen, help decrease swelling, pain, and fever  This medicine is available with or without a doctor's order  NSAIDs can cause stomach bleeding or kidney problems in certain people  If you take blood thinner medicine, always ask your healthcare provider if NSAIDs are safe for you  Always read the medicine label and follow directions  Gout medicine  decreases joint pain and swelling  It may also be given to prevent new gout attacks  Steroids  reduce inflammation and can help your joint stiffness and pain during gout attacks  Uric acid medicine  may be given to reduce the amount of uric acid your body makes  Some medicines may help you pass more uric acid when you urinate      Surgery  called a bone graft may be needed for tophi that are painful or infected  Bone in the joint may be replaced with bone taken from another place in your body  Ask your healthcare provider for more information about bone graft surgery  How can I manage my symptoms? Rest your painful joint so it can heal   Your healthcare provider may recommend crutches or a walker if the affected joint is in a leg  Apply ice to your joint  Ice decreases pain and swelling  Use an ice pack, or put crushed ice in a plastic bag  Cover the ice pack or bag with a towel before you apply it to your painful joint  Apply ice for 15 to 20 minutes every hour, or as directed  Elevate your joint  Elevation helps reduce swelling and pain  Raise your joint above the level of your heart as often as you can  Prop your painful joint on pillows to keep it above your heart comfortably  Go to physical therapy if directed  A physical therapist can teach you exercises to improve flexibility and range of motion  How can I help prevent gout attacks? Do not eat high-purine foods  These foods include meats, seafood, asparagus, spinach, cauliflower, and some types of beans  Healthcare providers may tell you to eat more low-fat milk products, such as yogurt  Milk products may decrease your risk for gout attacks  Vitamin C and coffee may also help  Your healthcare provider or dietitian can help you create a meal plan  Drink liquids as directed  Liquids such as water help remove uric acid from your body  Ask how much liquid to drink each day and which liquids are best for you  Maintain a healthy weight  Weight loss may decrease the amount of uric acid in your body  Ask your healthcare provider what a healthy weight is for you  Ask him or her to help you create a weight loss plan if you are overweight  Control your blood sugar level if you have diabetes  Keep your blood sugar level in a normal range  This can help prevent gout attacks      Limit or do not drink alcohol as directed  Alcohol can trigger a gout attack  Alcohol also increases your risk for dehydration  Ask your healthcare provider if alcohol is safe for you  When should I seek immediate care? You have severe joint pain that you cannot tolerate  You have a fever or redness that spreads beyond the joint area  When should I call my doctor? You have a fever, chills, or body aches  You are confused or more tired than usual     You have new symptoms, such as a rash, after you start gout treatment  Your joint pain and swelling do not go away, even after treatment  You are not urinating as much or as often as you usually do  You have trouble taking your gout medicines  CARE AGREEMENT:   You have the right to help plan your care  Learn about your health condition and how it may be treated  Discuss treatment options with your healthcare providers to decide what care you want to receive  You always have the right to refuse treatment  The above information is an  only  It is not intended as medical advice for individual conditions or treatments  Talk to your doctor, nurse or pharmacist before following any medical regimen to see if it is safe and effective for you  © Copyright [employer] 2022 Information is for End User's use only and may not be sold, redistributed or otherwise used for commercial purposes   All illustrations and images included in CareNotes® are the copyrighted property of [employer]  or [address] [place] [address]

## 2022-06-23 NOTE — PROGRESS NOTES
BMI Counseling: Body mass index is 38 54 kg/m²  The BMI is above normal  Nutrition recommendations include decreasing portion sizes, encouraging healthy choices of fruits and vegetables, decreasing fast food intake, consuming healthier snacks, limiting drinks that contain sugar, moderation in carbohydrate intake, increasing intake of lean protein, reducing intake of saturated and trans fat and reducing intake of cholesterol  Exercise recommendations include vigorous physical activity 75 minutes/week, exercising 3-5 times per week, obtaining a gym membership and strength training exercises  No pharmacotherapy was ordered  Rationale for BMI follow-up plan is due to patient being overweight or obese  Depression Screening and Follow-up Plan: Clincally patient does not have depression  No treatment is required  Assessment/Plan:         Problem List Items Addressed This Visit        Musculoskeletal and Integument    Left wrist tendonitis - Primary       Other    Class 2 obesity due to excess calories with body mass index (BMI) of 38 0 to 38 9 in adult    Gout    Relevant Orders    Uric acid    Left wrist pain    Relevant Orders    XR wrist 3+ vw left            Subjective:      Patient ID: Jennifer Silva is a 55 y o  male  Patient is a 55year old male that reports left wrist pain for the last 2 weeks that is worsening  No injury noted  History of gout, and patient drives a truck  Reports using his left hand only to drive  Patient reports also cutting wood for his deck and drilling down boards to replace planks on his deck         The following portions of the patient's history were reviewed and updated as appropriate:   Past Medical History:  He has a past medical history of Bariatric surgery status, Duodenal ulcer, GERD (gastroesophageal reflux disease), Gout, Hyperparathyroidism ([address] ), Hypertension, Lung nodule, Postsurgical malabsorption, Pulmonary embolism ([address] ) (2019), Sarcoidosis, and Wears glasses  ,  _______________________________________________________________________  Medical Problems:  does not have any pertinent problems on file ,  _______________________________________________________________________  Past Surgical History:   has a past surgical history that includes Hand surgery; Bariatric Surgery; Foot surgery (Right); Colonoscopy; GASTRIC BYPASS LAPAROSCOPIC (N/A, 9/22/2020); pr lap,partial nephrectomy (Right, 6/14/2021); LAPAROTOMY (N/A, 6/19/2021); and Skin biopsy  ,  _______________________________________________________________________  Family History:  family history includes Cancer in his father and maternal aunt; Diabetes in his father and maternal aunt; Hypertension in his father ,  _______________________________________________________________________  Social History:   reports that he quit smoking about 16 years ago  His smoking use included cigars and cigarettes  He started smoking about 23 years ago  He has a 7 00 pack-year smoking history  He has never used smokeless tobacco  He reports previous alcohol use  He reports previous drug use  Drugs: Marijuana and Cocaine  ,  _______________________________________________________________________  Allergies:  is allergic to nsaids, flunisolide, lisinopril, and testosterone     _______________________________________________________________________  Current Outpatient Medications   Medication Sig Dispense Refill    acetaminophen (TYLENOL) 325 mg tablet Take 2 tablets (650 mg total) by mouth every 4 (four) hours as needed for mild pain 30 tablet 0    ammonium lactate (LAC-HYDRIN) 12 % cream Apply topically as needed for dry skin 385 g 3    APPLE CIDER VINEGAR PO Take by mouth daily      calcium carbonate-vitamin D (OSCAL-D) 500 mg-200 units per tablet TAKE 1 TABLET BY MOUTH DAILY TO SUPPLEMENT CALCIUM/ STRENGTHEN BONES      carvedilol (COREG) 12 5 mg tablet Take 1 tablet (12 5 mg total) by mouth 2 (two) times a day 180 tablet 1    Cholecalciferol 25 MCG (1000 UT) tablet TAKE ONE TABLET BY MOUTH DAILY FOR VITAMIN D SUPPLEMENTATION      ciclopirox (LOPROX) 0 77 % cream Apply topically 2 (two) times a day For 2-4 weeks to affected areas of skin 90 g 0    losartan (COZAAR) 50 mg tablet TAKE 1 TABLET (50 MG TOTAL) BY MOUTH DAILY IN THE EARLY MORNING HAS BEEN ON IT WITHOUT ISSUES 90 tablet 0    Multiple Vitamin (Daily-Mark Multivitamin) TABS TAKE 1 TABLET BY MOUTH EVERY DAY 90 tablet 1    nystatin (MYCOSTATIN) cream Apply topically as needed (rash) 30 g 0    Xarelto 20 MG tablet Take 1 tablet (20 mg total) by mouth daily with breakfast 90 tablet 1    chlorhexidine (PERIDEX) 0 12 % solution SWISH AND SPIT 15ML TWICE DAILY FOR ONE WEEK (Patient not taking: Reported on 4/20/2022)      docusate sodium (COLACE) 100 mg capsule Take 1 capsule (100 mg total) by mouth 2 (two) times a day for 15 days 30 capsule 0    Multiple Vitamins-Minerals (MULTIVITAMIN ADULT, MINERALS, PO) 1 tablet      NON FORMULARY daily Beet Root (Patient not taking: Reported on 4/20/2022 )      pantoprazole (PROTONIX) 40 mg tablet Take 1 tablet (40 mg total) by mouth daily at bedtime 90 tablet 1     No current facility-administered medications for this visit      _______________________________________________________________________  Review of Systems   Constitutional: Negative for activity change, appetite change, chills, fatigue, fever and unexpected weight change  HENT: Negative for congestion, ear discharge, ear pain, nosebleeds, postnasal drip, rhinorrhea, sinus pressure, sinus pain, sneezing, sore throat and voice change  Eyes: Negative for pain, redness and visual disturbance  Respiratory: Negative for cough, chest tightness, shortness of breath and wheezing  Cardiovascular: Negative for chest pain and palpitations  Gastrointestinal: Negative for abdominal distention, abdominal pain, constipation, diarrhea, nausea and vomiting  Endocrine: Negative  Genitourinary: Negative for difficulty urinating, dysuria, flank pain, frequency, hematuria and urgency  Musculoskeletal: Positive for arthralgias  Negative for myalgias  Left wrist pain over the last 2 weeks  Pain scale 5/10  With activity pain may increase with turning his wrist to drive using steering wheel  Skin: Negative  Allergic/Immunologic: Negative  Neurological: Negative  Hematological: Negative  Psychiatric/Behavioral: Negative  Objective:  Vitals:    06/24/22 1419   BP: 130/90   BP Location: Left arm   Patient Position: Sitting   Cuff Size: Large   Pulse: 65   Resp: 16   Temp: 97 6 °F (36 4 °C)   TempSrc: Temporal   SpO2: 97%   Weight: 105 kg (231 lb 9 6 oz)   Height: 5' 5" (1 651 m)     Body mass index is 38 54 kg/m²  Physical Exam  Vitals and nursing note reviewed  Constitutional:       Appearance: Normal appearance  He is well-developed  He is obese  HENT:      Head: Normocephalic and atraumatic  Right Ear: Tympanic membrane, ear canal and external ear normal       Left Ear: Tympanic membrane, ear canal and external ear normal       Nose: Nose normal  No congestion or rhinorrhea  Mouth/Throat:      Mouth: Mucous membranes are moist       Pharynx: No oropharyngeal exudate or posterior oropharyngeal erythema  Eyes:      Extraocular Movements: Extraocular movements intact  Conjunctiva/sclera: Conjunctivae normal       Pupils: Pupils are equal, round, and reactive to light  Cardiovascular:      Rate and Rhythm: Normal rate and regular rhythm  Pulses: Normal pulses  Heart sounds: Normal heart sounds  No murmur heard  Pulmonary:      Effort: Pulmonary effort is normal       Breath sounds: Normal breath sounds  Abdominal:      General: Bowel sounds are normal       Palpations: Abdomen is soft  Musculoskeletal:         General: Tenderness present  Normal range of motion  Cervical back: Normal range of motion        Comments: Tenderness of left wrist with mild numbness  Pain with flexion of the wrist and extension  +pp, + warmth, +mobility, + sensation, + capillary refill  Hand strength 5/5  Skin:     General: Skin is warm  Capillary Refill: Capillary refill takes less than 2 seconds  Neurological:      General: No focal deficit present  Mental Status: He is alert and oriented to person, place, and time     Psychiatric:         Mood and Affect: Mood normal          Behavior: Behavior normal

## 2022-06-24 ENCOUNTER — HOSPITAL ENCOUNTER (OUTPATIENT)
Dept: RADIOLOGY | Facility: HOSPITAL | Age: 57
Discharge: HOME | End: 2022-06-24
Payer: COMMERCIAL

## 2022-06-24 ENCOUNTER — APPOINTMENT (OUTPATIENT)
Dept: LAB | Facility: CLINIC | Age: 57
End: 2022-06-24
Payer: COMMERCIAL

## 2022-06-24 ENCOUNTER — OFFICE VISIT (OUTPATIENT)
Dept: FAMILY MEDICINE CLINIC | Facility: CLINIC | Age: 57
End: 2022-06-24
Payer: COMMERCIAL

## 2022-06-24 VITALS
SYSTOLIC BLOOD PRESSURE: 130 MMHG | WEIGHT: 231.6 LBS | DIASTOLIC BLOOD PRESSURE: 90 MMHG | HEIGHT: 65 IN | OXYGEN SATURATION: 97 % | HEART RATE: 65 BPM | RESPIRATION RATE: 16 BRPM | BODY MASS INDEX: 38.59 KG/M2 | TEMPERATURE: 97.6 F

## 2022-06-24 DIAGNOSIS — I26.99 PULMONARY EMBOLISM, UNSPECIFIED CHRONICITY, UNSPECIFIED PULMONARY EMBOLISM TYPE, UNSPECIFIED WHETHER ACUTE COR PULMONALE PRESENT (HCC): ICD-10-CM

## 2022-06-24 DIAGNOSIS — Z98.84 BARIATRIC SURGERY STATUS: ICD-10-CM

## 2022-06-24 DIAGNOSIS — K91.2 POSTSURGICAL MALABSORPTION: ICD-10-CM

## 2022-06-24 DIAGNOSIS — E66.09 CLASS 2 OBESITY DUE TO EXCESS CALORIES WITH BODY MASS INDEX (BMI) OF 38.0 TO 38.9 IN ADULT, UNSPECIFIED WHETHER SERIOUS COMORBIDITY PRESENT: ICD-10-CM

## 2022-06-24 DIAGNOSIS — Z48.815 ENCOUNTER FOR SURGICAL AFTERCARE FOLLOWING SURGERY OF DIGESTIVE SYSTEM: ICD-10-CM

## 2022-06-24 DIAGNOSIS — M10.9 GOUT, UNSPECIFIED CAUSE, UNSPECIFIED CHRONICITY, UNSPECIFIED SITE: ICD-10-CM

## 2022-06-24 DIAGNOSIS — C64.1 MALIGNANT NEOPLASM OF RIGHT KIDNEY (HCC): ICD-10-CM

## 2022-06-24 DIAGNOSIS — E21.1 HYPERPARATHYROIDISM , SECONDARY, NON-RENAL (HCC): ICD-10-CM

## 2022-06-24 DIAGNOSIS — I10 ESSENTIAL HYPERTENSION: ICD-10-CM

## 2022-06-24 DIAGNOSIS — Z12.5 SCREENING FOR PROSTATE CANCER: ICD-10-CM

## 2022-06-24 DIAGNOSIS — I50.22 CHRONIC SYSTOLIC CONGESTIVE HEART FAILURE (HCC): ICD-10-CM

## 2022-06-24 DIAGNOSIS — K21.9 GASTROESOPHAGEAL REFLUX DISEASE, UNSPECIFIED WHETHER ESOPHAGITIS PRESENT: ICD-10-CM

## 2022-06-24 DIAGNOSIS — M25.532 LEFT WRIST PAIN: ICD-10-CM

## 2022-06-24 DIAGNOSIS — M77.8 LEFT WRIST TENDONITIS: Primary | ICD-10-CM

## 2022-06-24 DIAGNOSIS — D86.9 SARCOIDOSIS: ICD-10-CM

## 2022-06-24 DIAGNOSIS — E11.65 TYPE 2 DIABETES MELLITUS WITH HYPERGLYCEMIA, WITHOUT LONG-TERM CURRENT USE OF INSULIN (HCC): ICD-10-CM

## 2022-06-24 DIAGNOSIS — E66.9 OBESITY, CLASS II, BMI 35-39.9: ICD-10-CM

## 2022-06-24 LAB
25(OH)D3 SERPL-MCNC: 26.3 NG/ML (ref 30–100)
ALBUMIN SERPL BCP-MCNC: 3.8 G/DL (ref 3.5–5)
ALP SERPL-CCNC: 67 U/L (ref 34–104)
ALT SERPL W P-5'-P-CCNC: 21 U/L (ref 7–52)
ANION GAP SERPL CALCULATED.3IONS-SCNC: 7 MMOL/L (ref 4–13)
AST SERPL W P-5'-P-CCNC: 18 U/L (ref 13–39)
BASOPHILS # BLD AUTO: 0.03 THOUSANDS/ΜL (ref 0–0.1)
BASOPHILS NFR BLD AUTO: 1 % (ref 0–1)
BILIRUB SERPL-MCNC: 0.32 MG/DL (ref 0.2–1)
BILIRUB UR QL STRIP: NEGATIVE
BUN SERPL-MCNC: 13 MG/DL (ref 5–25)
CALCIUM SERPL-MCNC: 8.4 MG/DL (ref 8.4–10.2)
CHLORIDE SERPL-SCNC: 105 MMOL/L (ref 96–108)
CLARITY UR: CLEAR
CO2 SERPL-SCNC: 26 MMOL/L (ref 21–32)
COLOR UR: YELLOW
CREAT SERPL-MCNC: 0.79 MG/DL (ref 0.6–1.3)
EOSINOPHIL # BLD AUTO: 0.28 THOUSAND/ΜL (ref 0–0.61)
EOSINOPHIL NFR BLD AUTO: 6 % (ref 0–6)
ERYTHROCYTE [DISTWIDTH] IN BLOOD BY AUTOMATED COUNT: 14.1 % (ref 11.6–15.1)
EST. AVERAGE GLUCOSE BLD GHB EST-MCNC: 117 MG/DL
FERRITIN SERPL-MCNC: 10 NG/ML (ref 8–388)
FOLATE SERPL-MCNC: >20 NG/ML (ref 3.1–17.5)
GFR SERPL CREATININE-BSD FRML MDRD: 101 ML/MIN/1.73SQ M
GLUCOSE SERPL-MCNC: 91 MG/DL (ref 65–140)
GLUCOSE UR STRIP-MCNC: NEGATIVE MG/DL
HBA1C MFR BLD: 5.7 %
HCT VFR BLD AUTO: 40.3 % (ref 36.5–49.3)
HGB BLD-MCNC: 12.6 G/DL (ref 12–17)
HGB UR QL STRIP.AUTO: NEGATIVE
IMM GRANULOCYTES # BLD AUTO: 0.02 THOUSAND/UL (ref 0–0.2)
IMM GRANULOCYTES NFR BLD AUTO: 0 % (ref 0–2)
IRON SATN MFR SERPL: 10 % (ref 20–50)
IRON SERPL-MCNC: 40 UG/DL (ref 65–175)
KETONES UR STRIP-MCNC: NEGATIVE MG/DL
LEUKOCYTE ESTERASE UR QL STRIP: NEGATIVE
LYMPHOCYTES # BLD AUTO: 1.15 THOUSANDS/ΜL (ref 0.6–4.47)
LYMPHOCYTES NFR BLD AUTO: 26 % (ref 14–44)
MCH RBC QN AUTO: 28.4 PG (ref 26.8–34.3)
MCHC RBC AUTO-ENTMCNC: 31.3 G/DL (ref 31.4–37.4)
MCV RBC AUTO: 91 FL (ref 82–98)
MONOCYTES # BLD AUTO: 0.36 THOUSAND/ΜL (ref 0.17–1.22)
MONOCYTES NFR BLD AUTO: 8 % (ref 4–12)
NEUTROPHILS # BLD AUTO: 2.65 THOUSANDS/ΜL (ref 1.85–7.62)
NEUTS SEG NFR BLD AUTO: 59 % (ref 43–75)
NITRITE UR QL STRIP: NEGATIVE
NRBC BLD AUTO-RTO: 0 /100 WBCS
PH UR STRIP.AUTO: 5.5 [PH]
PLATELET # BLD AUTO: 169 THOUSANDS/UL (ref 149–390)
PMV BLD AUTO: 11.9 FL (ref 8.9–12.7)
POTASSIUM SERPL-SCNC: 4 MMOL/L (ref 3.5–5.3)
PROT SERPL-MCNC: 6.5 G/DL (ref 6.4–8.4)
PROT UR STRIP-MCNC: NEGATIVE MG/DL
PSA SERPL-MCNC: 0.6 NG/ML (ref 0–4)
PTH-INTACT SERPL-MCNC: 148.3 PG/ML (ref 18.4–80.1)
RBC # BLD AUTO: 4.44 MILLION/UL (ref 3.88–5.62)
SODIUM SERPL-SCNC: 138 MMOL/L (ref 135–147)
SP GR UR STRIP.AUTO: 1.02 (ref 1–1.03)
TIBC SERPL-MCNC: 392 UG/DL (ref 250–450)
TSH SERPL DL<=0.05 MIU/L-ACNC: 0.98 UIU/ML (ref 0.45–4.5)
URATE SERPL-MCNC: 4.8 MG/DL (ref 3.5–8.5)
UROBILINOGEN UR QL STRIP.AUTO: 0.2 E.U./DL
VIT B12 SERPL-MCNC: 544 PG/ML (ref 100–900)
WBC # BLD AUTO: 4.49 THOUSAND/UL (ref 4.31–10.16)

## 2022-06-24 PROCEDURE — 36415 COLL VENOUS BLD VENIPUNCTURE: CPT

## 2022-06-24 PROCEDURE — 86803 HEPATITIS C AB TEST: CPT

## 2022-06-24 PROCEDURE — 84425 ASSAY OF VITAMIN B-1: CPT

## 2022-06-24 PROCEDURE — 82746 ASSAY OF FOLIC ACID SERUM: CPT

## 2022-06-24 PROCEDURE — 80053 COMPREHEN METABOLIC PANEL: CPT

## 2022-06-24 PROCEDURE — 3075F SYST BP GE 130 - 139MM HG: CPT | Performed by: NURSE PRACTITIONER

## 2022-06-24 PROCEDURE — 82306 VITAMIN D 25 HYDROXY: CPT

## 2022-06-24 PROCEDURE — 73110 X-RAY EXAM OF WRIST: CPT

## 2022-06-24 PROCEDURE — 82728 ASSAY OF FERRITIN: CPT

## 2022-06-24 PROCEDURE — 87389 HIV-1 AG W/HIV-1&-2 AB AG IA: CPT

## 2022-06-24 PROCEDURE — 3044F HG A1C LEVEL LT 7.0%: CPT | Performed by: NURSE PRACTITIONER

## 2022-06-24 PROCEDURE — 3008F BODY MASS INDEX DOCD: CPT | Performed by: NURSE PRACTITIONER

## 2022-06-24 PROCEDURE — 81003 URINALYSIS AUTO W/O SCOPE: CPT

## 2022-06-24 PROCEDURE — 83550 IRON BINDING TEST: CPT

## 2022-06-24 PROCEDURE — 84550 ASSAY OF BLOOD/URIC ACID: CPT

## 2022-06-24 PROCEDURE — 3080F DIAST BP >= 90 MM HG: CPT | Performed by: NURSE PRACTITIONER

## 2022-06-24 PROCEDURE — 1036F TOBACCO NON-USER: CPT | Performed by: NURSE PRACTITIONER

## 2022-06-24 PROCEDURE — 84443 ASSAY THYROID STIM HORMONE: CPT

## 2022-06-24 PROCEDURE — 83036 HEMOGLOBIN GLYCOSYLATED A1C: CPT

## 2022-06-24 PROCEDURE — 85025 COMPLETE CBC W/AUTO DIFF WBC: CPT

## 2022-06-24 PROCEDURE — 83540 ASSAY OF IRON: CPT

## 2022-06-24 PROCEDURE — 83970 ASSAY OF PARATHORMONE: CPT

## 2022-06-24 PROCEDURE — 84590 ASSAY OF VITAMIN A: CPT

## 2022-06-24 PROCEDURE — 82607 VITAMIN B-12: CPT

## 2022-06-24 PROCEDURE — 99214 OFFICE O/P EST MOD 30 MIN: CPT | Performed by: NURSE PRACTITIONER

## 2022-06-24 PROCEDURE — G0103 PSA SCREENING: HCPCS

## 2022-06-24 PROCEDURE — 84630 ASSAY OF ZINC: CPT

## 2022-06-24 NOTE — ASSESSMENT & PLAN NOTE
BMI currently 38 54 kg/M2  Patient counseled on proper diet nutrition and exercise  Goal BMI at this time 32 kg/M2  Recheck BMI next office visit

## 2022-06-24 NOTE — ASSESSMENT & PLAN NOTE
Patient noted left wrist tendinitis  Information provided in my chart  Views left wrist splint which has been provided office  Currently ordered for x-ray of the left wrist   Patient instructed on rice therapy

## 2022-06-24 NOTE — ASSESSMENT & PLAN NOTE
Patient has noted history of gout  Currently provided gout diet education  Currently order for uric acid level for evaluation  Purine diet education provided  Consumption of shellfish as well as barbecue foods recently

## 2022-06-25 LAB
HCV AB SER QL: NORMAL
HIV 1+2 AB+HIV1 P24 AG SERPL QL IA: NORMAL

## 2022-06-28 ENCOUNTER — PATIENT MESSAGE (OUTPATIENT)
Dept: FAMILY MEDICINE CLINIC | Facility: CLINIC | Age: 57
End: 2022-06-28

## 2022-06-28 DIAGNOSIS — M25.532 LEFT WRIST PAIN: Primary | ICD-10-CM

## 2022-06-28 LAB — ZINC SERPL-MCNC: 57 UG/DL (ref 44–115)

## 2022-06-29 LAB — VIT A SERPL-MCNC: 42.3 UG/DL (ref 20.1–62)

## 2022-07-29 LAB — VIT B1 BLD-SCNC: 130 NMOL/L (ref 66.5–200)

## 2022-08-02 DIAGNOSIS — I50.22 CHRONIC SYSTOLIC CONGESTIVE HEART FAILURE (HCC): ICD-10-CM

## 2022-08-02 DIAGNOSIS — I10 ESSENTIAL HYPERTENSION: ICD-10-CM

## 2022-08-02 PROCEDURE — 4010F ACE/ARB THERAPY RXD/TAKEN: CPT | Performed by: ORTHOPAEDIC SURGERY

## 2022-08-02 RX ORDER — LOSARTAN POTASSIUM 50 MG/1
50 TABLET ORAL
Qty: 90 TABLET | Refills: 0 | Status: SHIPPED | OUTPATIENT
Start: 2022-08-02 | End: 2022-10-31

## 2022-08-11 ENCOUNTER — OFFICE VISIT (OUTPATIENT)
Dept: OBGYN CLINIC | Facility: CLINIC | Age: 57
End: 2022-08-11
Payer: COMMERCIAL

## 2022-08-11 VITALS
DIASTOLIC BLOOD PRESSURE: 87 MMHG | HEIGHT: 65 IN | BODY MASS INDEX: 39.45 KG/M2 | WEIGHT: 236.8 LBS | SYSTOLIC BLOOD PRESSURE: 129 MMHG | HEART RATE: 76 BPM

## 2022-08-11 DIAGNOSIS — M25.532 LEFT WRIST PAIN: ICD-10-CM

## 2022-08-11 DIAGNOSIS — M77.8 EXTENSOR CARPI ULNARIS TENDINITIS: Primary | ICD-10-CM

## 2022-08-11 PROCEDURE — 99214 OFFICE O/P EST MOD 30 MIN: CPT | Performed by: ORTHOPAEDIC SURGERY

## 2022-08-11 PROCEDURE — 3074F SYST BP LT 130 MM HG: CPT | Performed by: ORTHOPAEDIC SURGERY

## 2022-08-11 PROCEDURE — 3079F DIAST BP 80-89 MM HG: CPT | Performed by: ORTHOPAEDIC SURGERY

## 2022-08-11 RX ORDER — LIDOCAINE 50 MG/G
OINTMENT TOPICAL 3 TIMES DAILY PRN
Qty: 35.44 G | Refills: 2 | Status: SHIPPED | OUTPATIENT
Start: 2022-08-11

## 2022-08-11 NOTE — PROGRESS NOTES
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  Pain is 4-8/10, sharp, localized to ulnar border left wrist   Pain is sharp in nature and sometimes radiates proximally to the ulnar forearm  Patient denies any associated tingling or numbness of the left hand distally  Symptoms are made worse with pronation and supination of the left wrist as well as with prolonged typing or lifting activity  He may notice intermittent clicking of the left wrist with movement  Summary of treatment to-date: Pt has tried night brace and OTC Tylenol w/o relief  I have personally reviewed pertinent films in PACS and my interpretation is Mild degenerative changes over [facility]st [facility] joint  No pathology correlate with sx's  No acute fracture or bony injury noted       Patient Active Problem List   Diagnosis    Pulmonary embolism ([address] )    Gastroesophageal reflux disease    History of sleeve gastrectomy    Sarcoidosis    Incidental lung nodule, > 3mm and < 8mm    Right renal mass    Cigar smoker    Essential hypertension    Hyperparathyroidism , secondary, non-renal ([address] )    Low vitamin D level    Low vitamin B12 level    Low calcium levels    NICM (nonischemic cardiomyopathy) (HCC)    Chest pain    Bariatric surgery status    Type 2 diabetes mellitus with hyperglycemia (HCC)    Osteoporosis    Class 2 obesity due to excess calories with body mass index (BMI) of 38 0 to 38 9 in adult    Hypercholesterolemia    Heart burn    Gout    Generalized osteoarthritis    Arthritis    ED (erectile dysfunction) of organic origin    Prediabetes    Mixed hyperlipidemia    Chronic systolic congestive heart failure (HCC)    History of pulmonary embolism    Obesity, Class II, BMI 35-39 9    Encounter for surgical aftercare following surgery of digestive system    Muscle pain    Postsurgical malabsorption    Renal cyst    Pre-op evaluation    Incisional hernia of anterior abdominal wall with obstruction    Renal cancer (HCC)    Small bowel obstruction due to adhesions (HCC)    Left wrist pain    Left wrist tendonitis        Current Outpatient Medications on File Prior to Visit   Medication Sig Dispense Refill    acetaminophen (TYLENOL) 325 mg tablet Take 2 tablets (650 mg total) by mouth every 4 (four) hours as needed for mild pain 30 tablet 0    ammonium lactate (LAC-HYDRIN) 12 % cream Apply topically as needed for dry skin 385 g 3    APPLE CIDER VINEGAR PO Take by mouth daily      calcium carbonate-vitamin D (OSCAL-D) 500 mg-200 units per tablet TAKE 1 TABLET BY MOUTH DAILY TO SUPPLEMENT CALCIUM/ STRENGTHEN BONES      Cholecalciferol 25 MCG (1000 UT) tablet TAKE ONE TABLET BY MOUTH DAILY FOR VITAMIN D SUPPLEMENTATION      ciclopirox (LOPROX) 0 77 % cream Apply topically 2 (two) times a day For 2-4 weeks to affected areas of skin 90 g 0    losartan (COZAAR) 50 mg tablet TAKE 1 TABLET (50 MG TOTAL) BY MOUTH DAILY IN THE EARLY MORNING HAS BEEN ON IT WITHOUT ISSUES 90 tablet 0    Multiple Vitamin (Daily-Mark Multivitamin) TABS TAKE 1 TABLET BY MOUTH EVERY DAY 90 tablet 1    Multiple Vitamins-Minerals (MULTIVITAMIN ADULT, MINERALS, PO) 1 tablet      nystatin (MYCOSTATIN) cream Apply topically as needed (rash) 30 g 0    carvedilol (COREG) 12 5 mg tablet Take 1 tablet (12 5 mg total) by mouth 2 (two) times a day 180 tablet 1    chlorhexidine (PERIDEX) 0 12 % solution SWISH AND SPIT 15ML TWICE DAILY FOR ONE WEEK (Patient not taking: No sig reported)      docusate sodium (COLACE) 100 mg capsule Take 1 capsule (100 mg total) by mouth 2 (two) times a day for 15 days 30 capsule 0    NON FORMULARY daily Beet Root (Patient not taking: No sig reported)      pantoprazole (PROTONIX) 40 mg tablet Take 1 tablet (40 mg total) by mouth daily at bedtime 90 tablet 1    Xarelto 20 MG tablet Take 1 tablet (20 mg total) by mouth daily with breakfast 90 tablet 1     No current facility-administered medications on file prior to visit  Allergies   Allergen Reactions    Nsaids Other (See Comments)     Duodenal ulcer disease    Flunisolide      Other reaction(s): Burning sensation    Lisinopril Cough    Testosterone Itching, Rash and Other (See Comments)        Tobacco Use: Medium Risk    Smoking Tobacco Use: Former Smoker    Smokeless Tobacco Use: Never Used        Social Determinants of Health     Tobacco Use: Medium Risk    Smoking Tobacco Use: Former Smoker    Smokeless Tobacco Use: Never Used   Alcohol Use: Not on file   Financial Resource Strain: Not on file   Food Insecurity: Not on file   Transportation Needs: Not on file   Physical Activity: Not on file   Stress: Not on file   Social Connections: Not on file   Intimate Partner Violence: Not on file   Depression: Not at risk    PHQ-2 Score: 0   Housing Stability: Not on file               Review of Systems     Body mass index is 39 41 kg/m²  Physical Exam  Vitals and nursing note reviewed  Constitutional:       Appearance: Normal appearance  HENT:      Head: Atraumatic  Eyes:      Conjunctiva/sclera: Conjunctivae normal    Cardiovascular:      Rate and Rhythm: Normal rate  Pulmonary:      Effort: Pulmonary effort is normal    Neurological:      Mental Status: He is alert and oriented to person, place, and time  Psychiatric:         Mood and Affect: Mood normal          Behavior: Behavior normal           Ortho Exam:    Body part: left wrist    Inspection:  Nml appearing left wrist   No gross deformity noted  No overlying skin changes or erythema noted  No swelling noted      Palpation:  + tenderness to palpation over extensor carpi ulnaris tendon area that is worse with wrist pronation and radial deviation  No ECU tendon subluxation is noted clinically with pronation and supination  There is no significant tenderness to palpation in the ulnar groove  Range of motion:  Patient is able to actively dorsiflex and volar flex the left wrist but has discomfort with dorsiflexion  Also has discomfort with wrist supination  Special Tests:   Mild discomfort with TFCC compression test   Has increased discomfort with resisted right wrist dorsiflexion  There is also a palpable clicking noted with pronation and supination no palpable subluxation noted of the ECU tendon  Negative Tinel's over the Guyon's canal and no volar wrist tenderness noted  Distal Neurovascular Status: Intact, Yes    Procedures       Assessment:     Diagnosis ICD-10-CM Associated Orders   1  Extensor carpi ulnaris tendinitis  M77 8    2  Left wrist pain  M25 532 Ambulatory Referral to Orthopedic Surgery     lidocaine (XYLOCAINE) 5 % ointment        Plan:    Explained my current clinical findings and reviewed radiological findings with Ulmer  His left wrist pain is likely secondary to extensor carpi ulnaris tendinitis  Differential diagnosis includes potential underlying TFCC tear  At this time, I have prescribed him topical lidocaine ointment that he may apply on an as-needed basis  His suggested to avoid doing repetitive left wrist pronation and supination as this worsens his symptoms  He is agreeable to a trial of ultrasound-guided left ECU tendon sheath cortisone injection for which he will be scheduled accordingly  If symptoms persist despite this measure we could consider doing further advanced imaging of the left wrist       Follow-Up:    Return for FOLLOW-UP FOR ULTRASOUND-GUIDED LEFT WRIST INJECTION  Portions of the record may have been created with voice recognition software   Occasional wrong word or "sound alike" substitutions may have occurred due to the inherent limitations of voice recognition software  Please review the chart carefully and recognize, using context, where substitutions/typographical errors may have occurred

## 2022-09-05 ENCOUNTER — OFFICE VISIT (OUTPATIENT)
Dept: URGENT CARE | Age: 57
End: 2022-09-05
Payer: COMMERCIAL

## 2022-09-05 VITALS — HEART RATE: 96 BPM | RESPIRATION RATE: 14 BRPM | OXYGEN SATURATION: 98 % | TEMPERATURE: 97.4 F

## 2022-09-05 DIAGNOSIS — U07.1 COVID: Primary | ICD-10-CM

## 2022-09-05 LAB
SARS-COV-2 AG UPPER RESP QL IA: POSITIVE
VALID CONTROL: ABNORMAL

## 2022-09-05 PROCEDURE — 87811 SARS-COV-2 COVID19 W/OPTIC: CPT

## 2022-09-05 PROCEDURE — 99204 OFFICE O/P NEW MOD 45 MIN: CPT | Performed by: PHYSICIAN ASSISTANT

## 2022-09-05 RX ORDER — BENZONATATE 100 MG/1
100 CAPSULE ORAL 3 TIMES DAILY PRN
Qty: 20 CAPSULE | Refills: 0 | Status: SHIPPED | OUTPATIENT
Start: 2022-09-05

## 2022-09-05 NOTE — PATIENT INSTRUCTIONS
COVID   Tessalon Perles as needed  Follow up with PCP in s worsen  3-5 days  Proceed to  ER if symptom  COVID-19 Home Care Guidelines    Your healthcare provider and/or public health staff have evaluated you and have determined that you do not need to remain in the hospital at this time  At this time you can be isolated at home where you will be monitored by staff from your local or state health department  You should carefully follow the prevention and isolation steps below until a healthcare provider or local or state health department says that you can return to your normal activities  Stay home except to get medical care    People who are mildly ill with COVID-19 are able to isolate at home during their illness  You should restrict activities outside your home, except for getting medical care  Do not go to work, school, or public areas  Avoid using public transportation, ride-sharing, or taxis  Separate yourself from other people and animals in your home    People: As much as possible, you should stay in a specific room and away from other people in your home  Also, you should use a separate bathroom, if available  Animals: You should restrict contact with pets and other animals while you are sick with COVID-19, just like you would around other people  Although there have not been reports of pets or other animals becoming sick with COVID-19, it is still recommended that people sick with COVID-19 limit contact with animals until more information is known about the virus  When possible, have another member of your household care for your animals while you are sick  If you are sick with COVID-19, avoid contact with your pet, including petting, snuggling, being kissed or licked, and sharing food  If you must care for your pet or be around animals while you are sick, wash your hands before and after you interact with pets and wear a facemask  See COVID-19 and Animals for more information      Call ahead before visiting your doctor    If you have a medical appointment, call the healthcare provider and tell them that you have or may have COVID-19  This will help the healthcare providers office take steps to keep other people from getting infected or exposed  Wear a facemask    You should wear a facemask when you are around other people (e g , sharing a room or vehicle) or pets and before you enter a healthcare providers office  If you are not able to wear a facemask (for example, because it causes trouble breathing), then people who live with you should not stay in the same room with you, or they should wear a facemask if they enter your room  Cover your coughs and sneezes    Cover your mouth and nose with a tissue when you cough or sneeze  Throw used tissues in a lined trash can  Immediately wash your hands with soap and water for at least 20 seconds or, if soap and water are not available, clean your hands with an alcohol-based hand  that contains at least 60% alcohol  Clean your hands often    Wash your hands often with soap and water for at least 20 seconds, especially after blowing your nose, coughing, or sneezing; going to the bathroom; and before eating or preparing food  If soap and water are not readily available, use an alcohol-based hand  with at least 60% alcohol, covering all surfaces of your hands and rubbing them together until they feel dry  Soap and water are the best option if hands are visibly dirty  Avoid touching your eyes, nose, and mouth with unwashed hands  Avoid sharing personal household items    You should not share dishes, drinking glasses, cups, eating utensils, towels, or bedding with other people or pets in your home  After using these items, they should be washed thoroughly with soap and water      Clean all high-touch surfaces everyday    High touch surfaces include counters, tabletops, doorknobs, bathroom fixtures, toilets, phones, keyboards, tablets, and bedside tables  Also, clean any surfaces that may have blood, stool, or body fluids on them  Use a household cleaning spray or wipe, according to the label instructions  Labels contain instructions for safe and effective use of the cleaning product including precautions you should take when applying the product, such as wearing gloves and making sure you have good ventilation during use of the product  Monitor your symptoms    Seek prompt medical attention if your illness is worsening (e g , difficulty breathing)  Before seeking care, call your healthcare provider and tell them that you have, or are being evaluated for, COVID-19  Put on a facemask before you enter the facility  These steps will help the healthcare providers office to keep other people in the office or waiting room from getting infected or exposed  Ask your healthcare provider to call the local or [facility] health department  Persons who are placed under active monitoring or facilitated self-monitoring should follow instructions provided by their local health department or occupational health professionals, as appropriate  If you have a medical emergency and need to call 911, notify the dispatch personnel that you have, or are being evaluated for COVID-19  If possible, put on a facemask before emergency medical services arrive  Discontinuing home isolation    Patients with confirmed COVID-19 should remain under home isolation precautions until the following conditions are met:    They have had no fever for at least 24 hours (that is one full day of no fever without the use medicine that reduces fevers)  AND  other symptoms have improved (for example, when their cough or shortness of breath have improved)  AND  If had mild or moderate illness, at least 10 days have passed since their symptoms first appeared or if severe illness (needed oxygen) or immunosuppressed, at least 20 days have passed since symptoms first appeared  Patients with confirmed COVID-19 should also notify close contacts (including their workplace) and ask that they self-quarantine  Currently, close contact is defined as being within 6 feet for 15 minutes or more from the period 24 hours starting 48 hours before symptom onset to the time at which the patient went into isolation  Close contacts of patients diagnosed with COVID-19 should be instructed by the patient to self-quarantine for 14 days from the last time of their last contact with the patient       Source: [employer]

## 2022-09-05 NOTE — PROGRESS NOTES
[facility]        NAME: Ulysses Cart is a 65 y o  male  : 1965    MRN: [id number]  DATE: 2022  TIME: 3:49 PM    Assessment and Plan   COVID [U07 1]  1  COVID  benzonatate (TESSALON PERLES) 100 mg capsule    Poct Covid 19 Rapid Antigen Test         Patient Instructions     COVID   Tessalon Perles as needed  Follow up with PCP in s worsen  3-5 days  Proceed to  ER if symptom    Chief Complaint     Chief Complaint   Patient presents with    Cough    Diarrhea    Sore Throat    Headache     Symptoms started Thursday         History of Present Illness       60-year-old male who presents complaining of cough, generalized body aches, fevers T-max 100 7° x5 days  Patient states the son tested positive for COVID  Patient is vaccinated in booster for COVID-19  Patient denies chest pain, shortness off breath    Cough  Associated symptoms include headaches and a sore throat  Diarrhea   Associated symptoms include coughing and headaches  Sore Throat   Associated symptoms include coughing, diarrhea and headaches  Headache      Review of Systems   Review of Systems   HENT: Positive for sore throat  Respiratory: Positive for cough  Gastrointestinal: Positive for diarrhea  Neurological: Positive for headaches           Current Medications       Current Outpatient Medications:     benzonatate (TESSALON PERLES) 100 mg capsule, Take 1 capsule (100 mg total) by mouth 3 (three) times a day as needed for cough, Disp: 20 capsule, Rfl: 0    acetaminophen (TYLENOL) 325 mg tablet, Take 2 tablets (650 mg total) by mouth every 4 (four) hours as needed for mild pain, Disp: 30 tablet, Rfl: 0    ammonium lactate (LAC-HYDRIN) 12 % cream, Apply topically as needed for dry skin, Disp: 385 g, Rfl: 3    APPLE CIDER VINEGAR PO, Take by mouth daily, Disp: , Rfl:     calcium carbonate-vitamin D (OSCAL-D) 500 mg-200 units per tablet, TAKE 1 TABLET BY MOUTH DAILY TO SUPPLEMENT CALCIUM/ STRENGTHEN BONES, Disp: , Rfl:     carvedilol (COREG) 12 5 mg tablet, Take 1 tablet (12 5 mg total) by mouth 2 (two) times a day, Disp: 180 tablet, Rfl: 1    chlorhexidine (PERIDEX) 0 12 % solution, SWISH AND SPIT 15ML TWICE DAILY FOR ONE WEEK (Patient not taking: No sig reported), Disp: , Rfl:     Cholecalciferol 25 MCG (1000 UT) tablet, TAKE ONE TABLET BY MOUTH DAILY FOR VITAMIN D SUPPLEMENTATION, Disp: , Rfl:     ciclopirox (LOPROX) 0 77 % cream, Apply topically 2 (two) times a day For 2-4 weeks to affected areas of skin, Disp: 90 g, Rfl: 0    docusate sodium (COLACE) 100 mg capsule, Take 1 capsule (100 mg total) by mouth 2 (two) times a day for 15 days, Disp: 30 capsule, Rfl: 0    lidocaine (XYLOCAINE) 5 % ointment, Apply topically 3 (three) times a day as needed for mild pain (FOR LEFT WRIST PAIN), Disp: 35 44 g, Rfl: 2    losartan (COZAAR) 50 mg tablet, TAKE 1 TABLET (50 MG TOTAL) BY MOUTH DAILY IN THE EARLY MORNING HAS BEEN ON IT WITHOUT ISSUES, Disp: 90 tablet, Rfl: 0    Multiple Vitamin (Daily-Mark Multivitamin) TABS, TAKE 1 TABLET BY MOUTH EVERY DAY, Disp: 90 tablet, Rfl: 1    Multiple Vitamins-Minerals (MULTIVITAMIN ADULT, MINERALS, PO), 1 tablet, Disp: , Rfl:     NON FORMULARY, daily Beet Root (Patient not taking: No sig reported), Disp: , Rfl:     nystatin (MYCOSTATIN) cream, Apply topically as needed (rash), Disp: 30 g, Rfl: 0    pantoprazole (PROTONIX) 40 mg tablet, Take 1 tablet (40 mg total) by mouth daily at bedtime, Disp: 90 tablet, Rfl: 1    Xarelto 20 MG tablet, Take 1 tablet (20 mg total) by mouth daily with breakfast, Disp: 90 tablet, Rfl: 1    Current Allergies     Allergies as of 09/05/2022 - Reviewed 09/05/2022   Allergen Reaction Noted    Nsaids Other (See Comments) 04/02/2015    Flunisolide  04/03/2006    Lisinopril Cough 06/12/2020    Testosterone Itching, Rash, and Other (See Comments) 09/27/2010            The following portions of the patient's history were reviewed and updated as appropriate: allergies, current medications, past family history, past medical history, past social history, past surgical history and problem list      Past Medical History:   Diagnosis Date    Bariatric surgery status     Duodenal ulcer     GERD (gastroesophageal reflux disease)     Gout     [facility]par[facility]roidism ([facility] [address] )     Hypertension     Lung nodule     Postsurgical malabsorption     Pulmonary embolism ([facility] [address] ) 2019    Sarcoidosis     Wears glasses        Past Surgical History:   Procedure Laterality Date    BARIATRIC SURGERY      Gastric Sleeve    COLONOSCOPY      FOOT SURGERY Right     R ankle    GASTRIC BYPASS LAPAROSCOPIC N/A 9/22/2020    Procedure: LAPAROSCOPIC REVISION/ CONVERSION TO HALEY-EN-Y GASTRIC BYPASS W ROBOTICS, PARAESOPHAGEAL HERNIA REPAIR, AND INTRAOPERATIVE EGD;  Surgeon: Miranda Cuevas MD;  [facility];  Service: Bariatrics    HAND SURGERY      LAPAROTOMY N/A 6/19/2021    Procedure: Repair of incarcerated incisional hernia repair with mesh   ;  Surgeon: Pete Napier DO;  [facility];  Service: General    AK LAP,PARTIAL NEPHRECTOMY Right 6/14/2021    Procedure: ROBOTIC LAPAROSCOPIC PARTIAL NEPHRECTOMY DECORTICATION OF RIGHT RENAL CYST;  Surgeon: Obie Tolentino MD;  [facility];  Service: Urology    SKIN BIOPSY         Family History   Problem Relation Age of Onset    Hypertension Father     Diabetes Father     Cancer Father     Diabetes Maternal Aunt     Cancer Maternal Aunt          Medications have been verified          Objective   Pulse 96   Temp (!) 97 4 °F (36 3 °C) (Temporal)   Resp 14   SpO2 98%        Physical Exam     Physical Exam

## 2022-09-05 NOTE — LETTER
September 5, 2022     Patient: Tammi Allen   YOB: 1965   Date of Visit: 9/5/2022       To Whom it May Concern:    Alba Miranda was seen in my clinic on 9/5/2022  If Covid and flu tests are negative, patient may return to work when fever free for 24 hours without the use of a fever reducing agent  If covid or flu test is positive, patient may return to work 9/10/22  and when fever free for 24 hours without the use of a fever reducing agent  [place] Upon return, the patient must then adhere to strict masking for an additional 5 days    If you have any questions or concerns, please don't hesitate to call           Sincerely,          [facility]        CC: No Recipients

## 2022-09-06 ENCOUNTER — TELEMEDICINE (OUTPATIENT)
Dept: FAMILY MEDICINE CLINIC | Facility: CLINIC | Age: 57
End: 2022-09-06
Payer: COMMERCIAL

## 2022-09-06 DIAGNOSIS — U07.1 COVID-19: Primary | ICD-10-CM

## 2022-09-06 PROCEDURE — 99213 OFFICE O/P EST LOW 20 MIN: CPT | Performed by: NURSE PRACTITIONER

## 2022-09-06 NOTE — PATIENT INSTRUCTIONS
COVID-19 and Chronic Health Conditions   WHAT YOU NEED TO KNOW:   Your chronic condition may increase your risk for COVID-19 or serious problems it can cause  Healthcare providers might need to make changes that affect how you usually manage your chronic health condition  Providers may change hours of operation or not have patients come in to be seen  You may not be able to make appointments to get blood drawn or to have tests or procedures  This may continue until the virus that causes COVID-19 is controlled  Until then, you can take steps to manage your condition  The steps will also lower your risk for COVID-19 or the serious problems it causes  If you do develop COVID-19, healthcare providers will tell you when it is okay to be around others after you recover  This depends on your chronic condition, any symptoms of COVID-19 that developed, and how severe the symptoms were  DISCHARGE INSTRUCTIONS:   Call your local emergency number [phone] in the [address] [place] [address],[address]) or an emergency department if:   You have trouble breathing or shortness of breath  You have chest pain or pressure that lasts longer than 5 minutes  You become confused or hard to wake  Your lips or face are blue  Return to the emergency department if:   You have a fever of 104°F (40°C) or higher  Call your doctor or healthcare provider if:   You have symptoms of COVID-19  You have questions or concerns about COVID-19 or your chronic condition  What you need to know about serious problems from the virus:  You may develop long-term health problems caused by the virus  Your risk is higher if you are 65 or older  A weak immune system, obesity, diabetes, chronic kidney disease, or a heart or lung condition can also increase your risk  Your risk is also higher if you are a current or former cigarette smoker   COVID-19 can lead to any of the following:  Multisymptom inflammatory syndrome in adults (MIS-A) or in children (MIS-C), causing inflammation in the heart, digestive system, skin, or brain    Shortness of breath, serious lower respiratory conditions, such as pneumonia or acute respiratory distress syndrome (ARDS)    Blood clots or blood vessel damage    Organ damage from a lack of oxygen or from blood clots    Sleep problems    Problems thinking clearly, remembering information, or concentrating    Mood changes, depression, or anxiety    Long-term problems tasting or smelling    Loss of appetite and weight loss    Nerve pain    Fatigue (feeling mentally and physically tired)    How the 2019 coronavirus spreads: The virus spreads quickly and easily  The virus can be passed starting 2 to 3 days before symptoms begin or before a positive test if symptoms never begin  Droplets are the main way all coronaviruses spread  The virus travels in droplets that form when a person talks, sings, coughs, or sneezes  The droplets can also float in the air for minutes or hours  Infection happens when you breathe in the droplets or get them in your eyes or nose  Close personal contact with an infected person increases your risk for infection  This means being within 6 feet (2 meters) of the person for at least 15 minutes over 24 hours  Person-to-person contact can spread the virus  For example, a person with the virus on his or her hands can spread it by shaking hands with someone  The virus can stay on objects and surfaces for up to 3 days  You may become infected by touching the object or surface and then touching your eyes or mouth  Manage your chronic health condition during this time:  If you do not have a regular healthcare provider, experts recommend you contact a local Critical access hospital health center or health department  The following can help you manage your condition and prevent COVID-19:  Get emergency care for your condition if needed  Talk to your healthcare providers about symptoms of your chronic condition that need immediate care   Your providers can help you create a plan or add exacerbation management to your plan  The plan will include when to go to an emergency department and when to call your local emergency number  This will depend on where you live and the services that are available during this time  Go to dialysis appointments as scheduled  It is important to stay on schedule  You will need to have enough food to be able to follow the emergency diet plan if you must miss a session  The emergency diet needs to be part of the management plan for your condition  Reschedule any upcoming appointments as needed  Medical facilities may be closed until the coronavirus is better controlled  This means you may need to reschedule a surgery, procedure, or check-up appointment  If you cannot have a phone or video appointment, you will need to make a new appointment  Some providers may be scheduling appointments several months in advance  Some surgeries and procedures will happen as scheduled  This depends on the medical condition and the reason for the surgery or procedure  You may need to have extra testing for COVID-19 several days before  Follow any regular management plan you use  Your healthcare provider will tell you if you need to make any changes to your regular management plan  For example, if you have asthma, continue to follow your asthma action plan  If you have diabetes, you may need to check your blood sugar level more often  Stress and illness can make blood sugar levels go up  You may need to adjust medicine such as insulin  If you have a heart condition or high blood pressure, you may need to check your blood pressure more often  Stress and illness can also raise your blood pressure  Talk to your healthcare providers about your medicines  You may be able to get more than 1 month of medicine at a time  This will lower the number of times you need to go to a pharmacy to get your medicines   Make sure you have enough medicine if you have a condition that can lead to an emergency  Examples include asthma medicines, insulin, or an epinephrine pen  Check the expiration dates on the medicines you currently have  Ask for refills as soon as possible, if needed  If it is not time to refill prescriptions, you may be able to get an emergency supply of some medicines  Medicine plans vary, so ask your healthcare provider or pharmacist for options  Have supplies available in your home  If possible, get extra supplies you use regularly  Examples include absorbent pads, syringes, and wound cleaning solutions  This will limit the number of trips out of your home to get supplies  Know the signs and symptoms of COVID-19  Signs and symptoms usually start about 5 days after infection but can take 2 to 14 days  Signs and symptoms range from mild to severe  You may feel like you have the flu or a bad cold  Your chronic health condition may cause some of the same symptoms COVID-19 causes  This can make it hard to know if a symptom is from COVID-19 or your chronic condition  Keep a record of any new or worsening symptom you have  This is especially important if you have a condition that often causes shortness of breath  Your provider can tell you if you should be tested for COVID-19  Tell your healthcare provider if you think you were infected but develop signs or symptoms not listed below:    A cough    Shortness of breath or trouble breathing that may become severe    A fever of at least 100 4°F, or 38°C (may be lower in adults 65 or older)    Chills that might include shaking    Muscle pain, body aches, or a headache    A sore throat    Suddenly not being able to taste or smell anything    Feeling very tired (fatigue)    Congestion (stuffy head and nose), or a runny nose    Diarrhea, nausea, or vomiting    What you can do to prevent having to go out of your home during this time:   Ask your healthcare provider for other ways to have appointments    Some providers offer phone, video, or other types of appointments  Have food, medicines, and other supplies delivered  Some pharmacies can send certain medicines to you through the mail  Grocery stores and restaurants may be able to deliver food and other items  If possible, have delivered items placed somewhere  Try not to have someone hand you an item  You will be so close to the person that the virus can spread between you  Ask someone to get items you need  The person can get groceries, medicines, or other needed items for you  Choose a person who does not have signs or symptoms of COVID-19 or has tested negative for it  The person should not be waiting for test results  He or she should not have a condition that increases the risk for COVID-19 or serious problems it causes  What you need to know about COVID-19 vaccines: Your healthcare provider can give you more information about what to expect, depending on your chronic condition  You are considered fully vaccinated against COVID-19 two weeks after the final dose of any COVID-19 vaccine  Let your healthcare provider know when you have received the final dose of the vaccine  Make a copy of your vaccination card  Keep the original with you in case you need to show it  Keep the copy in a safe place  COVID-19 vaccines are given as a shot in 1 or 2 doses  Vaccination is recommended for everyone 5 years or older  One 2-dose vaccine is fully approved  for those 16 years or older  This vaccine also has an emergency use authorization (EUA) for children 11to 13years old  No vaccine is currently available for children younger than 5 years  A booster (additional) dose  is given to help the immune system continue to protect against severe COVID-19  A booster is recommended for all adults 18 or older  The booster can be a different brand of the COVID-19 vaccine than you originally received   The timing for the booster depends on the type of vaccine you received:    1-dose vaccine: The booster is given at least 2 months after you received the vaccine  2-dose vaccine: The booster is given at least 5 or 6 months after the second dose  A booster can be given to adolescents 15to 16years old  Only 1 COVID-19 vaccine has this EUA  The booster is given at least 5 months after the second dose of the original vaccine series  A booster is recommended for immunocompromised children 11to 6years old  Only 1 COVID-19 vaccine has this EUA  The booster is given 28 days after the second dose  Continue social distancing and other measures, even after you get the vaccine  Although it is not common, you can become infected after you get the vaccine  You may also be able to pass the virus to others without knowing you are infected  After you get the vaccine, check local, national, and international travel rules  You may need to be tested before you travel  Some countries require proof of a negative test before you travel  You may also need to quarantine after you return  Medicine may be given to prevent infection  The medicine can be given if you are at high risk for infection and cannot get the vaccine  It can also be given if your immune system does not respond well to the vaccine  Lower your risk for COVID-19:   Wash your hands often throughout the day  Use soap and water  Rub your soapy hands together, lacing your fingers, for at least 20 seconds  Rinse with warm, running water  Dry your hands with a clean towel or paper towel  Use hand  that contains alcohol if soap and water are not available  Teach children how to wash their hands and use hand   Cover sneezes and coughs  Turn your face away and cover your mouth and nose with a tissue  Throw the tissue away  Use the bend of your arm if a tissue is not available  Then wash your hands with soap and water or use hand    Teach children how to cover a cough or sneeze  Follow worldwide, national, and local social distancing guidelines  Keep at least 6 feet (2 meters) between you and others  Wear a face covering (mask) around anyone who does not live in your home  Use a cloth covering with at least 2 layers  You can also create layers by putting a cloth covering over a disposable non-medical mask  Cover your mouth and your nose  Try not to touch your face  If you get the virus on your hands, you can transfer it to your eyes, nose, or mouth and become infected  You can also transfer it to objects, surfaces, or people  Clean and disinfect high-touch surfaces and objects in your home often  Use disinfecting wipes, or make a solution by mixing 4 teaspoons of bleach with 1 quart (4 cups) of water  Do not  use any cleaning or disinfecting products that can trigger an asthma attack or other breathing problems  Open windows or have circulating air as you clean  Do not  mix ammonia with bleach  This will create toxic fumes  How to follow social distancing guidelines:  National and local social distancing rules vary  Rules and restrictions may change over time as restrictions are lifted  The following are general guidelines:  Stay home if you are sick or think you may have COVID-19  It is important to stay home if you are waiting for a testing appointment or for test results  Avoid close physical contact with anyone who does not live in your home  Do not shake hands with, hug, or kiss a person as a greeting  If you must use public transportation (such as a bus or subway), try to sit or stand away from others  Wear your face covering  Avoid in-person gatherings and crowds  Attend virtually if possible  Help strengthen your immune system:   Ask about other vaccines you may need  Get the influenza (flu) vaccine as soon as recommended each year, usually starting in September or October  Get the pneumonia vaccine if recommended   Your healthcare provider can tell you if you should also get other vaccines, and when to get them  Do not smoke  Nicotine and other chemicals in cigarettes and cigars can increase your risk for infection and for serious COVID-19 effects  Ask your healthcare provider for information if you currently smoke and need help to quit  E-cigarettes or smokeless tobacco still contain nicotine  Talk to your healthcare provider before you use these products  Eat a variety of healthy foods  Examples include vegetables, fruits, whole-grain breads and cereals, lean meats and poultry, fish, low-fat dairy products, and cooked beans  Healthy foods contain nutrients that help keep your immune system strong  Find ways to manage stress  You may be feeling more stressed than usual because of the COVID-19 outbreak  The situation is very stressful to many people  Talk to your healthcare providers about ways to manage stress during this time  Stress can lead to breathing problems or make the problems worse  Stress can trigger an attack or exacerbation of many health conditions  It is important to do things that help you feel more relaxed, such as the following:     Pick 1 or 2 times a day to watch the news  Constant news watching can increase your stress levels  Talk to a friend on the phone or through a video chat  Take a warm, soothing bath  Listen to music  Exercise can also help relieve stress  This may be hard if your regular gym or outdoor exercise area is closed  If you do not have exercise equipment at home, try walking inside your home  You can walk quickly or turn on music and dance  Follow up with your doctor or healthcare provider as directed: Your providers will tell you when you can come in for tests, procedures, or check-ups  Bring your symptom record with you to all appointments  Write down your questions so you remember to ask them during your visits    For more information:   [facility] Prevention  [address] , [address]  Phone: 2- 630 - [phone]  Phone: 9- 321 - 2825124  Web Address: [employer]    © [address] [place] [address] 2022 Information is for End User's use only and may not be sold, redistributed or otherwise used for commercial purposes  All illustrations and images included in CareNotes® are the copyrighted property of [employer]  or [address] [place], [place] [address]taylor Yap   The above information is an  only  It is not intended as medical advice for individual conditions or treatments  Talk to your doctor, nurse or pharmacist before following any medical regimen to see if it is safe and effective for you

## 2022-09-06 NOTE — PROGRESS NOTES
COVID-19 Outpatient Progress Note    Assessment/Plan:    Problem List Items Addressed This Visit    None     Visit Diagnoses     COVID-19    -  Primary         Disposition:     Risks and benefits of COVID-19 vaccination was discussed with patient  Discussed symptom directed medication options with patient  Discussed vitamin D, vitamin C, and/or zinc supplementation with patient  Patient meets criteria for PAXLOVID and they have been counseled appropriately according to EUA documentation released by the FDA  After discussion, patient agrees to treatment  Amberuli DavisSummer is an investigational medicine used to treat mild-to-moderate COVID-19 in adults and children (15years of age and older weighing at least 80 pounds (40 kg)) with positive results of direct SARS-CoV-2 viral testing, and who are at high risk for progression to severe COVID-19, including hospitalization or death  PAXLOVID is investigational because it is still being studied  There is limited information about the safety and effectiveness of using PAXLOVID to treat people with mild-to-moderate COVID-19  The FDA has authorized the emergency use of PAXLOVID for the treatment of mild-tomoderate COVID-19 in adults and children (15years of age and older weighing at least 80 pounds (40 kg)) with a positive test for the virus that causes COVID-19, and who are at high risk for progression to severe COVID-19, including hospitalization or death, under an EUA  What should I tell my healthcare provider before I take PAXLOVID? Tell your healthcare provider if you:  - Have any allergies  - Have liver or kidney disease  - Are pregnant or plan to become pregnant  - Are breastfeeding a child  - Have any serious illnesses    Tell your healthcare provider about all the medicines you take, including prescription and over-the-counter medicines, vitamins, and herbal supplements  Some medicines may interact with PAXLOVID and may cause serious side effects   Keep a list of your medicines to show your healthcare provider and pharmacist when you get a new medicine  You can ask your healthcare provider or pharmacist for a list of medicines that interact with PAXLOVID  Do not start taking a new medicine without telling your healthcare provider  Your healthcare provider can tell you if it is safe to take PAXLOVID with other medicines  Tell your healthcare provider if you are taking combined hormonal contraceptive  PAXLOVID may affect how your birth control pills work  Females who are able to become pregnant should use another effective alternative form of contraception or an additional barrier method of contraception  Talk to your healthcare provider if you have any questions about contraceptive methods that might be right for you  How do I take PAXLOVID? PAXLOVID consists of 2 medicines: nirmatrelvir and ritonavir  - Take 2 pink tablets of nirmatrelvir with 1 white tablet of ritonavir by mouth 2 times each day (in the morning and in the evening) for 5 days  For each dose, take all 3 tablets at the same time  - If you have kidney disease, talk to your healthcare provider  You may need a different dose  - Swallow the tablets whole  Do not chew, break, or crush the tablets  - Take PAXLOVID with or without food  - Do not stop taking PAXLOVID without talking to your healthcare provider, even if you feel better  - If you miss a dose of PAXLOVID within 8 hours of the time it is usually taken, take it as soon as you remember  If you miss a dose by more than 8 hours, skip the missed dose and take the next dose at your regular time  Do not take 2 doses of PAXLOVID at the same time  - If you take too much PAXLOVID, call your healthcare provider or go to the nearest hospital emergency room right away    - If you are taking a ritonavir- or cobicistat-containing medicine to treat hepatitis C or Human Immunodeficiency Virus (HIV), you should continue to take your medicine as prescribed by your healthcare provider   - Talk to your healthcare provider if you do not feel better or if you feel worse after 5 days  Who should generally not take PAXLOVID? Do not take PAXLOVID if:  You are allergic to nirmatrelvir, ritonavir, or any of the ingredients in PAXLOVID  You are taking any of the following medicines:  - Alfuzosin  - Pethidine, piroxicam, propoxyphene  - Ranolazine  - Amiodarone, dronedarone, flecainide, propafenone, quinidine  - Colchicine  - Lurasidone, pimozide, clozapine  - Dihydroergotamine, ergotamine, methylergonovine  - Lovastatin, simvastatin  - Sildenafil (Revatio®) for pulmonary arterial hypertension (PAH)  - Triazolam, oral midazolam  - Apalutamide  - Carbamazepine, phenobarbital, phenytoin  - Rifampin  - St  Severianos Wort (hypericum perforatum)    What are the important possible side effects of PAXLOVID? Possible side effects of PAXLOVID are:  - Liver Problems  Tell your healthcare provider right away if you have any of these signs and symptoms of liver problems: loss of appetite, yellowing of your skin and the whites of eyes (jaundice), dark-colored urine, pale colored stools and itchy skin, stomach area (abdominal) pain  - Resistance to HIV Medicines  If you have untreated HIV infection, PAXLOVID may lead to some HIV medicines not working as well in the future  - Other possible side effects include: altered sense of taste, diarrhea, high blood pressure, or muscle aches    These are not all the possible side effects of PAXLOVID  Not many people have taken PAXLOVID  Serious and unexpected side effects may happen  Lydia العلي is still being studied, so it is possible that all of the risks are not known at this time  What other treatment choices are there? Like Faye Ansari may allow for the emergency use of other medicines to treat people with COVID-19   Go to https://[employer]/ for information on the emergency use of other medicines that are authorized by FDA to treat people with COVID-19  Your healthcare provider may talk with you about clinical trials for which you may be eligible  It is your choice to be treated or not to be treated with PAXLOVID  Should you decide not to receive it or for your child not to receive it, it will not change your standard medical care  What if I am pregnant or breastfeeding? There is no experience treating pregnant women or breastfeeding mothers with PAXLOVID  For a mother and unborn baby, the benefit of taking PAXLOVID may be greater than the risk from the treatment  If you are pregnant, discuss your options and specific situation with your healthcare provider  It is recommended that you use effective barrier contraception or do not have sexual activity while taking PAXLOVID  If you are breastfeeding, discuss your options and specific situation with your healthcare provider  How do I report side effects with PAXLOVID? Contact your healthcare provider if you have any side effects that bother you or do not go away  Report side effects to FDA MedWatch at www fda gov/medwatch or call 4-093-JXU6901 or you can report side effects to [employer]  at the contact information provided below  Website Fax number Telephone number   [facility] [phone]     How should I store [address]? Store PAXLOVID tablets at room temperature between 68°F to 77°F (20°C to 25°C)  Full fact sheet for patients, parents, and caregivers can be found at: Roxann vazquez    I have spent 25 minutes directly with the patient   Greater than 50% of this time was spent in counseling/coordination of care regarding: prognosis, risks and benefits of treatment options, instructions for management, patient and family education, importance of treatment compliance, risk factor reductions and impressions  Encounter provider: ANSHUL Lugo     Provider located at: [address] [place] [address] [address] [facility] [id number] [phone]     Recent Visits  No visits were found meeting these conditions  Showing recent visits within past 7 days and meeting all other requirements  Today's Visits  Date Type Provider Dept   09/06/22 Telemedicine YOLANDE Kline 112 today's visits and meeting all other requirements  Future Appointments  No visits were found meeting these conditions  Showing future appointments within next 150 days and meeting all other requirements     This virtual check-in was done via [employer] and patient was informed that this is a secure, HIPAA-compliant platform  He agrees to proceed  Patient agrees to participate in a virtual check in via telephone or video visit instead of presenting to the office to address urgent/immediate medical needs  Patient is aware this is a billable service  He acknowledged consent and understanding of privacy and security of the video platform  The patient has agreed to participate and understands they can discontinue the visit at any time  After connecting through [facility], the patient was identified by name and date of birth  Narciso Hahn was informed that this was a telemedicine visit and that the exam was being conducted confidentially over secure lines  My office door was closed  Narciso Hahn acknowledged consent and understanding of privacy and security of the telemedicine visit  I informed the patient that I have reviewed his record in Epic and presented the opportunity for him to ask any questions regarding the visit today  The patient agreed to participate      Verification of patient location:  Patient is located in the following state in which I hold an active license: [place]    Subjective:   Hitesh Escobedo is a 65 y o  male who is concerned about COVID-19  Patient's symptoms include fever, chills, fatigue, nasal congestion, sore throat, cough, diarrhea, myalgias and headache  Patient denies malaise, rhinorrhea, anosmia, loss of taste, shortness of breath, chest tightness, abdominal pain, nausea and vomiting      - Date of symptom onset: 8/31/2022  - Date of exposure: 8/30/2022      COVID-19 vaccination status: Fully vaccinated with booster    Exposure:   Contact with a person who is under investigation (PUI) for or who is positive for COVID-19 within the last 14 days?: Yes    Hospitalized recently for fever and/or lower respiratory symptoms?: No      Currently a healthcare worker that is involved in direct patient care?: Yes      Works in a special setting where the risk of COVID-19 transmission may be high? (this may include long-term care, correctional and halfway facilities; homeless shelters; assisted-living facilities and group homes ): No      Resident in a special setting where the risk of COVID-19 transmission may be high? (this may include long-term care, correctional and halfway facilities; homeless shelters; assisted-living facilities and group homes ): No      COVID-19 (Tested positive 09/05/2022 at a [facility])  Cough  Anorexia  Headache  Diarrhea    Paxlovid discuss and refused by the patient at this time        Lab Results   Component Value Date    SARSCOV2 Negative 01/10/2022    SARSCOV2 Not Detected 09/12/2020    SARSCOVAG Positive (A) 09/05/2022     Past Medical History:   Diagnosis Date    Bariatric surgery status     Duodenal ulcer     GERD (gastroesophageal reflux disease)     Gout     Hyperparathyroidism ([address] )     Hypertension     Lung nodule     Postsurgical malabsorption     [facility] embol[facility] [facility] ) 2019    Sarcoidosis     Wears glasses      Past Surgical History:   Procedure Laterality Date    BARIATRIC SURGERY      Gastric Sleeve    COLONOSCOPY      FOOT SURGERY Right     R ankle    GASTRIC BYPASS LAPAROSCOPIC N/A 9/22/2020    Procedure: LAPAROSCOPIC REVISION/ CONVERSION TO HALEY-EN-Y GASTRIC BYPASS W ROBOTICS, PARAESOPHAGEAL HERNIA REPAIR, AND INTRAOPERATIVE EGD;  Surgeon: Jorge A Lynne MD;  [facility]: [facility];  Service: Sky Ibrahim HAND SURGERY      LAPAROTOMY N/A 6/19/2021    Procedure: Repair of incarcerated incisional hernia repair with mesh   ;  Surgeon: Kurt Cartwright DO;  [facility];  Service: General    [place] LAP,PARTIAL NEPHRECTOMY Right 6/14/2021    Procedure: ROBOTIC LAPAROSCOPIC PARTIAL NEPHRECTOMY DECORTICATION OF RIGHT RENAL CYST;  Surgeon: Audrey Nicole MD;  [facility];  Service: Urology    SKIN BIOPSY       Current Outpatient Medications   Medication Sig Dispense Refill    acetaminophen (TYLENOL) 325 mg tablet Take 2 tablets (650 mg total) by mouth every 4 (four) hours as needed for mild pain 30 tablet 0    ammonium lactate (LAC-HYDRIN) 12 % cream Apply topically as needed for dry skin 385 g 3    APPLE CIDER VINEGAR PO Take by mouth daily      benzonatate (TESSALON PERLES) 100 mg capsule Take 1 capsule (100 mg total) by mouth 3 (three) times a day as needed for cough 20 capsule 0    calcium carbonate-vitamin D (OSCAL-D) 500 mg-200 units per tablet TAKE 1 TABLET BY MOUTH DAILY TO SUPPLEMENT CALCIUM/ STRENGTHEN BONES      carvedilol (COREG) 12 5 mg tablet Take 1 tablet (12 5 mg total) by mouth 2 (two) times a day 180 tablet 1    Cholecalciferol 25 MCG (1000 UT) tablet TAKE ONE TABLET BY MOUTH DAILY FOR VITAMIN D SUPPLEMENTATION      ciclopirox (LOPROX) 0 77 % cream Apply topically 2 (two) times a day For 2-4 weeks to affected areas of skin 90 g 0    docusate sodium (COLACE) 100 mg capsule Take 1 capsule (100 mg total) by mouth 2 (two) times a day for 15 days 30 capsule 0    lidocaine (XYLOCAINE) 5 % ointment Apply topically 3 (three) times a day as needed for mild pain (FOR LEFT WRIST PAIN) 35 44 g 2    losartan (COZAAR) 50 mg tablet TAKE 1 TABLET (50 MG TOTAL) BY MOUTH DAILY IN THE EARLY MORNING HAS BEEN ON IT WITHOUT ISSUES 90 tablet 0    Multiple Vitamin (Daily-Mark Multivitamin) TABS TAKE 1 TABLET BY MOUTH EVERY DAY 90 tablet 1    Multiple Vitamins-Minerals (MULTIVITAMIN ADULT, MINERALS, PO) 1 tablet      nystatin (MYCOSTATIN) cream Apply topically as needed (rash) 30 g 0    pantoprazole (PROTONIX) 40 mg tablet Take 1 tablet (40 mg total) by mouth daily at bedtime 90 tablet 1    Xarelto 20 MG tablet Take 1 tablet (20 mg total) by mouth daily with breakfast 90 tablet 1    chlorhexidine (PERIDEX) 0 12 % solution SWISH AND SPIT 15ML TWICE DAILY FOR ONE WEEK (Patient not taking: No sig reported)      NON FORMULARY daily Beet Root (Patient not taking: No sig reported)       No current facility-administered medications for this visit  Allergies   Allergen Reactions    Nsaids Other (See Comments)     Duodenal ulcer disease    Flunisolide      Other reaction(s): Burning sensation    Lisinopril Cough    Testosterone Itching, Rash and Other (See Comments)       Review of Systems   Constitutional: Positive for chills, fatigue and fever  HENT: Positive for congestion, postnasal drip and sore throat  Negative for ear pain, rhinorrhea, sinus pressure, sinus pain and sneezing  Eyes: Negative  Respiratory: Positive for cough  Negative for chest tightness and shortness of breath  Cardiovascular: Negative for chest pain and palpitations  Gastrointestinal: Positive for diarrhea  Negative for abdominal distention, abdominal pain, constipation, nausea and vomiting  Endocrine: Negative  Genitourinary: Negative  Musculoskeletal: Positive for myalgias  Negative for arthralgias  Allergic/Immunologic: Negative  Neurological: Positive for headaches  Negative for dizziness and seizures  Hematological: Negative  Psychiatric/Behavioral: Negative  Objective: There were no vitals filed for this visit  Physical Exam  Vitals and nursing note reviewed  Constitutional:       Appearance: Normal appearance  He is well-developed  He is obese  HENT:      Nose: No congestion or rhinorrhea  Mouth/Throat:      Mouth: Mucous membranes are dry  Pharynx: No oropharyngeal exudate or posterior oropharyngeal erythema  Eyes:      Conjunctiva/sclera: Conjunctivae normal    Cardiovascular:      Heart sounds: No murmur heard  Pulmonary:      Effort: Pulmonary effort is normal  No respiratory distress  Abdominal:      Tenderness: There is no abdominal tenderness  Skin:     General: Skin is dry  Neurological:      Mental Status: He is alert and oriented to person, place, and time     Psychiatric:         Mood and Affect: Mood normal

## 2022-09-22 NOTE — PROGRESS NOTES
Chief Complaint: ***    HPI:    Justino Olsen is a *** year old {Male Female Bisexual:96918} who presents today for {New/followup:99012}    Athlete: {YES-DESCRIBE/NO:44190}    Injury related: {Yes/No With Yes Description:32374}    If not injury related:{desc; hpi onset:32698}    Description of symptoms: ***    Summary of treatment to-date: {prev rx:105176}    {Imaging Review Statement:2631033453}    Patient Active Problem List   Diagnosis    [facility] ([facility] )    Gastroesophageal reflux disease    History of sleeve gastrectomy    Sarcoidosis    Incidental lung nodule, > 3mm and < 8mm    Right renal mass    Cigar smoker    Essential hypertension    [facility] , secondary[facility]    Low vitamin D level    Low vitamin B12 level    Low calcium levels    [facility]ICM [facility]ischemic cardiomyopathy) [facility]    Chest pain    Bariatric surgery status    Type 2 diabetes mellitus with hyperglycemia (HCC)    Osteoporosis    Class 2 obesity due to excess calories with body mass index (BMI) of 38 0 to 38 9 in adult    Hypercholesterolemia    Heart burn    Gout    Generalized osteoarthritis    Arthritis    ED (erectile dysfunction) of organic origin    Prediabetes    Mixed hyperlipidemia    Chronic systolic congestive heart failure (HCC)    History of pulmonary embolism    Obesity, Class II, BMI 35-39 9    Encounter for surgical aftercare following surgery of digestive system    Muscle pain    Postsurgical malabsorption    Renal cyst    Pre-op evaluation    Incisional hernia of anterior abdominal wall with obstruction    Renal cancer ([facility] )    Small bowel obstruction due to adhesions (HCC)    Left wrist pain    Extensor carpi ulnaris tendinitis        Current Outpatient Medications on File Prior to Visit   Medication Sig Dispense Refill    acetaminophen (TYLENOL) 325 mg tablet Take 2 tablets (650 mg total) by mouth every 4 (four) hours as needed for mild pain 30 tablet 0    ammonium lactate (LAC-HYDRIN) 12 % cream Apply topically as needed for dry skin 385 g 3    APPLE CIDER VINEGAR PO Take by mouth daily      benzonatate (TESSALON PERLES) 100 mg capsule Take 1 capsule (100 mg total) by mouth 3 (three) times a day as needed for cough 20 capsule 0    calcium carbonate-vitamin D (OSCAL-D) 500 mg-200 units per tablet TAKE 1 TABLET BY MOUTH DAILY TO SUPPLEMENT CALCIUM/ STRENGTHEN BONES      carvedilol (COREG) 12 5 mg tablet Take 1 tablet (12 5 mg total) by mouth 2 (two) times a day 180 tablet 1    chlorhexidine (PERIDEX) 0 12 % solution SWISH AND SPIT 15ML TWICE DAILY FOR ONE WEEK (Patient not taking: No sig reported)      Cholecalciferol 25 MCG (1000 UT) tablet TAKE ONE TABLET BY MOUTH DAILY FOR VITAMIN D SUPPLEMENTATION      ciclopirox (LOPROX) 0 77 % cream Apply topically 2 (two) times a day For 2-4 weeks to affected areas of skin 90 g 0    docusate sodium (COLACE) 100 mg capsule Take 1 capsule (100 mg total) by mouth 2 (two) times a day for 15 days 30 capsule 0    lidocaine (XYLOCAINE) 5 % ointment Apply topically 3 (three) times a day as needed for mild pain (FOR LEFT WRIST PAIN) 35 44 g 2    losartan (COZAAR) 50 mg tablet TAKE 1 TABLET (50 MG TOTAL) BY MOUTH DAILY IN THE EARLY MORNING HAS BEEN ON IT WITHOUT ISSUES 90 tablet 0    Multiple Vitamin (Daily-Mark Multivitamin) TABS TAKE 1 TABLET BY MOUTH EVERY DAY 90 tablet 1    Multiple Vitamins-Minerals (MULTIVITAMIN ADULT, MINERALS, PO) 1 tablet      NON FORMULARY daily Beet Root (Patient not taking: No sig reported)      nystatin (MYCOSTATIN) cream Apply topically as needed (rash) 30 g 0    pantoprazole (PROTONIX) 40 mg tablet Take 1 tablet (40 mg total) by mouth daily at bedtime 90 tablet 1    Xarelto 20 MG tablet Take 1 tablet (20 mg total) by mouth daily with breakfast 90 tablet 1     No current facility-administered medications on file prior to visit          Allergies   Allergen Reactions    Nsaids Other (See Comments)     Duodenal ulcer disease    Flunisolide      Other reaction(s): Burning sensation    Lisinopril Cough    Testosterone Itching, Rash and Other (See Comments)        Tobacco Use: Medium Risk    Smoking Tobacco Use: Former Smoker    Smokeless Tobacco Use: Never Used        Social Determinants of Health     Tobacco Use: Medium Risk    Smoking Tobacco Use: Former Smoker    Smokeless Tobacco Use: Never Used   Alcohol Use: Not on file   Financial Resource Strain: Not on file   Food Insecurity: Not on file   Transportation Needs: Not on file   Physical Activity: Not on file   Stress: Not on file   Social Connections: Not on file   Intimate Partner Violence: Not on file   Depression: Not at risk    PHQ-2 Score: 0   Housing Stability: Not on file               Review of Systems     There is no height or weight on file to calculate BMI  Physical Exam     Ortho Exam:    Body part: {right/left/bilateral:46306} ***    Inspection: ***    Palpation: ***    Range of motion: ***    Special Tests: ***    Distal Neurovascular Status: Intact, {Yes/No with explanation/N/A:96530}    Medium joint arthrocentesis  Universal Protocol:  Consent: Verbal consent obtained  Risks and benefits: risks, benefits and alternatives were discussed  Consent given by: patient  Patient understanding: patient states understanding of the procedure being performed  Site marked: the operative site was marked  Radiology Images displayed and confirmed  If images not available, report reviewed: imaging studies available  Required items: required blood products, implants, devices, and special equipment available  Patient identity confirmed: verbally with patient    Supporting Documentation  Indications: pain   Procedure Details  Location: wrist - Wrist joint: Left ECU tendon sheath    Preparation: Patient was prepped and draped in the usual sterile fashion  Needle size: 22 G  Ultrasound guidance: yes    Patient tolerance: patient tolerated the procedure well with no immediate complications  Dressing:  Sterile dressing applied             Assessment:     Diagnosis ICD-10-CM Associated Orders   1  Extensor carpi ulnaris tendinitis  M77 8    2  Left wrist pain  M25 532         Plan:    ***    {VB BMI Counselin}    Follow-Up:    ***        Portions of the record may have been created with voice recognition software  Occasional wrong word or "sound alike" substitutions may have occurred due to the inherent limitations of voice recognition software  Please review the chart carefully and recognize, using context, where substitutions/typographical errors may have occurred

## 2022-09-23 ENCOUNTER — OFFICE VISIT (OUTPATIENT)
Dept: OBGYN CLINIC | Facility: OTHER | Age: 57
End: 2022-09-23
Payer: COMMERCIAL

## 2022-09-23 VITALS — DIASTOLIC BLOOD PRESSURE: 88 MMHG | HEART RATE: 69 BPM | SYSTOLIC BLOOD PRESSURE: 136 MMHG

## 2022-09-23 DIAGNOSIS — M77.8 EXTENSOR CARPI ULNARIS TENDINITIS: Primary | ICD-10-CM

## 2022-09-23 DIAGNOSIS — M25.532 LEFT WRIST PAIN: ICD-10-CM

## 2022-09-23 PROCEDURE — 20550 NJX 1 TENDON SHEATH/LIGAMENT: CPT | Performed by: ORTHOPAEDIC SURGERY

## 2022-09-23 PROCEDURE — 76942 ECHO GUIDE FOR BIOPSY: CPT | Performed by: ORTHOPAEDIC SURGERY

## 2022-09-23 RX ORDER — LIDOCAINE HYDROCHLORIDE 10 MG/ML
0.5 INJECTION, SOLUTION INFILTRATION; PERINEURAL
Status: COMPLETED | OUTPATIENT
Start: 2022-09-23 | End: 2022-09-23

## 2022-09-23 RX ORDER — BETAMETHASONE SODIUM PHOSPHATE AND BETAMETHASONE ACETATE 3; 3 MG/ML; MG/ML
3 INJECTION, SUSPENSION INTRA-ARTICULAR; INTRALESIONAL; INTRAMUSCULAR; SOFT TISSUE
Status: COMPLETED | OUTPATIENT
Start: 2022-09-23 | End: 2022-09-23

## 2022-09-23 RX ADMIN — LIDOCAINE HYDROCHLORIDE 0.5 ML: 10 INJECTION, SOLUTION INFILTRATION; PERINEURAL at 08:29

## 2022-09-23 RX ADMIN — BETAMETHASONE SODIUM PHOSPHATE AND BETAMETHASONE ACETATE 3 MG: 3; 3 INJECTION, SUSPENSION INTRA-ARTICULAR; INTRALESIONAL; INTRAMUSCULAR; SOFT TISSUE at 08:29

## 2022-09-23 NOTE — PATIENT INSTRUCTIONS
Avoid any lifting pulling pushing over 5 lb from the left upper extremity over the next 2 weeks  Avoid repetitive rotational movement of the left wrist over the next 2 weeks  Keep the area clean and dry  Call our office back if you notice any significant increase in pain, swelling, redness or some other features like fever or chills  Follow-up in 6 weeks

## 2022-09-23 NOTE — PROGRESS NOTES
Hand/upper extremity injection: L extensor compartment 6  Universal Protocol:  Consent: Verbal consent obtained  Risks and benefits: risks, benefits and alternatives were discussed  Consent given by: patient  Patient understanding: patient states understanding of the procedure being performed  Site marked: the operative site was marked  Radiology Images displayed and confirmed   If images not available, report reviewed: imaging studies available  Required items: required blood products, implants, devices, and special equipment available  Patient identity confirmed: verbally with patient    Supporting Documentation  Indications: diagnostic, therapeutic and pain   Procedure Details  Condition:sixth dorsal compartment tendonitis Site: L extensor compartment 6   Preparation: Patient was prepped and draped in the usual sterile fashion  Needle size: 25 G  Ultrasound guidance: yes (High-frequency linear ultrasound transducer used with sterile ultrasound gel and sterile transducer cover )  Approach: ulnar  Medications administered: 0 5 mL lidocaine 1 %; 3 mg betamethasone acetate-betamethasone sodium phosphate 6 (3-3) mg/mL    Patient tolerance: patient tolerated the procedure well with no immediate complications  Dressing:  Sterile dressing applied

## 2022-11-06 ENCOUNTER — APPOINTMENT (OUTPATIENT)
Dept: LAB | Facility: CLINIC | Age: 57
End: 2022-11-06

## 2022-11-06 DIAGNOSIS — I50.22 CHRONIC SYSTOLIC CONGESTIVE HEART FAILURE (HCC): ICD-10-CM

## 2022-11-06 DIAGNOSIS — E11.65 TYPE 2 DIABETES MELLITUS WITH HYPERGLYCEMIA, WITHOUT LONG-TERM CURRENT USE OF INSULIN (HCC): ICD-10-CM

## 2022-11-06 DIAGNOSIS — K21.9 GASTROESOPHAGEAL REFLUX DISEASE, UNSPECIFIED WHETHER ESOPHAGITIS PRESENT: ICD-10-CM

## 2022-11-06 DIAGNOSIS — E21.1 HYPERPARATHYROIDISM , SECONDARY, NON-RENAL (HCC): ICD-10-CM

## 2022-11-06 DIAGNOSIS — C64.1 CANCER OF RIGHT KIDNEY (HCC): ICD-10-CM

## 2022-11-06 DIAGNOSIS — C64.1 MALIGNANT NEOPLASM OF RIGHT KIDNEY (HCC): ICD-10-CM

## 2022-11-06 DIAGNOSIS — I26.99 PULMONARY EMBOLISM, UNSPECIFIED CHRONICITY, UNSPECIFIED PULMONARY EMBOLISM TYPE, UNSPECIFIED WHETHER ACUTE COR PULMONALE PRESENT (HCC): ICD-10-CM

## 2022-11-06 DIAGNOSIS — E34.9 TESTOSTERONE DEFICIENCY: ICD-10-CM

## 2022-11-06 DIAGNOSIS — I10 ESSENTIAL HYPERTENSION: ICD-10-CM

## 2022-11-06 LAB
ANION GAP SERPL CALCULATED.3IONS-SCNC: 5 MMOL/L (ref 4–13)
BUN SERPL-MCNC: 13 MG/DL (ref 5–25)
CALCIUM SERPL-MCNC: 8.3 MG/DL (ref 8.4–10.2)
CHLORIDE SERPL-SCNC: 107 MMOL/L (ref 96–108)
CHOLEST SERPL-MCNC: 167 MG/DL
CO2 SERPL-SCNC: 27 MMOL/L (ref 21–32)
CREAT SERPL-MCNC: 0.72 MG/DL (ref 0.6–1.3)
GFR SERPL CREATININE-BSD FRML MDRD: 104 ML/MIN/1.73SQ M
GLUCOSE SERPL-MCNC: 91 MG/DL (ref 65–140)
HDLC SERPL-MCNC: 80 MG/DL
LDLC SERPL CALC-MCNC: 77 MG/DL (ref 0–100)
NONHDLC SERPL-MCNC: 87 MG/DL
POTASSIUM SERPL-SCNC: 4.1 MMOL/L (ref 3.5–5.3)
SODIUM SERPL-SCNC: 139 MMOL/L (ref 135–147)
TRIGL SERPL-MCNC: 50 MG/DL

## 2022-11-07 LAB
TESTOST FREE SERPL-MCNC: 9.7 PG/ML (ref 7.2–24)
TESTOST SERPL-MCNC: 417 NG/DL (ref 264–916)

## 2022-11-09 ENCOUNTER — HOSPITAL ENCOUNTER (OUTPATIENT)
Dept: CT IMAGING | Facility: HOSPITAL | Age: 57
Discharge: HOME | End: 2022-11-09

## 2022-11-09 DIAGNOSIS — C64.1 CANCER OF RIGHT KIDNEY (HCC): ICD-10-CM

## 2022-11-09 RX ADMIN — IOHEXOL 100 ML: 350 INJECTION, SOLUTION INTRAVENOUS at 15:15

## 2022-11-14 ENCOUNTER — OFFICE VISIT (OUTPATIENT)
Dept: OBGYN CLINIC | Facility: OTHER | Age: 57
End: 2022-11-14

## 2022-11-14 VITALS
WEIGHT: 244.6 LBS | HEART RATE: 78 BPM | SYSTOLIC BLOOD PRESSURE: 132 MMHG | DIASTOLIC BLOOD PRESSURE: 89 MMHG | BODY MASS INDEX: 40.75 KG/M2 | HEIGHT: 65 IN

## 2022-11-14 DIAGNOSIS — M25.532 LEFT WRIST PAIN: Primary | ICD-10-CM

## 2022-11-14 DIAGNOSIS — M77.8 EXTENSOR CARPI ULNARIS TENDINITIS: ICD-10-CM

## 2022-11-14 NOTE — PROGRESS NOTES
Assessment:       1  Left wrist pain    2  Extensor carpi ulnaris tendinitis          Plan:        Explained my current clinical findings to the patient today  He has partial improvement of the left ulnar wrist pain following the extensor carpi ulnaris ultrasound-guided corticosteroid injection  His clinical exam does reveal associated discomfort with TFCC load and grind test   I have clinical suspicion of potential underlying TFCC tear for which an MRI of the left wrist is being requested  Will follow up with the patient after this evaluation  Subjective:     Patient ID: Janet Masters is a 65 y o  male  Chief Complaint:    HPI  Mr Tami Perdue presents today for a follow-up of his left wrist pain  He was initially evaluated in this regard on 2022 for left ulnar wrist pain  He was treated conservatively with wrist bracing and topical pain medication  There was clinical suspicion of underlying extensor carpi ulnaris tendinitis with a differential diagnosis of TFCC tear  Patient underwent an ultrasound-guided left wrist extensor carpi ulnaris tendon sheath corticosteroid injection on 2022  Thereafter, he reports that there has been approximately 40% improvement of his left wrist pain  However, he still has discomfort with repetitive left wrist motion in the ulnar aspect  Denies significant radiation of the left wrist pain at this time  Denies any new injury  No associated tingling or numbness of the left hand       Social History     Occupational History   • Not on file   Tobacco Use   • Smoking status: Former Smoker     Packs/day: 1 00     Years: 7 00     Pack years: 7 00     Types: Cigars, Cigarettes     Start date:      Quit date: 2006     Years since quittin 8   • Smokeless tobacco: Never Used   • Tobacco comment: only smoked occ cigar, not for over 6 months   Vaping Use   • Vaping Use: Never used   Substance and Sexual Activity   • Alcohol use: Not Currently   • Drug use: Not Currently     Types: Marijuana, Cocaine     Comment: pt quit 20 years ago    • Sexual activity: Yes      Review of Systems        Objective:     Left Hand Exam     Tenderness   Left hand tenderness location: Tender to palpation over the ulnar groove as well as mild tenderness only over the 6th dorsal compartment of the wrist      Range of Motion   Wrist   Extension: normal   Flexion: normal   Pronation: normal   Left wrist supination: Mild discomfort in terminal supination  Muscle Strength   Wrist extension: 5/5   Wrist flexion: 5/5   :  5/5     Other   Erythema: absent  Sensation: normal  Pulse: present    Comments:  No clinically palpable subluxation noted of the extensor carpi ulnaris with left forearm pronation and supination  There is some discomfort on TFCC loading and grind  Only mild discomfort noted on resisted left wrist dorsiflexion  Strength/Myotome Testing     Left Wrist/Hand   Wrist extension: 5  Wrist flexion: 5  Physical Exam  Vitals and nursing note reviewed  Constitutional:       Appearance: He is well-developed  HENT:      Head: Normocephalic and atraumatic  Eyes:      Conjunctiva/sclera: Conjunctivae normal    Cardiovascular:      Rate and Rhythm: Normal rate  Pulmonary:      Effort: Pulmonary effort is normal  No respiratory distress  Skin:     General: Skin is warm  Findings: No erythema  Neurological:      Mental Status: He is alert and oriented to person, place, and time  Psychiatric:         Behavior: Behavior normal          Thought Content: Thought content normal          Judgment: Judgment normal            I have personally reviewed pertinent films in PACS

## 2022-12-07 ENCOUNTER — HOSPITAL ENCOUNTER (OUTPATIENT)
Dept: MRI IMAGING | Facility: HOSPITAL | Age: 57
Discharge: HOME | End: 2022-12-07
Attending: ORTHOPAEDIC SURGERY

## 2022-12-07 DIAGNOSIS — M25.532 LEFT WRIST PAIN: ICD-10-CM

## 2022-12-07 DIAGNOSIS — M77.8 EXTENSOR CARPI ULNARIS TENDINITIS: ICD-10-CM

## 2022-12-30 ENCOUNTER — OFFICE VISIT (OUTPATIENT)
Dept: OBGYN CLINIC | Facility: OTHER | Age: 57
End: 2022-12-30

## 2022-12-30 VITALS
HEIGHT: 65 IN | HEART RATE: 91 BPM | DIASTOLIC BLOOD PRESSURE: 84 MMHG | WEIGHT: 244.71 LBS | SYSTOLIC BLOOD PRESSURE: 132 MMHG | BODY MASS INDEX: 40.77 KG/M2

## 2022-12-30 DIAGNOSIS — M77.8 EXTENSOR CARPI ULNARIS TENDINITIS: ICD-10-CM

## 2022-12-30 DIAGNOSIS — S63.592A COMPLEX TEAR OF TRIANGULAR FIBROCARTILAGE OF LEFT WRIST, INITIAL ENCOUNTER: ICD-10-CM

## 2022-12-30 DIAGNOSIS — M25.532 LEFT WRIST PAIN: Primary | ICD-10-CM

## 2022-12-30 NOTE — PROGRESS NOTES
Chief Complaint: Left wrist pain follow-up    HPI:    Manuela Woods is a 65year old Male who presents today for follow up of left wrist pain  Description of symptoms: Patient presents for follow-up of left wrist pain  Last visit in this regard 11/14/2022  Previous treatment includes wrist bracing, topical analgesics, and ultrasound-guided left wrist extensor carpi ulnaris tendon sheath corticosteroid injection performed on 9/23/2022  He presents today for repeat assessment and review of recent wrist MRI  He reports continued pain qualified as an aching  Localizes pain to the ulnar aspect of his left wrist   Pain is made worse with gripping activities or with significant weightbearing  Pain is significant worsening in the morning upon waking up  Pain improves with rest   He denies any new injuries since time of last visit  Summary of treatment to-date: As outlined above  I have personally reviewed pertinent films in PACS and my interpretation is MRI of the left wrist obtained 12/7/2022 reveals small tear/perforation of the central disc of the TFCC as well as mild ECU tendinosis without tear        Patient Active Problem List   Diagnosis   • Pulmonary embolism (HCC)   • Gastroesophageal reflux disease   • History of sleeve gastrectomy   • Sarcoidosis   • Incidental lung nodule, > 3mm and < 8mm   • Right renal mass   • Cigar smoker   • Essential hypertension   • Hyperparathyroidism , secondary, non-renal (HCC)   • Low vitamin D level   • Low vitamin B12 level   • Low calcium levels   • NICM (nonischemic cardiomyopathy) (HCC)   • Chest pain   • Bariatric surgery status   • Type 2 diabetes mellitus with hyperglycemia (HCC)   • Osteoporosis   • Class 2 obesity due to excess calories with body mass index (BMI) of 38 0 to 38 9 in adult   • Hypercholesterolemia   • Heart burn   • Gout   • Generalized osteoarthritis   • Arthritis   • ED (erectile dysfunction) of organic origin   • Prediabetes   • Mixed hyperlipidemia   • Chronic systolic congestive heart failure (HCC)   • History of pulmonary embolism   • Obesity, Class II, BMI 35-39 9   • Encounter for surgical aftercare following surgery of digestive system   • Muscle pain   • Postsurgical malabsorption   • Renal cyst   • Pre-op evaluation   • Incisional hernia of anterior abdominal wall with obstruction   • Renal cancer (HCC)   • Small bowel obstruction due to adhesions [facility])   • Left wrist pain   • Extensor carpi ulnaris tendinitis        Current Outpatient Medications on File Prior to Visit   Medication Sig Dispense Refill   • acetaminophen (TYLENOL) 325 mg tablet Take 2 tablets (650 mg total) by mouth every 4 (four) hours as needed for mild pain 30 tablet 0   • ammonium lactate (LAC-HYDRIN) 12 % cream Apply topically as needed for dry skin 385 g 3   • APPLE CIDER VINEGAR PO Take by mouth daily     • benzonatate (TESSALON PERLES) 100 mg capsule Take 1 capsule (100 mg total) by mouth 3 (three) times a day as needed for cough 20 capsule 0   • calcium carbonate-vitamin D (OSCAL-D) 500 mg-200 units per tablet TAKE 1 TABLET BY MOUTH DAILY TO SUPPLEMENT CALCIUM/ STRENGTHEN BONES     • chlorhexidine (PERIDEX) 0 12 % solution SWISH AND SPIT 15ML TWICE DAILY FOR ONE WEEK     • Cholecalciferol 25 MCG (1000 UT) tablet TAKE ONE TABLET BY MOUTH DAILY FOR VITAMIN D SUPPLEMENTATION     • ciclopirox (LOPROX) 0 77 % cream Apply topically 2 (two) times a day For 2-4 weeks to affected areas of skin 90 g 0   • lidocaine (XYLOCAINE) 5 % ointment Apply topically 3 (three) times a day as needed for mild pain (FOR LEFT WRIST PAIN) 35 44 g 2   • Multiple Vitamin (Daily-Mark Multivitamin) TABS TAKE 1 TABLET BY MOUTH EVERY DAY 90 tablet 1   • Multiple Vitamins-Minerals (MULTIVITAMIN ADULT, MINERALS, PO) 1 tablet     • NON FORMULARY daily Beet Root     • nystatin (MYCOSTATIN) cream Apply topically as needed (rash) 30 g 0   • carvedilol (COREG) 12 5 mg tablet Take 1 tablet (12 5 mg total) by mouth 2 (two) times a day 180 tablet 1   • docusate sodium (COLACE) 100 mg capsule Take 1 capsule (100 mg total) by mouth 2 (two) times a day for 15 days 30 capsule 0   • losartan (COZAAR) 50 mg tablet TAKE 1 TABLET (50 MG TOTAL) BY MOUTH DAILY IN THE EARLY MORNING HAS BEEN ON IT WITHOUT ISSUES 90 tablet 0   • pantoprazole (PROTONIX) 40 mg tablet Take 1 tablet (40 mg total) by mouth daily at bedtime 90 tablet 1   • Xarelto 20 MG tablet Take 1 tablet (20 mg total) by mouth daily with breakfast 90 tablet 1     No current facility-administered medications on file prior to visit  Allergies   Allergen Reactions   • Nsaids Other (See Comments)     Duodenal ulcer disease   • Flunisolide      Other reaction(s): Burning sensation   • Lisinopril Cough   • Testosterone Itching, Rash and Other (See Comments)        Tobacco Use: Medium Risk   • Smoking Tobacco Use: Former   • Smokeless Tobacco Use: Never   • Passive Exposure: Not on file        Social Determinants of Health     Tobacco Use: Medium Risk   • Smoking Tobacco Use: Former   • Smokeless Tobacco Use: Never   • Passive Exposure: Not on file   Alcohol Use: Not on file   Financial Resource Strain: Not on file   Food Insecurity: Not on file   Transportation Needs: Not on file   Physical Activity: Not on file   Stress: Not on file   Social Connections: Not on file   Intimate Partner Violence: Not on file   Depression: Not at risk   • PHQ-2 Score: 0   Housing Stability: Not on file         Review of Systems     Body mass index is 40 72 kg/m²  Physical Exam  Vitals and nursing note reviewed  Constitutional:       General: He is not in acute distress  Appearance: Normal appearance  He is obese  He is not ill-appearing, toxic-appearing or diaphoretic  HENT:      Head: Normocephalic and atraumatic  Eyes:      General: No scleral icterus  Conjunctiva/sclera: Conjunctivae normal    Cardiovascular:      Pulses: Normal pulses     Pulmonary: Effort: Pulmonary effort is normal  No respiratory distress  Skin:     General: Skin is warm and dry  Capillary Refill: Capillary refill takes less than 2 seconds  Neurological:      Mental Status: He is alert and oriented to person, place, and time  Ortho Exam:    Body part: left wrist    Inspection: No obvious visual abnormalities on inspection  Palpation: Tender to palpation over the ulnar groove as well as the 6 dorsal compartment  Range of motion: Normal in extension, flexion, pronation, and supination  Though patient reports discomfort with extension  Special Tests: Discomfort with TFCC load and grind test     Distal Neurovascular Status: Intact, Yes    Procedures       Assessment:     Diagnosis ICD-10-CM Associated Orders   1  Left wrist pain  M25 [id number] Ambulatory referral to Hand Surgery      2  Extensor carpi ulnaris tendinitis  M77 [id number] Ambulatory referral to Hand Surgery      3  Complex tear of triangular fibrocartilage of left wrist, initial encounter  [id number] Ambulatory referral to Hand Surgery           Plan: We reviewed our current clinical assessment with Betsy De La Rosa  We reviewed his imaging, which includes left wrist MRI and reveals TFCC tear and mild ECU tendinosis  We discussed management options, reviewed natural history, and engaged in shared decision-making  At this point, given his overall poor response to ultrasound-guided corticosteroid injection of the extensor carpi ulnaris tendon sheath, we will recommend referral to one of our hand surgery colleagues, Dr Corrina Green, for his opinion  Patient endorsed understanding and acceptance of the above plan  Follow-Up:    With hand surgery      Portions of the record may have been created with voice recognition software  Occasional wrong word or "sound alike" substitutions may have occurred due to the inherent limitations of voice recognition software   Please review the chart carefully and recognize, using context, where substitutions/typographical errors may have occurred

## 2023-01-25 ENCOUNTER — HOSPITAL ENCOUNTER (EMERGENCY)
Facility: HOSPITAL | Age: 58
Discharge: HOME | End: 2023-01-25
Attending: EMERGENCY MEDICINE

## 2023-01-25 ENCOUNTER — APPOINTMENT (EMERGENCY)
Dept: RADIOLOGY | Facility: HOSPITAL | Age: 58
End: 2023-01-25

## 2023-01-25 ENCOUNTER — APPOINTMENT (EMERGENCY)
Dept: CT IMAGING | Facility: HOSPITAL | Age: 58
End: 2023-01-25

## 2023-01-25 VITALS
OXYGEN SATURATION: 100 % | SYSTOLIC BLOOD PRESSURE: 142 MMHG | HEART RATE: 65 BPM | TEMPERATURE: 98.5 F | RESPIRATION RATE: 18 BRPM | DIASTOLIC BLOOD PRESSURE: 70 MMHG

## 2023-01-25 DIAGNOSIS — R07.89 CHEST DISCOMFORT: Primary | ICD-10-CM

## 2023-01-25 DIAGNOSIS — R20.2 PARESTHESIAS: ICD-10-CM

## 2023-01-25 DIAGNOSIS — R51.9 HEADACHE: ICD-10-CM

## 2023-01-25 DIAGNOSIS — H57.12 DISCOMFORT OF LEFT EYE: ICD-10-CM

## 2023-01-25 LAB
2HR DELTA HS TROPONIN: 0 NG/L
ALBUMIN SERPL BCP-MCNC: 3.9 G/DL (ref 3.5–5)
ALP SERPL-CCNC: 77 U/L (ref 34–104)
ALT SERPL W P-5'-P-CCNC: 24 U/L (ref 7–52)
ANION GAP SERPL CALCULATED.3IONS-SCNC: 6 MMOL/L (ref 4–13)
AST SERPL W P-5'-P-CCNC: 21 U/L (ref 13–39)
BASOPHILS # BLD AUTO: 0.03 THOUSANDS/ÂΜL (ref 0–0.1)
BASOPHILS NFR BLD AUTO: 1 % (ref 0–1)
BILIRUB SERPL-MCNC: 0.26 MG/DL (ref 0.2–1)
BUN SERPL-MCNC: 16 MG/DL (ref 5–25)
CALCIUM SERPL-MCNC: 8.9 MG/DL (ref 8.4–10.2)
CARDIAC TROPONIN I PNL SERPL HS: 12 NG/L
CARDIAC TROPONIN I PNL SERPL HS: 12 NG/L
CHLORIDE SERPL-SCNC: 103 MMOL/L (ref 96–108)
CO2 SERPL-SCNC: 26 MMOL/L (ref 21–32)
CREAT SERPL-MCNC: 0.87 MG/DL (ref 0.6–1.3)
EOSINOPHIL # BLD AUTO: 0.38 THOUSAND/ÂΜL (ref 0–0.61)
EOSINOPHIL NFR BLD AUTO: 7 % (ref 0–6)
ERYTHROCYTE [DISTWIDTH] IN BLOOD BY AUTOMATED COUNT: 13.8 % (ref 11.6–15.1)
GFR SERPL CREATININE-BSD FRML MDRD: 96 ML/MIN/1.73SQ M
GLUCOSE SERPL-MCNC: 96 MG/DL (ref 65–140)
HCT VFR BLD AUTO: 38.7 % (ref 36.5–49.3)
HGB BLD-MCNC: 12.2 G/DL (ref 12–17)
IMM GRANULOCYTES # BLD AUTO: 0.01 THOUSAND/UL (ref 0–0.2)
IMM GRANULOCYTES NFR BLD AUTO: 0 % (ref 0–2)
LYMPHOCYTES # BLD AUTO: 1.53 THOUSANDS/ÂΜL (ref 0.6–4.47)
LYMPHOCYTES NFR BLD AUTO: 28 % (ref 14–44)
MCH RBC QN AUTO: 27.6 PG (ref 26.8–34.3)
MCHC RBC AUTO-ENTMCNC: 31.5 G/DL (ref 31.4–37.4)
MCV RBC AUTO: 88 FL (ref 82–98)
MONOCYTES # BLD AUTO: 0.55 THOUSAND/ÂΜL (ref 0.17–1.22)
MONOCYTES NFR BLD AUTO: 10 % (ref 4–12)
NEUTROPHILS # BLD AUTO: 2.91 THOUSANDS/ÂΜL (ref 1.85–7.62)
NEUTS SEG NFR BLD AUTO: 54 % (ref 43–75)
NRBC BLD AUTO-RTO: 0 /100 WBCS
PLATELET # BLD AUTO: 198 THOUSANDS/UL (ref 149–390)
PMV BLD AUTO: 11.5 FL (ref 8.9–12.7)
POTASSIUM SERPL-SCNC: 4.2 MMOL/L (ref 3.5–5.3)
PROT SERPL-MCNC: 7.2 G/DL (ref 6.4–8.4)
RBC # BLD AUTO: 4.42 MILLION/UL (ref 3.88–5.62)
SODIUM SERPL-SCNC: 135 MMOL/L (ref 135–147)
WBC # BLD AUTO: 5.41 THOUSAND/UL (ref 4.31–10.16)

## 2023-01-25 RX ORDER — ACETAMINOPHEN 325 MG/1
650 TABLET ORAL ONCE
Status: COMPLETED | OUTPATIENT
Start: 2023-01-25 | End: 2023-01-25

## 2023-01-25 RX ADMIN — ACETAMINOPHEN 650 MG: 325 TABLET ORAL at 17:51

## 2023-01-25 NOTE — ED NOTES
Stroke assessment negative  Equal strength noted in all extremities, no arm drift  Pt walked without assistance to bed 31       Sherwin Chaves RN  01/25/23 [id number]

## 2023-01-25 NOTE — ED PROVIDER NOTES
History  Chief Complaint   Patient presents with   • Shortness of Breath   • Pressure Behind the Eyes   • Numbness     Patient arrives to the ER from home as a walk-in with complaints pressure behind left eye, left arm numbness, headache and shortness of breath (hx of PE)  Salbador Paniagua is a 65year old male with a history of CHF, prior pulmonary embolism, presenting for evaluation of several complaints including left chest pressure, left upper extremity paresthesias  Patient describes that his symptoms began suddenly while he was driving on the highway  He denies significant vision changes or significant headache at this time  No focal numbness or weakness  No facial droop or change in speech  He denies any recent head injuries, the patient does take Xarelto  No prior history of stroke or TIA  No recent lower extremity edema or tenderness  Patient denies any history of glaucoma  Patient did recently start using bifocals  History provided by:  Patient   used: No    Shortness of Breath  Associated symptoms: headaches    Associated symptoms: no abdominal pain, no chest pain, no cough, no ear pain, no fever, no rash, no sore throat and no vomiting        Prior to Admission Medications   Prescriptions Last Dose Informant Patient Reported? Taking?    APPLE CIDER VINEGAR PO   Yes No   Sig: Take by mouth daily   Cholecalciferol 25 MCG (1000 UT) tablet   Yes No   Sig: TAKE ONE TABLET BY MOUTH DAILY FOR VITAMIN D SUPPLEMENTATION   Multiple Vitamin (Daily-Mark Multivitamin) TABS   No No   Sig: TAKE 1 TABLET BY MOUTH EVERY DAY   Multiple Vitamins-Minerals (MULTIVITAMIN ADULT, MINERALS, PO)   Yes No   Si tablet   NON FORMULARY   Yes No   Sig: daily Beet Root   Xarelto 20 MG tablet   No No   Sig: Take 1 tablet (20 mg total) by mouth daily with breakfast   acetaminophen (TYLENOL) 325 mg tablet   No No   Sig: Take 2 tablets (650 mg total) by mouth every 4 (four) hours as needed for mild pain ammonium lactate (LAC-HYDRIN) 12 % cream   No No   Sig: Apply topically as needed for dry skin   benzonatate (TESSALON PERLES) 100 mg capsule   No No   Sig: Take 1 capsule (100 mg total) by mouth 3 (three) times a day as needed for cough   calcium carbonate-vitamin D (OSCAL-D) 500 mg-200 units per tablet   Yes No   Sig: TAKE 1 TABLET BY MOUTH DAILY TO SUPPLEMENT CALCIUM/ STRENGTHEN BONES   carvedilol (COREG) 12 5 mg tablet   No No   Sig: Take 1 tablet (12 5 mg total) by mouth 2 (two) times a day   chlorhexidine (PERIDEX) 0 12 % solution   Yes No   Sig: SWISH AND SPIT 15ML TWICE DAILY FOR ONE WEEK   ciclopirox (LOPROX) 0 77 % cream   No No   Sig: Apply topically 2 (two) times a day For 2-4 weeks to affected areas of skin   docusate sodium (COLACE) 100 mg capsule   No No   Sig: Take 1 capsule (100 mg total) by mouth 2 (two) times a day for 15 days   lidocaine (XYLOCAINE) 5 % ointment   No No   Sig: Apply topically 3 (three) times a day as needed for mild pain (FOR LEFT WRIST PAIN)   losartan (COZAAR) 50 mg tablet   No No   Sig: TAKE 1 TABLET (50 MG TOTAL) BY MOUTH DAILY IN THE EARLY MORNING HAS BEEN ON IT WITHOUT ISSUES   nystatin (MYCOSTATIN) cream   No No   Sig: Apply topically as needed (rash)   pantoprazole (PROTONIX) 40 mg tablet   No No   Sig: Take 1 tablet (40 mg total) by mouth daily at bedtime      Facility-Administered Medications: None       Past Medical History:   Diagnosis Date   • Bariatric surgery status    • Duodenal ulcer    • GERD (gastroesophageal reflux disease)    • Gout    • Hyperparathyroidism (HCC)    • Hypertension    • Lung nodule    • Postsurgical malabsorption    • Pulmonary embolism ([facility] [address] ) 2019   • Sarcoidosis    • Wears glasses        Past Surgical History:   Procedure Laterality Date   • BARIATRIC SURGERY      Gastric Sleeve   • COLONOSCOPY     • FOOT SURGERY Right     R ankle   • GASTRIC BYPASS LAPAROSCOPIC N/A 9/22/2020    Procedure: LAPAROSCOPIC REVISION/ CONVERSION TO HALEY-EN-Y GASTRIC BYPASS W ROBOTICS, PARAESOPHAGEAL HERNIA REPAIR, AND INTRAOPERATIVE EGD;  Surgeon: Etta Orellana MD;  Location: [facility];  Service: Bariatrics   • HAND SURGERY     • LAPAROTOMY N/A 2021    Procedure: Repair of incarcerated incisional hernia repair with mesh   ;  Surgeon: Oscar Hunter DO;  Location[facility];  Service: General   • CT LAPAROSCOPY SURG PARTIAL NEPHRECTOMY Right 2021    Procedure: ROBOTIC LAPAROSCOPIC PARTIAL NEPHRECTOMY DECORTICATION OF RIGHT RENAL CYST;  Surgeon: Adis Palmer MD;  Location[facility];  Service: Urology   • SKIN BIOPSY         Family History   Problem Relation Age of Onset   • Hypertension Father    • Diabetes Father    • Cancer Father    • Diabetes Maternal Aunt    • Cancer Maternal Aunt      I have reviewed and agree with the history as documented  E-Cigarette/Vaping   • E-Cigarette Use Never User      E-Cigarette/Vaping Substances   • Nicotine No    • THC No    • CBD No    • Flavoring No    • Other No    • Unknown No      Social History     Tobacco Use   • Smoking status: Former     Packs/day: 1 00     Years: 7 00     Pack years: 7 00     Types: Cigars, Cigarettes     Start date:      Quit date:      Years since quittin 0   • Smokeless tobacco: Never   • Tobacco comments:     only smoked occ cigar, not for over 6 months   Vaping Use   • Vaping Use: Never used   Substance Use Topics   • Alcohol use: Not Currently   • Drug use: Not Currently     Types: Marijuana, Cocaine     Comment: pt quit 20 years ago         Review of Systems   Constitutional: Negative for chills and fever  HENT: Negative for ear pain and sore throat  Eyes: Positive for pain and visual disturbance  Respiratory: Positive for shortness of breath  Negative for cough  Cardiovascular: Negative for chest pain and palpitations  Gastrointestinal: Negative for abdominal pain and vomiting  Genitourinary: Negative for dysuria and hematuria     Musculoskeletal: Negative for arthralgias and back pain  Skin: Negative for color change and rash  Neurological: Positive for numbness and headaches  Negative for seizures and syncope  All other systems reviewed and are negative  Physical Exam  ED Triage Vitals   Temperature Pulse Respirations Blood Pressure SpO2   01/25/23 1718 01/25/23 1718 01/25/23 1718 01/25/23 1719 01/25/23 1718   98 5 °F (36 9 °C) 69 18 (!) 174/98 96 %      Temp src Heart Rate Source Patient Position - Orthostatic VS BP Location FiO2 (%)   -- 01/25/23 1835 01/25/23 1835 01/25/23 1835 --    Monitor Sitting Right arm       Pain Score       --                    Orthostatic Vital Signs  Vitals:    01/25/23 1835 01/25/23 1845 01/25/23 1900 01/25/23 2000   BP: 139/63 118/64 136/79 134/82   Pulse: 65 61 66 67   Patient Position - Orthostatic VS: Sitting          Physical Exam  Vitals and nursing note reviewed  Constitutional:       Appearance: Normal appearance  He is not ill-appearing  HENT:      Head: Normocephalic and atraumatic  Right Ear: External ear normal       Left Ear: External ear normal       Mouth/Throat:      Mouth: Mucous membranes are moist       Pharynx: Oropharynx is clear  Eyes:      General: No scleral icterus  Intraocular pressure: Right eye pressure is 12 mmHg  Left eye pressure is 12 mmHg  Measurements were taken using an automated tonometer  Extraocular Movements: Extraocular movements intact  Conjunctiva/sclera: Conjunctivae normal       Comments: Left pupil is slightly larger than the right pupil, both are reactive   Cardiovascular:      Rate and Rhythm: Normal rate and regular rhythm  Pulses: Normal pulses  Heart sounds: Normal heart sounds  Pulmonary:      Effort: Pulmonary effort is normal  No respiratory distress  Breath sounds: Normal breath sounds  No wheezing, rhonchi or rales  Abdominal:      General: Abdomen is flat  There is no distension  Tenderness:  There is no abdominal tenderness  Musculoskeletal:         General: No tenderness or signs of injury  Cervical back: Neck supple  No rigidity  Right lower leg: No tenderness  No edema  Left lower leg: No tenderness  No edema  Skin:     General: Skin is warm  Coloration: Skin is not jaundiced  Findings: No erythema or rash  Neurological:      General: No focal deficit present  Mental Status: He is alert and oriented to person, place, and time  Mental status is at baseline  GCS: GCS eye subscore is 4  GCS verbal subscore is 5  GCS motor subscore is 6  Cranial Nerves: Cranial nerves 2-12 are intact  Sensory: Sensation is intact  Motor: Motor function is intact  Coordination: Coordination is intact     Psychiatric:         Mood and Affect: Mood normal          Behavior: Behavior normal          ED Medications  Medications   acetaminophen (TYLENOL) tablet 650 mg (650 mg Oral Given 1/25/23 1751)       Diagnostic Studies  Results Reviewed     Procedure Component Value Units Date/Time    HS Troponin I 2hr [[id number]]  (Normal) Collected: 01/25/23 1950    Lab Status: Final result Specimen: Blood from Arm, Left Updated: 01/25/23 2025     hs TnI 2hr 12 ng/L      Delta 2hr hsTnI 0 ng/L     HS Troponin 0hr (reflex protocol) [[id number]]  (Normal) Collected: 01/25/23 1736    Lab Status: Final result Specimen: Blood from Arm, Left Updated: 01/25/23 1808     hs TnI 0hr 12 ng/L     Comprehensive metabolic panel [[id number]] Collected: 01/25/23 1736    Lab Status: Final result Specimen: Blood from Arm, Left Updated: 01/25/23 1804     Sodium 135 mmol/L      Potassium 4 2 mmol/L      Chloride 103 mmol/L      CO2 26 mmol/L      ANION GAP 6 mmol/L      BUN 16 mg/dL      Creatinine 0 87 mg/dL      Glucose 96 mg/dL      Calcium 8 9 mg/dL      AST 21 U/L      ALT 24 U/L      Alkaline Phosphatase 77 U/L      Total Protein 7 2 g/dL      Albumin 3 9 g/dL      Total Bilirubin 0 26 mg/dL      eGFR 96 ml/min/1 73sq m     Narrative:      National Kidney Disease Foundation guidelines for Chronic Kidney Disease (CKD):   •  Stage 1 with normal or high GFR (GFR > 90 mL/min/1 73 square meters)  •  Stage 2 Mild CKD (GFR = 60-89 mL/min/1 73 square meters)  •  Stage 3A Moderate CKD (GFR = 45-59 mL/min/1 73 square meters)  •  Stage 3B Moderate CKD (GFR = 30-44 mL/min/1 73 square meters)  •  Stage 4 Severe CKD (GFR = 15-29 mL/min/1 73 square meters)  •  Stage 5 End Stage CKD (GFR <15 mL/min/1 73 square meters)  Note: GFR calculation is accurate only with a steady state creatinine    CBC and differential [[id number]]  (Abnormal) Collected: 01/25/23 1736    Lab Status: Final result Specimen: Blood from Arm, Left Updated: 01/25/23 1743     WBC 5 41 Thousand/uL      RBC 4 42 Million/uL      Hemoglobin 12 2 g/dL      Hematocrit 38 7 %      MCV 88 fL      MCH 27 6 pg      MCHC 31 5 g/dL      RDW 13 8 %      MPV 11 5 fL      Platelets 691 Thousands/uL      nRBC 0 /100 WBCs      Neutrophils Relative 54 %      Immat GRANS % 0 %      Lymphocytes Relative 28 %      Monocytes Relative 10 %      Eosinophils Relative 7 %      Basophils Relative 1 %      Neutrophils Absolute 2 91 Thousands/µL      Immature Grans Absolute 0 01 Thousand/uL      Lymphocytes Absolute 1 53 Thousands/µL      Monocytes Absolute 0 55 Thousand/µL      Eosinophils Absolute 0 38 Thousand/µL      Basophils Absolute 0 03 Thousands/µL                  XR chest 1 view portable   ED Interpretation by Raina Soler DO (01/25 1809)   No acute cardiopulmonary abnormalities visualized      CT head without contrast   Final Result by Dimple Salas DO (01/25 1831)   No acute intracranial abnormality                    Workstation performed: [id number]               Procedures  ECG 12 Lead Documentation Only    Date/Time: 1/25/2023 5:44 PM  Performed by: Raina Soler DO  Authorized by: Raina Soler DO     Indications / Diagnosis: Chest pressure  ECG reviewed by me, the ED Provider: yes    Patient location:  ED  Previous ECG:     Previous ECG:  Compared to current    Similarity:  No change  Interpretation:     Interpretation: normal    Rate:     ECG rate:  63    ECG rate assessment: normal    Rhythm:     Rhythm: sinus rhythm    Ectopy:     Ectopy: none    QRS:     QRS axis:  Normal    QRS intervals:  Normal  Conduction:     Conduction: normal    ST segments:     ST segments:  Normal  T waves:     T waves: inverted      Inverted:  III  Comments:      Normal sinus rhythm, no acute ST changes or T wave abnormalities          ED Course             HEART Risk Score    Flowsheet Row Most Recent Value   Heart Score Risk Calculator    History 1 Filed at: 01/25/2023 2025   ECG 0 Filed at: 01/25/2023 2025   Age 1 Filed at: 01/25/2023 2025   Risk Factors 2 Filed at: 01/25/2023 2025   Troponin 0 Filed at: 01/25/2023 2025   HEART Score 4 Filed at: 01/25/2023 2025                      SBIRT 22yo+    Flowsheet Row Most Recent Value   SBIRT (25 yo +)    In order to provide better care to our patients, we are screening all of our patients for alcohol and drug use  Would it be okay to ask you these screening questions? Unable to answer at this time Filed at: 01/25/2023 [facility]  Brenda Valencia is a 65year old male with a history of CHF, prior pulmonary embolism, presenting for evaluation of several complaints including left chest pressure, left upper extremity paresthesias  He describes the symptoms started suddenly while he was driving  No focal neurological deficits  No history of prior MI  He takes Xarelto daily  Patient seemed anxious, but was not in any significant distress on initial examination  Patient's left pupil appeared slightly larger than the right, both were reactive, extraocular movements were intact, the rest of his neurological exam was normal   No facial droop or slurring of the speech    Intraocular pressures were normal   Vision was intact  Concern for possible ACS, possible intracranial bleed  Given that he is taking his Xarelto as prescribed and does not have any shortness of breath, normal vital signs  Do not suspect ischemic stroke at this time given normal neurological examination  Patient's blood work was unremarkable, delta troponin was normal, ECG showed normal sinus rhythm, no signs of STEMI or NSTEMI  CT scan of his head was normal   Chest x-ray also normal   Patient has a heart score 4  He was stable for discharge, advised that he follow-up with the cardiology team, I made a referral for him, I also advised that he follow-up with his eye doctor, I gave strict return precautions, he was agreeable to plan  Chest discomfort: acute illness or injury  Discomfort of left eye: acute illness or injury  Headache: acute illness or injury  Paresthesias: acute illness or injury  Amount and/or Complexity of Data Reviewed  Labs: ordered  Details: CBC, CMP, delta troponin, all of which were unremarkable  Radiology: ordered and independent interpretation performed  Details: Chest x-ray and CT scan of the head, both of which were unremarkable  ECG/medicine tests: independent interpretation performed  Details: Normal sinus rhythm      Risk  OTC drugs              Disposition  Final diagnoses:   Paresthesias - Left upper extremity   Discomfort of left eye   Chest discomfort   Headache     Time reflects when diagnosis was documented in both MDM as applicable and the Disposition within this note     Time User Action Codes Description Comment    1/25/2023  8:26 PM Margo Gains Add [R20 2] Paresthesias     1/25/2023  8:26 PM Vasu Abarca Add [H57 12] Discomfort of left eye     1/25/2023  8:26 PM Vasu Abarca Add [R07 89] Chest discomfort     1/25/2023  8:26 PM Vasu Abarca Modify [R20 2] Paresthesias     1/25/2023  8:26 PM Vasu Abarca Modify [R07 89] Chest discomfort     1/25/2023 8:26 PM Vasu Abarca Modify [R20 2] Paresthesias Left upper extremity    1/25/2023  8:26 PM Vasu Abarca Add [R51 9] Headache       ED Disposition     ED Disposition   Discharge    Condition   Stable    Date/Time   Wed Jan 25, 2023  8:26 PM    Comment   Darrel Hernandez discharge to home/self care  Follow-up Information     Follow up With Specialties Details Why Contact Info Additional Information    [facility] Schedule an appointment as soon as possible for a visit   [phone] [id number] Cardiology [address] [facility], [address] [place] [address]          Patient's Medications   Discharge Prescriptions    No medications on file         PDMP Review       Value Time User    PDMP Reviewed  Yes 6/21/2021  9:41 AM Kd Hamilton PA-C           ED Provider  Attending physically available and evaluated Jay Jay Sargent I managed the patient along with the ED Attending      Electronically Signed by         [id number] [facility],   01/25/23 2029

## 2023-01-26 NOTE — DISCHARGE INSTRUCTIONS
Call and schedule follow-up appointments with the cardiology team and with your eye doctor, I have made a referral to cardiology and gave you contact information  Return to the emergency department for any worsening discomfort, significant changes in your vision, focal numbness or weakness, worsening chest pain, shortness of breath or difficulty breathing, or any other concerning signs or symptoms

## 2023-01-26 NOTE — ED ATTENDING ATTESTATION
1/25/2023  IJorge MD, saw and evaluated the patient  I have discussed the patient with the resident/non-physician practitioner and agree with the resident's/non-physician practitioner's findings, Plan of Care, and MDM as documented in the resident's/non-physician practitioner's note, except where noted  All available labs and Radiology studies were reviewed  I was present for key portions of any procedure(s) performed by the resident/non-physician practitioner and I was immediately available to provide assistance  At this point I agree with the current assessment done in the Emergency Department  I have conducted an independent evaluation of this patient a history and physical is as follows: EKG nonischemic, troponin x2 not uptrending, chest x-ray unremarkable, on Xarelto, no shortness of breath, no hypoxia, no tachycardia, doubt PE   CT head unremarkable  Additional physical exam unremarkable  Stable for discharge home, outpatient follow-up with ophthalmology, PCP, return precaution discussed, ambulatory, no acute distress without discharge      Results Reviewed     Procedure Component Value Units Date/Time    HS Troponin I 2hr [[id number]]  (Normal) Collected: 01/25/23 1950    Lab Status: Final result Specimen: Blood from Arm, Left Updated: 01/25/23 2025     hs TnI 2hr 12 ng/L      Delta 2hr hsTnI 0 ng/L     HS Troponin 0hr (reflex protocol) [[id number]]  (Normal) Collected: 01/25/23 1736    Lab Status: Final result Specimen: Blood from Arm, Left Updated: 01/25/23 1808     hs TnI 0hr 12 ng/L     Comprehensive metabolic panel [[id number]] Collected: 01/25/23 1736    Lab Status: Final result Specimen: Blood from Arm, Left Updated: 01/25/23 1804     Sodium 135 mmol/L      Potassium 4 2 mmol/L      Chloride 103 mmol/L      CO2 26 mmol/L      ANION GAP 6 mmol/L      BUN 16 mg/dL      Creatinine 0 87 mg/dL      Glucose 96 mg/dL      Calcium 8 9 mg/dL      AST 21 U/L      ALT 24 U/L      Alkaline Phosphatase 77 U/L      Total Protein 7 2 g/dL      Albumin 3 9 g/dL      Total Bilirubin 0 26 mg/dL      eGFR 96 ml/min/1 73sq m     Narrative:      National Kidney Disease Foundation guidelines for Chronic Kidney Disease (CKD):   •  Stage 1 with normal or high GFR (GFR > 90 mL/min/1 73 square meters)  •  Stage 2 Mild CKD (GFR = 60-89 mL/min/1 73 square meters)  •  Stage 3A Moderate CKD (GFR = 45-59 mL/min/1 73 square meters)  •  Stage 3B Moderate CKD (GFR = 30-44 mL/min/1 73 square meters)  •  Stage 4 Severe CKD (GFR = 15-29 mL/min/1 73 square meters)  •  Stage 5 End Stage CKD (GFR <15 mL/min/1 73 square meters)  Note: GFR calculation is accurate only with a steady state creatinine    CBC and differential [[id number]]  (Abnormal) Collected: 01/25/23 1736    Lab Status: Final result Specimen: Blood from Arm, Left Updated: 01/25/23 1743     WBC 5 41 Thousand/uL      RBC 4 42 Million/uL      Hemoglobin 12 2 g/dL      Hematocrit 38 7 %      MCV 88 fL      MCH 27 6 pg      MCHC 31 5 g/dL      RDW 13 8 %      MPV 11 5 fL      Platelets 605 Thousands/uL      nRBC 0 /100 WBCs      Neutrophils Relative 54 %      Immat GRANS % 0 %      Lymphocytes Relative 28 %      Monocytes Relative 10 %      Eosinophils Relative 7 %      Basophils Relative 1 %      Neutrophils Absolute 2 91 Thousands/µL      Immature Grans Absolute 0 01 Thousand/uL      Lymphocytes Absolute 1 53 Thousands/µL      Monocytes Absolute 0 55 Thousand/µL      Eosinophils Absolute 0 38 Thousand/µL      Basophils Absolute 0 03 Thousands/µL         XR chest 1 view portable   ED Interpretation by [id number]  (01/25 1809)   No acute cardiopulmonary abnormalities visualized      CT head without contrast   Final Result by Js Beltran DO (01/25 1831)   No acute intracranial abnormality                    Workstation performed: [id number]               ED Course         Critical Care Time  Procedures

## 2023-01-28 LAB
ATRIAL RATE: 63 BPM
P AXIS: 18 DEGREES
PR INTERVAL: 134 MS
QRS AXIS: 22 DEGREES
QRSD INTERVAL: 92 MS
QT INTERVAL: 398 MS
QTC INTERVAL: 407 MS
T WAVE AXIS: -22 DEGREES
VENTRICULAR RATE: 63 BPM

## 2023-02-20 ENCOUNTER — OFFICE VISIT (OUTPATIENT)
Dept: BARIATRICS | Facility: CLINIC | Age: 58
End: 2023-02-20

## 2023-02-20 VITALS
HEIGHT: 65 IN | TEMPERATURE: 96.8 F | WEIGHT: 252.5 LBS | HEART RATE: 77 BPM | SYSTOLIC BLOOD PRESSURE: 144 MMHG | DIASTOLIC BLOOD PRESSURE: 82 MMHG | BODY MASS INDEX: 42.07 KG/M2

## 2023-02-20 DIAGNOSIS — E66.01 OBESITY, CLASS III, BMI 40-49.9 (MORBID OBESITY) (HCC): ICD-10-CM

## 2023-02-20 DIAGNOSIS — Z12.11 COLON CANCER SCREENING: ICD-10-CM

## 2023-02-20 DIAGNOSIS — R63.5 ABNORMAL WEIGHT GAIN: ICD-10-CM

## 2023-02-20 DIAGNOSIS — Z48.815 ENCOUNTER FOR SURGICAL AFTERCARE FOLLOWING SURGERY OF DIGESTIVE SYSTEM: Primary | ICD-10-CM

## 2023-02-20 DIAGNOSIS — K91.2 POSTSURGICAL MALABSORPTION: ICD-10-CM

## 2023-02-20 DIAGNOSIS — Z98.84 BARIATRIC SURGERY STATUS: ICD-10-CM

## 2023-02-20 NOTE — PROGRESS NOTES
Assessment/Plan:     Patient ID: Zoltan Evans is a 65 y o  male  Bariatric Surgery Status/Abnormal weight gain    -s/p Keke-En-Y Gastric Bypass with Dr Oneil Cook on 09/22/2020  Presents to the office today for annual visit  Overall doing fair, he reports having some weight gain - attributing to weight regain since him and his wife has been going through many things such as surgery and personal health issues  He was dx of renal cancer with partial nephectromy but did not undergo any chemo or radiation but developed a PE and is now on xarelto  Still feels full for the most part - tries to eat his protein first  Tries to make healthier food choices  Limited in exercise due to extensive history and bilateral hip/knee pain  Feels he lacks endurance  Doesn't do 30/60 minute rule; does feel hungry often  PLAN:     - UGI order to evaluate his anatomy due to weight gain similar to presurgical weight  - Will refer to [facility] if UGI is normal     - referral to GI for routine colon cancer screening - he does not recall when was his last colonoscopy was  - Routine follow up in 1 year for annual visit  - Continue with healthy lifestyle, adequate protein intake of 60 gm, fluid intake of at least 64 oz  - Continue with MVI daily  - Activity as tolerated  - Labs ordered and will adjust accordingly if any deficiency  - Follow up with RD and SW as needed  · Continued/Maintain healthy weight loss with good nutrition intakes  · Adequate hydration with at least 64oz  fluid intake  · Follow diet as discussed  · Follow vitamin and mineral recommendations as reviewed with you  · Exercise as tolerated  · Colonoscopy referral made: Due - referral to GI  · Follow-up in 1 year for annual visit  We kindly ask that your arrive 15 minutes before your scheduled appointment time with your provider to allow our staff to room you, get your vital signs and update your chart  · Get lab work done   Please call the office if you need a script  It is recommended to check with your insurance BEFORE getting labs done to make sure they are covered by your policy  · Call our office if you have any problems with abdominal pain especially associated with fever, chills, nausea, vomiting or any other concerns  · All  Post-bariatric surgery patients should be aware that very small quantities of any alcohol can cause impairment and it is very possible not to feel the effect  The effect can be in the system for several hours  It is also a stomach irritant  · It is advised to AVOID alcohol, Nonsteroidal antiinflammatory drugs (NSAIDS) and nicotine of all forms   Any of these can cause stomach irritation/pain  · Discussed the effects of alcohol on a bariatric patient and the increased impairment risk  · Keep up the good work! Postsurgical Malabsorption   -At risk for malabsorption of vitamins/minerals secondary to malabsorption and restriction of intake from bariatric surgery  -Currently taking adequate postop bariatric surgery vitamin supplementation  -Last set of bariatric labs completed on 06/2022 and showed elevated PTH and low Vitamin D3   -Next set of bariatric labs ordered for approximately 2 weeks  -Patient received education about the importance of adhering to a lifelong supplementation regimen to avoid vitamin/mineral deficiencies      Diagnoses and all orders for this visit:    Encounter for surgical aftercare following surgery of digestive system  -     Zinc; Future  -     Vitamin D 25 hydroxy; Future  -     Vitamin B12; Future  -     Vitamin B1, whole blood; Future  -     Vitamin A; Future  -     PTH, intact; Future  -     CBC and Platelet; Future  -     Comprehensive metabolic panel; Future  -     Ferritin; Future  -     Folate; Future  -     Iron Saturation %; Future    Bariatric surgery status  -     Zinc; Future  -     Vitamin D 25 hydroxy;  Future  -     Vitamin B12; Future  -     Vitamin B1, whole blood; Future  -     Vitamin A; Future  -     PTH, intact; Future  -     CBC and Platelet; Future  -     Comprehensive metabolic panel; Future  -     Ferritin; Future  -     Folate; Future  -     Iron Saturation %; Future  -     FL UPPER GI UGI; Future    Obesity, Class III, BMI 40-49 9 (morbid obesity) ([facility])  -     Zinc; Future  -     Vitamin D 25 hydroxy; Future  -     Vitamin B12; Future  -     Vitamin B1, whole blood; Future  -     Vitamin A; Future  -     PTH, intact; Future  -     CBC and Platelet; Future  -     Comprehensive metabolic panel; Future  -     Ferritin; Future  -     Folate; Future  -     Iron Saturation %; Future    Postsurgical malabsorption  -     Zinc; Future  -     Vitamin D 25 hydroxy; Future  -     Vitamin B12; Future  -     Vitamin B1, whole blood; Future  -     Vitamin A; Future  -     PTH, intact; Future  -     CBC and Platelet; Future  -     Comprehensive metabolic panel; Future  -     Ferritin; Future  -     Folate; Future  -     Iron Saturation %; Future    Abnormal weight gain  -     Zinc; Future  -     Vitamin D 25 hydroxy; Future  -     Vitamin B12; Future  -     Vitamin B1, whole blood; Future  -     Vitamin A; Future  -     PTH, intact; Future  -     CBC and Platelet; Future  -     Comprehensive metabolic panel; Future  -     Ferritin; Future  -     Folate; Future  -     Iron Saturation %; Future  -     FL UPPER GI UGI; Future    Colon cancer screening  -     Ambulatory referral to Gastroenterology; Future    Other orders  -     Diclofenac Sodium (VOLTAREN) 1 %         Subjective:      Patient ID: Narciso Hahn is a 65 y o  male  us/Abnormal weight gain    -s/p Keke-En-Y Gastric Bypass with Dr Jason Onofre on 09/22/2020  Presents to the office today for annual visit  Overall doing fair, he reports having some weight gain - attributing to weight regain since him and his wife has been going through many things such as surgery and personal health issues   He was dx of renal cancer with partial nephectromy but did not undergo any chemo or radiation but developed a PE and is now on xarelto  Still feels full for the most part - tries to eat his protein first  Tries to make healthier food choices  Limited in exercise due to extensive history and bilateral hip/knee pain  Feels he lacks endurance  Doesn't do 30/60 minute rule; does feel hungry often  Initial: 267 5 lbs  Current: 252 5 lbs  EWL: (Weight loss is ahead of schedule at this post surgical period )  Trav: 222 lbs  Current BMI is Body mass index is 42 67 kg/m²  · Tolerating a regular diet-yes  · Eating at least 60 grams of protein per day-yes  · Following 30/60 minute rule with liquids-no - encouraged to try 30/30  Also plan to food log and meal prep  · Drinking at least 64 ounces of fluid per day-no - encouraged to increase this as this can also contribute to frequent hunger  · Drinking carbonated beverages-no  · Sufficient exercise-no  · Using NSAIDs regularly-no  · Using nicotine-no  · Using alcohol-no  · Supplements: Multivitamins and Iron (45 mg of iron); calcium - 2 chews    · EWL is 5%, which DOES NOT places the patient ahead of schedule for expected post surgical weight loss at this time  The following portions of the patient's history were reviewed and updated as appropriate: allergies, current medications, past family history, past medical history, past social history, past surgical history and problem list     Review of Systems   Constitutional: Positive for fatigue  Respiratory: Negative  Cardiovascular: Negative  Gastrointestinal: Negative  Musculoskeletal: Positive for arthralgias (shoulder and hip) and back pain (chronic)  Neurological: Negative  Psychiatric/Behavioral: Negative            Objective:    /82   Pulse 77   Temp (!) 96 8 °F (36 °C) (Tympanic)   Ht 5' 4 5" (1 638 m)   Wt 115 kg (252 lb 8 oz)   BMI 42 67 kg/m²      Physical Exam  Vitals and nursing note reviewed  Constitutional:       Appearance: Normal appearance  He is obese  Cardiovascular:      Rate and Rhythm: Normal rate and regular rhythm  Pulses: Normal pulses  Heart sounds: Normal heart sounds  Pulmonary:      Effort: Pulmonary effort is normal       Breath sounds: Normal breath sounds  Abdominal:      General: Bowel sounds are normal       Palpations: Abdomen is soft  Tenderness: There is no abdominal tenderness  Musculoskeletal:         General: Normal range of motion  Skin:     General: Skin is warm and dry  Neurological:      General: No focal deficit present  Mental Status: He is alert and oriented to person, place, and time  Mental status is at baseline  Psychiatric:         Mood and Affect: Mood normal          Behavior: Behavior normal          Thought Content:  Thought content normal          Judgment: Judgment normal

## 2023-02-26 ENCOUNTER — APPOINTMENT (OUTPATIENT)
Dept: LAB | Facility: CLINIC | Age: 58
End: 2023-02-26

## 2023-02-26 DIAGNOSIS — R63.5 ABNORMAL WEIGHT GAIN: ICD-10-CM

## 2023-02-26 DIAGNOSIS — E66.01 OBESITY, CLASS III, BMI 40-49.9 (MORBID OBESITY) (HCC): ICD-10-CM

## 2023-02-26 DIAGNOSIS — K91.2 POSTSURGICAL MALABSORPTION: ICD-10-CM

## 2023-02-26 DIAGNOSIS — Z48.815 ENCOUNTER FOR SURGICAL AFTERCARE FOLLOWING SURGERY OF DIGESTIVE SYSTEM: ICD-10-CM

## 2023-02-26 DIAGNOSIS — Z98.84 BARIATRIC SURGERY STATUS: ICD-10-CM

## 2023-02-26 LAB
25(OH)D3 SERPL-MCNC: 14.5 NG/ML (ref 30–100)
ALBUMIN SERPL BCP-MCNC: 3.8 G/DL (ref 3.5–5)
ALP SERPL-CCNC: 84 U/L (ref 34–104)
ALT SERPL W P-5'-P-CCNC: 17 U/L (ref 7–52)
ANION GAP SERPL CALCULATED.3IONS-SCNC: 7 MMOL/L (ref 4–13)
AST SERPL W P-5'-P-CCNC: 17 U/L (ref 13–39)
BILIRUB SERPL-MCNC: 0.55 MG/DL (ref 0.2–1)
BUN SERPL-MCNC: 14 MG/DL (ref 5–25)
CALCIUM SERPL-MCNC: 8.7 MG/DL (ref 8.4–10.2)
CHLORIDE SERPL-SCNC: 103 MMOL/L (ref 96–108)
CO2 SERPL-SCNC: 26 MMOL/L (ref 21–32)
CREAT SERPL-MCNC: 0.89 MG/DL (ref 0.6–1.3)
ERYTHROCYTE [DISTWIDTH] IN BLOOD BY AUTOMATED COUNT: 13.8 % (ref 11.6–15.1)
FERRITIN SERPL-MCNC: 12 NG/ML (ref 8–388)
FOLATE SERPL-MCNC: 8.8 NG/ML (ref 3.1–17.5)
GFR SERPL CREATININE-BSD FRML MDRD: 95 ML/MIN/1.73SQ M
GLUCOSE P FAST SERPL-MCNC: 99 MG/DL (ref 65–99)
HCT VFR BLD AUTO: 40.7 % (ref 36.5–49.3)
HGB BLD-MCNC: 12.5 G/DL (ref 12–17)
IRON SATN MFR SERPL: 20 % (ref 20–50)
IRON SERPL-MCNC: 80 UG/DL (ref 65–175)
MCH RBC QN AUTO: 26.8 PG (ref 26.8–34.3)
MCHC RBC AUTO-ENTMCNC: 30.7 G/DL (ref 31.4–37.4)
MCV RBC AUTO: 87 FL (ref 82–98)
PLATELET # BLD AUTO: 159 THOUSANDS/UL (ref 149–390)
PMV BLD AUTO: 11.7 FL (ref 8.9–12.7)
POTASSIUM SERPL-SCNC: 4.2 MMOL/L (ref 3.5–5.3)
PROT SERPL-MCNC: 7 G/DL (ref 6.4–8.4)
PTH-INTACT SERPL-MCNC: 108.7 PG/ML (ref 18.4–80.1)
RBC # BLD AUTO: 4.66 MILLION/UL (ref 3.88–5.62)
SODIUM SERPL-SCNC: 136 MMOL/L (ref 135–147)
TIBC SERPL-MCNC: 409 UG/DL (ref 250–450)
VIT B12 SERPL-MCNC: 376 PG/ML (ref 100–900)
WBC # BLD AUTO: 8.7 THOUSAND/UL (ref 4.31–10.16)

## 2023-03-01 LAB
VIT B1 BLD-SCNC: 135 NMOL/L (ref 66.5–200)
ZINC SERPL-MCNC: 75 UG/DL (ref 44–115)

## 2023-03-06 LAB — VIT A SERPL-MCNC: 43.9 UG/DL (ref 20.1–62)

## 2023-03-07 ENCOUNTER — TELEPHONE (OUTPATIENT)
Dept: BARIATRICS | Facility: CLINIC | Age: 58
End: 2023-03-07

## 2023-03-07 DIAGNOSIS — Z98.84 BARIATRIC SURGERY STATUS: Primary | ICD-10-CM

## 2023-03-07 DIAGNOSIS — R79.89 HIGH SERUM PARATHYROID HORMONE (PTH): ICD-10-CM

## 2023-03-07 DIAGNOSIS — E55.9 VITAMIN D DEFICIENCY: ICD-10-CM

## 2023-03-07 DIAGNOSIS — K91.2 POSTSURGICAL MALABSORPTION: ICD-10-CM

## 2023-03-07 DIAGNOSIS — E53.8 VITAMIN B12 DEFICIENCY: ICD-10-CM

## 2023-03-07 RX ORDER — ERGOCALCIFEROL 1.25 MG/1
50000 CAPSULE ORAL 2 TIMES WEEKLY
Qty: 24 CAPSULE | Refills: 0 | Status: SHIPPED | OUTPATIENT
Start: 2023-03-09 | End: 2023-06-07

## 2023-03-07 NOTE — TELEPHONE ENCOUNTER
----- Message from Odette Schofield sent at 3/7/2023  8:09 AM EST -----  Please call patient to let him know his vitamin D is low and parathyroid level is high which means he may be losing calcium from his bones  I would like to start him on vitamin D2 - 94923 IU twice a week for 12 weeks  After this is completed, please switch to a daily maintenance dose of vitamin D3 2000 IU daily  Also, please make sure he is adding calcium to his supplementation  He should have at least 6481-5552 mg of calcium per day, which is two to three chews per day  I would like to repeat his level in 3 months  Vitamin B12 is mildly low  Please make sure you are taking your multivitamins and if you are consistent, then please add vitamin b12 500 mcg once a day to your current regimen       Thank you,   ANSHUL Lewis

## 2023-03-07 NOTE — TELEPHONE ENCOUNTER
Spoke to patients Spouse let her know blood results , she asked me for [facility]ISA BERMAN  Injection   I asked Provider she stated he has to do  X-ray  upper GI first and make gume to [facility]  I called his wife and she stated she will call office back

## 2023-03-09 ENCOUNTER — OFFICE VISIT (OUTPATIENT)
Dept: CARDIOLOGY CLINIC | Facility: CLINIC | Age: 58
End: 2023-03-09

## 2023-03-09 VITALS
WEIGHT: 249 LBS | DIASTOLIC BLOOD PRESSURE: 68 MMHG | OXYGEN SATURATION: 97 % | HEART RATE: 92 BPM | BODY MASS INDEX: 41.48 KG/M2 | HEIGHT: 65 IN | SYSTOLIC BLOOD PRESSURE: 138 MMHG

## 2023-03-09 DIAGNOSIS — R07.89 CHEST DISCOMFORT: ICD-10-CM

## 2023-03-09 DIAGNOSIS — I10 ESSENTIAL HYPERTENSION: ICD-10-CM

## 2023-03-09 DIAGNOSIS — I50.22 CHRONIC SYSTOLIC CONGESTIVE HEART FAILURE (HCC): ICD-10-CM

## 2023-03-09 DIAGNOSIS — I42.8 NICM (NONISCHEMIC CARDIOMYOPATHY) (HCC): Primary | ICD-10-CM

## 2023-03-09 DIAGNOSIS — I26.99 PULMONARY EMBOLISM, UNSPECIFIED CHRONICITY, UNSPECIFIED PULMONARY EMBOLISM TYPE, UNSPECIFIED WHETHER ACUTE COR PULMONALE PRESENT (HCC): ICD-10-CM

## 2023-03-09 NOTE — PROGRESS NOTES
Cardiology Follow Up    [facility]  [id number]  1965  [facility] [place] [address] [phone]      1  NICM (nonischemic cardiomyopathy) ([facility] [address] )  Echo complete w/ contrast if indicated      2  Chronic systolic congestive heart failure (HCC)        3  Pulmonary embolism, unspecified chronicity, unspecified pulmonary embolism type, unspecified whether acute cor pulmonale present ([facility] )        4  Essential hypertension  CANCELED: POCT ECG      5  Chest discomfort  Ambulatory Referral to Cardiology    NM myocardial perfusion spect (rx stress and/or rest)    Echo complete w/ contrast if indicated          Discussion/Summary:    History of a nonischemic cardiomyopathy  Ejection fraction as low as about 30% on cardiac MRI in the past  Stress was negative a few years back when the cardiomyopathy was diagnosed  Has had renal cell carcinoma and a partial nephrectomy  His EF had normalized when evaluated in 2021  He had recurrent pulmonary embolism and is currently on Xarelto  He complains of symptoms of chest pain, shortness of breath  We will check an echocardiogram to reevaluate his LV function to make sure this is still preserved  Continue Coreg and losartan  Volume status remained stable  Check pharmacologic nuclear stress test  Has ankle injury, unable to do treadmill  Also incomplete left bundle branch block on his ECG  Blood work including recent troponin reviewed from the emergency room  It was unremarkable  Previous History:  55-year-old gentleman who has a history of gastric sleeve  I met him in May of 2020 in preoperative evaluation for bariatric surgery revision and hiatal hernia repair  He had a PE in 12/2019, and at that time an echo showed a mildly decreased LV function    He was asymptomatic, but given his prior decrease in LV function, I repeated a limited echo which somewhat surprisingly showed an EF of 35%  There is a history of pulmonary sarcoidosis without any significant flares in the past other than when he was first diagnosed  He has HTN  I did a stress CMR to look for ischemic CMP, other infiltrative disease, and also to reevaluate EF  This showed biventricular dysfunction, EF 31%  No infiltration, and no evidence of ischemia  Subsequently underwent bariatric surgery  His EF has normalized  Unfortunately, a renal mass was found and he is s/p partial nephrectomy  Had recurrent PE, is on Xarelto  Interval history:    Returns to see me, overdue for follow-up  He had his partial nephrectomy  He had a PE after surgery  He is on Xarelto  Ejection fraction on last echocardiogram in 2021 was normal at 55  No significant valve disease  He has remained pretty stable, but tells me that recently he is not feeling very well  He is feeling lack of balance and unstable on his feet  He has fallen  He gets left-sided chest pains which are intermittent sometimes even when just driving  He feels short of breath when walking up a flight of stairs  He is gaining weight, but this mostly seems caloric  He says there is some fluid retention  Was in the emergency room in January  Had testing with blood work  Troponin was negative  ECG was unremarkable although there is an incomplete left bundle branch block  Head CT was done because of some of his neurologic complaints which was unremarkable      Patient Active Problem List   Diagnosis   • Pulmonary embolism (HCC)   • Gastroesophageal reflux disease   • History of sleeve gastrectomy   • Sarcoidosis   • Incidental lung nodule, > 3mm and < 8mm   • Right renal mass   • Cigar smoker   • Essential hypertension   • Hyperparathyroidism , secondary, non-renal (HCC)   • Low vitamin D level   • Low vitamin B12 level   • Low calcium levels   • NICM (nonischemic cardiomyopathy) (HCC)   • Chest pain   • Bariatric surgery status   • Type 2 diabetes mellitus with hyperglycemia (HCC)   • Osteoporosis   • Class 2 obesity due to excess calories with body mass index (BMI) of 38 0 to 38 9 in adult   • Hypercholesterolemia   • Heart burn   • Gout   • Generalized osteoarthritis   • Arthritis   • ED (erectile dysfunction) of organic origin   • Prediabetes   • Mixed hyperlipidemia   • Chronic systolic congestive heart failure (HCC)   • History of pulmonary embolism   • Obesity, Class II, BMI 35-39 9   • Encounter for surgical aftercare following surgery of digestive system   • Muscle pain   • Postsurgical malabsorption   • Renal cyst   • Pre-op evaluation   • Incisional hernia of anterior abdominal wall with obstruction   • Renal cancer (HCC)   • Small bowel obstruction due to adhesions (HCC)   • Left wrist pain   • Extensor carpi ulnaris tendinitis     Past Medical History:   Diagnosis Date   • Bariatric surgery status    • Duodenal ulcer    • GERD (gastroesophageal reflux disease)    • Gout    • Hyperparathyroidism ([facility] [address] )    • Hypertension    • Lung nodule    • Postsurgical malabsorption    • Pulmonary embolism ([facility] ) 2019   • Sarcoidosis    • Wears glasses      Social History     Tobacco Use   • Smoking status: Former     Packs/day: 1 00     Years: 7 00     Pack years: 7 00     Types: Cigars, Cigarettes     Start date:      Quit date: 2006     Years since quittin 1   • Smokeless tobacco: Never   • Tobacco comments:     only smoked occ cigar, not for over 6 months   Vaping Use   • Vaping Use: Never used   Substance Use Topics   • Alcohol use: Not Currently   • Drug use: Not Currently     Types: Marijuana, Cocaine     Comment: pt quit 20 years ago       Family History   Problem Relation Age of Onset   • Hypertension Father    • Diabetes Father    • Cancer Father    • Diabetes Maternal Aunt    • Cancer Maternal Aunt      Past Surgical History:   Procedure Laterality Date   • BARIATRIC SURGERY      Gastric Sleeve   • COLONOSCOPY     • FOOT SURGERY Right R ankle   • GASTRIC BYPASS LAPAROSCOPIC N/A 9/22/2020    Procedure: LAPAROSCOPIC REVISION/ CONVERSION TO HALEY-EN-Y GASTRIC BYPASS W ROBOTICS, PARAESOPHAGEAL HERNIA REPAIR, AND INTRAOPERATIVE EGD;  Surgeon: Mare Burden MD;  Location: [facility];  Service: Bariatrics   • HAND SURGERY     • LAPAROTOMY N/A 6/19/2021    Procedure: Repair of incarcerated incisional hernia repair with mesh   ;  Surgeon: Edwin Awan DO;  Location: [facility];  Service: General   • PA LAPAROSCOPY SURG PARTIAL NEPHRECTOMY Right 6/14/2021    Procedure: ROBOTIC LAPAROSCOPIC PARTIAL NEPHRECTOMY DECORTICATION OF RIGHT RENAL CYST;  Surgeon: Pura Bang MD;  Location: [facility];  Service: Urology   • SKIN BIOPSY         Current Outpatient Medications:   •  acetaminophen (TYLENOL) 325 mg tablet, Take 2 tablets (650 mg total) by mouth every 4 (four) hours as needed for mild pain, Disp: 30 tablet, Rfl: 0  •  ammonium lactate (LAC-HYDRIN) 12 % cream, Apply topically as needed for dry skin, Disp: 385 g, Rfl: 3  •  calcium carbonate-vitamin D (OSCAL-D) 500 mg-200 units per tablet, TAKE 1 TABLET BY MOUTH DAILY TO SUPPLEMENT CALCIUM/ STRENGTHEN BONES, Disp: , Rfl:   •  carvedilol (COREG) 12 5 mg tablet, Take 1 tablet (12 5 mg total) by mouth 2 (two) times a day, Disp: 180 tablet, Rfl: 1  •  Cholecalciferol 25 MCG (1000 UT) tablet, TAKE ONE TABLET BY MOUTH DAILY FOR VITAMIN D SUPPLEMENTATION, Disp: , Rfl:   •  ciclopirox (LOPROX) 0 77 % cream, Apply topically 2 (two) times a day For 2-4 weeks to affected areas of skin, Disp: 90 g, Rfl: 0  •  Diclofenac Sodium (VOLTAREN) 1 %, , Disp: , Rfl:   •  ergocalciferol (VITAMIN D2) 50,000 units, Take 1 capsule (50,000 Units total) by mouth 2 (two) times a week, Disp: 24 capsule, Rfl: 0  •  lidocaine (XYLOCAINE) 5 % ointment, Apply topically 3 (three) times a day as needed for mild pain (FOR LEFT WRIST PAIN), Disp: 35 44 g, Rfl: 2  •  losartan (COZAAR) 50 mg tablet, TAKE 1 TABLET (50 MG TOTAL) BY MOUTH DAILY IN THE EARLY MORNING HAS BEEN ON IT WITHOUT ISSUES, Disp: 90 tablet, Rfl: 0  •  Multiple Vitamin (Daily-Mark Multivitamin) TABS, TAKE 1 TABLET BY MOUTH EVERY DAY, Disp: 90 tablet, Rfl: 1  •  Multiple Vitamins-Minerals (MULTIVITAMIN ADULT, MINERALS, PO), 1 tablet, Disp: , Rfl:   •  nystatin (MYCOSTATIN) cream, Apply topically as needed (rash), Disp: 30 g, Rfl: 0  •  Xarelto 20 MG tablet, Take 1 tablet (20 mg total) by mouth daily with breakfast, Disp: 90 tablet, Rfl: 1  •  APPLE CIDER VINEGAR PO, Take by mouth daily (Patient not taking: Reported on 2/20/2023), Disp: , Rfl:   •  benzonatate (TESSALON PERLES) 100 mg capsule, Take 1 capsule (100 mg total) by mouth 3 (three) times a day as needed for cough (Patient not taking: Reported on 2/20/2023), Disp: 20 capsule, Rfl: 0  •  chlorhexidine (PERIDEX) 0 12 % solution, SWISH AND SPIT 15ML TWICE DAILY FOR ONE WEEK (Patient not taking: Reported on 2/20/2023), Disp: , Rfl:   •  docusate sodium (COLACE) 100 mg capsule, Take 1 capsule (100 mg total) by mouth 2 (two) times a day for 15 days (Patient not taking: Reported on 2/20/2023), Disp: 30 capsule, Rfl: 0  •  pantoprazole (PROTONIX) 40 mg tablet, Take 1 tablet (40 mg total) by mouth daily at bedtime (Patient not taking: Reported on 2/20/2023), Disp: 90 tablet, Rfl: 1  Allergies   Allergen Reactions   • Nsaids Other (See Comments)     Duodenal ulcer disease   • Flunisolide      Other reaction(s): Burning sensation   • Lisinopril Cough   • Testosterone Itching, Rash and Other (See Comments)     Vitals:    03/09/23 1041   BP: 138/68   BP Location: Left arm   Patient Position: Sitting   Cuff Size: Large   Pulse: 92   SpO2: 97%   Weight: 113 kg (249 lb)   Height: 5' 4 5" (1 638 m)     Vitals:    03/09/23 1041   Weight: 113 kg (249 lb)      Height: 5' 4 5" (163 8 cm)   Body mass index is 42 08 kg/m²      Physical Exam:  GEN: Steele Favre appears well, alert and oriented x 3, pleasant and cooperative   HEENT: pupils equal, round, and reactive to light; extraocular muscles intact  NECK: supple, no carotid bruits   HEART: regular rhythm, normal S1 and S2, no murmurs, clicks, gallops or rubs   LUNGS: clear to auscultation bilaterally; no wheezes, rales, or rhonchi   ABDOMEN: normal bowel sounds, soft, no tenderness, no distention  EXTREMITIES: peripheral pulses normal; no clubbing, cyanosis, or edema  NEURO: no focal findings   SKIN: normal without suspicious lesions on exposed skin    ROS:  Positive for GERD, shortness of breath, chest pain, weight gain, fatigue, lack of balance  Except as noted in HPI, is otherwise reviewed in detail and a 12 point review of systems is negative  Labs:  Lab Results   Component Value Date     12/26/2015    K 4 2 02/26/2023     02/26/2023    CREATININE 0 89 02/26/2023    BUN 14 02/26/2023    CO2 26 02/26/2023    ALT 17 02/26/2023    AST 17 02/26/2023    INR 0 93 06/19/2021    GLUF 99 02/26/2023    HGBA1C 5 7 (H) 06/24/2022    WBC 8 70 02/26/2023    HGB 12 5 02/26/2023    HCT 40 7 02/26/2023     02/26/2023     Testing:  Echo 6/20/21  LEFT VENTRICLE:  Systolic function was normal  Ejection fraction was estimated to be 55 %  Although no diagnostic regional wall motion abnormality was identified, this possibility cannot be completely excluded on the basis of this study  Wall thickness was mildly increased  There was mild concentric hypertrophy      RIGHT VENTRICLE:  The size was normal   Systolic function was normal      LEFT ATRIUM:  The atrium was mildly dilated      MITRAL VALVE:  There was moderate thickening  There was trace regurgitation  Echo 12/2020:  LEFT VENTRICLE:  Systolic function was mildly reduced by visual assessment  Ejection fraction was estimated to be 45 %  There was mild diffuse hypokinesis    Doppler parameters were consistent with abnormal left ventricular relaxation (grade 1 diastolic dysfunction)      MITRAL VALVE:  There was trace regurgitation      TRICUSPID VALVE:  There was mild regurgitation      COMPARISONS:  Comparison was made with the previous study of 05-Jun-2020  Ejection fraction has increased from 35 % to 45 %  MRI 7/7/20: IMPRESSION:  Impression:  1  Mildly enlarged left ventricle size moderately reduced left ventricle systolic function  Ejection fraction measures 31%  This may be slightly underestimated secondary to some arrhythmia artifact  Nonspecific delayed myocardial enhancement at   inferior RV insertion site  No evidence of perfusion defect in resting or stress state  Findings are consistent with idiopathic cardiomyopathy  2  Normal right ventricle end-diastolic volume with moderately reduced right ventricle systolic function  Ejection fraction measures 20%  3  No valvular abnormality  4  Mild left atrial enlargement      Echo 6/5/2020:  LEFT VENTRICLE:  Size was at the upper limits of normal   Systolic function was moderately reduced  Ejection fraction was estimated to be 35 %  There was moderate global hypokinesis with severe hypokinesis of the entire septum and the basal to mid inferior wall  Wall thickness was normal      RIGHT VENTRICLE:  The size was normal   Systolic function was normal      MITRAL VALVE:  There was mild regurgitation      TRICUSPID VALVE:  There was trace regurgitation      COMPARISONS:  Comparison was made with the previous study of 18-Dec-2019  Ejection fraction has decreased  Echo 12/2019:  LEFT VENTRICLE:  Systolic function was at the lower limits of normal  Ejection fraction was estimated to be 45 % - 50%  Although no diagnostic regional wall motion abnormality was identified, this possibility cannot be completely excluded on the basis of this study  Wall thickness was increased  Hypertrophy was noted  Doppler parameters were consistent with abnormal left ventricular relaxation (grade 1 diastolic dysfunction)      AORTIC VALVE:  There was trace regurgitation

## 2023-03-16 ENCOUNTER — HOSPITAL ENCOUNTER (OUTPATIENT)
Dept: RADIOLOGY | Facility: HOSPITAL | Age: 58
Discharge: HOME | End: 2023-03-16

## 2023-03-16 DIAGNOSIS — R63.5 ABNORMAL WEIGHT GAIN: ICD-10-CM

## 2023-03-16 DIAGNOSIS — Z98.84 BARIATRIC SURGERY STATUS: ICD-10-CM

## 2023-03-22 ENCOUNTER — TELEPHONE (OUTPATIENT)
Dept: OBGYN CLINIC | Facility: HOSPITAL | Age: 58
End: 2023-03-22

## 2023-03-22 NOTE — TELEPHONE ENCOUNTER
Patient is being referred to a orthopedics  Referral team called 1/23/23 to rs but Patient's wife said she would cb to schedule at a later date  Please call patient x2      [phone]   [phone]

## 2023-03-24 ENCOUNTER — HOSPITAL ENCOUNTER (OUTPATIENT)
Dept: NON INVASIVE DIAGNOSTICS | Facility: CLINIC | Age: 58
Discharge: HOME | End: 2023-03-24

## 2023-03-24 DIAGNOSIS — I42.8 NICM (NONISCHEMIC CARDIOMYOPATHY) (HCC): ICD-10-CM

## 2023-03-24 DIAGNOSIS — R07.89 CHEST DISCOMFORT: ICD-10-CM

## 2023-06-05 ENCOUNTER — OFFICE VISIT (OUTPATIENT)
Dept: BARIATRICS | Facility: CLINIC | Age: 58
End: 2023-06-05
Payer: COMMERCIAL

## 2023-06-05 VITALS
RESPIRATION RATE: 16 BRPM | WEIGHT: 247.6 LBS | BODY MASS INDEX: 41.25 KG/M2 | HEART RATE: 110 BPM | HEIGHT: 65 IN | DIASTOLIC BLOOD PRESSURE: 88 MMHG | SYSTOLIC BLOOD PRESSURE: 128 MMHG

## 2023-06-05 DIAGNOSIS — R73.03 PREDIABETES: ICD-10-CM

## 2023-06-05 DIAGNOSIS — E78.2 MIXED HYPERLIPIDEMIA: ICD-10-CM

## 2023-06-05 DIAGNOSIS — E53.8 LOW VITAMIN B12 LEVEL: ICD-10-CM

## 2023-06-05 DIAGNOSIS — Z98.84 HISTORY OF BARIATRIC SURGERY: ICD-10-CM

## 2023-06-05 DIAGNOSIS — E66.01 OBESITY, CLASS III, BMI 40-49.9 (MORBID OBESITY) (HCC): Primary | ICD-10-CM

## 2023-06-05 DIAGNOSIS — E78.00 HYPERCHOLESTEROLEMIA: ICD-10-CM

## 2023-06-05 PROBLEM — E66.813 OBESITY, CLASS III, BMI 40-49.9 (MORBID OBESITY): Status: ACTIVE | Noted: 2023-06-05

## 2023-06-05 PROBLEM — E66.9 OBESITY, CLASS II, BMI 35-39.9: Status: RESOLVED | Noted: 2021-01-07 | Resolved: 2023-06-05

## 2023-06-05 PROBLEM — E66.812 OBESITY, CLASS II, BMI 35-39.9: Status: RESOLVED | Noted: 2021-01-07 | Resolved: 2023-06-05

## 2023-06-05 PROBLEM — E11.40 TYPE 2 DIABETES MELLITUS WITH DIABETIC NEUROPATHY, WITHOUT LONG-TERM CURRENT USE OF INSULIN (HCC): Status: ACTIVE | Noted: 2023-06-05

## 2023-06-05 PROBLEM — Z48.815 ENCOUNTER FOR SURGICAL AFTERCARE FOLLOWING SURGERY OF DIGESTIVE SYSTEM: Status: RESOLVED | Noted: 2021-01-07 | Resolved: 2023-06-05

## 2023-06-05 PROCEDURE — 99214 OFFICE O/P EST MOD 30 MIN: CPT | Performed by: INTERNAL MEDICINE

## 2023-06-05 NOTE — PATIENT INSTRUCTIONS
START wegovy 0 25mg weekly  Nurse visit in 4 weeks  If you are doing fine then we will increase to wegovy 0 5mg weekly  Visit wegovy  com for further information/injection instructions  Please eat small frequent meals to help reduce nausea  Lemon water and saltine crackers may help with this  Monitor your resting heart rate and contact the office if it is greater than 100  If you experience fever, nausea/vomiting, and pain radiating to your back this may be a sign of pancreatitis  Please go to the emergency room if this occurs  Follow-up in 3 months

## 2023-06-05 NOTE — ASSESSMENT & PLAN NOTE
- Discussed options of HealthyCORE-Intensive Lifestyle Intervention Program, Very Low Calorie Diet-VLCD and Conservative Program and the role of weight loss medications  - Patient is interested in pursuing Conservative Program  - Weight loss can improve patient's co-morbid conditions and/or prevent weight-related complications  - Labs reviewed: As below  We had a discussion with various AOM available and the risk and benefit of each  Patient wishes to go with semaglutide  He was able to call the [address] but I was in the room with him to verify that they do have the starting dose  He was counseled on the risk and benefit of the medication  He does not have any absolute contraindications to taking the medication  He is agreeable to start treatment at this time  He is to come in for nurse visit in 1 month  If he is doing well at that point we [facility]  will be increased from 0 25 weekly to [facility] 0 5 weekly  Patient understood understanding and agreement with the plan

## 2023-06-05 NOTE — PROGRESS NOTES
Assessment/Plan:  [place] was seen today for consult  Diagnoses and all orders for this visit:    Obesity, Class III, BMI 40-49 9 (morbid obesity) ([facility])  -     Discontinue: Semaglutide-Weight Management (WEGOVY) 0 25 MG/0 5ML; Inject 0 5 mL (0 25 mg total) under the skin once a week for 28 days  -     Discontinue: Semaglutide-Weight Management (WEGOVY) 0 25 MG/0 5ML; Inject 0 5 mL (0 25 mg total) under the skin once a week for 28 days  -     Semaglutide-Weight Management (WEGOVY) 0 25 MG/0 5ML; Inject 0 5 mL (0 25 mg total) under the skin once a week for 28 days    History of bariatric surgery    Mixed hyperlipidemia    Prediabetes    Hypercholesterolemia    Low vitamin B12 level       Obesity, Class III, BMI 40-49 9 (morbid obesity) ([facility] )  - Discussed options of HealthyCORE-Intensive Lifestyle Intervention Program, Very Low Calorie Diet-VLCD and Conservative Program and the role of weight loss medications  - Patient is interested in pursuing Conservative Program  - Weight loss can improve patient's co-morbid conditions and/or prevent weight-related complications  - Labs reviewed: As below  We had a discussion with various AOM available and the risk and benefit of each  Patient wishes to go with semaglutide  He was able to call the [address] but I was in the room with him to verify that they do have the starting dose  He was counseled on the risk and benefit of the medication  He does not have any absolute contraindications to taking the medication  He is agreeable to start treatment at this time  He is to come in for nurse visit in 1 month  If he is doing well at that point we [facility]  will be increased from 0 25 weekly to [facility] 0 5 weekly  Patient understood understanding and agreement with the plan  Total time spent reviewing chart, interviewing patient, examining patient, discussing plan, answering all questions, and documentin min  ______________________________________________________________________    Subjective:   Chief Complaint   Patient presents with   • Consult     MWM consult; waist 50in; goal wt 190     HPI: Osvaldo Ugarte  is a 65 y o  male with history of prediabetes, hypertension, hyperlipidemia, gout, history of renal cell cancer, history of type 2 diabetes, status post Keke-en-Y gastric bypass by Dr Jasper Varghese 9/22/2020, history of PE on Xarelto history of osteoarthritis and excess weight, here to pursue weight loss management  Previous notes and records have been reviewed      HPI  Wt Readings from Last 20 Encounters:   06/05/23 112 kg (247 lb 9 6 oz)   04/14/23 113 kg (249 lb)   04/14/23 112 kg (248 lb)   04/14/23 113 kg (249 lb)   03/09/23 113 kg (249 lb)   02/20/23 115 kg (252 lb 8 oz)   12/30/22 111 kg (244 lb 11 4 oz)   11/14/22 111 kg (244 lb 9 6 oz)   10/12/22 110 kg (242 lb 12 8 oz)   08/11/22 107 kg (236 lb 12 8 oz)   06/24/22 105 kg (231 lb 9 6 oz)   04/20/22 (!) 149 kg (328 lb)   02/15/22 105 kg (231 lb)   01/25/22 101 kg (222 lb)   01/24/22 101 kg (222 lb 8 oz)   10/14/21 101 kg (222 lb)   10/08/21 100 kg (221 lb)   08/05/21 98 4 kg (217 lb)   07/16/21 98 4 kg (217 lb)   07/02/21 98 kg (216 lb)     Excess Weight:  Highest weight: 350 Had sleeve done  Dr Jasper Varghese converted to bypass, patient recalls weight 280s before the surgery  Patient recall julian of 210  Regained to 247  Goal <200  What has been tried:   Contributing factors:   Associated symptoms and effects:     Food logging:    Hydration:  Coffee 4 cups, Water 4 cups  Alcohol: No  Weight Monitoring:  Sleep: Adequate  Occupation:  Works for [employer] in [place]    Past Medical History:   Diagnosis Date   • Bariatric surgery status    • Duodenal ulcer    • GERD (gastroesophageal reflux disease)    • Gout    • Hyperparathyroidism ([facility] )    • Hypertension    • Lung nodule    • Postsurgical malabsorption    • Pulmonary embolism ([facility] ) 2019   • Sarcoidosis    • Wears glasses      Patient denies personal and family history of  pancreatitis, thyroid cancer, MEN-2 tumors  Denies any hx of glaucoma, seizures, kidney stones, gallstones  Denies Hx of CAD, PAD, palpitations, arrhythmia  Denies uncontrolled anxiety or depression, suicidal behavior or thinking , insomnia or sleep disturbance  Past Surgical History:   Procedure Laterality Date   • BARIATRIC SURGERY      Gastric Sleeve   • COLONOSCOPY     • FOOT SURGERY Right     R ankle   • GASTRIC BYPASS LAPAROSCOPIC N/A 9/22/2020    Procedure: LAPAROSCOPIC REVISION/ CONVERSION TO HALEY-EN-Y GASTRIC BYPASS W ROBOTICS, PARAESOPHAGEAL HERNIA REPAIR, AND INTRAOPERATIVE EGD;  Surgeon: Raine Jacobs MD;  Location: [facility];  Service: Bariatrics   • HAND SURGERY     • LAPAROTOMY N/A 6/19/2021    Procedure: Repair of incarcerated incisional hernia repair with mesh   ;  Surgeon: Karyle Banana, DO;  Location: [facility];  Service: General   • NE LAPAROSCOPY SURG PARTIAL NEPHRECTOMY Right 6/14/2021    Procedure: ROBOTIC LAPAROSCOPIC PARTIAL NEPHRECTOMY DECORTICATION OF RIGHT RENAL CYST;  Surgeon: Neymar Lazaro MD;  Location: [facility];  Service: Urology   • SKIN BIOPSY       The following portions of the patient's history were reviewed and updated as appropriate: allergies, current medications, past family history, past medical history, past social history, past surgical history, and problem list     Review Of Systems:  Review of Systems   Constitutional: Negative for activity change, appetite change, chills, fatigue and fever  HENT: Negative for trouble swallowing  Respiratory: Negative for cough and shortness of breath  Cardiovascular: Negative for chest pain, palpitations and leg swelling  Gastrointestinal: Negative for abdominal pain, constipation, diarrhea, nausea and vomiting  Endocrine: Negative for cold intolerance and heat intolerance  Genitourinary: Negative for difficulty urinating and dysuria  "  Musculoskeletal: Negative for arthralgias, back pain, gait problem and myalgias  Skin: Negative for pallor and rash  Neurological: Negative for headaches  Psychiatric/Behavioral: Negative for dysphoric mood and suicidal ideas  The patient is not nervous/anxious  Objective:  /88   Pulse (!) 110   Resp 16   Ht 5' 5\" (1 651 m)   Wt 112 kg (247 lb 9 6 oz)   BMI 41 20 kg/m²   Physical Exam  Vitals and nursing note reviewed  Constitutional:       General: He is not in acute distress  Appearance: Normal appearance  He is not ill-appearing or diaphoretic  Eyes:      General: No scleral icterus  Cardiovascular:      Rate and Rhythm: Normal rate and regular rhythm  Pulses: Normal pulses  Heart sounds: Normal heart sounds  No murmur heard  Pulmonary:      Effort: Pulmonary effort is normal  No respiratory distress  Breath sounds: Normal breath sounds  No stridor  No wheezing or rhonchi  Abdominal:      General: Bowel sounds are normal  There is no distension  Palpations: Abdomen is soft  There is no mass  Tenderness: There is no abdominal tenderness  Musculoskeletal:      Cervical back: Neck supple  Right lower leg: No edema  Left lower leg: No edema  Lymphadenopathy:      Cervical: No cervical adenopathy  Skin:     Capillary Refill: Capillary refill takes less than 2 seconds  Findings: No lesion or rash  Neurological:      Mental Status: He is alert and oriented to person, place, and time  Gait: Gait normal    Psychiatric:         Mood and Affect: Mood normal          Behavior: Behavior normal          Labs and Imaging  Recent labs and imaging have been personally reviewed    Lab Results   Component Value Date    HCT 40 7 02/26/2023    HGB 12 5 02/26/2023    MCV 87 02/26/2023     02/26/2023    WBC 8 70 02/26/2023     Lab Results   Component Value Date    AGAP 7 02/26/2023    ALKPHOS 84 02/26/2023    ALT 17 02/26/2023    ANIONGAP 5 " 12/26/2015    AST 17 02/26/2023    BUN 14 02/26/2023    CALCIUM 8 7 02/26/2023     02/26/2023    CO2 26 02/26/2023    CREATININE 0 89 02/26/2023    EGFR 95 02/26/2023    GLUC 96 01/25/2023    GLUF 99 02/26/2023    K 4 2 02/26/2023     12/26/2015    SODIUM 136 02/26/2023    TBILI 0 55 02/26/2023    TP 7 0 02/26/2023     Lab Results   Component Value Date    HGBA1C 5 7 (H) 06/24/2022     Lab Results   Component Value Date    XPR1RHLPDKXB 0 979 06/24/2022     Lab Results   Component Value Date    CHOLESTEROL 167 11/06/2022     Lab Results   Component Value Date    HDL 80 11/06/2022     Lab Results   Component Value Date    TRIG 50 11/06/2022     Lab Results   Component Value Date    LDLCALC 77 11/06/2022

## 2023-06-06 ENCOUNTER — TELEPHONE (OUTPATIENT)
Dept: BARIATRICS | Facility: CLINIC | Age: 58
End: 2023-06-06

## 2023-06-06 DIAGNOSIS — E66.01 OBESITY, CLASS III, BMI 40-49.9 (MORBID OBESITY) (HCC): ICD-10-CM

## 2023-06-06 RX ORDER — SEMAGLUTIDE 0.25 MG/.5ML
INJECTION, SOLUTION SUBCUTANEOUS
Refills: 0 | OUTPATIENT
Start: 2023-06-06

## 2023-06-06 NOTE — TELEPHONE ENCOUNTER
Let the patient know that this medication is on backorder as we had discussed  He will need to try and get it through the [address]

## 2023-06-06 NOTE — TELEPHONE ENCOUNTER
Pt and Pharm inform of Wegovy Approval  Pt was inform to lose 4/5% of Wt for renewal purposes  Angelina Patel

## 2023-06-29 DIAGNOSIS — Z98.84 HISTORY OF BARIATRIC SURGERY: ICD-10-CM

## 2023-06-29 DIAGNOSIS — E66.01 OBESITY, CLASS III, BMI 40-49.9 (MORBID OBESITY) (HCC): Primary | ICD-10-CM

## 2023-06-29 RX ORDER — TOPIRAMATE 50 MG/1
TABLET, FILM COATED ORAL
Qty: 30 TABLET | Refills: 0 | Status: SHIPPED | OUTPATIENT
Start: 2023-06-29

## 2023-07-27 DIAGNOSIS — E66.01 OBESITY, CLASS III, BMI 40-49.9 (MORBID OBESITY) (HCC): ICD-10-CM

## 2023-07-27 DIAGNOSIS — Z98.84 HISTORY OF BARIATRIC SURGERY: ICD-10-CM

## 2023-07-27 RX ORDER — TOPIRAMATE 50 MG/1
TABLET, FILM COATED ORAL
Qty: 30 TABLET | Refills: 0 | Status: SHIPPED | OUTPATIENT
Start: 2023-07-27

## 2023-07-31 ENCOUNTER — TELEPHONE (OUTPATIENT)
Dept: OTHER | Facility: OTHER | Age: 58
End: 2023-07-31

## 2023-07-31 NOTE — TELEPHONE ENCOUNTER
Called patient and left a message to give the office a call back to reschedule his 7/31/23 Nurse Visit appointment per his wife request through My Chart.

## 2023-09-15 ENCOUNTER — OFFICE VISIT (OUTPATIENT)
Dept: BARIATRICS | Facility: CLINIC | Age: 58
End: 2023-09-15
Payer: COMMERCIAL

## 2023-09-15 VITALS
HEART RATE: 78 BPM | RESPIRATION RATE: 16 BRPM | BODY MASS INDEX: 39.85 KG/M2 | SYSTOLIC BLOOD PRESSURE: 122 MMHG | HEIGHT: 65 IN | DIASTOLIC BLOOD PRESSURE: 70 MMHG | WEIGHT: 239.2 LBS

## 2023-09-15 DIAGNOSIS — Z98.84 HISTORY OF BARIATRIC SURGERY: ICD-10-CM

## 2023-09-15 DIAGNOSIS — E66.01 OBESITY, CLASS III, BMI 40-49.9 (MORBID OBESITY) (HCC): ICD-10-CM

## 2023-09-15 PROBLEM — E11.40 TYPE 2 DIABETES MELLITUS WITH DIABETIC NEUROPATHY, WITHOUT LONG-TERM CURRENT USE OF INSULIN (HCC): Status: RESOLVED | Noted: 2023-06-05 | Resolved: 2023-09-15

## 2023-09-15 PROCEDURE — 99214 OFFICE O/P EST MOD 30 MIN: CPT | Performed by: INTERNAL MEDICINE

## 2023-09-15 RX ORDER — TOPIRAMATE 50 MG/1
50 TABLET, FILM COATED ORAL 2 TIMES DAILY
Qty: 180 TABLET | Refills: 0 | Status: SHIPPED | OUTPATIENT
Start: 2023-09-15

## 2023-09-15 RX ORDER — PANTOPRAZOLE SODIUM 20 MG/1
TABLET, DELAYED RELEASE ORAL
COMMUNITY
Start: 2023-06-08

## 2023-09-15 NOTE — PROGRESS NOTES
Assessment/Plan:  Corina Monroe was seen today for follow-up. Diagnoses and all orders for this visit:    Obesity, Class III, BMI 40-49.9 (morbid obesity) ([facility])  -     topiramate (Topamax) 50 MG tablet; Take 1 tablet (50 mg total) by mouth 2 (two) times a day  -     Semaglutide-Weight Management (WEGOVY) 0.25 MG/0.5ML; Inject 0.5 mL (0.25 mg total) under the skin once a week for 28 days    History of bariatric surgery  -     topiramate (Topamax) 50 MG tablet; Take 1 tablet (50 mg total) by mouth 2 (two) times a day       Encouraged to increase activity. Encouraged to avoid processed foods and continue maintaining adequate hydration. Increase topiramate to 50 mg twice a day. [facility] and will contact me if he is able to get started on it. He is aware that he will need to monitor his heart rate and follow-up with a nurse visit for vital sign check 1 month after starting. Patient demonstrated understanding and agreement with the plan. Total time spent reviewing chart, interviewing patient, examining patient, discussing plan, answering all questions, and documentin min.       ______________________________________________________________________      Subjective:   Chief Complaint   Patient presents with   • Follow-up     MWM 3mth f/u, waist 52in     Patient here to discuss weight associated problems and nutrition goals  HPI: Ilia Browning  is a 63 y.o. male with history of prediabetes, hypertension, hyperlipidemia, gout, history of PE on Xarelto, osteoarthritis, history of renal cell cancer, history of type 2 diabetes status post Keke-en-Y gastric bypass by Dr. Mary Hinds on 2020 and excess weight. Weight loss plan:  Conservative Program.   Most recent notes and records were reviewed.     Wt Readings from Last 10 Encounters:   09/15/23 109 kg (239 lb 3.2 oz)   23 112 kg (247 lb 9.6 oz)   23 113 kg (249 lb)   23 112 kg (248 lb) 04/14/23 113 kg (249 lb)   03/09/23 113 kg (249 lb)   02/20/23 115 kg (252 lb 8 oz)   12/30/22 111 kg (244 lb 11.4 oz)   11/14/22 111 kg (244 lb 9.6 oz)   10/12/22 110 kg (242 lb 12.8 oz)     Highest weight had been 350 pounds and in the 280 per patient recollection prior to bariatric surgery. Trav 210  Regain to 247 prior to establishing with medical weight management on 6/5/2023. He was prescribed Wegovy 0.25 mg weekly. Due to shortage of this was switched to topiramate on 6/29/2023. He is down 8 pounds as he weighed 239 lbs today. Hunger/Cravings:  Decreased with topiramate. Dining out:  Occasionally  Hydration:  Water 64oz   Alcohol:  No   Sleep:  6  Weight Monitoring:      Review Of Systems:  Review of Systems   Constitutional: Negative for activity change, appetite change, chills, fatigue and fever. HENT: Negative for trouble swallowing. Respiratory: Negative for cough and shortness of breath. Cardiovascular: Negative for chest pain, palpitations and leg swelling. Gastrointestinal: Negative for abdominal pain, constipation, diarrhea, nausea and vomiting. Endocrine: Negative for cold intolerance and heat intolerance. Genitourinary: Negative for difficulty urinating and dysuria. Musculoskeletal: Negative for arthralgias, back pain, gait problem and myalgias. Skin: Negative for pallor and rash. Neurological: Negative for headaches. Psychiatric/Behavioral: Negative for dysphoric mood and suicidal ideas. The patient is not nervous/anxious. Objective:  /70   Pulse 78   Resp 16   Ht 5' 5" (1.651 m)   Wt 109 kg (239 lb 3.2 oz)   BMI 39.80 kg/m²   Physical Exam  Vitals and nursing note reviewed. Constitutional:       General: He is not in acute distress. Appearance: Normal appearance. He is not ill-appearing or diaphoretic. Eyes:      General: No scleral icterus. Cardiovascular:      Rate and Rhythm: Normal rate and regular rhythm.       Pulses: Normal pulses. Heart sounds: Normal heart sounds. No murmur heard. Pulmonary:      Effort: Pulmonary effort is normal. No respiratory distress. Breath sounds: Normal breath sounds. No stridor. No wheezing or rhonchi. Abdominal:      General: Bowel sounds are normal. There is no distension. Palpations: Abdomen is soft. There is no mass. Tenderness: There is no abdominal tenderness. Musculoskeletal:      Cervical back: Neck supple. Right lower leg: No edema. Left lower leg: No edema. Lymphadenopathy:      Cervical: No cervical adenopathy. Skin:     Capillary Refill: Capillary refill takes less than 2 seconds. Findings: No lesion or rash. Neurological:      Mental Status: He is alert and oriented to person, place, and time. Gait: Gait normal.   Psychiatric:         Mood and Affect: Mood normal.         Behavior: Behavior normal.         Labs and Imaging  Recent labs and imaging have been personally reviewed.   Lab Results   Component Value Date    WBC 8.70 02/26/2023    HGB 12.5 02/26/2023    HCT 40.7 02/26/2023    MCV 87 02/26/2023     02/26/2023     Lab Results   Component Value Date     12/26/2015    SODIUM 136 02/26/2023    K 4.2 02/26/2023     02/26/2023    CO2 26 02/26/2023    ANIONGAP 5 12/26/2015    AGAP 7 02/26/2023    BUN 14 02/26/2023    CREATININE 0.89 02/26/2023    GLUC 96 01/25/2023    GLUF 99 02/26/2023    CALCIUM 8.7 02/26/2023    AST 17 02/26/2023    ALT 17 02/26/2023    ALKPHOS 84 02/26/2023    TP 7.0 02/26/2023    TBILI 0.55 02/26/2023    EGFR 95 02/26/2023     Lab Results   Component Value Date    HGBA1C 5.7 (H) 06/24/2022     Lab Results   Component Value Date    FPM6JNAWMGQK 0.979 06/24/2022     Lab Results   Component Value Date    CHOLESTEROL 167 11/06/2022     Lab Results   Component Value Date    HDL 80 11/06/2022     Lab Results   Component Value Date    TRIG 50 11/06/2022     Lab Results   Component Value Date    LDLCALC 77 11/06/2022

## 2023-12-26 DIAGNOSIS — E66.01 OBESITY, CLASS III, BMI 40-49.9 (MORBID OBESITY) (HCC): ICD-10-CM

## 2023-12-26 DIAGNOSIS — Z98.84 HISTORY OF BARIATRIC SURGERY: ICD-10-CM

## 2023-12-26 RX ORDER — TOPIRAMATE 50 MG/1
50 TABLET, FILM COATED ORAL 2 TIMES DAILY
Qty: 180 TABLET | Refills: 0 | Status: SHIPPED | OUTPATIENT
Start: 2023-12-26

## 2024-03-13 DIAGNOSIS — Z98.84 HISTORY OF BARIATRIC SURGERY: ICD-10-CM

## 2024-03-13 DIAGNOSIS — E66.01 OBESITY, CLASS III, BMI 40-49.9 (MORBID OBESITY) (HCC): ICD-10-CM

## 2024-03-13 RX ORDER — TOPIRAMATE 50 MG/1
50 TABLET, FILM COATED ORAL 2 TIMES DAILY
Qty: 60 TABLET | Refills: 0 | Status: SHIPPED | OUTPATIENT
Start: 2024-03-13

## 2024-04-16 DIAGNOSIS — Z98.84 HISTORY OF BARIATRIC SURGERY: ICD-10-CM

## 2024-04-16 DIAGNOSIS — E66.01 OBESITY, CLASS III, BMI 40-49.9 (MORBID OBESITY) (HCC): ICD-10-CM

## 2024-04-16 RX ORDER — TOPIRAMATE 50 MG/1
50 TABLET, FILM COATED ORAL 2 TIMES DAILY
Qty: 180 TABLET | Refills: 0 | Status: SHIPPED | OUTPATIENT
Start: 2024-04-16

## 2024-04-24 DIAGNOSIS — E66.01 OBESITY, CLASS III, BMI 40-49.9 (MORBID OBESITY) (HCC): Primary | ICD-10-CM

## 2024-04-25 ENCOUNTER — TELEPHONE (OUTPATIENT)
Age: 59
End: 2024-04-25

## 2024-04-25 NOTE — TELEPHONE ENCOUNTER
PA for wegovy    Submitted via    []CMM-KEY    [x][place]-Case ID # [id number]   []Faxed to plan   []Other website    []Phone call Case ID #      Office notes sent, clinical questions answered. Awaiting determination    Turnaround time for your insurance to make a decision on your Prior Authorization can take 7-21 business days.

## 2024-04-27 NOTE — TELEPHONE ENCOUNTER
PA for wegovy Approved     Date(s) approved March 26, 2024 to October 22, 2024     Case #     Patient advised by          [x] Partschannelhart Message  [] Phone call   []LMOM  []L/M to call office as no active Communication consent on file  []Unable to leave detailed message as VM not approved on Communication consent         Pharmacy advised by    [x]Fax  []Phone call    Approval letter scanned into Media Yes

## 2024-05-28 ENCOUNTER — OFFICE VISIT (OUTPATIENT)
Age: 59
End: 2024-05-28
Payer: COMMERCIAL

## 2024-05-28 VITALS — HEIGHT: 65 IN | WEIGHT: 230 LBS | BODY MASS INDEX: 38.32 KG/M2

## 2024-05-28 DIAGNOSIS — E66.9 OBESITY, CLASS II, BMI 35-39.9: Primary | ICD-10-CM

## 2024-05-28 PROCEDURE — 99213 OFFICE O/P EST LOW 20 MIN: CPT | Performed by: PHYSICIAN ASSISTANT

## 2024-05-28 NOTE — LETTER
May 28, 2024     Zaire Kemp PA-C  [address]  [address]  [facility] [address]    Patient: Placido Hernandez   YOB: 1965   Date of Visit: 5/28/2024       Dear Dr. Kemp:    Thank you for referring Placido Hernandez to me for evaluation. Below are my notes for this consultation.    If you have questions, please do not hesitate to call me. I look forward to following your patient along with you.         Sincerely,        Kim Baltazar PA-C        CC: No Recipients    Kim Baltazar PA-C  5/28/2024  3:37 PM  Incomplete  Assessment/Plan:  No diagnosis found.    Initial:  lbs  Current:  lbs  Change:  lbs  Goal:  lbs    - Weight not at goal  - Patient is interested in {PT INTEREST (Optional):96387}  - Labs reviewed: As below.    General Recommendations:  Nutrition:  Eat breakfast daily.  Do not skip meals.     Food log (ie.) www.myfitnesspal.com, sparkpeople.com, loseit.com, calorieking.com, etc.    Practice mindful eating.  Be sure to set aside time to eat, eat slowly, and savor your food.    Hydration:    At least 64oz of water daily.  No sugar sweetened beverages.  No juice (eat the fruit instead).    Exercise:  Studies have shown that the ideal exercise goal is somewhere between 150 to 300 minutes of moderate intensity exercise a week.  Start with exercising 10 minutes every other day and gradually increase physical activity with a goal of at least 150 minutes of moderate intensity exercise a week, divided over at least 3 days a week.  An example of this would be exercising 30 minutes a day, 5 days a week.  Resistance training can increase muscle mass and increase our resting metabolic rate.   FULL BODY resistance training is recommended 2-3 times a week.  Do not do this on consecutive days to allow for muscle recovery.    Aim for a bare minimum 5000 steps, even on days you do not exercise.    Monitoring:   Weigh yourself daily.  If this causes undue stress, then just  weigh yourself once a week.  Weigh yourself the same time of the day with the same amount of clothing on.  Preferably this should be done after waking up, before you eat, and with no clothing or minimal clothing on.    Specific Goals:  {GOALS:39547}  {GOALS:09165}  {GOALS:12829}    Calorie goal:  ***    Return visit:    Patient counseled that he is overdue for his RCC surveillance imaging. Encouraged him to reach out to his urology team too coordinate.      ______________________________________________________________________      Subjective:   Chief Complaint   Patient presents with   • Follow-up     Mwm 4mth f/u     Patient here to discuss weight associated problems and nutrition goals  HPI: Placido Hernandez  is a 58 y.o. male with history of prediabetes, hypertension, hyperlipidemia, gout, history of PE on Xarelto, osteoarthritis, history of renal cell cancer, history of type 2 diabetes status post Keke-en-Y gastric bypass by Dr. Song Ferrell on 9/22/2020 and excess weight.  Weight loss plan:  Conservative Program.   Most recent notes and records were reviewed.    Wt Readings from Last 10 Encounters:   05/28/24 104 kg (230 lb)   09/15/23 109 kg (239 lb 3.2 oz)   06/05/23 112 kg (247 lb 9.6 oz)   04/14/23 113 kg (249 lb)   04/14/23 112 kg (248 lb)   04/14/23 113 kg (249 lb)   03/09/23 113 kg (249 lb)   02/20/23 115 kg (252 lb 8 oz)   12/30/22 111 kg (244 lb 11.4 oz)   11/14/22 111 kg (244 lb 9.6 oz)     Highest weight had been 350 pounds and in the 280 per patient recollection prior to bariatric surgery.    Trav 210  Regain to 247 prior to establishing with medical weight management on 6/5/2023.    He was prescribed Wegovy 0.25 mg weekly.  Due to shortage of this was switched to topiramate on 6/29/2023.  He is down a totally of 17 lbs, weighing 230 lbs today    Hunger/Cravings:  Decreased with topiramate.  Dining out:  Occasionally  Hydration:  Water 64oz   Alcohol:  No   Sleep:  6  Weight Monitoring:      Review Of  "Systems:  Review of Systems   Constitutional:  Negative for activity change, appetite change, chills, fatigue and fever.   HENT:  Negative for trouble swallowing.    Respiratory:  Negative for cough and shortness of breath.    Cardiovascular:  Negative for chest pain, palpitations and leg swelling.   Gastrointestinal:  Negative for abdominal pain, constipation, diarrhea, nausea and vomiting.   Endocrine: Negative for cold intolerance and heat intolerance.   Genitourinary:  Negative for difficulty urinating and dysuria.   Musculoskeletal:  Negative for arthralgias, back pain, gait problem and myalgias.   Skin:  Negative for pallor and rash.   Neurological:  Negative for headaches.   Psychiatric/Behavioral:  Negative for dysphoric mood and suicidal ideas. The patient is not nervous/anxious.      Objective:  Ht 5' 4.5\" (1.638 m)   Wt 104 kg (230 lb)   BMI 38.87 kg/m²   Physical Exam  Vitals and nursing note reviewed.   Constitutional:       General: He is not in acute distress.     Appearance: Normal appearance. He is not ill-appearing or diaphoretic.   Eyes:      General: No scleral icterus.  Cardiovascular:      Rate and Rhythm: Normal rate and regular rhythm.      Pulses: Normal pulses.      Heart sounds: Normal heart sounds. No murmur heard.  Pulmonary:      Effort: Pulmonary effort is normal. No respiratory distress.      Breath sounds: Normal breath sounds. No stridor. No wheezing or rhonchi.   Abdominal:      General: Bowel sounds are normal. There is no distension.      Palpations: Abdomen is soft. There is no mass.      Tenderness: There is no abdominal tenderness.   Musculoskeletal:      Cervical back: Neck supple.      Right lower leg: No edema.      Left lower leg: No edema.   Lymphadenopathy:      Cervical: No cervical adenopathy.   Skin:     Capillary Refill: Capillary refill takes less than 2 seconds.      Findings: No lesion or rash.   Neurological:      Mental Status: He is alert and oriented to " person, place, and time.      Gait: Gait normal.   Psychiatric:         Mood and Affect: Mood normal.         Behavior: Behavior normal.         Labs and Imaging  Recent labs and imaging have been personally reviewed.  Lab Results   Component Value Date    WBC 8.70 02/26/2023    HGB 12.5 02/26/2023    HCT 40.7 02/26/2023    MCV 87 02/26/2023     02/26/2023     Lab Results   Component Value Date     12/26/2015    SODIUM 136 02/26/2023    K 4.2 02/26/2023     02/26/2023    CO2 26 02/26/2023    ANIONGAP 5 12/26/2015    AGAP 7 02/26/2023    BUN 14 02/26/2023    CREATININE 0.89 02/26/2023    GLUC 96 01/25/2023    GLUF 99 02/26/2023    CALCIUM 8.7 02/26/2023    AST 17 02/26/2023    ALT 17 02/26/2023    ALKPHOS 84 02/26/2023    TP 7.0 02/26/2023    TBILI 0.55 02/26/2023    EGFR 95 02/26/2023     Lab Results   Component Value Date    HGBA1C 5.7 (H) 06/24/2022     Lab Results   Component Value Date    XQA9DFRSDWHQ 0.979 06/24/2022     Lab Results   Component Value Date    CHOLESTEROL 167 11/06/2022     Lab Results   Component Value Date    HDL 80 11/06/2022     Lab Results   Component Value Date    TRIG 50 11/06/2022     Lab Results   Component Value Date    LDLCALC 77 11/06/2022

## 2024-05-28 NOTE — PROGRESS NOTES
Assessment/Plan:  1. Obesity, Class II, BMI 35-39.9  Semaglutide-Weight Management (WEGOVY) 0.5 MG/0.5ML          - Weight not at goal  - Patient is interested in Conservative Program  - Labs reviewed: As below.    General Recommendations:  Nutrition:  Eat breakfast daily.  Do not skip meals.     Food log (ie.) www.myfitnesspal.com, sparkpeople.com, loseit.com, calorieking.com, etc.    Practice mindful eating.  Be sure to set aside time to eat, eat slowly, and savor your food.    Hydration:    At least 64oz of water daily.  No sugar sweetened beverages.  No juice (eat the fruit instead).    Exercise:  Studies have shown that the ideal exercise goal is somewhere between 150 to 300 minutes of moderate intensity exercise a week.  Start with exercising 10 minutes every other day and gradually increase physical activity with a goal of at least 150 minutes of moderate intensity exercise a week, divided over at least 3 days a week.  An example of this would be exercising 30 minutes a day, 5 days a week.  Resistance training can increase muscle mass and increase our resting metabolic rate.   FULL BODY resistance training is recommended 2-3 times a week.  Do not do this on consecutive days to allow for muscle recovery.    Aim for a bare minimum 5000 steps, even on days you do not exercise.    Monitoring:   Weigh yourself daily.  If this causes undue stress, then just weigh yourself once a week.  Weigh yourself the same time of the day with the same amount of clothing on.  Preferably this should be done after waking up, before you eat, and with no clothing or minimal clothing on.    Specific Goals:  Practice 30/60 rule  No sugary beverages. At least 64oz of water daily.  Goal protein intake of 60-80 grams per day    Calorie goal:  1500 barbraa/day    Return visit:    Patient counseled that he is overdue for his RCC surveillance imaging. Encouraged him to reach out to his urology team too coordinate.    Patient verbalized  understanding  Calorie deficit/calorie counting  Increase physical activity  AOM  Will titrate up on the Wegovy - up to 0.5mg dose. Rx sent to pharmacy. Patient tolerating well.   Will taper off topamax once on a maintenance dose   RTC in 3 months    ______________________________________________________________________      Subjective:   Chief Complaint   Patient presents with    Follow-up     Mwm 4mth f/u     Patient here to discuss weight associated problems and nutrition goals  HPI: Placido Hernandez  is a 58 y.o. male with history of prediabetes, hypertension, hyperlipidemia, gout, history of PE on Xarelto, osteoarthritis, history of renal cell cancer, history of type 2 diabetes status post Keke-en-Y gastric bypass by Dr. Song Ferrell on 9/22/2020 and excess weight.  Weight loss plan:  Conservative Program.   Most recent notes and records were reviewed.    Wt Readings from Last 10 Encounters:   05/28/24 104 kg (230 lb)   09/15/23 109 kg (239 lb 3.2 oz)   06/05/23 112 kg (247 lb 9.6 oz)   04/14/23 113 kg (249 lb)   04/14/23 112 kg (248 lb)   04/14/23 113 kg (249 lb)   03/09/23 113 kg (249 lb)   02/20/23 115 kg (252 lb 8 oz)   12/30/22 111 kg (244 lb 11.4 oz)   11/14/22 111 kg (244 lb 9.6 oz)     Highest weight had been 350 pounds and in the 280 per patient recollection prior to bariatric surgery.    Trav 210  Regain to 247 prior to establishing with medical weight management on 6/5/2023.    He was prescribed Wegovy 0.25 mg weekly.  Due to shortage of this was switched to topiramate on 6/29/2023.  Restarted wegovy last month, currently completing 0.25mg dose  He is down a totally of 17 lbs, weighing 230 lbs today. States he was down to 220lbs, however gained within the past month d/t recent passing of his father and some emotional eating. This has started to improve and declines visit with SW today.    Hunger/Cravings:  Decreased with topiramate.  Dining out:  Occasionally  Hydration:  Water 64oz   Alcohol:  No  "  Sleep:  6  Weight Monitoring:      Review Of Systems:  Review of Systems   Constitutional:  Negative for activity change, appetite change, chills, fatigue and fever.   HENT:  Negative for trouble swallowing.    Respiratory:  Negative for cough and shortness of breath.    Cardiovascular:  Negative for chest pain, palpitations and leg swelling.   Gastrointestinal:  Negative for abdominal pain, constipation, diarrhea, nausea and vomiting.   Endocrine: Negative for cold intolerance and heat intolerance.   Genitourinary:  Negative for difficulty urinating and dysuria.   Musculoskeletal:  Negative for arthralgias, back pain, gait problem and myalgias.   Skin:  Negative for pallor and rash.   Neurological:  Negative for headaches.   Psychiatric/Behavioral:  Negative for dysphoric mood and suicidal ideas. The patient is not nervous/anxious.      Objective:  Ht 5' 4.5\" (1.638 m)   Wt 104 kg (230 lb)   BMI 38.87 kg/m²   Physical Exam  Vitals and nursing note reviewed.   Constitutional:       General: He is not in acute distress.     Appearance: Normal appearance. He is not ill-appearing or diaphoretic.   Eyes:      General: No scleral icterus.  Cardiovascular:      Rate and Rhythm: Normal rate and regular rhythm.      Pulses: Normal pulses.      Heart sounds: Normal heart sounds. No murmur heard.  Pulmonary:      Effort: Pulmonary effort is normal. No respiratory distress.      Breath sounds: Normal breath sounds. No stridor. No wheezing or rhonchi.   Abdominal:      General: Bowel sounds are normal. There is no distension.      Palpations: Abdomen is soft. There is no mass.      Tenderness: There is no abdominal tenderness.   Musculoskeletal:      Cervical back: Neck supple.      Right lower leg: No edema.      Left lower leg: No edema.   Lymphadenopathy:      Cervical: No cervical adenopathy.   Skin:     Capillary Refill: Capillary refill takes less than 2 seconds.      Findings: No lesion or rash.   Neurological:      " Mental Status: He is alert and oriented to person, place, and time.      Gait: Gait normal.   Psychiatric:         Mood and Affect: Mood normal.         Behavior: Behavior normal.         Labs and Imaging  Recent labs and imaging have been personally reviewed.  Lab Results   Component Value Date    WBC 8.70 02/26/2023    HGB 12.5 02/26/2023    HCT 40.7 02/26/2023    MCV 87 02/26/2023     02/26/2023     Lab Results   Component Value Date     12/26/2015    SODIUM 136 02/26/2023    K 4.2 02/26/2023     02/26/2023    CO2 26 02/26/2023    ANIONGAP 5 12/26/2015    AGAP 7 02/26/2023    BUN 14 02/26/2023    CREATININE 0.89 02/26/2023    GLUC 96 01/25/2023    GLUF 99 02/26/2023    CALCIUM 8.7 02/26/2023    AST 17 02/26/2023    ALT 17 02/26/2023    ALKPHOS 84 02/26/2023    TP 7.0 02/26/2023    TBILI 0.55 02/26/2023    EGFR 95 02/26/2023     Lab Results   Component Value Date    HGBA1C 5.7 (H) 06/24/2022     Lab Results   Component Value Date    [id number] 0.979 06/24/2022     Lab Results   Component Value Date    CHOLESTEROL 167 11/06/2022     Lab Results   Component Value Date    HDL 80 11/06/2022     Lab Results   Component Value Date    TRIG 50 11/06/2022     Lab Results   Component Value Date    LDLCALC 77 11/06/2022

## 2024-05-28 NOTE — LETTER
May 28, 2024     Zaire Kemp PA-C  [address]  [address]  [facility] [address]    Patient: Placiod Hernandez   YOB: 1965   Date of Visit: 5/28/2024       Dear Dr. Kemp:    Thank you for referring Placido Hernandez to me for evaluation. Below are my notes for this consultation.    If you have questions, please do not hesitate to call me. I look forward to following your patient along with you.         Sincerely,        Kim Baltazar PA-C        CC: No Recipients    Kim Baltazar PA-C  5/28/2024  4:01 PM  Cosign Needed  Assessment/Plan:  1. Obesity, Class II, BMI 35-39.9  Semaglutide-Weight Management (WEGOVY) 0.5 MG/0.5ML          - Weight not at goal  - Patient is interested in Conservative Program  - Labs reviewed: As below.    General Recommendations:  Nutrition:  Eat breakfast daily.  Do not skip meals.     Food log (ie.) www.[employer].com, sparkpeople.com, loseit.com, calorieking.com, etc.    Practice mindful eating.  Be sure to set aside time to eat, eat slowly, and savor your food.    Hydration:    At least 64oz of water daily.  No sugar sweetened beverages.  No juice (eat the fruit instead).    Exercise:  Studies have shown that the ideal exercise goal is somewhere between 150 to 300 minutes of moderate intensity exercise a week.  Start with exercising 10 minutes every other day and gradually increase physical activity with a goal of at least 150 minutes of moderate intensity exercise a week, divided over at least 3 days a week.  An example of this would be exercising 30 minutes a day, 5 days a week.  Resistance training can increase muscle mass and increase our resting metabolic rate.   FULL BODY resistance training is recommended 2-3 times a week.  Do not do this on consecutive days to allow for muscle recovery.    Aim for a bare minimum 5000 steps, even on days you do not exercise.    Monitoring:   Weigh yourself daily.  If this causes undue stress,  then just weigh yourself once a week.  Weigh yourself the same time of the day with the same amount of clothing on.  Preferably this should be done after waking up, before you eat, and with no clothing or minimal clothing on.    Specific Goals:  Practice 30/60 rule  No sugary beverages. At least 64oz of water daily.  Goal protein intake of 60-80 grams per day    Calorie goal:  1500 barbara/day    Return visit:    Patient counseled that he is overdue for his RCC surveillance imaging. Encouraged him to reach out to his urology team too coordinate.    Patient verbalized understanding  Calorie deficit/calorie counting  Increase physical activity  AOM  Will titrate up on the Wegovy - up to 0.5mg dose. Rx sent to pharmacy. Patient tolerating well.   Will taper off topamax once on a maintenance dose   RTC in 3 months    ______________________________________________________________________      Subjective:   Chief Complaint   Patient presents with   • Follow-up     Mwm 4mth f/u     Patient here to discuss weight associated problems and nutrition goals  HPI: Placido Hernandez  is a 58 y.o. male with history of prediabetes, hypertension, hyperlipidemia, gout, history of PE on Xarelto, osteoarthritis, history of renal cell cancer, history of type 2 diabetes status post Keke-en-Y gastric bypass by Dr. Song Ferrell on 9/22/2020 and excess weight.  Weight loss plan:  Conservative Program.   Most recent notes and records were reviewed.    Wt Readings from Last 10 Encounters:   05/28/24 104 kg (230 lb)   09/15/23 109 kg (239 lb 3.2 oz)   06/05/23 112 kg (247 lb 9.6 oz)   04/14/23 113 kg (249 lb)   04/14/23 112 kg (248 lb)   04/14/23 113 kg (249 lb)   03/09/23 113 kg (249 lb)   02/20/23 115 kg (252 lb 8 oz)   12/30/22 111 kg (244 lb 11.4 oz)   11/14/22 111 kg (244 lb 9.6 oz)     Highest weight had been 350 pounds and in the 280 per patient recollection prior to bariatric surgery.    Trav 210  Regain to 247 prior to establishing with  "medical weight management on 6/5/2023.    He was prescribed Wegovy 0.25 mg weekly.  Due to shortage of this was switched to topiramate on 6/29/2023.  Restarted wegovy last month, currently completing 0.25mg dose  He is down a totally of 17 lbs, weighing 230 lbs today. States he was down to 220lbs, however gained within the past month d/t recent passing of his father and some emotional eating. This has started to improve and declines visit with SW today.    Hunger/Cravings:  Decreased with topiramate.  Dining out:  Occasionally  Hydration:  Water 64oz   Alcohol:  No   Sleep:  6  Weight Monitoring:      Review Of Systems:  Review of Systems   Constitutional:  Negative for activity change, appetite change, chills, fatigue and fever.   HENT:  Negative for trouble swallowing.    Respiratory:  Negative for cough and shortness of breath.    Cardiovascular:  Negative for chest pain, palpitations and leg swelling.   Gastrointestinal:  Negative for abdominal pain, constipation, diarrhea, nausea and vomiting.   Endocrine: Negative for cold intolerance and heat intolerance.   Genitourinary:  Negative for difficulty urinating and dysuria.   Musculoskeletal:  Negative for arthralgias, back pain, gait problem and myalgias.   Skin:  Negative for pallor and rash.   Neurological:  Negative for headaches.   Psychiatric/Behavioral:  Negative for dysphoric mood and suicidal ideas. The patient is not nervous/anxious.      Objective:  Ht 5' 4.5\" (1.638 m)   Wt 104 kg (230 lb)   BMI 38.87 kg/m²   Physical Exam  Vitals and nursing note reviewed.   Constitutional:       General: He is not in acute distress.     Appearance: Normal appearance. He is not ill-appearing or diaphoretic.   Eyes:      General: No scleral icterus.  Cardiovascular:      Rate and Rhythm: Normal rate and regular rhythm.      Pulses: Normal pulses.      Heart sounds: Normal heart sounds. No murmur heard.  Pulmonary:      Effort: Pulmonary effort is normal. No " respiratory distress.      Breath sounds: Normal breath sounds. No stridor. No wheezing or rhonchi.   Abdominal:      General: Bowel sounds are normal. There is no distension.      Palpations: Abdomen is soft. There is no mass.      Tenderness: There is no abdominal tenderness.   Musculoskeletal:      Cervical back: Neck supple.      Right lower leg: No edema.      Left lower leg: No edema.   Lymphadenopathy:      Cervical: No cervical adenopathy.   Skin:     Capillary Refill: Capillary refill takes less than 2 seconds.      Findings: No lesion or rash.   Neurological:      Mental Status: He is alert and oriented to person, place, and time.      Gait: Gait normal.   Psychiatric:         Mood and Affect: Mood normal.         Behavior: Behavior normal.         Labs and Imaging  Recent labs and imaging have been personally reviewed.  Lab Results   Component Value Date    WBC 8.70 02/26/2023    HGB 12.5 02/26/2023    HCT 40.7 02/26/2023    MCV 87 02/26/2023     02/26/2023     Lab Results   Component Value Date     12/26/2015    SODIUM 136 02/26/2023    K 4.2 02/26/2023     02/26/2023    CO2 26 02/26/2023    ANIONGAP 5 12/26/2015    AGAP 7 02/26/2023    BUN 14 02/26/2023    CREATININE 0.89 02/26/2023    GLUC 96 01/25/2023    GLUF 99 02/26/2023    CALCIUM 8.7 02/26/2023    AST 17 02/26/2023    ALT 17 02/26/2023    ALKPHOS 84 02/26/2023    TP 7.0 02/26/2023    TBILI 0.55 02/26/2023    EGFR 95 02/26/2023     Lab Results   Component Value Date    HGBA1C 5.7 (H) 06/24/2022     Lab Results   Component Value Date    [id number] 0.979 06/24/2022     Lab Results   Component Value Date    CHOLESTEROL 167 11/06/2022     Lab Results   Component Value Date    HDL 80 11/06/2022     Lab Results   Component Value Date    TRIG 50 11/06/2022     Lab Results   Component Value Date    LDLCALC 77 11/06/2022

## 2024-06-24 ENCOUNTER — HOSPITAL ENCOUNTER (OUTPATIENT)
Dept: ULTRASOUND IMAGING | Facility: HOSPITAL | Age: 59
Discharge: HOME | End: 2024-06-24
Payer: COMMERCIAL

## 2024-06-24 DIAGNOSIS — E66.01 OBESITY, CLASS III, BMI 40-49.9 (MORBID OBESITY) (HCC): ICD-10-CM

## 2024-06-24 DIAGNOSIS — C64.1 CANCER OF RIGHT KIDNEY (HCC): ICD-10-CM

## 2024-06-24 DIAGNOSIS — Z98.84 HISTORY OF BARIATRIC SURGERY: ICD-10-CM

## 2024-06-24 PROCEDURE — 76775 US EXAM ABDO BACK WALL LIM: CPT

## 2024-06-24 RX ORDER — SEMAGLUTIDE 0.25 MG/.5ML
INJECTION, SOLUTION SUBCUTANEOUS
OUTPATIENT
Start: 2024-06-24

## 2024-06-25 DIAGNOSIS — E66.9 OBESITY, CLASS II, BMI 35-39.9: Primary | ICD-10-CM

## 2024-06-25 RX ORDER — TOPIRAMATE 50 MG/1
50 TABLET, FILM COATED ORAL 2 TIMES DAILY
Qty: 180 TABLET | Refills: 0 | Status: SHIPPED | OUTPATIENT
Start: 2024-06-25

## 2024-07-28 DIAGNOSIS — E66.9 OBESITY, CLASS II, BMI 35-39.9: ICD-10-CM

## 2024-07-28 DIAGNOSIS — E66.01 OBESITY, CLASS III, BMI 40-49.9 (MORBID OBESITY) (HCC): ICD-10-CM

## 2024-07-31 DIAGNOSIS — E66.01 OBESITY, CLASS III, BMI 40-49.9 (MORBID OBESITY) (HCC): ICD-10-CM

## 2024-07-31 DIAGNOSIS — Z98.84 HISTORY OF BARIATRIC SURGERY: ICD-10-CM

## 2024-07-31 RX ORDER — SEMAGLUTIDE 1 MG/.5ML
INJECTION, SOLUTION SUBCUTANEOUS
OUTPATIENT
Start: 2024-07-31

## 2024-07-31 RX ORDER — SEMAGLUTIDE 0.25 MG/.5ML
INJECTION, SOLUTION SUBCUTANEOUS
OUTPATIENT
Start: 2024-07-31

## 2024-07-31 NOTE — TELEPHONE ENCOUNTER
LMOM- Asked patient what dose of Wegovy he is currently taking and to give our office a call back.

## 2024-08-01 DIAGNOSIS — E66.01 OBESITY, CLASS III, BMI 40-49.9 (MORBID OBESITY) (HCC): Primary | ICD-10-CM

## 2024-08-01 RX ORDER — TOPIRAMATE 50 MG/1
50 TABLET, FILM COATED ORAL 2 TIMES DAILY
Qty: 180 TABLET | Refills: 0 | Status: SHIPPED | OUTPATIENT
Start: 2024-08-01 | End: 2024-08-01 | Stop reason: ALTCHOICE

## 2024-10-19 DIAGNOSIS — E66.01 OBESITY, CLASS III, BMI 40-49.9 (MORBID OBESITY) (HCC): ICD-10-CM

## 2024-10-28 RX ORDER — SEMAGLUTIDE 1.7 MG/.75ML
INJECTION, SOLUTION SUBCUTANEOUS
Qty: 3 ML | Refills: 1 | Status: SHIPPED | OUTPATIENT
Start: 2024-10-28

## 2024-12-03 ENCOUNTER — TELEPHONE (OUTPATIENT)
Dept: FAMILY MEDICINE CLINIC | Facility: CLINIC | Age: 59
End: 2024-12-03

## 2024-12-03 NOTE — TELEPHONE ENCOUNTER
Called pt abd left msg stating that appt for 12/18 at 6*40 is cancelled due to not enough time for appt that appt should be 40 minutes

## 2024-12-26 DIAGNOSIS — E66.01 OBESITY, CLASS III, BMI 40-49.9 (MORBID OBESITY) (HCC): ICD-10-CM

## 2024-12-26 RX ORDER — SEMAGLUTIDE 1.7 MG/.75ML
INJECTION, SOLUTION SUBCUTANEOUS
Qty: 3 ML | Refills: 1 | Status: SHIPPED | OUTPATIENT
Start: 2024-12-26

## 2025-01-09 RX ORDER — LOSARTAN POTASSIUM 25 MG/1
TABLET ORAL
COMMUNITY
Start: 2024-11-10

## 2025-01-09 RX ORDER — CARVEDILOL 6.25 MG/1
TABLET ORAL
COMMUNITY
Start: 2024-11-25

## 2025-01-10 ENCOUNTER — OFFICE VISIT (OUTPATIENT)
Dept: FAMILY MEDICINE CLINIC | Facility: CLINIC | Age: 60
End: 2025-01-10
Payer: COMMERCIAL

## 2025-01-10 VITALS
DIASTOLIC BLOOD PRESSURE: 64 MMHG | WEIGHT: 229 LBS | BODY MASS INDEX: 38.15 KG/M2 | RESPIRATION RATE: 17 BRPM | HEIGHT: 65 IN | TEMPERATURE: 98 F | SYSTOLIC BLOOD PRESSURE: 132 MMHG | OXYGEN SATURATION: 99 % | HEART RATE: 79 BPM

## 2025-01-10 DIAGNOSIS — Z12.5 PROSTATE CANCER SCREENING: ICD-10-CM

## 2025-01-10 DIAGNOSIS — Z00.00 ANNUAL PHYSICAL EXAM: Primary | ICD-10-CM

## 2025-01-10 DIAGNOSIS — I50.22 CHRONIC SYSTOLIC CONGESTIVE HEART FAILURE (HCC): ICD-10-CM

## 2025-01-10 DIAGNOSIS — E78.2 MIXED HYPERLIPIDEMIA: ICD-10-CM

## 2025-01-10 DIAGNOSIS — G89.29 CHRONIC LEFT SHOULDER PAIN: ICD-10-CM

## 2025-01-10 DIAGNOSIS — M25.612 DECREASED RANGE OF MOTION OF LEFT SHOULDER: ICD-10-CM

## 2025-01-10 DIAGNOSIS — C64.1 CANCER OF RIGHT KIDNEY (HCC): ICD-10-CM

## 2025-01-10 DIAGNOSIS — M25.512 CHRONIC LEFT SHOULDER PAIN: ICD-10-CM

## 2025-01-10 DIAGNOSIS — R73.03 PREDIABETES: ICD-10-CM

## 2025-01-10 DIAGNOSIS — Z85.528 HISTORY OF KIDNEY CANCER: ICD-10-CM

## 2025-01-10 DIAGNOSIS — I10 ESSENTIAL HYPERTENSION: ICD-10-CM

## 2025-01-10 DIAGNOSIS — Z12.11 COLON CANCER SCREENING: ICD-10-CM

## 2025-01-10 DIAGNOSIS — E21.1 HYPERPARATHYROIDISM , SECONDARY, NON-RENAL (HCC): ICD-10-CM

## 2025-01-10 PROCEDURE — 99396 PREV VISIT EST AGE 40-64: CPT | Performed by: NURSE PRACTITIONER

## 2025-01-10 PROCEDURE — 99213 OFFICE O/P EST LOW 20 MIN: CPT | Performed by: NURSE PRACTITIONER

## 2025-01-10 NOTE — PATIENT INSTRUCTIONS
"Patient Education     Routine physical for adults   The Basics   Written by the doctors and editors at [facility], [place]   What is a physical? -- A physical is a routine visit, or \"check-up,\" with your doctor. You might also hear it called a \"wellness visit\" or \"preventive visit.\"  During each visit, the doctor will:   Ask about your physical and mental health   Ask about your habits, behaviors, and lifestyle   Do an exam   Give you vaccines if needed   Talk to you about any medicines you take   Give advice about your health   Answer your questions  Getting regular check-ups is an important part of taking care of your health. It can help your doctor find and treat any problems you have. But it's also important for preventing health problems.  A routine physical is different from a \"sick visit.\" A sick visit is when you see a doctor because of a health concern or problem. Since physicals are scheduled ahead of time, you can think about what you want to ask the doctor.  How often should I get a physical? -- It depends on your age and health. In general, for people age 21 years and older:   If you are younger than 50 years, you might be able to get a physical every 3 years.   If you are 50 years or older, your doctor might recommend a physical every year.  If you have an ongoing health condition, like diabetes or high blood pressure, your doctor will probably want to see you more often.  What happens during a physical? -- In general, each visit will include:   Physical exam - The doctor or nurse will check your height, weight, heart rate, and blood pressure. They will also look at your eyes and ears. They will ask about how you are feeling and whether you have any symptoms that bother you.   Medicines - It's a good idea to bring a list of all the medicines you take to each doctor visit. Your doctor will talk to you about your medicines and answer any questions. Tell them if you are having any side effects that bother you. You " "should also tell them if you are having trouble paying for any of your medicines.   Habits and behaviors - This includes:   Your diet   Your exercise habits   Whether you smoke, drink alcohol, or use drugs   Whether you are sexually active   Whether you feel safe at home  Your doctor will talk to you about things you can do to improve your health and lower your risk of health problems. They will also offer help and support. For example, if you want to quit smoking, they can give you advice and might prescribe medicines. If you want to improve your diet or get more physical activity, they can help you with this, too.   Lab tests, if needed - The tests you get will depend on your age and situation. For example, your doctor might want to check your:   Cholesterol   Blood sugar   Iron level   Vaccines - The recommended vaccines will depend on your age, health, and what vaccines you already had. Vaccines are very important because they can prevent certain serious or deadly infections.   Discussion of screening - \"Screening\" means checking for diseases or other health problems before they cause symptoms. Your doctor can recommend screening based on your age, risk, and preferences. This might include tests to check for:   Cancer, such as breast, prostate, cervical, ovarian, colorectal, prostate, lung, or skin cancer   Sexually transmitted infections, such as chlamydia and gonorrhea   Mental health conditions like depression and anxiety  Your doctor will talk to you about the different types of screening tests. They can help you decide which screenings to have. They can also explain what the results might mean.   Answering questions - The physical is a good time to ask the doctor or nurse questions about your health. If needed, they can refer you to other doctors or specialists, too.  Adults older than 65 years often need other care, too. As you get older, your doctor will talk to you about:   How to prevent falling at " home   Hearing or vision tests   Memory testing   How to take your medicines safely   Making sure that you have the help and support you need at home  All topics are updated as new evidence becomes available and our peer review process is complete.  This topic retrieved from [employer] on: May 02, 2024.  Topic 775449 Version 1.0  Release: 32.4.3 - C32.122  © 2024 UpToDate, Inc. and/or its affiliates. All rights reserved.  Consumer Information Use and Disclaimer   Disclaimer: This generalized information is a limited summary of diagnosis, treatment, and/or medication information. It is not meant to be comprehensive and should be used as a tool to help the user understand and/or assess potential diagnostic and treatment options. It does NOT include all information about conditions, treatments, medications, side effects, or risks that may apply to a specific patient. It is not intended to be medical advice or a substitute for the medical advice, diagnosis, or treatment of a health care provider based on the health care provider's examination and assessment of a patient's specific and unique circumstances. Patients must speak with a health care provider for complete information about their health, medical questions, and treatment options, including any risks or benefits regarding use of medications. This information does not endorse any treatments or medications as safe, effective, or approved for treating a specific patient. UpToDate, Inc. and its affiliates disclaim any warranty or liability relating to this information or the use thereof.The use of this information is governed by the Terms of Use, available at https://www.[employer].com/en/know/clinical-effectiveness-terms. 2024© UpToDate, Inc. and its affiliates and/or licensors. All rights reserved.  Copyright   © 2024 UpToDate, Inc. and/or its affiliates. All rights reserved.

## 2025-01-10 NOTE — ASSESSMENT & PLAN NOTE
Orders:    CBC and differential; Future    Comprehensive metabolic panel    TSH, 3rd generation with Free T4 reflex

## 2025-01-10 NOTE — ASSESSMENT & PLAN NOTE
Wt Readings from Last 3 Encounters:   01/10/25 104 kg (229 lb)   05/28/24 104 kg (230 lb)   09/15/23 109 kg (239 lb 3.2 oz)     [facility]

## 2025-01-10 NOTE — ASSESSMENT & PLAN NOTE
Orders:    Comprehensive metabolic panel    TSH, 3rd generation with Free T4 reflex    Lipid panel; Future

## 2025-01-10 NOTE — PROGRESS NOTES
Adult Annual Physical  Name: Placido Hernandez      : 1965      MRN: [id number]  Encounter Provider: ANSHUL Castillo  Encounter Date: 1/10/2025   Encounter department: [facility]    Assessment & Plan  Annual physical exam         History of kidney cancer  History of right kidney cancer.  Due for follow-up with urology  Orders:    Ambulatory Referral to Urology; Future    Colon cancer screening  Due for colonoscopy  Orders:    Ambulatory Referral to Gastroenterology; Future    Cancer of right kidney (HCC)         Chronic systolic congestive heart failure (HCC)  Wt Readings from Last 3 Encounters:   01/10/25 104 kg (229 lb)   24 104 kg (230 lb)   09/15/23 109 kg (239 lb 3.2 oz)     [facility]               Hyperparathyroidism , secondary, non-renal (HCC)         Chronic left shoulder pain    Orders:    XR shoulder 2+ vw left; Future    Decreased range of motion of left shoulder    Orders:    XR shoulder 2+ vw left; Future    Essential hypertension    Orders:    CBC and differential; Future    Comprehensive metabolic panel    TSH, 3rd generation with Free T4 reflex    Mixed hyperlipidemia    Orders:    Comprehensive metabolic panel    TSH, 3rd generation with Free T4 reflex    Lipid panel; Future    Prostate cancer screening    Orders:    PSA, Total Screen; Future    Prediabetes    Orders:    Hemoglobin A1C; Future      Immunizations and preventive care screenings were discussed with patient today. Appropriate education was printed on patient's after visit summary.    Discussed risks and benefits of prostate cancer screening. We discussed the controversial history of PSA screening for prostate cancer in the [place] as well as the risk of over detection and over treatment of prostate cancer by way of PSA screening.  The patient understands that PSA blood testing is an imperfect way to screen for prostate cancer and that elevated PSA levels in the blood may also be caused  by infection, inflammation, prostatic trauma or manipulation, urological procedures, or by benign prostatic enlargement.    The role of the digital rectal examination in prostate cancer screening was also discussed and I discussed with him that there is large interobserver variability in the findings of digital rectal examination.    Counseling:  Dental Health: discussed importance of regular tooth brushing, flossing, and dental visits.  Injury prevention: discussed safety/seat belts, safety helmets, smoke detectors, carbon monoxide detectors, and smoking near bedding or upholstery.  Exercise: the importance of regular exercise/physical activity was discussed. Recommend exercise 3-5 times per week for at least 30 minutes.       Depression Screening and Follow-up Plan: Patient was screened for depression during today's encounter. They screened negative with a PHQ-2 score of 2.        History of Present Illness     Adult Annual Physical:  Patient presents for annual physical.     Diet and Physical Activity:  - Diet/Nutrition: heart healthy (low sodium) diet and limited junk food.  - Exercise: no formal exercise.    Depression Screening:  - PHQ-2 Score: 2    General Health:  - Sleep: sleeps well.  - Hearing: normal hearing bilateral ears.  - Vision: wears glasses.  - Dental: no dental visits for > 1 year.     Health:    - Urinary symptoms: none.     Advanced Care Planning:  - Has an advanced directive?: no    - Has a durable medical POA?: no    - ACP document given to patient?: no      Review of Systems   Constitutional:  Positive for fatigue. Negative for fever and unexpected weight change.   HENT:  Negative for trouble swallowing.    Respiratory:  Negative for cough and shortness of breath.         History of kidney cancer. Due for uro f/u   Cardiovascular:  Negative for chest pain, palpitations and leg swelling.   Gastrointestinal:  Negative for abdominal pain and blood in stool.   Endocrine: Negative.     Genitourinary:  Negative for decreased urine volume, difficulty urinating and hematuria.   Musculoskeletal:  Positive for arthralgias (left shoulder pain. decreased ROM for several weeks). Negative for gait problem.   Skin:  Negative for pallor.   Neurological:  Negative for dizziness, syncope and weakness.   Hematological:  Does not bruise/bleed easily.   Psychiatric/Behavioral:  Negative for confusion.      Medical History Reviewed by provider this encounter:     .  Current Outpatient Medications on File Prior to Visit   Medication Sig Dispense Refill    acetaminophen (TYLENOL) 325 mg tablet Take 2 tablets (650 mg total) by mouth every 4 (four) hours as needed for mild pain 30 tablet 0    ammonium lactate (LAC-HYDRIN) 12 % cream Apply topically as needed for dry skin 385 g 3    carvedilol (COREG) 6.25 mg tablet       Diclofenac Sodium (VOLTAREN) 1 %       lidocaine (XYLOCAINE) 5 % ointment Apply topically 3 (three) times a day as needed for mild pain (FOR LEFT WRIST PAIN) 35.44 g 2    losartan (COZAAR) 25 mg tablet       Multiple Vitamin (Daily-Mark Multivitamin) TABS TAKE 1 TABLET BY MOUTH EVERY DAY 90 tablet 1    Multiple Vitamins-Minerals (MULTIVITAMIN ADULT, MINERALS, PO) 1 tablet      nystatin (MYCOSTATIN) cream Apply topically as needed (rash) 30 g 0    Semaglutide-Weight Management (Wegovy) 1.7 MG/0.75ML INJECT 0.75 ML (1.7 MG TOTAL) UNDER THE SKIN ONCE A WEEK 3 mL 1    Xarelto 20 MG tablet Take 1 tablet (20 mg total) by mouth daily with breakfast 90 tablet 1    calcium carbonate-vitamin D (OSCAL-D) 500 mg-200 units per tablet TAKE 1 TABLET BY MOUTH DAILY TO SUPPLEMENT CALCIUM/ STRENGTHEN BONES (Patient not taking: Reported on 1/10/2025)      Cholecalciferol 25 MCG (1000 UT) tablet TAKE ONE TABLET BY MOUTH DAILY FOR VITAMIN D SUPPLEMENTATION (Patient not taking: Reported on 1/10/2025)      ciclopirox (LOPROX) 0.77 % cream Apply topically 2 (two) times a day For 2-4 weeks to affected areas of skin (Patient  "not taking: Reported on 1/10/2025) 90 g 0    ergocalciferol (VITAMIN D2) 50,000 units Take 1 capsule (50,000 Units total) by mouth 2 (two) times a week (Patient not taking: Reported on 1/10/2025) 24 capsule 0    pantoprazole (PROTONIX) 20 mg tablet  (Patient not taking: Reported on 1/10/2025)       No current facility-administered medications on file prior to visit.        Objective   /74 (BP Location: Right arm, Patient Position: Sitting, Cuff Size: Large)   Pulse 79   Temp 98 °F (36.7 °C) (Temporal)   Resp 17   Ht 5' 4.5\" (1.638 m)   Wt 104 kg (229 lb)   SpO2 99%   BMI 38.70 kg/m²     Physical Exam  Vitals and nursing note reviewed.   Constitutional:       General: He is not in acute distress.     Appearance: Normal appearance. He is well-developed.   HENT:      Head: Normocephalic and atraumatic.   Eyes:      Conjunctiva/sclera: Conjunctivae normal.      Pupils: Pupils are equal, round, and reactive to light.   Neck:      Vascular: No carotid bruit.   Cardiovascular:      Rate and Rhythm: Normal rate and regular rhythm.      Pulses: Normal pulses.      Heart sounds: Normal heart sounds. No murmur heard.  Pulmonary:      Effort: Pulmonary effort is normal.      Breath sounds: Normal breath sounds. No rhonchi or rales.   Abdominal:      General: Bowel sounds are normal.      Palpations: Abdomen is soft.      Tenderness: There is no abdominal tenderness.   Musculoskeletal:      Left shoulder: Tenderness present. Decreased range of motion. Normal strength. Normal pulse.      Right lower leg: No edema.      Left lower leg: No edema.   Lymphadenopathy:      Cervical: No cervical adenopathy.   Skin:     General: Skin is warm and dry.      Coloration: Skin is not pale.   Neurological:      General: No focal deficit present.      Mental Status: He is alert. Mental status is at baseline.      Motor: No weakness.      Gait: Gait normal.   Psychiatric:         Mood and Affect: Mood normal.         Behavior: " Behavior normal.

## 2025-01-28 ENCOUNTER — OFFICE VISIT (OUTPATIENT)
Dept: UROLOGY | Facility: CLINIC | Age: 60
End: 2025-01-28
Payer: COMMERCIAL

## 2025-01-28 VITALS
HEIGHT: 65 IN | OXYGEN SATURATION: 95 % | SYSTOLIC BLOOD PRESSURE: 112 MMHG | WEIGHT: 228 LBS | DIASTOLIC BLOOD PRESSURE: 70 MMHG | BODY MASS INDEX: 37.99 KG/M2 | HEART RATE: 53 BPM

## 2025-01-28 DIAGNOSIS — R35.1 BENIGN PROSTATIC HYPERPLASIA WITH NOCTURIA: Primary | ICD-10-CM

## 2025-01-28 DIAGNOSIS — Z12.5 SCREENING FOR PROSTATE CANCER: ICD-10-CM

## 2025-01-28 DIAGNOSIS — Z85.528 HISTORY OF KIDNEY CANCER: ICD-10-CM

## 2025-01-28 DIAGNOSIS — N52.9 ED (ERECTILE DYSFUNCTION) OF ORGANIC ORIGIN: ICD-10-CM

## 2025-01-28 DIAGNOSIS — N40.1 BENIGN PROSTATIC HYPERPLASIA WITH NOCTURIA: Primary | ICD-10-CM

## 2025-01-28 PROCEDURE — 99214 OFFICE O/P EST MOD 30 MIN: CPT | Performed by: UROLOGY

## 2025-01-28 RX ORDER — GABAPENTIN 300 MG/1
600 CAPSULE ORAL DAILY
COMMUNITY
Start: 2025-01-14

## 2025-01-28 RX ORDER — SILDENAFIL 50 MG/1
50 TABLET, FILM COATED ORAL DAILY PRN
Qty: 20 TABLET | Refills: 6 | Status: SHIPPED | OUTPATIENT
Start: 2025-01-28

## 2025-01-28 RX ORDER — TAMSULOSIN HYDROCHLORIDE 0.4 MG/1
0.4 CAPSULE ORAL
Qty: 30 CAPSULE | Refills: 6 | Status: SHIPPED | OUTPATIENT
Start: 2025-01-28

## 2025-01-28 RX ORDER — TOPIRAMATE 50 MG/1
50 TABLET, FILM COATED ORAL DAILY
COMMUNITY

## 2025-01-28 NOTE — PROGRESS NOTES
Referring Physician: ANSHUL Castillo  A copy of this note was sent to the referring physician.       Diagnoses and all orders for this visit:    History of kidney cancer  -     Ambulatory Referral to Urology    Other orders  -     gabapentin (NEURONTIN) 300 mg capsule; Take 600 mg by mouth daily  -     topiramate (TOPAMAX) 50 MG tablet; Take 50 mg by mouth daily            Assessment and plan:       1. 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 Left benign renal cysts    3. History of recurrent pulmonary embolism  -On lifelong anticoagulation    4. Nocturia x 3    Digital rectal examination reveals BPH.  Have recommended an alpha-blocker.  Also recommend screening PSA.    Wrist benefits and alternatives of tamsulosin reviewed.  Prescription provided.  Also provided sildenafil for as needed use of ED.  Benefits and does titration reviewed    Follow-up in 3 months time with advanced practitioner for symptom reassessment.  He will be due for repeat imaging in the summer         Pranav Garcia      Chief Complaint     Follow-up kidney cancer, new issue of urinary symptoms and ED      History of Present Illness     Placido Hernandez is a 59 y.o.   Returns in follow-up     Detailed Urologic History     - please refer to HPI    Review of Systems     Review of Systems   Constitutional:  Negative for activity change and fatigue.   HENT:  Negative for congestion.    Eyes:  Negative for visual disturbance.   Respiratory:  Negative for shortness of breath and wheezing.    Cardiovascular:  Negative for chest pain and leg swelling.  "  Gastrointestinal:  Negative for abdominal pain.   Endocrine: Negative for polyuria.   Genitourinary:  Negative for dysuria, flank pain, hematuria and urgency.   Musculoskeletal:  Negative for back pain.   Allergic/Immunologic: Negative for immunocompromised state.   Neurological:  Negative for dizziness and numbness.   Psychiatric/Behavioral:  Negative for dysphoric mood.    All other systems reviewed and are negative.            Allergies     Allergies   Allergen Reactions    Nsaids Other (See Comments)     Duodenal ulcer disease    Flunisolide      Other reaction(s): Burning sensation    Lisinopril Cough    Testosterone Itching, Rash and Other (See Comments)       Physical Exam     Physical Exam  Constitutional:       General: He is not in acute distress.     Appearance: He is well-developed.   HENT:      Head: Normocephalic and atraumatic.   Cardiovascular:      Comments: obese  Pulmonary:      Effort: Pulmonary effort is normal.      Breath sounds: Normal breath sounds.   Abdominal:      Palpations: Abdomen is soft.   Genitourinary:     Comments: Laparoscopic surgical incisions are noted  Musculoskeletal:         General: Normal range of motion.      Cervical back: Normal range of motion.   Skin:     General: Skin is warm.   Neurological:      Mental Status: He is alert and oriented to person, place, and time.   Psychiatric:         Behavior: Behavior normal.        all incisions are well healed with no palpable hernia noted      Vital Signs  Vitals:    01/28/25 1456   BP: 112/70   BP Location: Left arm   Patient Position: Sitting   Cuff Size: Large   Pulse: (!) 53   SpO2: 95%   Weight: 103 kg (228 lb)   Height: 5' 4.5\" (1.638 m)         Current Medications       Current Outpatient Medications:     acetaminophen (TYLENOL) 325 mg tablet, Take 2 tablets (650 mg total) by mouth every 4 (four) hours as needed for mild pain, Disp: 30 tablet, Rfl: 0    ammonium lactate (LAC-HYDRIN) 12 % cream, Apply topically as " needed for dry skin, Disp: 385 g, Rfl: 3    carvedilol (COREG) 6.25 mg tablet, , Disp: , Rfl:     Diclofenac Sodium (VOLTAREN) 1 %, , Disp: , Rfl:     gabapentin (NEURONTIN) 300 mg capsule, Take 600 mg by mouth daily, Disp: , Rfl:     lidocaine (XYLOCAINE) 5 % ointment, Apply topically 3 (three) times a day as needed for mild pain (FOR LEFT WRIST PAIN), Disp: 35.44 g, Rfl: 2    losartan (COZAAR) 25 mg tablet, , Disp: , Rfl:     Multiple Vitamin (Daily-Mark Multivitamin) TABS, TAKE 1 TABLET BY MOUTH EVERY DAY, Disp: 90 tablet, Rfl: 1    nystatin (MYCOSTATIN) cream, Apply topically as needed (rash), Disp: 30 g, Rfl: 0    Semaglutide-Weight Management (Wegovy) 1.7 MG/0.75ML, INJECT 0.75 ML (1.7 MG TOTAL) UNDER THE SKIN ONCE A WEEK, Disp: 3 mL, Rfl: 1    topiramate (TOPAMAX) 50 MG tablet, Take 50 mg by mouth daily, Disp: , Rfl:     Xarelto 20 MG tablet, Take 1 tablet (20 mg total) by mouth daily with breakfast, Disp: 90 tablet, Rfl: 1    Multiple Vitamins-Minerals (MULTIVITAMIN ADULT, MINERALS, PO), 1 tablet (Patient not taking: Reported on 1/28/2025), Disp: , Rfl:       Active Problems     Patient Active Problem List   Diagnosis    Pulmonary embolism (HCC)    Gastroesophageal reflux disease    History of sleeve gastrectomy    Sarcoidosis    Incidental lung nodule, > 3mm and < 8mm    Right renal mass    Cigar smoker    Essential hypertension    Hyperparathyroidism , secondary, non-renal (HCC)    Low vitamin D level    Low vitamin B12 level    Low calcium levels    NICM (nonischemic cardiomyopathy) (HCC)    Chest pain    History of bariatric surgery    Osteoporosis    Hypercholesterolemia    Heart burn    Gout    Generalized osteoarthritis    Arthritis    ED (erectile dysfunction) of organic origin    Prediabetes    Mixed hyperlipidemia    Chronic systolic congestive heart failure (HCC)    History of pulmonary embolism    Muscle pain    Postsurgical malabsorption    Renal cyst    Pre-op evaluation    Incisional hernia of  anterior abdominal wall with obstruction    Renal cancer (HCC)    Small bowel obstruction due to adhesions (HCC)    Left wrist pain    Extensor carpi ulnaris tendinitis    Obesity, Class III, BMI 40-49.9 (morbid obesity) (HCC)         Past Medical History     Past Medical History:   Diagnosis Date    Bariatric surgery status     Duodenal ulcer     GERD (gastroesophageal reflux disease)     Gout     Hyperparathyroidism (HCC)     Hypertension     Lung nodule     Postsurgical malabsorption     Pulmonary embolism (HCC) 2019    Sarcoidosis     Wears glasses          Surgical History     Past Surgical History:   Procedure Laterality Date    BARIATRIC SURGERY      Gastric Sleeve    COLONOSCOPY      FOOT SURGERY Right     R ankle    GASTRIC BYPASS LAPAROSCOPIC N/A 2020    Procedure: LAPAROSCOPIC REVISION/ CONVERSION TO HALEY-EN-Y GASTRIC BYPASS W ROBOTICS, PARAESOPHAGEAL HERNIA REPAIR, AND INTRAOPERATIVE EGD;  Surgeon: Rachael Ferrell MD;  Location: [facility];  Service: Bariatrics    HAND SURGERY      LAPAROTOMY N/A 2021    Procedure: Repair of incarcerated incisional hernia repair with mesh.  ;  Surgeon: Moises Leija DO;  Location: [facility];  Service: General    VT LAPAROSCOPY SURG PARTIAL NEPHRECTOMY Right 2021    Procedure: ROBOTIC LAPAROSCOPIC PARTIAL NEPHRECTOMY DECORTICATION OF RIGHT RENAL CYST;  Surgeon: Pranav Garcia MD;  Location: [facility];  Service: Urology    SKIN BIOPSY           Family History     Family History   Problem Relation Age of Onset    Hypertension Father     Diabetes Father     Cancer Father     Diabetes Maternal Aunt     Cancer Maternal Aunt          Social History     Social History     Social History     Tobacco Use   Smoking Status Former    Current packs/day: 0.00    Average packs/day: 1 pack/day for 7.0 years (7.0 ttl pk-yrs)    Types: Cigars, Cigarettes    Start date:     Quit date: 2006    Years since quittin.0   Smokeless Tobacco Never   Tobacco  Comments    only smoked occ cigar, not for over 6 months         Pertinent Lab Values     Lab Results   Component Value Date    CREATININE 0.89 02/26/2023       Lab Results   Component Value Date    PSA 0.6 06/24/2022    PSA 0.4 01/31/2021       @RESULTRCNT(1H])@      Pertinent Imaging        FINDINGS:     ABDOMEN     LOWER CHEST:  No clinically significant abnormality identified in the visualized lower chest.     LIVER/BILIARY TREE:  Unremarkable.     GALLBLADDER:  No calcified gallstones. No pericholecystic inflammatory change.     SPLEEN:  Unremarkable.     PANCREAS:  Unremarkable.     ADRENAL GLANDS:  Unremarkable.     KIDNEYS/URETERS:  There is a unchanged 3.3 cm right renal cystic lesion which does not meet CT criteria for simple cysts on this examination.  Bilateral simple cysts are visualized.     STOMACH AND BOWEL:  Status post gastric surgery.  No evidence of bowel obstruction.  Colonic diverticulosis without evidence of acute diverticulitis     APPENDIX:  No findings to suggest appendicitis.     ABDOMINOPELVIC CAVITY:  No ascites.  No pneumoperitoneum.  No lymphadenopathy.     VESSELS:  Unremarkable for patient's age.     PELVIS     REPRODUCTIVE ORGANS:  Unremarkable for patient's age.     URINARY BLADDER:  Unremarkable.     ABDOMINAL WALL/INGUINAL REGIONS:  Unremarkable.     OSSEOUS STRUCTURES:  No acute fracture or destructive osseous lesion.     IMPRESSION:     No evidence of bowel obstruction.     Unchanged right-sided renal cystic lesion which does not meet CT criteria for.  Further evaluation on a nonemergent basis with a renal ultrasound is recommended.     The study was marked in EPIC for immediate notification.    FINDINGS:     KIDNEYS:  Symmetric and normal size.  Right kidney:  11.3 x 7 x 6.7 cm.  Left kidney:  11.8 x 6.2 x 5.5 cm.     Right kidney  Normal echogenicity and contour.   Solid appearing mid renal lesion measuring 3.4 x 2.5 x 2.7 cm.  This appears to correlate with the lesion seen  "on prior CT.   Additional simple appearing cysts.  No hydronephrosis.  No shadowing calculi.  No perinephric fluid collections.     Left kidney  Normal echogenicity and contour.   No suspicious masses detected.    Simple appearing lower pole cyst measures 2.7 x 2.8 x 2.4 cm.  No hydronephrosis.  5 mm echogenic focus is indeterminate, possibly nonobstructing stone though no stone visualized on prior CT.  No perinephric fluid collections.     URETERS:  Nonvisualized.     BLADDER:   Normally distended.  No focal thickening or mass lesions.  Bilateral ureteral jets are not detected.        IMPRESSION:  1.  Solid appearing mid renal lesion measuring 3.4 x 2.5 x 2.7 cm. This is concerning for neoplasm until proven otherwise.  Further characterization with contrast MR suggested.  2.  Bilateral renal cysts.    Portions of the record may have been created with voice recognition software.  Occasional wrong word or \"sound a like\" substitutions may have occurred due to the inherent limitations of voice recognition software.  Read the chart carefully and recognize, using context, where substitutions have occurred.  "

## 2025-02-08 ENCOUNTER — APPOINTMENT (OUTPATIENT)
Dept: LAB | Facility: CLINIC | Age: 60
End: 2025-02-08
Payer: COMMERCIAL

## 2025-02-08 DIAGNOSIS — Z12.5 SCREENING FOR PROSTATE CANCER: ICD-10-CM

## 2025-02-08 DIAGNOSIS — E78.2 MIXED HYPERLIPIDEMIA: ICD-10-CM

## 2025-02-08 DIAGNOSIS — I10 ESSENTIAL HYPERTENSION: ICD-10-CM

## 2025-02-08 DIAGNOSIS — N52.9 ED (ERECTILE DYSFUNCTION) OF ORGANIC ORIGIN: ICD-10-CM

## 2025-02-08 DIAGNOSIS — Z12.5 PROSTATE CANCER SCREENING: ICD-10-CM

## 2025-02-08 DIAGNOSIS — R73.03 PREDIABETES: ICD-10-CM

## 2025-02-08 LAB
ALBUMIN SERPL BCG-MCNC: 3.9 G/DL (ref 3.5–5)
ALP SERPL-CCNC: 89 U/L (ref 34–104)
ALT SERPL W P-5'-P-CCNC: 17 U/L (ref 7–52)
ANION GAP SERPL CALCULATED.3IONS-SCNC: 5 MMOL/L (ref 4–13)
AST SERPL W P-5'-P-CCNC: 15 U/L (ref 13–39)
BASOPHILS # BLD AUTO: 0.03 THOUSANDS/ΜL (ref 0–0.1)
BASOPHILS NFR BLD AUTO: 1 % (ref 0–1)
BILIRUB SERPL-MCNC: 0.44 MG/DL (ref 0.2–1)
BUN SERPL-MCNC: 10 MG/DL (ref 5–25)
CALCIUM SERPL-MCNC: 8.7 MG/DL (ref 8.4–10.2)
CHLORIDE SERPL-SCNC: 109 MMOL/L (ref 96–108)
CHOLEST SERPL-MCNC: 181 MG/DL (ref ?–200)
CO2 SERPL-SCNC: 26 MMOL/L (ref 21–32)
CREAT SERPL-MCNC: 0.89 MG/DL (ref 0.6–1.3)
EOSINOPHIL # BLD AUTO: 0.28 THOUSAND/ΜL (ref 0–0.61)
EOSINOPHIL NFR BLD AUTO: 6 % (ref 0–6)
ERYTHROCYTE [DISTWIDTH] IN BLOOD BY AUTOMATED COUNT: 14.1 % (ref 11.6–15.1)
EST. AVERAGE GLUCOSE BLD GHB EST-MCNC: 117 MG/DL
GFR SERPL CREATININE-BSD FRML MDRD: 94 ML/MIN/1.73SQ M
GLUCOSE P FAST SERPL-MCNC: 94 MG/DL (ref 65–99)
HBA1C MFR BLD: 5.7 %
HCT VFR BLD AUTO: 40.6 % (ref 36.5–49.3)
HDLC SERPL-MCNC: 84 MG/DL
HGB BLD-MCNC: 12.7 G/DL (ref 12–17)
IMM GRANULOCYTES # BLD AUTO: 0 THOUSAND/UL (ref 0–0.2)
IMM GRANULOCYTES NFR BLD AUTO: 0 % (ref 0–2)
LDLC SERPL CALC-MCNC: 83 MG/DL (ref 0–100)
LYMPHOCYTES # BLD AUTO: 1.13 THOUSANDS/ΜL (ref 0.6–4.47)
LYMPHOCYTES NFR BLD AUTO: 26 % (ref 14–44)
MCH RBC QN AUTO: 28 PG (ref 26.8–34.3)
MCHC RBC AUTO-ENTMCNC: 31.3 G/DL (ref 31.4–37.4)
MCV RBC AUTO: 89 FL (ref 82–98)
MONOCYTES # BLD AUTO: 0.42 THOUSAND/ΜL (ref 0.17–1.22)
MONOCYTES NFR BLD AUTO: 10 % (ref 4–12)
NEUTROPHILS # BLD AUTO: 2.54 THOUSANDS/ΜL (ref 1.85–7.62)
NEUTS SEG NFR BLD AUTO: 57 % (ref 43–75)
NONHDLC SERPL-MCNC: 97 MG/DL
NRBC BLD AUTO-RTO: 0 /100 WBCS
PLATELET # BLD AUTO: 168 THOUSANDS/UL (ref 149–390)
PMV BLD AUTO: 12.6 FL (ref 8.9–12.7)
POTASSIUM SERPL-SCNC: 4 MMOL/L (ref 3.5–5.3)
PROT SERPL-MCNC: 6.6 G/DL (ref 6.4–8.4)
PSA SERPL-MCNC: 1.06 NG/ML (ref 0–4)
RBC # BLD AUTO: 4.54 MILLION/UL (ref 3.88–5.62)
SODIUM SERPL-SCNC: 140 MMOL/L (ref 135–147)
TESTOST SERPL-MSCNC: 368 NG/DL (ref 175–781)
TRIGL SERPL-MCNC: 68 MG/DL (ref ?–150)
TSH SERPL DL<=0.05 MIU/L-ACNC: 1.47 UIU/ML (ref 0.45–4.5)
WBC # BLD AUTO: 4.4 THOUSAND/UL (ref 4.31–10.16)

## 2025-02-08 PROCEDURE — 36415 COLL VENOUS BLD VENIPUNCTURE: CPT

## 2025-02-08 PROCEDURE — 85025 COMPLETE CBC W/AUTO DIFF WBC: CPT

## 2025-02-08 PROCEDURE — 83036 HEMOGLOBIN GLYCOSYLATED A1C: CPT

## 2025-02-08 PROCEDURE — 84403 ASSAY OF TOTAL TESTOSTERONE: CPT

## 2025-02-08 PROCEDURE — 80061 LIPID PANEL: CPT

## 2025-02-08 PROCEDURE — G0103 PSA SCREENING: HCPCS

## 2025-02-09 ENCOUNTER — RESULTS FOLLOW-UP (OUTPATIENT)
Dept: FAMILY MEDICINE CLINIC | Facility: CLINIC | Age: 60
End: 2025-02-09

## 2025-02-20 DIAGNOSIS — N40.1 BENIGN PROSTATIC HYPERPLASIA WITH NOCTURIA: ICD-10-CM

## 2025-02-20 DIAGNOSIS — R35.1 BENIGN PROSTATIC HYPERPLASIA WITH NOCTURIA: ICD-10-CM

## 2025-02-20 RX ORDER — TAMSULOSIN HYDROCHLORIDE 0.4 MG/1
0.4 CAPSULE ORAL
Qty: 30 CAPSULE | Refills: 6 | OUTPATIENT
Start: 2025-02-20

## 2025-02-25 ENCOUNTER — OFFICE VISIT (OUTPATIENT)
Age: 60
End: 2025-02-25
Payer: COMMERCIAL

## 2025-02-25 VITALS
HEIGHT: 65 IN | SYSTOLIC BLOOD PRESSURE: 140 MMHG | DIASTOLIC BLOOD PRESSURE: 100 MMHG | HEART RATE: 98 BPM | TEMPERATURE: 98.3 F | BODY MASS INDEX: 38.15 KG/M2 | WEIGHT: 229 LBS

## 2025-02-25 DIAGNOSIS — E66.812 OBESITY, CLASS II, BMI 35-39.9: Primary | ICD-10-CM

## 2025-02-25 DIAGNOSIS — Z98.84 HISTORY OF BARIATRIC SURGERY: ICD-10-CM

## 2025-02-25 DIAGNOSIS — I10 ESSENTIAL HYPERTENSION: ICD-10-CM

## 2025-02-25 DIAGNOSIS — R73.03 PREDIABETES: ICD-10-CM

## 2025-02-25 PROCEDURE — 99214 OFFICE O/P EST MOD 30 MIN: CPT | Performed by: PHYSICIAN ASSISTANT

## 2025-02-25 RX ORDER — TOPIRAMATE 50 MG/1
50 TABLET, FILM COATED ORAL DAILY
Qty: 30 TABLET | Refills: 2 | Status: SHIPPED | OUTPATIENT
Start: 2025-02-25

## 2025-02-25 NOTE — PROGRESS NOTES
Assessment/Plan:  1. Obesity, Class II, BMI 35-39.9  topiramate (TOPAMAX) 50 MG tablet    Semaglutide-Weight Management (WEGOVY) 2.4 MG/0.75ML      2. Prediabetes        3. History of bariatric surgery        4. Essential hypertension              - Weight not at goal  - Patient is interested in Conservative Program  - Labs reviewed: As below.    General Recommendations:  Nutrition:  Eat breakfast daily.  Do not skip meals.     Food log (ie.) www.myfitnesspal.com, sparkpeople.com, loseit.com, calorieking.com, etc.    Practice mindful eating.  Be sure to set aside time to eat, eat slowly, and savor your food.    Hydration:    At least 64oz of water daily.  No sugar sweetened beverages.  No juice (eat the fruit instead).    Exercise:  Studies have shown that the ideal exercise goal is somewhere between 150 to 300 minutes of moderate intensity exercise a week.  Start with exercising 10 minutes every other day and gradually increase physical activity with a goal of at least 150 minutes of moderate intensity exercise a week, divided over at least 3 days a week.  An example of this would be exercising 30 minutes a day, 5 days a week.  Resistance training can increase muscle mass and increase our resting metabolic rate.   FULL BODY resistance training is recommended 2-3 times a week.  Do not do this on consecutive days to allow for muscle recovery.    Aim for a bare minimum 5000 steps, even on days you do not exercise.    Monitoring:   Weigh yourself daily.  If this causes undue stress, then just weigh yourself once a week.  Weigh yourself the same time of the day with the same amount of clothing on.  Preferably this should be done after waking up, before you eat, and with no clothing or minimal clothing on.    Specific Goals:  Practice 30/60 rule  No sugary beverages. At least 64oz of water daily.  Goal protein intake of 60-80 grams per day    Calorie goal:  1500 barbara/day    Return visit:    Calorie deficit/calorie  counting - reviewed importance of eating 3 meals/day. Increase protein.   Increase physical activity  AOM  Wegovy - increase to 2.4mg  - Patient denies personal history of pancreatitis. Patient also denies personal and family history of thyroid cancer and multiple endocrine neoplasia type 2 (MEN 2 tumor).   Continue topamax 50mg QD   RTC in 6 months    ______________________________________________________________________      Subjective:   Chief Complaint   Patient presents with    Follow-up     Mwm 3mth f/u     Patient here to discuss weight associated problems and nutrition goals  HPI: Placido Hernandez  is a 59 y.o. male with history of prediabetes, hypertension, hyperlipidemia, gout, history of PE on Xarelto, osteoarthritis, history of renal cell cancer, history of type 2 diabetes status post Keke-en-Y gastric bypass by Dr. Song Ferrell on 9/22/2020 and excess weight.  Weight loss plan:  Conservative Program.   Most recent notes and records were reviewed.    Wt Readings from Last 10 Encounters:   02/25/25 104 kg (229 lb)   01/28/25 103 kg (228 lb)   01/10/25 104 kg (229 lb)   05/28/24 104 kg (230 lb)   09/15/23 109 kg (239 lb 3.2 oz)   06/05/23 112 kg (247 lb 9.6 oz)   04/14/23 113 kg (249 lb)   04/14/23 112 kg (248 lb)   04/14/23 113 kg (249 lb)   03/09/23 113 kg (249 lb)     Highest weight had been 350 pounds and in the 280 per patient recollection prior to bariatric surgery.    Trav 210  Regain to 247 prior to establishing with medical weight management on 6/5/2023.  He was prescribed Wegovy weekly.  Due to shortage of this was switched to topiramate on 6/29/2023.  Wegovy restarted 4/2024  Current dose: 1.7mg  Start weight: 230 lbs (5/28/24)  Current weight: 229 lbs BMI 38.7 (2/25/25)    Doing well on wegovy without adverse side effects  Does find that he is not eating much breakfast/lunch however eating more in the evenings with stress eating while watching the news.     Hunger/Cravings:  Decreased with  "topiramate.  Dining out:  Occasionally  Hydration:  Water 64oz   Alcohol:  No   Sleep:  6  Weight Monitoring:      Review Of Systems:  Review of Systems   Constitutional:  Negative for activity change, appetite change, chills, fatigue and fever.   HENT:  Negative for trouble swallowing.    Respiratory:  Negative for cough and shortness of breath.    Cardiovascular:  Negative for chest pain, palpitations and leg swelling.   Gastrointestinal:  Negative for abdominal pain, constipation, diarrhea, nausea and vomiting.   Endocrine: Negative for cold intolerance and heat intolerance.   Genitourinary:  Negative for difficulty urinating and dysuria.   Musculoskeletal:  Negative for arthralgias, back pain, gait problem and myalgias.   Skin:  Negative for pallor and rash.   Neurological:  Negative for headaches.   Psychiatric/Behavioral:  Negative for dysphoric mood and suicidal ideas. The patient is not nervous/anxious.      Objective:  /100 (Patient Position: Sitting, Cuff Size: Standard)   Pulse 98   Temp 98.3 °F (36.8 °C) (Tympanic)   Ht 5' 4.5\" (1.638 m)   Wt 104 kg (229 lb)   BMI 38.70 kg/m²   Physical Exam  Vitals and nursing note reviewed.   Constitutional:       General: He is not in acute distress.     Appearance: Normal appearance. He is not ill-appearing or diaphoretic.   Eyes:      General: No scleral icterus.  Cardiovascular:      Rate and Rhythm: Normal rate and regular rhythm.      Pulses: Normal pulses.      Heart sounds: Normal heart sounds. No murmur heard.  Pulmonary:      Effort: Pulmonary effort is normal. No respiratory distress.      Breath sounds: Normal breath sounds. No stridor. No wheezing or rhonchi.   Abdominal:      General: Bowel sounds are normal. There is no distension.      Palpations: Abdomen is soft. There is no mass.      Tenderness: There is no abdominal tenderness.   Musculoskeletal:      Cervical back: Neck supple.      Right lower leg: No edema.      Left lower leg: No " edema.   Lymphadenopathy:      Cervical: No cervical adenopathy.   Skin:     Capillary Refill: Capillary refill takes less than 2 seconds.      Findings: No lesion or rash.   Neurological:      Mental Status: He is alert and oriented to person, place, and time.      Gait: Gait normal.   Psychiatric:         Mood and Affect: Mood normal.         Behavior: Behavior normal.         Labs and Imaging  Recent labs and imaging have been personally reviewed.  Lab Results   Component Value Date    WBC 4.40 02/08/2025    HGB 12.7 02/08/2025    HCT 40.6 02/08/2025    MCV 89 02/08/2025     02/08/2025     Lab Results   Component Value Date     12/26/2015    SODIUM 140 02/08/2025    K 4.0 02/08/2025     (H) 02/08/2025    CO2 26 02/08/2025    ANIONGAP 5 12/26/2015    AGAP 5 02/08/2025    BUN 10 02/08/2025    CREATININE 0.89 02/08/2025    GLUC 96 01/25/2023    GLUF 94 02/08/2025    CALCIUM 8.7 02/08/2025    AST 15 02/08/2025    ALT 17 02/08/2025    ALKPHOS 89 02/08/2025    TP 6.6 02/08/2025    TBILI 0.44 02/08/2025    EGFR 94 02/08/2025     Lab Results   Component Value Date    HGBA1C 5.7 (H) 02/08/2025     Lab Results   Component Value Date    ZQK2ALXTTLGA 1.468 02/08/2025     Lab Results   Component Value Date    CHOLESTEROL 181 02/08/2025     Lab Results   Component Value Date    HDL 84 02/08/2025     Lab Results   Component Value Date    TRIG 68 02/08/2025     Lab Results   Component Value Date    LDLCALC 83 02/08/2025

## 2025-04-05 ENCOUNTER — TELEPHONE (OUTPATIENT)
Age: 60
End: 2025-04-05

## 2025-04-05 NOTE — TELEPHONE ENCOUNTER
04/05/25  Screened by: Sarah Millan MA    Referring Provider ANSHUL Bronson    Pre- Screening:     There is no height or weight on file to calculate BMI. 38.70  Has patient been referred for a routine screening Colonoscopy? yes  Is the patient between 45-75 years old? yes      Previous Colonoscopy yes   If yes:    Date: 5 yrs ago    Facility:     Reason:         Does the patient want to see a Gastroenterologist prior to their procedure OR are they having any GI symptoms? no    Has the patient been hospitalized or had abdominal surgery in the past 6 months? no    Does the patient use supplemental oxygen? no    Does the patient take Coumadin, Lovenox, Plavix, Elliquis, Xarelto, or other blood thinning medication? yes Xarelto    Has the patient had a stroke, cardiac event, or stent placed in the past year? no      If patient is between 45yrs - 49yrs, please advise patient that we will have to confirm benefits & coverage with their insurance company for a routine screening colonoscopy.

## 2025-04-08 DIAGNOSIS — R35.1 BENIGN PROSTATIC HYPERPLASIA WITH NOCTURIA: ICD-10-CM

## 2025-04-08 DIAGNOSIS — N40.1 BENIGN PROSTATIC HYPERPLASIA WITH NOCTURIA: ICD-10-CM

## 2025-04-08 RX ORDER — TAMSULOSIN HYDROCHLORIDE 0.4 MG/1
0.4 CAPSULE ORAL
Qty: 30 CAPSULE | Refills: 6 | OUTPATIENT
Start: 2025-04-08

## 2025-04-29 ENCOUNTER — OFFICE VISIT (OUTPATIENT)
Dept: UROLOGY | Facility: CLINIC | Age: 60
End: 2025-04-29
Payer: COMMERCIAL

## 2025-04-29 VITALS
DIASTOLIC BLOOD PRESSURE: 78 MMHG | WEIGHT: 221 LBS | HEIGHT: 65 IN | OXYGEN SATURATION: 97 % | SYSTOLIC BLOOD PRESSURE: 140 MMHG | BODY MASS INDEX: 36.82 KG/M2 | HEART RATE: 101 BPM

## 2025-04-29 DIAGNOSIS — Z85.528 HISTORY OF KIDNEY CANCER: Primary | ICD-10-CM

## 2025-04-29 PROCEDURE — 99213 OFFICE O/P EST LOW 20 MIN: CPT | Performed by: PHYSICIAN ASSISTANT

## 2025-04-29 RX ORDER — DOXEPIN HYDROCHLORIDE 10 MG/1
10 CAPSULE ORAL
COMMUNITY
Start: 2025-03-20

## 2025-04-29 NOTE — PROGRESS NOTES
Name: Placido Hernandez      : 1965      MRN: [id number]  Encounter Provider: Zaire Kemp PA-C  Encounter Date: 2025   Encounter department: [facility] [place]  :  Assessment & Plan  History of kidney cancer    Orders:    US kidney and bladder; Future  Follow-up in 6 months with ultrasound prior to visit  If stable will transition to yearly appointments      History of Present Illness   Placido Hernandez is a 59 y.o. male who presents history of papillary renal cell cancer.  He had a partial robotic nephrectomy in 2021.  Had recurrent pulmonary embolism and is on lifelong anticoagulation.  For BPH he is on tamsulosin.  PSA 1.05.  His last imaging showed no evidence of recurrent or metastatic disease    Review of Systems       Objective   There were no vitals taken for this visit.    Physical Exam GEN: no acute distress    RESP: breathing comfortably with no accessory muscle use    ABD: soft, non-tender, non-distended   INCISION:    EXT: no significant peripheral edema       Results   Lab Results   Component Value Date    PSA 1.058 2025    PSA 0.6 2022    PSA 0.4 2021     Lab Results   Component Value Date    GLUCOSE 96 2015    CALCIUM 8.7 2025     2015    K 4.0 2025    CO2 26 2025     (H) 2025    BUN 10 2025    CREATININE 0.89 2025     Lab Results   Component Value Date    WBC 4.40 2025    HGB 12.7 2025    HCT 40.6 2025    MCV 89 2025     2025       Office Urine Dip  No results found for this or any previous visit (from the past hour).

## 2025-05-13 ENCOUNTER — TELEPHONE (OUTPATIENT)
Dept: FAMILY MEDICINE CLINIC | Facility: CLINIC | Age: 60
End: 2025-05-13

## 2025-06-19 ENCOUNTER — OFFICE VISIT (OUTPATIENT)
Dept: GASTROENTEROLOGY | Facility: AMBULARY SURGERY CENTER | Age: 60
End: 2025-06-19
Payer: COMMERCIAL

## 2025-06-19 VITALS
HEART RATE: 101 BPM | HEIGHT: 64 IN | BODY MASS INDEX: 38.14 KG/M2 | WEIGHT: 223.4 LBS | DIASTOLIC BLOOD PRESSURE: 80 MMHG | OXYGEN SATURATION: 96 % | SYSTOLIC BLOOD PRESSURE: 118 MMHG

## 2025-06-19 DIAGNOSIS — Z12.11 COLON CANCER SCREENING: ICD-10-CM

## 2025-06-19 DIAGNOSIS — Z86.0100 HISTORY OF COLON POLYPS: Primary | ICD-10-CM

## 2025-06-19 DIAGNOSIS — Z98.84 HISTORY OF BARIATRIC SURGERY: ICD-10-CM

## 2025-06-19 DIAGNOSIS — K21.9 GASTROESOPHAGEAL REFLUX DISEASE, UNSPECIFIED WHETHER ESOPHAGITIS PRESENT: ICD-10-CM

## 2025-06-19 PROCEDURE — 99214 OFFICE O/P EST MOD 30 MIN: CPT

## 2025-06-19 RX ORDER — SODIUM CHLORIDE, SODIUM LACTATE, POTASSIUM CHLORIDE, CALCIUM CHLORIDE 600; 310; 30; 20 MG/100ML; MG/100ML; MG/100ML; MG/100ML
125 INJECTION, SOLUTION INTRAVENOUS CONTINUOUS
OUTPATIENT
Start: 2025-06-19

## 2025-06-19 RX ORDER — FAMOTIDINE 20 MG/1
20 TABLET, FILM COATED ORAL 2 TIMES DAILY
Qty: 90 TABLET | Refills: 3 | Status: SHIPPED | OUTPATIENT
Start: 2025-06-19

## 2025-06-19 NOTE — ASSESSMENT & PLAN NOTE
Recommend EGD for Monroe's screening in the setting of gastric bypass, GERD  Orders:    EGD; Future

## 2025-06-19 NOTE — ASSESSMENT & PLAN NOTE
History of large sliding hiatal hernia that was surgically repaired in 2020.  Has been taking Tums and Rolaids multiple times per week to worsening symptoms at night.    - Given patient's history of osteoporosis we will forego PPI at this time but start famotidine 20 mg twice daily.  If this is inadequate we will consider PPI for the future  - EGD for Monroe's screening as above  Orders:    EGD; Future    famotidine (PEPCID) 20 mg tablet; Take 1 tablet (20 mg total) by mouth in the morning and 1 tablet (20 mg total) in the evening.

## 2025-06-19 NOTE — PROGRESS NOTES
Name: Placido Hernandez      : 1965      MRN: [id number]  Encounter Provider: Venita Valdovinos PA-C  Encounter Date: 2025   Encounter department: [facility]  :  Assessment & Plan  History of colon polyps  [facility] for my review, however per patient last was in 2020 with 5-year recall.  He has history of colon polyps.  No changes in bowel habits, no alarm symptoms.    - Will proceed with colonoscopy for surveillance of colon polyps  - Patient is on Zepbound and also Xarelto.  Will obtain clearance  - MiraLAX/Dulcolax bowel prep  Orders:    Colonoscopy; Future    History of bariatric surgery  Recommend EGD for Monroe's screening in the setting of gastric bypass, GERD  Orders:    EGD; Future    Gastroesophageal reflux disease, unspecified whether esophagitis present  History of large sliding hiatal hernia that was surgically repaired in .  Has been taking Tums and Rolaids multiple times per week to worsening symptoms at night.    - Given patient's history of osteoporosis we will forego PPI at this time but start famotidine 20 mg twice daily.  If this is inadequate we will consider PPI for the future  - EGD for Monroe's screening as above  Orders:    EGD; Future    famotidine (PEPCID) 20 mg tablet; Take 1 tablet (20 mg total) by mouth in the morning and 1 tablet (20 mg total) in the evening.    Colon cancer screening    Orders:    Ambulatory Referral to Gastroenterology        History of Present Illness   Placido Hernandez is a 59 y.o. male with hx of renal cancer s/p nephrectomy, sarcoidosis, PE, HTN, chronic systolic congestive heart failure, GERD, sleeve gastrectomy and postsurgical malabsorption, incisional hernia of abdominal wall, small bowel obstruction due to adhesions, hyperparathyroidism, osteoporosis, hypercholesterolemia, arthritis, who presents for colonoscopy consult.    Patient has had 2  "prior [facility], the first was at age 51 with polyps, second was unremarkable per patient and wife.  He reports that his aunt had colon cancer, no first-degree relatives with colon cancer.    Denies any changes to his bowel habits, rectal bleeding, hematochezia, melena.  Has regular daily bowel movements 1-2 times per day.  He started Zepbound at the beginning of this month but denies any side effects.    He continues to have heartburn/reflux that is worse at night.  He has not been on any medication for this recently outside of taking Tums and Rolaids multiple times per week.  He is hesitant to start PPI given his history of osteoporosis.    EGD 2019 large sliding hiatal hernia, normal stomach and duodenum.     Surgical history is notable for revision to Keke-en-Y gastric bypass and paraesophageal hernia repair in 2020 and incarcerated incisional hernia repair with mesh in 2021.    HPI  History obtained from: patient  Review of Systems   HENT:  Negative for sore throat and trouble swallowing.    Gastrointestinal:  Negative for abdominal distention, abdominal pain, anal bleeding, blood in stool, constipation, diarrhea, nausea, rectal pain and vomiting.    A complete review of systems is negative other than that noted above in the HPI.         Objective   /80 (BP Location: Left arm, Patient Position: Sitting, Cuff Size: Standard)   Pulse 101   Ht 5' 4\" (1.626 m)   Wt 101 kg (223 lb 6.4 oz)   SpO2 96%   BMI 38.35 kg/m²     Physical Exam  Vitals and nursing note reviewed.   Constitutional:       General: He is not in acute distress.     Appearance: He is well-developed.   HENT:      Head: Normocephalic and atraumatic.     Eyes:      General: No scleral icterus.     Conjunctiva/sclera: Conjunctivae normal.       Cardiovascular:      Rate and Rhythm: Normal rate and regular rhythm.      Heart sounds: No murmur heard.  Pulmonary:      Effort: Pulmonary effort is normal. No respiratory " distress.      Breath sounds: Normal breath sounds.   Abdominal:      General: Bowel sounds are normal. There is no distension.      Palpations: Abdomen is soft.      Tenderness: There is no abdominal tenderness. There is no guarding or rebound.     Musculoskeletal:         General: No swelling.      Cervical back: Neck supple.     Skin:     General: Skin is warm and dry.      Capillary Refill: Capillary refill takes less than 2 seconds.     Neurological:      General: No focal deficit present.      Mental Status: He is alert and oriented to person, place, and time.     Psychiatric:         Mood and Affect: Mood normal.         Behavior: Behavior normal.          Lab Results: I personally reviewed relevant lab results.       Results for orders placed during the hospital encounter of 07/15/19    EGD    Impression  Large hiatal hernia otherwise normal EGD.    RECOMMENDATION:  Return to bariatric surgeon for their recommendations given large hernia found today.

## 2025-07-07 DIAGNOSIS — N40.1 BENIGN PROSTATIC HYPERPLASIA WITH NOCTURIA: ICD-10-CM

## 2025-07-07 DIAGNOSIS — R35.1 BENIGN PROSTATIC HYPERPLASIA WITH NOCTURIA: ICD-10-CM

## 2025-07-09 RX ORDER — TAMSULOSIN HYDROCHLORIDE 0.4 MG/1
0.4 CAPSULE ORAL
Qty: 30 CAPSULE | Refills: 5 | Status: SHIPPED | OUTPATIENT
Start: 2025-07-09

## 2025-08-03 DIAGNOSIS — K21.9 GASTROESOPHAGEAL REFLUX DISEASE, UNSPECIFIED WHETHER ESOPHAGITIS PRESENT: ICD-10-CM

## 2025-08-04 ENCOUNTER — OFFICE VISIT (OUTPATIENT)
Dept: FAMILY MEDICINE CLINIC | Facility: CLINIC | Age: 60
End: 2025-08-04
Payer: COMMERCIAL

## 2025-08-04 DIAGNOSIS — I10 ESSENTIAL HYPERTENSION: Primary | ICD-10-CM

## 2025-08-04 DIAGNOSIS — M54.16 LUMBAR RADICULOPATHY: ICD-10-CM

## 2025-08-04 DIAGNOSIS — M25.571 CHRONIC PAIN OF BOTH ANKLES: ICD-10-CM

## 2025-08-04 DIAGNOSIS — M81.0 OSTEOPOROSIS OF LUMBAR SPINE: ICD-10-CM

## 2025-08-04 DIAGNOSIS — Z96.9 PRESENCE OF RETAINED HARDWARE: ICD-10-CM

## 2025-08-04 DIAGNOSIS — Z87.81 HISTORY OF FRACTURE OF ANKLE: ICD-10-CM

## 2025-08-04 DIAGNOSIS — M25.572 CHRONIC PAIN OF BOTH ANKLES: ICD-10-CM

## 2025-08-04 DIAGNOSIS — G89.29 CHRONIC PAIN OF BOTH ANKLES: ICD-10-CM

## 2025-08-04 PROCEDURE — 99214 OFFICE O/P EST MOD 30 MIN: CPT | Performed by: NURSE PRACTITIONER

## 2025-08-04 RX ORDER — ALENDRONATE SODIUM 70 MG/1
70 TABLET ORAL
COMMUNITY
Start: 2025-08-01

## 2025-08-04 RX ORDER — FAMOTIDINE 20 MG/1
TABLET, FILM COATED ORAL
Qty: 180 TABLET | Refills: 2 | Status: SHIPPED | OUTPATIENT
Start: 2025-08-04

## 2025-08-04 RX ORDER — CHOLECALCIFEROL (VITAMIN D3) 50 MCG
50 TABLET ORAL DAILY
COMMUNITY
Start: 2025-08-01

## 2025-08-04 RX ORDER — TIRZEPATIDE 7.5 MG/.5ML
7.5 INJECTION, SOLUTION SUBCUTANEOUS
COMMUNITY
Start: 2025-07-08

## 2025-08-05 ENCOUNTER — HOSPITAL ENCOUNTER (OUTPATIENT)
Dept: RADIOLOGY | Facility: HOSPITAL | Age: 60
Discharge: HOME | End: 2025-08-05
Payer: COMMERCIAL

## 2025-08-05 VITALS
HEART RATE: 92 BPM | TEMPERATURE: 97.3 F | WEIGHT: 220.2 LBS | HEIGHT: 65 IN | DIASTOLIC BLOOD PRESSURE: 80 MMHG | SYSTOLIC BLOOD PRESSURE: 128 MMHG | BODY MASS INDEX: 36.69 KG/M2 | OXYGEN SATURATION: 98 %

## 2025-08-05 DIAGNOSIS — M25.571 CHRONIC PAIN OF BOTH ANKLES: ICD-10-CM

## 2025-08-05 DIAGNOSIS — Z96.9 PRESENCE OF RETAINED HARDWARE: ICD-10-CM

## 2025-08-05 DIAGNOSIS — M81.0 OSTEOPOROSIS OF LUMBAR SPINE: ICD-10-CM

## 2025-08-05 DIAGNOSIS — M54.16 LUMBAR RADICULOPATHY: ICD-10-CM

## 2025-08-05 DIAGNOSIS — Z87.81 HISTORY OF FRACTURE OF ANKLE: ICD-10-CM

## 2025-08-05 DIAGNOSIS — G89.29 CHRONIC PAIN OF BOTH ANKLES: ICD-10-CM

## 2025-08-05 DIAGNOSIS — M25.572 CHRONIC PAIN OF BOTH ANKLES: ICD-10-CM

## 2025-08-05 PROCEDURE — 72110 X-RAY EXAM L-2 SPINE 4/>VWS: CPT

## 2025-08-05 PROCEDURE — 73610 X-RAY EXAM OF ANKLE: CPT

## 2025-08-13 ENCOUNTER — TELEPHONE (OUTPATIENT)
Dept: GASTROENTEROLOGY | Facility: AMBULARY SURGERY CENTER | Age: 60
End: 2025-08-13

## (undated) DEVICE — SUT VICRYL 2-0 REEL 54 IN J286G

## (undated) DEVICE — IRRIG ENDO FLO TUBING

## (undated) DEVICE — SURGICEL 4 X 8

## (undated) DEVICE — VESSEL SEALER EXTEND: Brand: ENDOWRIST

## (undated) DEVICE — MEGA SUTURECUT ND: Brand: ENDOWRIST

## (undated) DEVICE — VIOLET BRAIDED (POLYGLACTIN 910), SYNTHETIC ABSORBABLE SUTURE: Brand: COATED VICRYL

## (undated) DEVICE — ARM DRAPE

## (undated) DEVICE — COLUMN DRAPE

## (undated) DEVICE — VIAL DECANTER

## (undated) DEVICE — HEMOSTATIC MATRIX SURGIFLO 8ML W/THROMBIN

## (undated) DEVICE — SUT PDS II 1 CTX 36 IN Z371T

## (undated) DEVICE — SUT VICRYL 0 REEL 54 IN J287G

## (undated) DEVICE — TIP COVER ACCESSORY

## (undated) DEVICE — SEAL

## (undated) DEVICE — STAPLER 60 RELOAD BLUE: Brand: SUREFORM

## (undated) DEVICE — URETERAL CATHETER ADAPTOR TIP

## (undated) DEVICE — 3000CC GUARDIAN II: Brand: GUARDIAN

## (undated) DEVICE — ADHESIVE SKIN CLSR DERMABOND NX

## (undated) DEVICE — PBT NON ABSORBABLE WOUND CLOSURE DEVICE: Brand: V-LOC

## (undated) DEVICE — MONOPOLAR CURVED SCISSORS: Brand: ENDOWRIST

## (undated) DEVICE — SUT MONOCRYL 4-0 SH 27 IN Y315H

## (undated) DEVICE — SYRINGE 10ML LL

## (undated) DEVICE — VISUALIZATION SYSTEM: Brand: CLEARIFY

## (undated) DEVICE — SUT VLOC 90 3-0 CV-23 9 IN VLOCM0844

## (undated) DEVICE — ASTOUND STANDARD SURGICAL GOWN, XL: Brand: CONVERTORS

## (undated) DEVICE — SUT SILK 2-0 SH 30 IN K833H

## (undated) DEVICE — 2000CC GUARDIAN II: Brand: GUARDIAN

## (undated) DEVICE — SUT MONOCRYL 4-0 PS-2 27 IN Y426H

## (undated) DEVICE — TISSUE RETRIEVAL SYSTEM: Brand: INZII RETRIEVAL SYSTEM

## (undated) DEVICE — Device: Brand: DEFENDO AIR/WATER/SUCTION AND BIOPSY VALVE

## (undated) DEVICE — CATH FOLEY 16FR 5ML 2 WAY UNCOATED SILICONE

## (undated) DEVICE — SCD SEQUENTIAL COMPRESSION COMFORT SLEEVE MEDIUM KNEE LENGTH: Brand: KENDALL SCD

## (undated) DEVICE — ABSORBABLE WOUND CLOSURE DEVICE: Brand: V-LOC 90

## (undated) DEVICE — BLUE HEAT SCOPE WARMER

## (undated) DEVICE — ADHESIVE SKIN HIGH VISCOSITY EXOFIN 1ML

## (undated) DEVICE — DRAPE TOWEL: Brand: CONVERTORS

## (undated) DEVICE — WEBRIL 6 IN UNSTERILE

## (undated) DEVICE — INTENDED FOR TISSUE SEPARATION, AND OTHER PROCEDURES THAT REQUIRE A SHARP SURGICAL BLADE TO PUNCTURE OR CUT.: Brand: BARD-PARKER SAFETY BLADES SIZE 15, STERILE

## (undated) DEVICE — 3M™ DURAPORE™ SURGICAL TAPE 1538-3, 3 INCH X 10 YARD (7,5CM X 9,1M), 4 ROLLS/BOX: Brand: 3M™ DURAPORE™

## (undated) DEVICE — HEM-O-LOK CLIP CARTRIDGE LARGE DA VINCI SI/XI

## (undated) DEVICE — SUT ETHILON 2-0 FS 18 IN 664H

## (undated) DEVICE — ENDOPATH PNEUMONEEDLE INSUFFLATION NEEDLES WITH LUER LOCK CONNECTORS 120MM: Brand: ENDOPATH

## (undated) DEVICE — PMI DISPOSABLE PUNCTURE CLOSURE DEVICE / SUTURE GRASPER: Brand: PMI

## (undated) DEVICE — JACKSON-PRATT 100CC BULB RESERVOIR: Brand: CARDINAL HEALTH

## (undated) DEVICE — HEAVY DUTY TABLE COVER: Brand: CONVERTORS

## (undated) DEVICE — SUT STRATAFIX SPIRAL PDS 2-0 CT-1 45 CM SXPP1B411

## (undated) DEVICE — 3M™ IOBAN™ 2 ANTIMICROBIAL INCISE DRAPE 6650EZ: Brand: IOBAN™ 2

## (undated) DEVICE — NEEDLE 25G X 1 1/2

## (undated) DEVICE — SYRINGE 30ML LL

## (undated) DEVICE — STRL PENROSE DRAIN 18" X 1/2": Brand: CARDINAL HEALTH

## (undated) DEVICE — VISIGI 3D®  CALIBRATION SYSTEM  SIZE 36FR BYPASS/SHORT: Brand: BOEHRINGER® VISIGI 3D®  CALIBRATION SYSTEM  SIZE 36FR BYPASS/SHORT

## (undated) DEVICE — JP CHANNEL DRAIN 19FR, FULL FLUTES: Brand: JACKSON-PRATT

## (undated) DEVICE — PROGRASP FORCEPS: Brand: ENDOWRIST

## (undated) DEVICE — TOWEL SURG XR DETECT GREEN STRL RFD

## (undated) DEVICE — TIP-UP FENESTRATED GRASPER: Brand: ENDOWRIST

## (undated) DEVICE — GAUZE SPONGES,16 PLY: Brand: CURITY

## (undated) DEVICE — CANNULA SEAL

## (undated) DEVICE — GLOVE SRG BIOGEL 7.5

## (undated) DEVICE — LAPAROSCOPIC DUAL RIGID APPLICATOR: Brand: ETHICON

## (undated) DEVICE — TUBING SMOKE EVAC W/FILTRATION DEVICE PLUMEPORT ACTIV

## (undated) DEVICE — PLUMEPEN PRO 10FT

## (undated) DEVICE — TRAY FOLEY 16FR URIMETER SURESTEP

## (undated) DEVICE — INTENDED FOR TISSUE SEPARATION, AND OTHER PROCEDURES THAT REQUIRE A SHARP SURGICAL BLADE TO PUNCTURE OR CUT.: Brand: BARD-PARKER SAFETY BLADES SIZE 11, STERILE

## (undated) DEVICE — SUT SILK 0 SH 30 IN K834H

## (undated) DEVICE — NEEDLE SPINAL18G X 3.5 IN QUINCKE

## (undated) DEVICE — SEALANT FIBRIN VISTASEAL 10ML

## (undated) DEVICE — LIGHT HANDLE COVER SLEEVE DISP BLUE STELLAR

## (undated) DEVICE — SYRINGE 20ML LL

## (undated) DEVICE — TIBURON LAPAROSCOPIC ABDOMINAL DRAPE: Brand: CONVERTORS

## (undated) DEVICE — LARGE NEEDLE DRIVER: Brand: ENDOWRIST

## (undated) DEVICE — SUT VICRYL 0 CT-1 27 IN J260H

## (undated) DEVICE — BLADELESS OBTURATOR: Brand: WECK VISTA

## (undated) DEVICE — CHLORAPREP HI-LITE 26ML ORANGE

## (undated) DEVICE — MAYO STAND COVER: Brand: CONVERTORS

## (undated) DEVICE — BETHLEHEM MAJOR GENERAL PACK: Brand: CARDINAL HEALTH

## (undated) DEVICE — STAPLER 60: Brand: SUREFORM

## (undated) DEVICE — INTENDED FOR TISSUE SEPARATION, AND OTHER PROCEDURES THAT REQUIRE A SHARP SURGICAL BLADE TO PUNCTURE OR CUT.: Brand: BARD-PARKER SAFETY BLADES SIZE 10, STERILE

## (undated) DEVICE — [HIGH FLOW INSUFFLATOR,  DO NOT USE IF PACKAGE IS DAMAGED,  KEEP DRY,  KEEP AWAY FROM SUNLIGHT,  PROTECT FROM HEAT AND RADIOACTIVE SOURCES.]: Brand: PNEUMOSURE

## (undated) DEVICE — REDUCER: Brand: ENDOWRIST

## (undated) DEVICE — COTTON TIP APPLICTOR 2 PK

## (undated) DEVICE — TROCAR: Brand: KII FIOS FIRST ENTRY

## (undated) DEVICE — BETHLEHEM UNIVERSAL MINOR GEN: Brand: CARDINAL HEALTH

## (undated) DEVICE — TUBING SUCTION 5MM X 12 FT

## (undated) DEVICE — PENCIL ELECTROSURG E-Z CLEAN -0035H

## (undated) DEVICE — JP PERF DRN SIL FLT 10MM FULL: Brand: CARDINAL HEALTH

## (undated) DEVICE — GLOVE SRG BIOGEL ECLIPSE 7

## (undated) DEVICE — FENESTRATED BIPOLAR FORCEPS: Brand: ENDOWRIST

## (undated) DEVICE — SUT VICRYL 0 UR-6 27 IN J603H

## (undated) DEVICE — POOLE SUCTION HANDLE: Brand: CARDINAL HEALTH

## (undated) DEVICE — SUT ETHILON 2-0 PS 18 IN 585H

## (undated) DEVICE — STAPLER 60 RELOAD GREEN: Brand: SUREFORM

## (undated) DEVICE — SUT SILK 0 30 IN A306H

## (undated) DEVICE — TROCAR PORT ACCESS 12 X120MM W/BLDLS OPTICAL TIP AIRSEAL

## (undated) DEVICE — TRAVELKIT CONTAINS FIRST STEP KIT (200ML EP-4 KIT) AND SOILED SCOPE BAG - 1 KIT: Brand: TRAVELKIT CONTAINS FIRST STEP KIT AND SOILED SCOPE BAG

## (undated) DEVICE — STAPLER 60 RELOAD WHITE: Brand: SUREFORM

## (undated) DEVICE — GLOVE SRG BIOGEL ORTHOPEDIC 8

## (undated) DEVICE — GLOVE INDICATOR PI UNDERGLOVE SZ 7 BLUE

## (undated) DEVICE — AIRSEAL TUBE SMOKE EVAC LUMENX3 FILTERED

## (undated) DEVICE — 10 MM BABCOCKS WITH RATCHET HANDLES: Brand: ENDOPATH

## (undated) DEVICE — STAPLER CANNULA SEAL: Brand: ENDOWRIST

## (undated) DEVICE — DRESSING XEROFORM 5 X 9

## (undated) DEVICE — PROXIMATE RELOADABLE LINEAR STAPLER: Brand: PROXIMATE

## (undated) DEVICE — SUT ETHIBOND 0 CT-1 30 IN X424H

## (undated) DEVICE — DRAPE SHEET THREE QUARTER

## (undated) DEVICE — ANTI-FOG SOLUTION WITH FOAM PAD: Brand: DEVON

## (undated) DEVICE — CADIERE FORCEPS: Brand: ENDOWRIST

## (undated) DEVICE — BAG DECANTER

## (undated) DEVICE — SUT PDS II 3-0 SH 27 IN Z316H

## (undated) DEVICE — DRAPE EQUIPMENT RF WAND